# Patient Record
Sex: MALE | Race: BLACK OR AFRICAN AMERICAN | NOT HISPANIC OR LATINO | Employment: FULL TIME | URBAN - METROPOLITAN AREA
[De-identification: names, ages, dates, MRNs, and addresses within clinical notes are randomized per-mention and may not be internally consistent; named-entity substitution may affect disease eponyms.]

---

## 2018-08-03 ENCOUNTER — HOSPITAL ENCOUNTER (INPATIENT)
Facility: HOSPITAL | Age: 48
LOS: 1 days | Discharge: HOME | DRG: 493 | End: 2018-08-04
Attending: EMERGENCY MEDICINE | Admitting: ORTHOPAEDIC SURGERY
Payer: OTHER MISCELLANEOUS

## 2018-08-03 ENCOUNTER — ANESTHESIA (OUTPATIENT)
Dept: SURGERY | Facility: HOSPITAL | Age: 48
DRG: 493 | End: 2018-08-03
Payer: OTHER MISCELLANEOUS

## 2018-08-03 ENCOUNTER — ANESTHESIA EVENT (OUTPATIENT)
Dept: SURGERY | Facility: HOSPITAL | Age: 48
DRG: 493 | End: 2018-08-03
Payer: OTHER MISCELLANEOUS

## 2018-08-03 DIAGNOSIS — T14.90XA TRAUMA: ICD-10-CM

## 2018-08-03 DIAGNOSIS — S82.891A CLOSED FRACTURE OF RIGHT ANKLE, INITIAL ENCOUNTER: Primary | ICD-10-CM

## 2018-08-03 DIAGNOSIS — Z01.818 PREOPERATIVE CLEARANCE: ICD-10-CM

## 2018-08-03 LAB
ALBUMIN SERPL BCP-MCNC: 4 G/DL
ALP SERPL-CCNC: 62 U/L
ALT SERPL W/O P-5'-P-CCNC: 23 U/L
ANION GAP SERPL CALC-SCNC: 4 MMOL/L
AST SERPL-CCNC: 25 U/L
BASOPHILS # BLD AUTO: 0.01 K/UL
BASOPHILS NFR BLD: 0.1 %
BILIRUB SERPL-MCNC: 0.9 MG/DL
BUN SERPL-MCNC: 10 MG/DL
CALCIUM SERPL-MCNC: 8.6 MG/DL
CHLORIDE SERPL-SCNC: 105 MMOL/L
CO2 SERPL-SCNC: 27 MMOL/L
CREAT SERPL-MCNC: 1 MG/DL
DIFFERENTIAL METHOD: ABNORMAL
EOSINOPHIL # BLD AUTO: 0 K/UL
EOSINOPHIL NFR BLD: 0.4 %
ERYTHROCYTE [DISTWIDTH] IN BLOOD BY AUTOMATED COUNT: 11.8 %
EST. GFR  (AFRICAN AMERICAN): >60 ML/MIN/1.73 M^2
EST. GFR  (NON AFRICAN AMERICAN): >60 ML/MIN/1.73 M^2
GLUCOSE SERPL-MCNC: 103 MG/DL
HCT VFR BLD AUTO: 39.9 %
HGB BLD-MCNC: 13.5 G/DL
LYMPHOCYTES # BLD AUTO: 0.9 K/UL
LYMPHOCYTES NFR BLD: 12 %
MCH RBC QN AUTO: 30.1 PG
MCHC RBC AUTO-ENTMCNC: 33.8 G/DL
MCV RBC AUTO: 89 FL
MONOCYTES # BLD AUTO: 0.6 K/UL
MONOCYTES NFR BLD: 8.5 %
NEUTROPHILS # BLD AUTO: 5.9 K/UL
NEUTROPHILS NFR BLD: 79 %
PLATELET # BLD AUTO: 155 K/UL
PMV BLD AUTO: 9.2 FL
POTASSIUM SERPL-SCNC: 4.5 MMOL/L
PROT SERPL-MCNC: 7.5 G/DL
RBC # BLD AUTO: 4.48 M/UL
SODIUM SERPL-SCNC: 136 MMOL/L
WBC # BLD AUTO: 7.44 K/UL

## 2018-08-03 PROCEDURE — 36000708 HC OR TIME LEV III 1ST 15 MIN: Performed by: ORTHOPAEDIC SURGERY

## 2018-08-03 PROCEDURE — 80053 COMPREHEN METABOLIC PANEL: CPT

## 2018-08-03 PROCEDURE — 63600175 PHARM REV CODE 636 W HCPCS: Performed by: ANESTHESIOLOGY

## 2018-08-03 PROCEDURE — 94761 N-INVAS EAR/PLS OXIMETRY MLT: CPT

## 2018-08-03 PROCEDURE — 25000003 PHARM REV CODE 250: Performed by: ANESTHESIOLOGY

## 2018-08-03 PROCEDURE — 93005 ELECTROCARDIOGRAM TRACING: CPT

## 2018-08-03 PROCEDURE — 85025 COMPLETE CBC W/AUTO DIFF WBC: CPT

## 2018-08-03 PROCEDURE — 96375 TX/PRO/DX INJ NEW DRUG ADDON: CPT

## 2018-08-03 PROCEDURE — 0QSG3BZ REPOSITION RIGHT TIBIA WITH MONOPLANAR EXTERNAL FIXATION DEVICE, PERCUTANEOUS APPROACH: ICD-10-PCS | Performed by: ORTHOPAEDIC SURGERY

## 2018-08-03 PROCEDURE — 71000039 HC RECOVERY, EACH ADD'L HOUR: Performed by: ORTHOPAEDIC SURGERY

## 2018-08-03 PROCEDURE — 36000711: Performed by: ORTHOPAEDIC SURGERY

## 2018-08-03 PROCEDURE — 63600175 PHARM REV CODE 636 W HCPCS: Performed by: EMERGENCY MEDICINE

## 2018-08-03 PROCEDURE — 71000033 HC RECOVERY, INTIAL HOUR: Performed by: ORTHOPAEDIC SURGERY

## 2018-08-03 PROCEDURE — 63600175 PHARM REV CODE 636 W HCPCS: Performed by: STUDENT IN AN ORGANIZED HEALTH CARE EDUCATION/TRAINING PROGRAM

## 2018-08-03 PROCEDURE — C1713 ANCHOR/SCREW BN/BN,TIS/BN: HCPCS | Performed by: ORTHOPAEDIC SURGERY

## 2018-08-03 PROCEDURE — 36000710: Performed by: ORTHOPAEDIC SURGERY

## 2018-08-03 PROCEDURE — 25000003 PHARM REV CODE 250: Performed by: NURSE ANESTHETIST, CERTIFIED REGISTERED

## 2018-08-03 PROCEDURE — 37000008 HC ANESTHESIA 1ST 15 MINUTES: Performed by: ORTHOPAEDIC SURGERY

## 2018-08-03 PROCEDURE — 25000003 PHARM REV CODE 250: Performed by: STUDENT IN AN ORGANIZED HEALTH CARE EDUCATION/TRAINING PROGRAM

## 2018-08-03 PROCEDURE — 25000003 PHARM REV CODE 250: Performed by: EMERGENCY MEDICINE

## 2018-08-03 PROCEDURE — 93010 ELECTROCARDIOGRAM REPORT: CPT | Mod: ,,, | Performed by: INTERNAL MEDICINE

## 2018-08-03 PROCEDURE — 37000009 HC ANESTHESIA EA ADD 15 MINS: Performed by: ORTHOPAEDIC SURGERY

## 2018-08-03 PROCEDURE — 11000001 HC ACUTE MED/SURG PRIVATE ROOM

## 2018-08-03 PROCEDURE — 63600175 PHARM REV CODE 636 W HCPCS: Performed by: NURSE ANESTHETIST, CERTIFIED REGISTERED

## 2018-08-03 PROCEDURE — 96374 THER/PROPH/DIAG INJ IV PUSH: CPT

## 2018-08-03 PROCEDURE — 36000709 HC OR TIME LEV III EA ADD 15 MIN: Performed by: ORTHOPAEDIC SURGERY

## 2018-08-03 PROCEDURE — 99285 EMERGENCY DEPT VISIT HI MDM: CPT | Mod: 25

## 2018-08-03 DEVICE — CLAMP COMBINATION / LG EXT FIX: Type: IMPLANTABLE DEVICE | Site: LEG | Status: FUNCTIONAL

## 2018-08-03 DEVICE — ROD CARBON FIBER 11MM X 400MM: Type: IMPLANTABLE DEVICE | Site: LEG | Status: FUNCTIONAL

## 2018-08-03 DEVICE — SCREW SCHANZ 5/60/150 294.784: Type: IMPLANTABLE DEVICE | Site: LEG | Status: FUNCTIONAL

## 2018-08-03 DEVICE — CLAMP LG 4 POSITION MULTI-PIN: Type: IMPLANTABLE DEVICE | Site: LEG | Status: FUNCTIONAL

## 2018-08-03 DEVICE — POST FIXATOR EXTERAL 11MM SS: Type: IMPLANTABLE DEVICE | Site: LEG | Status: FUNCTIONAL

## 2018-08-03 DEVICE — PIN TRANFX EXFIX 6X225: Type: IMPLANTABLE DEVICE | Site: LEG | Status: FUNCTIONAL

## 2018-08-03 RX ORDER — ACETAMINOPHEN 325 MG/1
650 TABLET ORAL EVERY 6 HOURS PRN
Status: DISCONTINUED | OUTPATIENT
Start: 2018-08-03 | End: 2018-08-04 | Stop reason: HOSPADM

## 2018-08-03 RX ORDER — MORPHINE SULFATE 2 MG/ML
3 INJECTION, SOLUTION INTRAMUSCULAR; INTRAVENOUS
Status: DISCONTINUED | OUTPATIENT
Start: 2018-08-03 | End: 2018-08-04

## 2018-08-03 RX ORDER — MUPIROCIN 20 MG/G
1 OINTMENT TOPICAL 2 TIMES DAILY
Status: DISCONTINUED | OUTPATIENT
Start: 2018-08-03 | End: 2018-08-04 | Stop reason: HOSPADM

## 2018-08-03 RX ORDER — MIDAZOLAM HYDROCHLORIDE 1 MG/ML
INJECTION, SOLUTION INTRAMUSCULAR; INTRAVENOUS
Status: DISCONTINUED | OUTPATIENT
Start: 2018-08-03 | End: 2018-08-03

## 2018-08-03 RX ORDER — ROCURONIUM BROMIDE 10 MG/ML
INJECTION, SOLUTION INTRAVENOUS
Status: DISCONTINUED | OUTPATIENT
Start: 2018-08-03 | End: 2018-08-03

## 2018-08-03 RX ORDER — HYDROCODONE BITARTRATE AND ACETAMINOPHEN 5; 325 MG/1; MG/1
1 TABLET ORAL EVERY 4 HOURS PRN
Status: DISCONTINUED | OUTPATIENT
Start: 2018-08-03 | End: 2018-08-04 | Stop reason: HOSPADM

## 2018-08-03 RX ORDER — FENTANYL CITRATE 50 UG/ML
INJECTION, SOLUTION INTRAMUSCULAR; INTRAVENOUS
Status: DISCONTINUED | OUTPATIENT
Start: 2018-08-03 | End: 2018-08-03

## 2018-08-03 RX ORDER — ONDANSETRON 2 MG/ML
4 INJECTION INTRAMUSCULAR; INTRAVENOUS EVERY 12 HOURS PRN
Status: DISCONTINUED | OUTPATIENT
Start: 2018-08-03 | End: 2018-08-04 | Stop reason: HOSPADM

## 2018-08-03 RX ORDER — MORPHINE SULFATE 4 MG/ML
4 INJECTION, SOLUTION INTRAMUSCULAR; INTRAVENOUS
Status: COMPLETED | OUTPATIENT
Start: 2018-08-03 | End: 2018-08-03

## 2018-08-03 RX ORDER — ONDANSETRON 2 MG/ML
4 INJECTION INTRAMUSCULAR; INTRAVENOUS DAILY PRN
Status: DISCONTINUED | OUTPATIENT
Start: 2018-08-03 | End: 2018-08-03 | Stop reason: HOSPADM

## 2018-08-03 RX ORDER — SODIUM CHLORIDE, SODIUM LACTATE, POTASSIUM CHLORIDE, CALCIUM CHLORIDE 600; 310; 30; 20 MG/100ML; MG/100ML; MG/100ML; MG/100ML
INJECTION, SOLUTION INTRAVENOUS CONTINUOUS PRN
Status: DISCONTINUED | OUTPATIENT
Start: 2018-08-03 | End: 2018-08-03

## 2018-08-03 RX ORDER — DIPHENHYDRAMINE HCL 25 MG
25 CAPSULE ORAL EVERY 6 HOURS PRN
Status: DISCONTINUED | OUTPATIENT
Start: 2018-08-03 | End: 2018-08-04 | Stop reason: HOSPADM

## 2018-08-03 RX ORDER — ENOXAPARIN SODIUM 100 MG/ML
40 INJECTION SUBCUTANEOUS EVERY 24 HOURS
Status: DISCONTINUED | OUTPATIENT
Start: 2018-08-03 | End: 2018-08-04 | Stop reason: HOSPADM

## 2018-08-03 RX ORDER — SODIUM CHLORIDE 0.9 % (FLUSH) 0.9 %
3 SYRINGE (ML) INJECTION
Status: DISCONTINUED | OUTPATIENT
Start: 2018-08-03 | End: 2018-08-03 | Stop reason: HOSPADM

## 2018-08-03 RX ORDER — SUCCINYLCHOLINE CHLORIDE 20 MG/ML
INJECTION INTRAMUSCULAR; INTRAVENOUS
Status: DISCONTINUED | OUTPATIENT
Start: 2018-08-03 | End: 2018-08-03

## 2018-08-03 RX ORDER — SODIUM CHLORIDE 0.9 % (FLUSH) 0.9 %
5 SYRINGE (ML) INJECTION
Status: DISCONTINUED | OUTPATIENT
Start: 2018-08-03 | End: 2018-08-04 | Stop reason: HOSPADM

## 2018-08-03 RX ORDER — ACETAMINOPHEN 10 MG/ML
INJECTION, SOLUTION INTRAVENOUS
Status: DISCONTINUED | OUTPATIENT
Start: 2018-08-03 | End: 2018-08-03

## 2018-08-03 RX ORDER — DIPHENHYDRAMINE HYDROCHLORIDE 50 MG/ML
12.5 INJECTION INTRAMUSCULAR; INTRAVENOUS EVERY 6 HOURS PRN
Status: DISCONTINUED | OUTPATIENT
Start: 2018-08-03 | End: 2018-08-03 | Stop reason: HOSPADM

## 2018-08-03 RX ORDER — HYDROCODONE BITARTRATE AND ACETAMINOPHEN 5; 325 MG/1; MG/1
1 TABLET ORAL EVERY 6 HOURS PRN
Status: COMPLETED | OUTPATIENT
Start: 2018-08-03 | End: 2018-08-03

## 2018-08-03 RX ORDER — PROPOFOL 10 MG/ML
VIAL (ML) INTRAVENOUS
Status: DISCONTINUED | OUTPATIENT
Start: 2018-08-03 | End: 2018-08-03

## 2018-08-03 RX ORDER — HYDROMORPHONE HYDROCHLORIDE 2 MG/ML
0.5 INJECTION, SOLUTION INTRAMUSCULAR; INTRAVENOUS; SUBCUTANEOUS EVERY 5 MIN PRN
Status: DISCONTINUED | OUTPATIENT
Start: 2018-08-03 | End: 2018-08-03 | Stop reason: HOSPADM

## 2018-08-03 RX ORDER — ONDANSETRON 2 MG/ML
INJECTION INTRAMUSCULAR; INTRAVENOUS
Status: DISCONTINUED | OUTPATIENT
Start: 2018-08-03 | End: 2018-08-03

## 2018-08-03 RX ORDER — SODIUM CHLORIDE, SODIUM LACTATE, POTASSIUM CHLORIDE, CALCIUM CHLORIDE 600; 310; 30; 20 MG/100ML; MG/100ML; MG/100ML; MG/100ML
1000 INJECTION, SOLUTION INTRAVENOUS
Status: COMPLETED | OUTPATIENT
Start: 2018-08-03 | End: 2018-08-03

## 2018-08-03 RX ORDER — LIDOCAINE HYDROCHLORIDE 20 MG/ML
INJECTION, SOLUTION EPIDURAL; INFILTRATION; INTRACAUDAL; PERINEURAL
Status: DISCONTINUED | OUTPATIENT
Start: 2018-08-03 | End: 2018-08-03

## 2018-08-03 RX ORDER — HYDROMORPHONE HYDROCHLORIDE 2 MG/ML
INJECTION, SOLUTION INTRAMUSCULAR; INTRAVENOUS; SUBCUTANEOUS
Status: DISCONTINUED
Start: 2018-08-03 | End: 2018-08-03 | Stop reason: WASHOUT

## 2018-08-03 RX ORDER — ONDANSETRON 2 MG/ML
4 INJECTION INTRAMUSCULAR; INTRAVENOUS
Status: COMPLETED | OUTPATIENT
Start: 2018-08-03 | End: 2018-08-03

## 2018-08-03 RX ADMIN — ONDANSETRON 4 MG: 2 INJECTION INTRAMUSCULAR; INTRAVENOUS at 06:08

## 2018-08-03 RX ADMIN — MUPIROCIN 1 G: 20 OINTMENT TOPICAL at 09:08

## 2018-08-03 RX ADMIN — ROCURONIUM BROMIDE 30 MG: 10 INJECTION, SOLUTION INTRAVENOUS at 09:08

## 2018-08-03 RX ADMIN — HYDROMORPHONE HYDROCHLORIDE 0.5 MG: 2 INJECTION INTRAMUSCULAR; INTRAVENOUS; SUBCUTANEOUS at 10:08

## 2018-08-03 RX ADMIN — FENTANYL CITRATE 100 MCG: 50 INJECTION, SOLUTION INTRAMUSCULAR; INTRAVENOUS at 09:08

## 2018-08-03 RX ADMIN — MORPHINE SULFATE 3 MG: 2 INJECTION, SOLUTION INTRAMUSCULAR; INTRAVENOUS at 08:08

## 2018-08-03 RX ADMIN — HYDROCODONE BITARTRATE AND ACETAMINOPHEN 1 TABLET: 5; 325 TABLET ORAL at 05:08

## 2018-08-03 RX ADMIN — HYDROCODONE BITARTRATE AND ACETAMINOPHEN 1 TABLET: 5; 325 TABLET ORAL at 11:08

## 2018-08-03 RX ADMIN — HYDROMORPHONE HYDROCHLORIDE 0.5 MG: 2 INJECTION INTRAMUSCULAR; INTRAVENOUS; SUBCUTANEOUS at 11:08

## 2018-08-03 RX ADMIN — MORPHINE SULFATE 4 MG: 4 INJECTION INTRAVENOUS at 06:08

## 2018-08-03 RX ADMIN — PROPOFOL 200 MG: 10 INJECTION, EMULSION INTRAVENOUS at 09:08

## 2018-08-03 RX ADMIN — SUCCINYLCHOLINE CHLORIDE 120 MG: 20 INJECTION, SOLUTION INTRAMUSCULAR; INTRAVENOUS at 09:08

## 2018-08-03 RX ADMIN — SODIUM CHLORIDE, SODIUM LACTATE, POTASSIUM CHLORIDE, AND CALCIUM CHLORIDE 1000 ML: .6; .31; .03; .02 INJECTION, SOLUTION INTRAVENOUS at 07:08

## 2018-08-03 RX ADMIN — ACETAMINOPHEN 1000 MG: 10 INJECTION, SOLUTION INTRAVENOUS at 09:08

## 2018-08-03 RX ADMIN — FENTANYL CITRATE 50 MCG: 50 INJECTION, SOLUTION INTRAMUSCULAR; INTRAVENOUS at 09:08

## 2018-08-03 RX ADMIN — ONDANSETRON 4 MG: 2 INJECTION, SOLUTION INTRAMUSCULAR; INTRAVENOUS at 10:08

## 2018-08-03 RX ADMIN — LIDOCAINE HYDROCHLORIDE 100 MG: 20 INJECTION, SOLUTION EPIDURAL; INFILTRATION; INTRACAUDAL; PERINEURAL at 09:08

## 2018-08-03 RX ADMIN — ENOXAPARIN SODIUM 40 MG: 100 INJECTION SUBCUTANEOUS at 05:08

## 2018-08-03 RX ADMIN — DEXTROSE 2 G: 50 INJECTION, SOLUTION INTRAVENOUS at 09:08

## 2018-08-03 RX ADMIN — MIDAZOLAM 2 MG: 1 INJECTION INTRAMUSCULAR; INTRAVENOUS at 08:08

## 2018-08-03 RX ADMIN — SODIUM CHLORIDE, SODIUM LACTATE, POTASSIUM CHLORIDE, AND CALCIUM CHLORIDE: .6; .31; .03; .02 INJECTION, SOLUTION INTRAVENOUS at 08:08

## 2018-08-03 NOTE — PROGRESS NOTES
Pt arrived to floor from recovery, AAOx4, VSS, NAD noted, no complaints of pain, son at bedside, bed alarm active, safety has been maintained, will continue to monitor.

## 2018-08-03 NOTE — PT/OT/SLP PROGRESS
Occupational Therapy      Patient Name:  Herman Floyd   MRN:  [id number]    2:08 PM: Patient not seen today secondary to nsg stated that the pt was still asleep after surgical procedure  . Will follow-up as able.    Ursula Britton OT  8/3/2018

## 2018-08-03 NOTE — PLAN OF CARE
Pt doing well. Security at BS bringing patient Cell phone and hearing aide. Attempted to call Wife, Grace No answer. Spoke with son Herman, family aware surgery is over and pt is in PACU waiting on an admit bed. Will continue to monitor

## 2018-08-03 NOTE — ANESTHESIA POSTPROCEDURE EVALUATION
Anesthesia Post Evaluation    Patient: Herman Floyd    Procedure(s) Performed: Procedure(s) (LRB):  APPLICATION,EXTERNAL FIXATION DEVICE (Right)    Final Anesthesia Type: general  Patient location during evaluation: PACU  Patient participation: Yes- Able to Participate  Level of consciousness: awake and alert, oriented and awake  Post-procedure vital signs: reviewed and stable  Pain management: adequate  Airway patency: patent  PONV status at discharge: No PONV  Anesthetic complications: no      Cardiovascular status: blood pressure returned to baseline  Respiratory status: unassisted and room air  Hydration status: euvolemic  Follow-up not needed.        Visit Vitals  /60 (BP Location: Right arm, Patient Position: Lying)   Pulse 64   Temp 36.7 °C (98 °F) (Oral)   Resp 14   Ht 6' (1.829 m)   Wt 134.3 kg (296 lb)   SpO2 97%   BMI 40.14 kg/m²       Pain/Andria Score: Pain Assessment Performed: Yes (8/3/2018 11:20 AM)  Presence of Pain: non-verbal indicators present (8/3/2018  1:30 PM)  Pain Rating Prior to Med Admin: 3 (8/3/2018 11:24 AM)  Andria Score: 10 (8/3/2018  1:30 PM)

## 2018-08-03 NOTE — ED NOTES
Contacted [employer] drug testing and gave them Jose's contact info at patient's work   Jose's # is [phone]

## 2018-08-03 NOTE — PLAN OF CARE
Report given to Yasmin, released from PACU. Transport called to bring pt to rm [id number] pt transported to [id number] with RN to floor. VSS NAD noted, Belongings with patient.

## 2018-08-03 NOTE — PT/OT/SLP PROGRESS
Physical Therapy  Eval Attempt    Patient Name:  Herman Floyd   MRN:  [id number]    Patient not seen today secondary to pt sleeping soundly and is unable to awaken to participate in nurses assessment- unsafe and unable to participate in therapy at this time. Will follow-up as able.    Flor Shipley, PT   8/3/2018

## 2018-08-03 NOTE — H&P
[facility]  H&P    Consult:   Right ankle injury    HPI:  47yM fell stepping off semitruck and sustained a right ankle injury.  Last meal was yesterday, drank water at 330am.    PMH:   Past Medical History:   Diagnosis Date    Deafness in right ear    Obesity    PSH:    has a past surgical history that includes Finger fracture surgery.    SOCIAL:    reports that he has never smoked. He does not have any smokeless tobacco history on file. He reports that he does not drink alcohol or use drugs.    MEDS:   No current facility-administered medications on file prior to encounter.      No current outpatient prescriptions on file prior to encounter.       ALLERGY:  Allergies as of 08/03/2018    (No Known Allergies)       Vitals:  Vitals:    08/03/18 0532   BP: (!) 159/67   Pulse: 87   Resp: 18   Temp: 98 °F (36.7 °C)       Labs:    Recent Labs  Lab 08/03/18  0658   WBC 7.44   HGB 13.5*   HCT 39.9*      MCV 89   RDW 11.8      K 4.5      CO2 27   BUN 10   CREATININE 1.0      PROT 7.5   ALBUMIN 4.0   BILITOT 0.9   AST 25   ALKPHOS 62   ALT 23       Coags: No results found for: INR, PROTIME, APTT      Exam:  NAD, A+Ox3  RRR  No increased WOB    RLE:  Swelling  Skin intact anteriorly  Motor: + ehl, fhl, ta, gs  SILT t/s/s/sp/dp  2+ DP, wwp    Radiology:  R ankle:  Comminuted pilon fracture    Impression:  47yM with right comminuted pilon fracture    Plan:  - NPO, IVF  - To OR for Ex fix  - pain control  - NWB RLE    I met and examined the patient. I agree with findings outlined by the resident. I have discussed with him the severity of the pilon fracture and have recommended spanning external fixation. He has demonstrated an understanding and has given written informed consent to proceed with surgery this morning.

## 2018-08-03 NOTE — ED NOTES
47y M to ED via  EMS. Pt with c/o right ankle pain and swelling after falling while stepping out of work vehicle. Significant swelling and deformity noted to right ankle on arrival to ED. Splint to ankle intact. Pt received 50mcg Fentanyl in route to ED.

## 2018-08-03 NOTE — BRIEF OP NOTE
[facility]  Brief Op Note    [facility] Brief Operative Note    Patient information:  Herman Floyd  [id number]      Date of Surgery:  8/3/2018      Pre-op Diagnosis:   1. R comminuted pilon fracture  2. R lateral malleolus fracture       Post-op Diagnosis:   1. R comminuted pilon fracture  2. R lateral malleolus fracture    Procedure(s):   1. External fixator application to right ankle    Anesthesia: General    Staff Attending/Surgeon: Dr. Simms    Assistant Surgeon(s):   1. Joey Espino MD    Estimated Blood Loss: Minimal           Drain: None            Specimens: None    Implants: Synthes large ex fix    Complications: None    Findings: Consistent with diagnosis    Tourniquet time: none           Disposition: awakened from anesthesia, extubated and taken to the recovery room in a stable condition, having suffered no apparent untoward event.           Condition: doing well without problems      Technique: See op report    I agree with findings outlined by the resident.

## 2018-08-03 NOTE — ANESTHESIA PREPROCEDURE EVALUATION
08/03/2018  Herman Floyd is a 47 y.o., male fell today injuring his right foot.  PMH - [place].  NPO since 3 AM today H2O only.  NAAC.  NKDA.  Chest clear.    Anesthesia Evaluation    I have reviewed the Patient Summary Reports.    I have reviewed the Nursing Notes.   I have reviewed the Medications.     Review of Systems  Anesthesia Hx:  No problems with previous Anesthesia   Denies Personal Hx of Anesthesia complications.   Social:  Non-Smoker        Physical Exam  General:  Well nourished    Airway/Jaw/Neck:  Airway Findings: Tongue: Normal General Airway Assessment: Adult  Mallampati: III  Improves to II with phonation.  TM Distance: Normal, at least 6 cm  Jaw/Neck Findings:  Neck ROM: Normal ROM            Mental Status:  Mental Status Findings:  Alert and Oriented         Anesthesia Plan  Type of Anesthesia, risks & benefits discussed:  Anesthesia Type:  general  Patient's Preference:   Intra-op Monitoring Plan:   Intra-op Monitoring Plan Comments:   Post Op Pain Control Plan:   Post Op Pain Control Plan Comments:   Induction:   IV  Beta Blocker:  Patient is not currently on a Beta-Blocker (No further documentation required).       Informed Consent: Patient understands risks and agrees with Anesthesia plan.  Questions answered. Anesthesia consent signed with patient.  ASA Score: 2     Day of Surgery Review of History & Physical:  There are no significant changes.          Ready For Surgery From Anesthesia Perspective.

## 2018-08-03 NOTE — ED PROVIDER NOTES
Encounter Date: 8/3/2018    SCRIBE #1 NOTE: I, Juanita Akins, am scribing for, and in the presence of,  Dr. Vargas. I have scribed the entire note.     I, Dr. Cachorro Vargas MD, personally performed the services described in this documentation. All medical record entries made by the scribe were at my direction and in my presence.  I have reviewed the chart and agree that the record reflects my personal performance and is accurate and complete. Cachorro Vargas MD.    History     Chief Complaint   Patient presents with    Ankle Pain     To ER per EMS with c/o pain and swelling to right ankle.  pt states that he accidently stepped off the back of his 18 frankel trailer while trying to fix it.  right ankle with swelling and pain to lateral and anterior ankle.  splint in place per EMS     CHIEF COMPLAINT: Patient presents with: ankle pain       HISTORY OF PRESENT ILLNESS: Herman Floyd who is a 47 y.o. presents to the emergency department today with complaint of right ankle pain. He reports onset of symptoms was about 1 hr ago. The patient notes he slipped from the back of his truck trying to attach a trailer. He states he fell about 3 ft to the ground. He denies any LOC or head injury. The patient reports his foot twisted inward. He describes the pain as sharp. The patient has associated swelling to the ankle but denies open wounds, numbness, tingling or weakness in the right leg. He denies use of any medications for the symptoms. The patient was splinted by EMS prior to arrival. He also received 50 mg of Fentanyl by EMS. He is comfortable at this time.    ALLERGIES REVIEWED  MEDICATIONS REVIEWED  PMH/PSH/SOC/FH REVIEWED     The history is provided by the patient.    Nursing/Ancillary staff note reviewed.          The history is provided by the patient.     Review of patient's allergies indicates:  No Known Allergies  Past Medical History:   Diagnosis Date    Deafness in right ear      Past Surgical History:    Procedure Laterality Date    FINGER FRACTURE SURGERY       History reviewed. No pertinent family history.  Social History   Substance Use Topics    Smoking status: Never Smoker    Smokeless tobacco: Not on file    Alcohol use No     Review of Systems   Constitutional: Negative for activity change, chills, diaphoresis and fever.   HENT: Negative for congestion, drooling, ear pain, rhinorrhea, sneezing, sore throat and trouble swallowing.    Eyes: Negative for pain.   Respiratory: Negative for cough, chest tightness, shortness of breath, wheezing and stridor.    Cardiovascular: Negative for chest pain, palpitations and leg swelling.   Gastrointestinal: Negative for abdominal distention, abdominal pain, constipation, diarrhea, nausea and vomiting.   Genitourinary: Negative for difficulty urinating, dysuria, frequency and urgency.   Musculoskeletal: Positive for joint swelling (right ankle). Negative for arthralgias, back pain, myalgias, neck pain and neck stiffness.        Right ankle pain   Skin: Negative for pallor, rash and wound.   Neurological: Negative for dizziness, syncope, weakness, light-headedness, numbness and headaches.   All other systems reviewed and are negative.      Physical Exam     Initial Vitals [08/03/18 0532]   BP Pulse Resp Temp SpO2   (!) 159/67 87 18 98 °F (36.7 °C) 96 %      MAP       --         Physical Exam    Nursing note and vitals reviewed.  Constitutional: He appears well-developed and well-nourished. He is not diaphoretic. No distress.   HENT:   Head: Normocephalic and atraumatic.   Nose: Nose normal.   Mouth/Throat: Oropharynx is clear and moist.   Eyes: Conjunctivae and EOM are normal. Pupils are equal, round, and reactive to light. No scleral icterus.   Neck: Normal range of motion. Neck supple. No JVD present.   Cardiovascular: Normal rate, regular rhythm, normal heart sounds and intact distal pulses. Exam reveals no gallop and no friction rub.    No murmur  heard.  Pulmonary/Chest: Breath sounds normal. No stridor. No respiratory distress. He has no wheezes. He exhibits no tenderness.   Abdominal: Soft. Bowel sounds are normal. He exhibits no distension and no mass. There is no tenderness. There is no rebound and no guarding.   Musculoskeletal: Normal range of motion. He exhibits edema and tenderness.        Cervical back: Normal.        Thoracic back: Normal.        Lumbar back: Normal.   RLE: Swelling and deformity noted to the ankle. But soft, no compartment syndrome appreciated. TTP along the ankle. 2+ DP pulse. Can wiggle toes. 3 pinpoint abrasions to medial malleolus. No lacerations or open wounds   Lymphadenopathy:     He has no cervical adenopathy.   Neurological: He is alert and oriented to person, place, and time. He has normal strength. No cranial nerve deficit.   Skin: Skin is warm and dry. No rash noted. No pallor.   Psychiatric: He has a normal mood and affect. Thought content normal.         ED Course   Procedures  Labs Reviewed   COMPREHENSIVE METABOLIC PANEL - Abnormal; Notable for the following:        Result Value    Calcium 8.6 (*)     Anion Gap 4 (*)     All other components within normal limits   CBC W/ AUTO DIFFERENTIAL - Abnormal; Notable for the following:     RBC 4.48 (*)     Hemoglobin 13.5 (*)     Hematocrit 39.9 (*)     Lymph # 0.9 (*)     Gran% 79.0 (*)     Lymph% 12.0 (*)     All other components within normal limits     EKG Readings: (Independently Interpreted)   Normal sinus rhythm at 79 bpm. Normal axis. Normal intervals. No ST elevations. No STEMI       Imaging Results          X-Ray Ankle Complete Right (Final result)  Result time 08/03/18 06:38:12    Final result by Reinaldo Moreno MD (08/03/18 06:38:12)                 Impression:      1. Comminuted, displaced pilon and distal fibular fractures with associated tibiotalar subluxation/dislocation.      Electronically signed by: Reinaldo Moreno  MD  Date:    08/03/2018  Time:    06:38             Narrative:    EXAMINATION:  XR ANKLE COMPLETE 3 VIEW RIGHT    CLINICAL HISTORY:  Injury, unspecified, initial encounter    TECHNIQUE:  AP, lateral, and oblique images of the right ankle were performed.    COMPARISON:  None    FINDINGS:  Comminuted, displaced pilon and distal fibular fractures are identified with associated dislocation of the tibiotalar joint.  There is surrounding soft tissue swelling.                                 Medical Decision Making:   Initial Assessment:   This is 47 y.o male who presents with right ankle pain s/p fall from approximately 3 ft. On exam there is swelling and tenderness to the ankle. Intact DP pulses. Will obtain Xray and give pain control then reassess  Differential Diagnosis:   Strain, sprain, fracture, dislocation.   Independently Interpreted Test(s):   I have ordered and independently interpreted EKG Reading(s) - see prior notes  Clinical Tests:   Lab Tests: Ordered and Reviewed  Radiological Study: Ordered and Reviewed  Medical Tests: Ordered and Reviewed  ED Management:  6:50AM I discussed the xray findings with the pt, he understands that he needs surgical interventions.     6:52 AM Paged Orthopedics    6:57 AM Case discussed with Dr. Sotelo of Orthopedics, will review Xray and give call back.     7:05 AM Dr. Sotelo agrees pt needs surgery, will take the patient to surgery.     7:08 AM Patient's pulse is still 2+, pain is controlled                       Clinical Impression:     1. Closed fracture of right ankle, initial encounter    2. Trauma    3. Preoperative clearance                             Cachorro Vargas MD  08/03/18 0851

## 2018-08-04 VITALS
SYSTOLIC BLOOD PRESSURE: 119 MMHG | BODY MASS INDEX: 40.36 KG/M2 | RESPIRATION RATE: 20 BRPM | HEART RATE: 79 BPM | TEMPERATURE: 99 F | WEIGHT: 298 LBS | OXYGEN SATURATION: 96 % | DIASTOLIC BLOOD PRESSURE: 56 MMHG | HEIGHT: 72 IN

## 2018-08-04 PROCEDURE — G8979 MOBILITY GOAL STATUS: HCPCS | Mod: CI

## 2018-08-04 PROCEDURE — 25000003 PHARM REV CODE 250: Performed by: STUDENT IN AN ORGANIZED HEALTH CARE EDUCATION/TRAINING PROGRAM

## 2018-08-04 PROCEDURE — 63600175 PHARM REV CODE 636 W HCPCS: Performed by: STUDENT IN AN ORGANIZED HEALTH CARE EDUCATION/TRAINING PROGRAM

## 2018-08-04 PROCEDURE — G8988 SELF CARE GOAL STATUS: HCPCS | Mod: CI

## 2018-08-04 PROCEDURE — G8987 SELF CARE CURRENT STATUS: HCPCS | Mod: CK

## 2018-08-04 PROCEDURE — G8978 MOBILITY CURRENT STATUS: HCPCS | Mod: CK

## 2018-08-04 PROCEDURE — 97535 SELF CARE MNGMENT TRAINING: CPT

## 2018-08-04 PROCEDURE — 94761 N-INVAS EAR/PLS OXIMETRY MLT: CPT

## 2018-08-04 PROCEDURE — 97165 OT EVAL LOW COMPLEX 30 MIN: CPT

## 2018-08-04 PROCEDURE — 97161 PT EVAL LOW COMPLEX 20 MIN: CPT

## 2018-08-04 RX ORDER — MORPHINE SULFATE 2 MG/ML
3 INJECTION, SOLUTION INTRAMUSCULAR; INTRAVENOUS
Status: DISCONTINUED | OUTPATIENT
Start: 2018-08-04 | End: 2018-08-04

## 2018-08-04 RX ORDER — FAMOTIDINE 20 MG/1
20 TABLET, FILM COATED ORAL 2 TIMES DAILY
Qty: 56 TABLET | Refills: 0 | Status: SHIPPED | OUTPATIENT
Start: 2018-08-04 | End: 2018-09-24 | Stop reason: SDUPTHER

## 2018-08-04 RX ORDER — OXYCODONE AND ACETAMINOPHEN 10; 325 MG/1; MG/1
1 TABLET ORAL EVERY 6 HOURS PRN
Qty: 28 TABLET | Refills: 0 | Status: SHIPPED | OUTPATIENT
Start: 2018-08-04 | End: 2018-08-11

## 2018-08-04 RX ORDER — NAPROXEN SODIUM 220 MG/1
81 TABLET, FILM COATED ORAL DAILY
Qty: 56 TABLET | Refills: 0 | Status: SHIPPED | OUTPATIENT
Start: 2018-08-04 | End: 2019-09-26

## 2018-08-04 RX ORDER — MORPHINE SULFATE 2 MG/ML
3 INJECTION, SOLUTION INTRAMUSCULAR; INTRAVENOUS
Status: DISCONTINUED | OUTPATIENT
Start: 2018-08-04 | End: 2018-08-04 | Stop reason: HOSPADM

## 2018-08-04 RX ADMIN — ENOXAPARIN SODIUM 40 MG: 100 INJECTION SUBCUTANEOUS at 08:08

## 2018-08-04 RX ADMIN — HYDROCODONE BITARTRATE AND ACETAMINOPHEN 1 TABLET: 5; 325 TABLET ORAL at 05:08

## 2018-08-04 RX ADMIN — MORPHINE SULFATE 3 MG: 2 INJECTION, SOLUTION INTRAMUSCULAR; INTRAVENOUS at 02:08

## 2018-08-04 RX ADMIN — HYDROCODONE BITARTRATE AND ACETAMINOPHEN 1 TABLET: 5; 325 TABLET ORAL at 01:08

## 2018-08-04 RX ADMIN — MORPHINE SULFATE 3 MG: 2 INJECTION, SOLUTION INTRAMUSCULAR; INTRAVENOUS at 08:08

## 2018-08-04 RX ADMIN — HYDROCODONE BITARTRATE AND ACETAMINOPHEN 1 TABLET: 5; 325 TABLET ORAL at 08:08

## 2018-08-04 RX ADMIN — MUPIROCIN 1 G: 20 OINTMENT TOPICAL at 08:08

## 2018-08-04 NOTE — OP NOTE
[facility]   Operative Note    Patient Name:  Herman Floyd  [id number]    Date of Surgery:  8/3/18    Pre-op Diagnosis:   1. Closed, comminuted, right pilon fracture       Post-op Diagnosis:   1. Closed, comminuted, right pilon fracture    Procedure(s):   1. Right ankle spanning external fixator application    Attending:    Bradley Simms MD    Assistant:   Joey Espino MD    Anesthesia:   General endotracheal anesthesia    Estimated Blood Loss: Minimal     Complications:  None    Implants:   Synthes large ex fix set    Findings:  Consistent with diagnosis           Drain: None                  Specimens: None    Indications:    47yM, , slipped off his truck and fell injuring his RLE.  He had immediate swelling, deformity, and inability to bear weight on the RLE.  He was brought to [facility] ED for evaluation.  He was found to have sustained a closed, comminuted right pilon fracture.  Closed manipulation and external fixator application was recommended to the patient to temporized the fracture pending definitive fixation after his swelling improves.  The patient agreed with the plan and provided informed consent to proceed.    Procedure in Detail:  Mr. Floyd was identified in the pre-operative area.  His identity was confirmed as well as the operative site and the procedure being performed. Any last minute questions were answered to his satisfaction.  He was taken to the OR, positioned supine on the OR table, and general endotracheal anesthesia was induced.  The right lower extremity was prepped and draped in normal sterile fashion.  A timeout was performed. All were in agreement so we proceeded with surgery.    The proximal extent of the fracture site was identified with fluoroscopic imaging.  A 5mm incision was made over the anteromedial aspect of the tibia, well proximal to the proximal extent of the fracture.  A 5mm, self-drilling bing pin was placed anterior to posterior in the  tibia at this site.  A second incision was made proximal relative to the first.  A second 5mm bing pin was placed A-to-P into the tibia through this incision.  A connector was placed between the two tibial bing pins. Two outriggers were placed on either side of the connector.     A 5mm transfix pin was placed from medial to lateral through a 5mm incision over the medial aspect of the calcaneal tuberosity.  The calcaneal transfix pin was placed under fluoroscopic guidance.  Two pin-to-bar connectors were applied to the transfix pin, with one on either side of the foot.  A bar-to-bar connector was placed on each outrigger.  Bars were placed spanning between the transfix pin connectors and the outrigger pin connectors.  The fracture was reduced under fluoroscopy and the connectors were fully tightened.  Satisfactory fracture alignment was confirmed under fluoroscopy.      Sterile dressing was applied with xeroform, kerlex, and ACE.  The patient was awoken from anesthesia and taken to recovery in stable condition.           Disposition: awakened from anesthesia, extubated and taken to the recovery room in a stable condition, having suffered no apparent untoward event.           Condition: doing well without problems    Post-op Plan:   Provisional fixation with ex fix for 2-3 weeks before definitive fixation after swelling has resolved.  NWFAUSTINO RLE. CT RLE.

## 2018-08-04 NOTE — DISCHARGE INSTRUCTIONS
Elevate R ankle above level of heart.  Ice extremity R ankle.  Do not bear weight on right lower extremity.  Keep ex fix clean and dry. Leave dressing on until follow up.  Take aspirin as prescribed to prevent blood clot.

## 2018-08-04 NOTE — PLAN OF CARE
Workman's Comp claim number:           [facility]# [id number]    No HH ordered. DME- BSC, RW, WC, TTB ordered. [place] informed pt that insurance will not pay for TTB, also informed pt that insurance would only pay for WC or RW not both. Pt stated that he had his grandmother's Rollator he could use and requested the WC.    Per [facility], Porfirio Carbone, [phone], OK to pull from DME closet, signed paper work delivered to DME closet. DME delivered to pt bedside. Floor nurse informed.    Pt's nurse will go over medications/signs and symptoms prior to discharge       08/04/18 1331   Final Note   Assessment Type Final Discharge Note   Discharge Disposition Home   What phone number can be called within the next 1-3 days to see how you are doing after discharge? [phone]   Hospital Follow Up  Appt(s) scheduled? No  (Offices closed for weekend. Patient to call MD office for appointment time.)   Right Care Referral Info   Post Acute Recommendation No Care     Grace Ramos, RN Transitional Navigator  [phone]

## 2018-08-04 NOTE — PLAN OF CARE
Problem: Pain, Acute (Adult)  Goal: Acceptable Pain Control/Comfort Level  Patient will demonstrate the desired outcomes by discharge/transition of care.  Assess for pain and medicate as needed. Reevaluate for effectiveness.

## 2018-08-04 NOTE — PLAN OF CARE
Problem: Occupational Therapy Goal  Goal: Occupational Therapy Goal  Goals to be met by: 9/4/2018    Patient will increase functional independence with ADLs by performing:    LE Dressing with Minimal Assistance.  Grooming while standing with Set-up Assistance.  Toileting from toilet with Modified Turner for hygiene and clothing management.   Sitting at edge of bed x10 minutes with Supervision.  Rolling to Bilateral with Turner.   Supine to sit with Turner.  Stand pivot transfers with Modified Turner.  Toilet transfer to toilet with Modified Turner.  Upper extremity exercise program x10 reps per handout, with independence.    Outcome: Ongoing (interventions implemented as appropriate)  Pt will continue to benefit from acute OT services to address deficits in functional mobility and self care. Recommending d/c home with HHOT/PT and BSC, TTB, RW, w/c.

## 2018-08-04 NOTE — PT/OT/SLP EVAL
Physical Therapy Evaluation    Patient Name:  Herman Floyd   MRN:  [id number]    Recommendations:     Discharge Recommendations:  home health PT, home health OT   Discharge Equipment Recommendations: bedside commode, bath bench, walker, rolling, wheelchair   Barriers to discharge: Inaccessible home and Decreased caregiver support    Assessment:     Herman Floyd is a 47 y.o. male admitted with a medical diagnosis of Closed fracture of right ankle.  He presents with the following impairments/functional limitations:  weakness, impaired endurance, impaired self care skills, impaired balance, gait instability, impaired functional mobilty, decreased coordination, decreased upper extremity function, decreased lower extremity function, pain, decreased safety awareness, impaired coordination, impaired fine motor, impaired skin, edema, orthopedic precautions .    Rehab Prognosis:  Good; patient would benefit from acute skilled PT services to address these deficits and reach maximum level of function.      Recent Surgery: Procedure(s) (LRB):  APPLICATION,EXTERNAL FIXATION DEVICE (Right) 1 Day Post-Op    Plan:     During this hospitalization, patient to be seen daily to address the above listed problems via gait training, therapeutic activities, therapeutic exercises, wheelchair management/training  · Plan of Care Expires:  08/18/18   Plan of Care Reviewed with: patient    Subjective     Communicated with nsg prior to session.  Patient found HOB elevated upon PT entry to room, agreeable to evaluation.      Chief Complaint: Pain  Patient comments/goals: PLOF  Pain/Comfort:  · Pain Rating 1:  (Unable to rate, Hanna Romero 6/10 with mobility)  · Location 1:  (R leg)  · Pain Addressed 1: Pre-medicate for activity, Reposition, Distraction, Cessation of Activity, Nurse notified  · Pain Rating Post-Intervention 1: 0/10    Patients cultural, spiritual, Baptist conflicts given the current situation: none    Living  Environment:  Pt lives with his spouse in a town house with 14 steps within home split railing  Prior to admission, patients level of function was Independent.  Patient has the following equipment: none.  DME owned (not currently used): none.  Upon discharge, patient will have assistance from Spouse, but works daily.    Objective:     Patient found with: bed alarm     General Precautions: Standard, fall   Orthopedic Precautions:RLE non weight bearing   Braces: N/A     Exams:  · Cognitive Exam:  Patient is oriented to Person, Place, Time and Situation and follows 100% of 2-step commands   · Fine Motor Coordination: impaired R Le  · Gross Motor Coordination:  Mildly impaired 2/2 body habitus  · Postural Exam:  Patient presented with the following abnormalities:    · -       Rounded shoulders  · -       Forward head  · -       Pendulous abdomen  · Sensation:    · -       Intact  light/touch B LE's  · Skin Integrity/Edema:      · -       Skin integrity: R LE with external fixator in place  · RLE ROM: N/T  · RLE Strength: N/T  · LLE ROM: WFL  · LLE Strength: WFL    Functional Mobility:  · Bed Mobility:     · Scooting: contact guard assistance  · Supine to Sit: minimum assistance  · Transfers:     · Sit to Stand:  minimum assistance with RW   · Gait: Pt ambulated approx 8' with RW adn CGA  · Balance: Fair+ sit, Fair stand    AM-PAC 6 CLICK MOBILITY  Total Score:17       Therapeutic Activities and Exercises:   eval on ly    Patient left HOB elevated with all lines intact, call button in reach, bed alarm on, nsg notified and friend present.    GOALS:    Physical Therapy Goals        Problem: Physical Therapy Goal    Goal Priority Disciplines Outcome Goal Variances Interventions   Physical Therapy Goal     PT/OT, PT Ongoing (interventions implemented as appropriate)     Description:  Goals to be met by: 2018     Patient will increase functional independence with mobility by performin. Supine to sit with Stand-by  Assistance  2. Sit to stand transfer with Stand-by Assistance  3. Bed to chair transfer with Stand-by Assistance   4. Gait  x 25-5- feet with Supervision using Rolling Walker.   5. Wheelchair propulsion x150 feet with Modified [place] using bilateral uppper extremities  6. Lower extremity exercise program x10-15 reps per handout, with independence                      History:     Past Medical History:   Diagnosis Date    Deafness in right ear        Past Surgical History:   Procedure Laterality Date    FINGER FRACTURE SURGERY         Clinical Decision Making:     History  Co-morbidities and personal factors that may impact the plan of care Examination  Body Structures and Functions, activity limitations and participation restrictions that may impact the plan of care Clinical Presentation   Decision Making/ Complexity Score   Co-morbidities:   [] Time since onset of injury / illness / exacerbation  [] Status of current condition  []Patient's cognitive status and safety concerns    [] Multiple Medical Problems (see med hx)  Personal Factors:   [] Patient's age  [] Prior Level of function   [] Patient's home situation (environment and family support)  [] Patient's level of motivation  [] Expected progression of patient      HISTORY:(criteria)    [] 28903 - no personal factors/history    [] 73176 - has 1-2 personal factor/comorbidity     [] 22262 - has >3 personal factor/comorbidity     Body Regions:  [] Objective examination findings  [] Head     []  Neck  [] Trunk   [] Upper Extremity  [] Lower Extremity    Body Systems:  [] For communication ability, affect, cognition, language, and learning style: the assessment of the ability to make needs known, consciousness, orientation (person, place, and time), expected emotional /behavioral responses, and learning preferences (eg, learning barriers, education  needs)  [] For the neuromuscular system: a general assessment of gross coordinated movement (eg, balance,  gait, locomotion, transfers, and transitions) and motor function  (motor control and motor learning)  [] For the musculoskeletal system: the assessment of gross symmetry, gross range of motion, gross strength, height, and weight  [] For the integumentary system: the assessment of pliability(texture), presence of scar formation, skin color, and skin integrity  [] For cardiovascular/pulmonary system: the assessment of heart rate, respiratory rate, blood pressure, and edema     Activity limitations:    [] Patient's cognitive status and saf ety concerns          [] Status of current condition      [] Weight bearing restriction  [] Cardiopulmunary Restriction    Participation Restrictions:   [] Goals and goal agreement with the patient     [] Rehab potential (prognosis) and probable outcome      Examination of Body System: (criteria)    [] 08722 - addressing 1-2 elements    [] 71574 - addressing a total of 3 or more elements     [] 76672 -  Addressing a total of 4 or more elements         Clinical Presentation: (criteria)  Choose one     On examination of body system using standardized tests and measures patient presents with (CHOOSE ONE) elements from any of the following: body structures and functions, activity limitations, and/or participation restrictions.  Leading to a clinical presentation that is considered (CHOOSE ONE)                              Clinical Decision Making  (Eval Complexity):  Choose One     Time Tracking:     PT Received On: 08/04/18  PT Start Time: 1055     PT Stop Time: 1112  PT Total Time (min): 17 min     Billable Minutes: Evaluation 17      Deb Price, PT  08/04/2018

## 2018-08-04 NOTE — PLAN OF CARE
Problem: Patient Care Overview  Goal: Plan of Care Review  C/o pain to Rt foot during shift. Received morphine 3mg x2  With moderate relief. Rt foot warm, pedal pulse +2. External fixation in place with Ace bandage. No drainage noted. Asleep at present, resp 16/min easy and nonlabored.. Will continue to monitor.

## 2018-08-04 NOTE — PLAN OF CARE
Problem: Skin and Soft Tissue Infection (Adult)  Goal: Signs and Symptoms of Listed Potential Problems Will be Absent, Minimized or Managed (Skin and Soft Tissue Infection)  Signs and symptoms of listed potential problems will be absent, minimized or managed by discharge/transition of care (reference Skin and Soft Tissue Infection (Adult) CPG).  Monitor for adequate circulation to rt foot/ankle, any signs of infection. Will continue to monitor.

## 2018-08-04 NOTE — PLAN OF CARE
Chief Complaint   Patient presents with    Ankle Pain       To ER per EMS with c/o pain and swelling to right ankle.  pt states that he accidently stepped off the back of his 18 frankel trailer while trying to fix it.  right ankle with swelling and pain to lateral and anterior ankle.  splint in place per EMS     Pt previously independent with ADLs, no HH/HME. Injured while at work, covered by workman's comp, [place] attempting to contact pt's employer, [employer], Suraj Rutherford [phone], for case number.    Workman's Comp claim number:           [id number]       08/04/18 1223   Discharge Assessment   Assessment Type Discharge Planning Assessment   Confirmed/corrected address and phone number on facesheet? Yes   Assessment information obtained from? Patient   Expected Length of Stay (days) 2   Communicated expected length of stay with patient/caregiver yes   Prior to hospitilization cognitive status: Alert/Oriented   Prior to hospitalization functional status: Independent   Current cognitive status: Alert/Oriented   Current Functional Status: Needs Assistance;Assistive Equipment   Facility Arrived From: (home)   Lives With spouse  (wife: Grace Floyd [phone])   Able to Return to Prior Arrangements yes   Is patient able to care for self after discharge? No   Who are your caregiver(s) and their phone number(s)? wife: Grace Floyd [phone]   Patient's perception of discharge disposition home or selfcare   Readmission Within The Last 30 Days no previous admission in last 30 days   Patient currently being followed by outpatient case management? No   Patient currently receives any other outside agency services? No   Equipment Currently Used at Home none   Do you have any problems affording any of your prescribed medications? TBD  (pt only covered by workman's comp)   Is the patient taking medications as prescribed? yes   Does the patient have transportation home? Yes   Transportation Available  family or friend will provide   Dialysis Name and Scheduled days N/A   Does the patient receive services at the [facility]mad[facility] Clinic? No   Discharge Plan A Home   Discharge Plan B Home with family   Patient/Family In Agreement With Plan yes     Grace Ramos RN Transitional Navigator  [phone]

## 2018-08-04 NOTE — DISCHARGE SUMMARY
Physician Discharge Summary     Patient ID:  [id number]    Admit date: 8/3/2018    Discharge date: 8/4/2018    Admitting Physician: Bradley Simms MD    Discharge Physician: same    Admission Diagnoses:   1. Closed, comminuted right pilon fracture       Discharge Diagnoses: same    Admission Condition: poor    Discharged Condition: good    Indication for Admission: operative management of above conditions    Hospital Course:   Herman Floyd was admitted to [facility] by Dr. Simms and taken to the OR for a ex fix application to the RLE.  See op note for details.  he tolerated the procedure well and post op course was benign.  He progressed well with PT.  Patient was discharged with proper instructions.    Consults: None    Treatments: surgery    Disposition: Home or Self Care    Patient Instructions:     Discharge Medications  Refer to Discharge Medication List    Activity: NWB RLE  Diet: regular diet  Wound Care: keep wound clean and dry    Discussed plan with patient and answered questions: Yes    Signed:  Joey Espino MD

## 2018-08-04 NOTE — PT/OT/SLP EVAL
Occupational Therapy   Evaluation    Name: Herman Floyd  MRN: [id number]  Admitting Diagnosis:  Closed fracture of right ankle 1 Day Post-Op    Recommendations:     Discharge Recommendations: home health OT, home health PT  Discharge Equipment Recommendations:  walker, rolling, bath bench, bedside commode, wheelchair  Barriers to discharge:  None    History:     Occupational Profile:  Living Environment: Lives with spouse in [facility], [address], split HR, t/s combo.   Previous level of function: Independent with self care and functional mobility  Roles and Routines: Works, drives  Equipment Owned:  none  Assistance upon Discharge: Family    Past Medical History:   Diagnosis Date    Deafness in right ear        Past Surgical History:   Procedure Laterality Date    FINGER FRACTURE SURGERY         Subjective     Chief Complaint: Pt c/o 10/10 pain in RLE  Patient/Family stated goals: To return to PLOF without pain  Communicated with: yvette prior to session.  Pain/Comfort:  · Pain Rating 1: 10/10  · Location - Side 1: Right  · Location 1: ankle  · Pain Addressed 1: Reposition, Distraction, Cessation of Activity, Pre-medicate for activity, Nurse notified  · Pain Rating Post-Intervention 1:  (did not rate but c/o significant pain at end of session)    Patients cultural, spiritual, Evangelical conflicts given the current situation:      Objective:     Patient found with: bed alarm, peripheral IV    General Precautions: Standard, fall   Orthopedic Precautions:N/A   Braces: N/A     Occupational Performance:    Bed Mobility:    · Patient completed Rolling/Turning to Left with  minimum assistance and moderate assistance to manage RLE  · Patient completed Scooting/Bridging with minimum assistance to manage RLE  · Patient completed Supine to Sit with minimum assistance to manage RLE  · Patient completed Sit to Supine with moderate assistance to manage RLE    Functional Mobility/Transfers:  · Patient completed Sit <> Stand Transfer  with contact guard assistance  with  rolling walker   · Patient completed Toilet Transfer Stand Pivot technique with minimum assistance with  rolling walker  · Functional Mobility: Seated balance Fair+, dynamic standing balance fair but with inc pain and pt reports exhaustion after ambulating 8 ft to toilet.    Activities of Daily Living:  · Upper Body Dressing: supervision to don gown as robe seated EOB  · Lower Body Dressing: maximal assistance to don undergarment on RLE  · Toileting: contact guard assistance and minimum assistance for simulated toileting scenario at toilet to manage clothing and hygeine    Cognitive/Visual Perceptual:  Cognitive/Psychosocial Skills:     -       Oriented to: Person, Place, Time and Situation   -       Follows Commands/attention:Follows multistep  commands  -       Communication: clear/fluent  -       Memory: No Deficits noted  -       Safety awareness/insight to disability: intact   -       Mood/Affect/Coping skills/emotional control: Appropriate to situation    Physical Exam:  Skin integrity: Wound RLE  Edema:  Moderate RLE  Sensation:    -       Intact  Motor Planning: -       WFL  Upper Extremity Range of Motion:  WFL  Upper Extremity Strength:WFL   Strength:WFL  Fine Motor Coordination: WFL  Gross motor coordination: WFL    Patient left HOB elevated with all lines intact, call button in reach, bed alarm on, nsg notified and case management and friends present    [facility] 6 Click:  [facility] Total Score: 15    Treatment & Education:  Pt educated on role of OT and POC.   Pt performing skills as listed above.  Pt educated re: DME needs and use but would likely benefit from training in use of DME for home.   Pt educated on adaptive LE dressing techniques but still req'ing max A to don undergarments 2/2 significant RLE pain with movement. Pt required min A to doff undergarment. Pt return demo'd donning affected extremity first and doffing last with carry over.  "    Education:    Assessment:     Herman Floyd is a 47 y.o. male with a medical diagnosis of Closed fracture of right ankle.  He presents with s/p ex fix RLE.  Performance deficits affecting function are weakness, impaired endurance, impaired sensation, impaired functional mobilty, impaired self care skills, impaired balance, decreased lower extremity function, pain, decreased ROM, edema, orthopedic precautions.  Pt would benefit from continued OT services to address deficits in self care and functional mobility.     Rehab Prognosis:  Good; patient would benefit from acute skilled OT services to address these deficits and reach maximum level of function.         Clinical Decision Makin.  OT Low:  "Pt evaluation falls under low complexity for evaluation coding due to performance deficits noted in 1-3 areas as stated above and 0 co-morbities affecting current functional status. Data obtained from problem focused assessments. No modifications or assistance was required for completion of evaluation. Only brief occupational profile and history review completed."     Plan:     Patient to be seen 5 x/week to address the above listed problems via self-care/home management, therapeutic activities, therapeutic exercises  · Plan of Care Expires:    · Plan of Care Reviewed with: patient    This Plan of care has been discussed with the patient who was involved in its development and understands and is in agreement with the identified goals and treatment plan    GOALS:    Occupational Therapy Goals        Problem: Occupational Therapy Goal    Goal Priority Disciplines Outcome Interventions   Occupational Therapy Goal     OT, PT/OT Ongoing (interventions implemented as appropriate)    Description:  Goals to be met by: 2018    Patient will increase functional independence with ADLs by performing:    LE Dressing with Minimal Assistance.  Grooming while standing with Set-up Assistance.  Toileting from toilet with " Modified [place] for hygiene and clothing management.   Sitting at edge of bed x10 minutes with Supervision.  Rolling to Bilateral with [place].   Supine to sit with [place].  Stand pivot transfers with Modified [place].  Toilet transfer to toilet with Modified [place].  Upper extremity exercise program x10 reps per handout, with independence.                      Time Tracking:     OT Date of Treatment: 08/04/18  OT Start Time: 1118  OT Stop Time: 1157  OT Total Time (min): 39 min    Billable Minutes:Evaluation 10  Self Care/Home Management 29    Ursula Britton OT  8/4/2018

## 2018-08-04 NOTE — PLAN OF CARE
Problem: Physical Therapy Goal  Goal: Physical Therapy Goal  Goals to be met by: 2018     Patient will increase functional independence with mobility by performin. Supine to sit with Stand-by Assistance  2. Sit to stand transfer with Stand-by Assistance  3. Bed to chair transfer with Stand-by Assistance   4. Gait  x 25-5- feet with Supervision using Rolling Walker.   5. Wheelchair propulsion x150 feet with Modified [place] using bilateral uppper extremities  6. Lower extremity exercise program x10-15 reps per handout, with independence    Outcome: Ongoing (interventions implemented as appropriate)  Initial PT evaluation performed.  Pt could benefit from daily skilled PT services in order to maximize function prior to D/C.  HHPT/OT, BSC, TTB, RW, and W/C recommended.

## 2018-08-04 NOTE — PLAN OF CARE
Problem: Occupational Therapy Goal  Goal: Occupational Therapy Goal  Outcome: Outcome(s) achieved Date Met: 08/04/18  Pt to d/c home this date with no further acute OT needs; reconsult if necessary. Recommending HHOT/PT with BSC, TTB, RW, w/c.

## 2018-08-04 NOTE — PLAN OF CARE
Problem: Fall Risk (Adult)  Goal: Absence of Falls  Patient will demonstrate the desired outcomes by discharge/transition of care.   Bed alarm on and bed in lowest position. Call geraldo alberts.

## 2018-08-04 NOTE — PROGRESS NOTES
[facility]  Clinic Note    Some pain this morning and overnight.    Temp:  [96.1 °F (35.6 °C)-99.3 °F (37.4 °C)] 98.5 °F (36.9 °C)  Pulse:  [64-91] 81  Resp:  [8-22] 18  SpO2:  [88 %-100 %] 95 %  BP: (101-144)/(51-84) 134/69    NAD, A+Ox3  RRR  No increased WOB    RLE  Swelling of RLE  Ex-fix in place  Motor: + ehl, fhl, ta, gs  SILT t/s/s/sp/dp  2+ DP, wwp    A/P: 47yM with comminuted R pilon fracture s/p ex fix  - pain control  - NWB RLE  - DVT ppx  - PT/OT  - Obtain DME  - Follow up with Dr. Mitchell on Wendesday  - Elevate and ice extremity    I agree with findings outlined by the resident.

## 2018-08-05 NOTE — NURSING
Discharge instructions and prescriptions given to patient and wife, patient is aware of follow up on 8/8/18 with Dr. Mitchell. External Fixer to Right lower calf and foot. No bleeding or irritation noted to pin sites. Patient was able to get into wheelchair with minimal assist. Home wheelchair and bedside commode went home with patient. All questions and concerns addressed. Escorted in patient wheelchair with nursing staff and patient's wife.

## 2018-08-06 ENCOUNTER — PATIENT OUTREACH (OUTPATIENT)
Dept: ADMINISTRATIVE | Facility: CLINIC | Age: 48
End: 2018-08-06

## 2018-08-06 NOTE — PATIENT INSTRUCTIONS
Leg Fracture    You have a break (fracture) of the leg. A fracture is treated with a splint, cast, or special boot. It will usually take at about 8 to 12 weeks for the fracture to heal, but it can take several months in some cases. If you have a severe injury, you may need surgery to fix it.  Home care  Follow these guidelines when caring for yourself at home:  · You will be given a splint, cast, boot, or other device to keep the injured area from moving. Unless you were told otherwise, use crutches or a walker. Dont put weight on the injured leg until your healthcare provider says you can do so. (You can rent crutches and a walker at many pharmacies and surgical or orthopedic supply stores.)  · Keep your leg elevated to reduce pain and swelling. When sleeping, put a pillow under the injured leg. When sitting, support the injured leg so it is above your waist. This is very important during the first 2 days (48 hours).  · Put an ice pack on the injured area. Do this for 20 minutes every 1 to 2 hours the first day for pain relief. You can make an ice pack by wrapping a plastic bag of ice cubes in a thin towel. As the ice melts, be careful that the cast, splint, or boot doesnt get wet. You can put the ice pack directly over the splint or cast. Continue using the ice pack 3 to 4 times a day for the next 2 days. Then use the ice pack as needed to ease pain and swelling.  · Keep the cast, splint, or boot completely dry at all times. Bathe with your cast, splint, or boot out of the water. Protect it with a large plastic bag, rubber-banded at the top end. If a boot or fiberglass cast or splint gets wet, you can dry it with a hair dryer.  · You may use acetaminophen or ibuprofen to control pain, unless another pain medicine was prescribed. If you have chronic liver or kidney disease, talk with your healthcare provider before using these medicines. Also talk with your provider if youve had a stomach ulcer or  gastrointestinal bleeding.  · Dont put creams or objects under the cast if you have itching.  Follow-up care  Follow up with your healthcare provider, or as advised. This is to make sure the bone is healing the way it should. If a splint was put on, it may be converted to a cast at your next visit.  X-rays may be taken. You will be told of any new findings that may affect your care.  When to seek medical advice  Call your healthcare provider right away if any of these occur:  · The cast or splint cracks  · The plaster cast or splint becomes wet or soft  · The fiberglass cast or splint stays wet for more than 24 hours  · Bad odor from the cast or wound fluid stains the cast  · Tightness or pain under the cast or splint gets worse  · Toes become swollen, cold, blue, numb, or tingly  · You cant move your toes  · Skin around cast or splint becomes red  · Fever of 100.4ºF (38ºC) or higher, or as directed by your healthcare provider  Date Last Reviewed: 2/1/2017 © 2000-2017 [employer]. [address] [place] [address]. All rights reserved. This information is not intended as a substitute for professional medical care. Always follow your healthcare professional's instructions.

## 2018-08-11 ENCOUNTER — HOSPITAL ENCOUNTER (EMERGENCY)
Facility: HOSPITAL | Age: 48
Discharge: HOME | End: 2018-08-11
Attending: EMERGENCY MEDICINE
Payer: OTHER MISCELLANEOUS

## 2018-08-11 VITALS
OXYGEN SATURATION: 95 % | DIASTOLIC BLOOD PRESSURE: 80 MMHG | SYSTOLIC BLOOD PRESSURE: 146 MMHG | HEART RATE: 93 BPM | HEIGHT: 72 IN | TEMPERATURE: 100 F | WEIGHT: 296 LBS | RESPIRATION RATE: 20 BRPM | BODY MASS INDEX: 40.09 KG/M2

## 2018-08-11 DIAGNOSIS — M79.673 FOOT PAIN: ICD-10-CM

## 2018-08-11 DIAGNOSIS — M79.606 LEG PAIN: ICD-10-CM

## 2018-08-11 DIAGNOSIS — S82.891G CLOSED FRACTURE OF RIGHT ANKLE WITH DELAYED HEALING, SUBSEQUENT ENCOUNTER: Primary | ICD-10-CM

## 2018-08-11 DIAGNOSIS — M25.579 ANKLE PAIN: ICD-10-CM

## 2018-08-11 DIAGNOSIS — R52 PAIN: ICD-10-CM

## 2018-08-11 PROCEDURE — 96372 THER/PROPH/DIAG INJ SC/IM: CPT

## 2018-08-11 PROCEDURE — 99284 EMERGENCY DEPT VISIT MOD MDM: CPT | Mod: 25

## 2018-08-11 PROCEDURE — 63600175 PHARM REV CODE 636 W HCPCS: Performed by: NURSE PRACTITIONER

## 2018-08-11 RX ORDER — HYDROMORPHONE HYDROCHLORIDE 1 MG/ML
1 INJECTION, SOLUTION INTRAMUSCULAR; INTRAVENOUS; SUBCUTANEOUS
Status: COMPLETED | OUTPATIENT
Start: 2018-08-11 | End: 2018-08-11

## 2018-08-11 RX ORDER — OXYCODONE AND ACETAMINOPHEN 10; 325 MG/1; MG/1
1 TABLET ORAL EVERY 6 HOURS PRN
Qty: 20 TABLET | Refills: 0 | Status: SHIPPED | OUTPATIENT
Start: 2018-08-11 | End: 2018-09-24 | Stop reason: ALTCHOICE

## 2018-08-11 RX ADMIN — Medication 1 MG: at 06:08

## 2018-08-11 RX ADMIN — Medication 1 MG: at 08:08

## 2018-08-11 NOTE — ED PROVIDER NOTES
"Encounter Date: 8/11/2018       History     Chief Complaint   Patient presents with    Leg Pain     pt had right lower leg sx with external pins in place and complains of increased pain      Patient is a 47 year old male who with past medical history of deafness in right ear who presents to ED with right leg pain x 3 days. Patient reports he received surgery on 8/3/18 by Dr. Simms for a fractured right ankle. Patient reports that he received pins in right leg. Pt reports that he had f/u with Dr. Mitchell on 8/8/18 and has had increased pain and swelling to right leg since. Pt has another f/u appointment in 2 weeks. Pt reports that he has been taking prescribed percocet with no relief. Last dose taken this am. Patient associates chills.  Denies history of blood clots.  Pt describes the pain as it "feels like my skin is tightening around the screws."  Patient denies any alleviating factors. Patient denies fever, neck pain/stiffness, dizziness, lightheadedness, SOB, chest pain, N/V/D, weakness, abdominal pain and dysuria.  Patient denies any complaints at this time.      The history is provided by the patient and the spouse.     Review of patient's allergies indicates:  No Known Allergies  Past Medical History:   Diagnosis Date    Deafness in right ear      Past Surgical History:   Procedure Laterality Date    FINGER FRACTURE SURGERY       History reviewed. No pertinent family history.  Social History   Substance Use Topics    Smoking status: Never Smoker    Smokeless tobacco: Not on file    Alcohol use No     Review of Systems   Constitutional: Positive for chills. Negative for fever.   HENT: Negative for sore throat.    Respiratory: Negative for shortness of breath.    Cardiovascular: Negative for chest pain.   Gastrointestinal: Negative for abdominal pain, diarrhea, nausea and vomiting.   Genitourinary: Negative for dysuria.   Musculoskeletal: Positive for arthralgias. Negative for back pain, gait problem, " joint swelling, neck pain and neck stiffness.        Right leg, ankle, and foot   Skin: Negative for rash.   Neurological: Negative for dizziness, syncope, facial asymmetry, speech difficulty, weakness, light-headedness, numbness and headaches.   Hematological: Does not bruise/bleed easily.       Physical Exam     Initial Vitals [08/11/18 1800]   BP Pulse Resp Temp SpO2   (!) 160/69 (!) 113 20 98.3 °F (36.8 °C) 98 %      MAP       --         Physical Exam    Nursing note and vitals reviewed.  Constitutional: He appears well-developed and well-nourished. He is not diaphoretic.  Non-toxic appearance. He does not have a sickly appearance.   HENT:   Head: Normocephalic.   Nose: Nose normal.   Mouth/Throat: Uvula is midline, oropharynx is clear and moist and mucous membranes are normal.   Eyes: Lids are normal.   Neck: Trachea normal, normal range of motion, full passive range of motion without pain and phonation normal. Neck supple.   Cardiovascular: Regular rhythm, normal heart sounds and normal pulses. Tachycardia present.    Pulses:       Dorsalis pedis pulses are 2+ on the right side, and 2+ on the left side.   Moderate swelling with non-pitting edema noted to right foot. 2+ DP pulses bilaterally by palpation. Denies chest pain and SOB.    Pulmonary/Chest: Effort normal and breath sounds normal.   Neurological: He is alert and oriented to person, place, and time. He has normal strength. No sensory deficit. Gait normal. GCS eye subscore is 4. GCS verbal subscore is 5. GCS motor subscore is 6.   Skin: Skin is warm, dry and intact. Capillary refill takes less than 2 seconds. No rash noted.   Psychiatric: He has a normal mood and affect.         ED Course   Procedures  Labs Reviewed - No data to display       Imaging Results          US Lower Extremity Veins Right (Final result)  Result time 08/11/18 19:56:34    Final result by Aleksandr Aguila MD (08/11/18 19:56:34)                 Impression:      No evidence of deep  venous thrombosis in the right lower extremity.      Electronically signed by: Aleksandr Aguila MD  Date:    08/11/2018  Time:    19:56             Narrative:    EXAMINATION:  US LOWER EXTREMITY VEINS RIGHT    CLINICAL HISTORY:  Pain in leg, unspecified    TECHNIQUE:  Duplex and color flow Doppler evaluation and graded compression of the right lower extremity veins was performed.    COMPARISON:  None    FINDINGS:  Right thigh veins: The common femoral, femoral, popliteal, upper greater saphenous, and deep femoral veins are patent and free of thrombus. The veins are normally compressible and have normal phasic flow and augmentation response.    Right calf veins: The visualized calf veins are patent.    Contralateral CFV: The contralateral (left) common femoral vein is patent and free of thrombus.    Miscellaneous: None                               X-Ray Ankle Complete Right (Final result)  Result time 08/11/18 19:08:45    Final result by Martinez Louis MD (08/11/18 19:08:45)                 Impression:      Heavily comminuted intra-articular fractures of the distal tibia and fibula in external fixation, noting increased separation of fracture fragments and widening of the tibiotalar joint when compared with the prior exam.      Electronically signed by: Martinez Louis MD  Date:    08/11/2018  Time:    19:08             Narrative:    EXAMINATION:  XR TIBIA FIBULA 2 VIEW RIGHT; XR ANKLE COMPLETE 3 VIEW RIGHT; XR FOOT COMPLETE 3 VIEW RIGHT    CLINICAL HISTORY:  Pain, unspecified; Pain in unspecified ankle and joints of unspecified foot; Pain in unspecified foot    TECHNIQUE:  Three views right tibia and fibula, three views right ankle, and three views right foot.    COMPARISON:  Right ankle and tibia/fibula radiographs, 08/03/2018.    FINDINGS:  External fixation device is again present traversing the mid tibia and calcaneus.  There are displaced and comminuted distal tibia and fibula fractures as described  previously, noting increased separation of some fracture fragments and widening of the tibiotalar joint when compared with the prior exam.  There is marked soft tissue swelling about the ankle and lower calf.                               X-Ray Foot Complete Right (Final result)  Result time 08/11/18 19:08:45    Final result by Martinez Louis MD (08/11/18 19:08:45)                 Impression:      Heavily comminuted intra-articular fractures of the distal tibia and fibula in external fixation, noting increased separation of fracture fragments and widening of the tibiotalar joint when compared with the prior exam.      Electronically signed by: Martinez Louis MD  Date:    08/11/2018  Time:    19:08             Narrative:    EXAMINATION:  XR TIBIA FIBULA 2 VIEW RIGHT; XR ANKLE COMPLETE 3 VIEW RIGHT; XR FOOT COMPLETE 3 VIEW RIGHT    CLINICAL HISTORY:  Pain, unspecified; Pain in unspecified ankle and joints of unspecified foot; Pain in unspecified foot    TECHNIQUE:  Three views right tibia and fibula, three views right ankle, and three views right foot.    COMPARISON:  Right ankle and tibia/fibula radiographs, 08/03/2018.    FINDINGS:  External fixation device is again present traversing the mid tibia and calcaneus.  There are displaced and comminuted distal tibia and fibula fractures as described previously, noting increased separation of some fracture fragments and widening of the tibiotalar joint when compared with the prior exam.  There is marked soft tissue swelling about the ankle and lower calf.                               X-Ray Tibia Fibula 2 View Right (Final result)  Result time 08/11/18 19:08:45    Final result by Martinez Louis MD (08/11/18 19:08:45)                 Impression:      Heavily comminuted intra-articular fractures of the distal tibia and fibula in external fixation, noting increased separation of fracture fragments and widening of the tibiotalar joint when compared with the prior  exam.      Electronically signed by: Martinez Louis MD  Date:    08/11/2018  Time:    19:08             Narrative:    EXAMINATION:  XR TIBIA FIBULA 2 VIEW RIGHT; XR ANKLE COMPLETE 3 VIEW RIGHT; XR FOOT COMPLETE 3 VIEW RIGHT    CLINICAL HISTORY:  Pain, unspecified; Pain in unspecified ankle and joints of unspecified foot; Pain in unspecified foot    TECHNIQUE:  Three views right tibia and fibula, three views right ankle, and three views right foot.    COMPARISON:  Right ankle and tibia/fibula radiographs, 08/03/2018.    FINDINGS:  External fixation device is again present traversing the mid tibia and calcaneus.  There are displaced and comminuted distal tibia and fibula fractures as described previously, noting increased separation of some fracture fragments and widening of the tibiotalar joint when compared with the prior exam.  There is marked soft tissue swelling about the ankle and lower calf.                                 Medical Decision Making:   Initial Assessment:   Pt presents to ED with right leg pain x 3 days. Pt appears well, non-toxic. Pt afebrile. 2+ DP pulses bilaterally by palpation. Sensation and strength intact bilaterally lower extremities. Pt NVI.  Differential Diagnosis:   Sprain, strain, fracture, dislocation, DVT   Clinical Tests:   Radiological Study: Ordered and Reviewed  ED Management:  Xray right foot, ankle, and tib-fib, US lower extremity, 1 mg IM dilaudid (x2)  Other:   I have discussed this case with another health care provider.       <> Summary of the Discussion: 2004- [facility] consulted.   Xray reveals heavily comminuted intra-articular fractures of the distal tibia and fibula in external fixation, noting increased separation of fracture fragments and widening of the tibiotalar joint when compared with the prior exam. US negative.  Dr. Latham discussed with [facility] surgeon.  Dr. Hinkle has reviewed Xrays and states that there is nothing emergently surgical to do at this  time.  Patient has follow-up and will likely get ankle fusion per Ortho.  Patient is stable and will be DC home with prescription for Percocet.  Patient instructed to follow up with Dr. Simms within 2-3 days and to return to ED if symptoms worsen or change.    Rx: percocet               Attending Attestation:     Physician Attestation Statement for NP/PA:   I have conducted a face to face encounter with this patient in addition to the NP/PA, due to NP/PA Request    Other NP/PA Attestation Additions:      Medical Decision Making: Patient with  Increased RLE pain.  Has ankle fx and is s/p ORIF.  VSS, 2+ DP pulse, + edema to LLE.  Sensation intact LLE, capillary refill <2 secs.    Upon re-evaluation, the patient's status has improved.  After complete ED evaluation, clinical impression is most consistent with closed ankle fracture.  Patient's XRs with some changes.  This was discussed with ortho who states that patient is already scheduled for outpatient follow up and is scheduled for ankle fusion.  There is nothing emergently surgical to do at this time.   At this time, I feel there is no emergent condition requiring further evaluation or admission. I believe the patient is stable for discharge from the ED. The patient and any additional family present were updated with test results, overall clinical impression, and recommended further plan of care. All questions were answered. The patient expressed understanding and agreed with current plan for discharge with recommendations to follow up as scheduled with orthopedic surgery. Strict return precautions were provided, including redness, drainage, fever, return/worsening of current symptoms or any other concerns.                    ED Course as of Aug 11 2016   Sat Aug 11, 2018   2004 Discussed with [facility] orthopedic surgery  [LD]   2007 Dr Hinkle has reviewed XRs and states that there is nothing emergently surgical to do at this point.  Patient has follow up scheduled and  will likely get ankle fusion per ortho.  [LD]      ED Course User Index  [LD] Leidy Latham MD     Clinical Impression:   The primary encounter diagnosis was Closed fracture of right ankle with delayed healing, subsequent encounter. Diagnoses of Leg pain, Pain, Ankle pain, and Foot pain were also pertinent to this visit.      Disposition:   Disposition: Discharged  Condition: Stable                        Vikas Benites NP  08/11/18 [id number]       Leidy Latham MD  08/13/18 [id number]

## 2018-08-11 NOTE — ED TRIAGE NOTES
Patient states he had a f/u appointment with orthopedics this week and ever since the appointment he feels that his leg pain has increased and he has more swelling noted to that extremity.

## 2018-08-12 NOTE — DISCHARGE INSTRUCTIONS
Please take prescribed medication as labeled. Follow-up with Dr. Simms within 2-3 days and return to ED if symptoms worsen or change.

## 2018-08-18 ENCOUNTER — HOSPITAL ENCOUNTER (EMERGENCY)
Facility: HOSPITAL | Age: 48
Discharge: HOME | End: 2018-08-18
Attending: EMERGENCY MEDICINE
Payer: OTHER MISCELLANEOUS

## 2018-08-18 VITALS
HEIGHT: 72 IN | TEMPERATURE: 99 F | OXYGEN SATURATION: 100 % | WEIGHT: 295 LBS | BODY MASS INDEX: 39.96 KG/M2 | HEART RATE: 92 BPM | SYSTOLIC BLOOD PRESSURE: 134 MMHG | DIASTOLIC BLOOD PRESSURE: 72 MMHG | RESPIRATION RATE: 18 BRPM

## 2018-08-18 DIAGNOSIS — Z48.89 ENCOUNTER FOR POST SURGICAL WOUND CHECK: Primary | ICD-10-CM

## 2018-08-18 PROCEDURE — 99283 EMERGENCY DEPT VISIT LOW MDM: CPT

## 2018-08-19 NOTE — DISCHARGE INSTRUCTIONS
Return promptly if concerning symptoms arise, keep exposed pin sites dressed, elevate leg when recumbent

## 2018-08-19 NOTE — ED PROVIDER NOTES
Encounter Date: 8/18/2018       History     Chief Complaint   Patient presents with    Wound Check     Had surgery Aug 3rd.  External fixator on right lower leg.  Noticed drainage around screws. Right leg swollen since surgery.     Patient presents for evaluation of drainage from one the pin sites on his right lower extremity external fixator. Patient is 2 weeks s/p external fixator placement for a comminuted distal tib/fb fracture. He is scheduled to see his Orthopedist this Wednesday. No fever. No further trauma. Patient reports a soupy discharge (no pus) from the medial calcaneous pin site.           Review of patient's allergies indicates:  No Known Allergies  Past Medical History:   Diagnosis Date    Deafness in right ear      Past Surgical History:   Procedure Laterality Date    FINGER FRACTURE SURGERY       No family history on file.  Social History     Tobacco Use    Smoking status: Never Smoker   Substance Use Topics    Alcohol use: No    Drug use: No     Review of Systems   All other systems reviewed and are negative.      Physical Exam     Initial Vitals [08/18/18 1937]   BP Pulse Resp Temp SpO2   123/64 100 18 98.5 °F (36.9 °C) --      MAP       --         Physical Exam    Constitutional: He appears well-developed and well-nourished.   HENT:   Head: Normocephalic and atraumatic.   Neck: Normal range of motion. Neck supple.   Cardiovascular: Normal rate, regular rhythm, normal heart sounds and intact distal pulses.   Pulmonary/Chest: Breath sounds normal. No respiratory distress. He has no wheezes. He has no rhonchi. He has no rales.   Abdominal: Soft. There is no tenderness. There is no rebound and no guarding.   Musculoskeletal: He exhibits edema.   External fixator in place on right lower extremity at ankle aspect, positive edema to right leg, distal pulses intact, no bleeding, positive slight brownish drainage collected at exposed medial calcaneal pin site, no surrounding erythema, no purulent  drainage   Neurological: He is alert and oriented to person, place, and time.   Skin: Skin is warm and dry.   See MS'al exam, no sign of cellulitis or abscess         ED Course   Procedures  Labs Reviewed - No data to display       Imaging Results    None          Medical Decision Making:   ED Management:  Discussed with Dr. Hinkle (Ortho on-call for Dr. Mitchell) who agrees with plan. Fresh Xeroform and gauze has been placed on medial pin site (following removal of original gauze and Xeroform)                       Clinical Impression:     1. Encounter for post surgical wound check                                 Jc Lewis MD  08/18/18 2035

## 2018-08-19 NOTE — ED NOTES
Pt presents to the ED for a wound recheck. Pt reports having surgery on his right lower leg on august 3 after falling out of his truck. Pt with an external fixator in place. Slight oozing noted around screws. Right lower leg swollen and tender to touch. Pt denies fever, chills, body aches, headache, n/v, or bladder or bowel issues at this time. Pt wife at bedside.

## 2018-09-18 ENCOUNTER — HOSPITAL ENCOUNTER (INPATIENT)
Facility: HOSPITAL | Age: 48
LOS: 2 days | Discharge: HOME | DRG: 176 | End: 2018-09-20
Attending: EMERGENCY MEDICINE | Admitting: INTERNAL MEDICINE
Payer: OTHER MISCELLANEOUS

## 2018-09-18 DIAGNOSIS — R06.02 SHORTNESS OF BREATH: ICD-10-CM

## 2018-09-18 DIAGNOSIS — S82.891D CLOSED FRACTURE OF RIGHT ANKLE WITH ROUTINE HEALING, SUBSEQUENT ENCOUNTER: ICD-10-CM

## 2018-09-18 DIAGNOSIS — I26.99 OTHER PULMONARY EMBOLISM WITHOUT ACUTE COR PULMONALE, UNSPECIFIED CHRONICITY: Primary | ICD-10-CM

## 2018-09-18 DIAGNOSIS — R07.9 CHEST PAIN, UNSPECIFIED TYPE: ICD-10-CM

## 2018-09-18 DIAGNOSIS — I26.99 OTHER ACUTE PULMONARY EMBOLISM WITHOUT ACUTE COR PULMONALE: ICD-10-CM

## 2018-09-18 LAB
ALBUMIN SERPL BCP-MCNC: 4 G/DL
ALP SERPL-CCNC: 74 U/L
ALT SERPL W/O P-5'-P-CCNC: 19 U/L
ANION GAP SERPL CALC-SCNC: 12 MMOL/L
AST SERPL-CCNC: 15 U/L
BASOPHILS # BLD AUTO: 0.02 K/UL
BASOPHILS NFR BLD: 0.4 %
BILIRUB SERPL-MCNC: 1.6 MG/DL
BNP SERPL-MCNC: <10 PG/ML
BUN SERPL-MCNC: 10 MG/DL
CALCIUM SERPL-MCNC: 9.4 MG/DL
CHLORIDE SERPL-SCNC: 101 MMOL/L
CO2 SERPL-SCNC: 25 MMOL/L
CREAT SERPL-MCNC: 1.1 MG/DL
CREAT SERPL-MCNC: 1.1 MG/DL (ref 0.5–1.4)
DIFFERENTIAL METHOD: ABNORMAL
EOSINOPHIL # BLD AUTO: 0.1 K/UL
EOSINOPHIL NFR BLD: 2.6 %
ERYTHROCYTE [DISTWIDTH] IN BLOOD BY AUTOMATED COUNT: 13.4 %
EST. GFR  (AFRICAN AMERICAN): >60 ML/MIN/1.73 M^2
EST. GFR  (NON AFRICAN AMERICAN): >60 ML/MIN/1.73 M^2
GLUCOSE SERPL-MCNC: 126 MG/DL
HCT VFR BLD AUTO: 39.8 %
HGB BLD-MCNC: 13.5 G/DL
INR PPP: 1.1
LYMPHOCYTES # BLD AUTO: 1.8 K/UL
LYMPHOCYTES NFR BLD: 32.4 %
MCH RBC QN AUTO: 29.2 PG
MCHC RBC AUTO-ENTMCNC: 33.9 G/DL
MCV RBC AUTO: 86 FL
MONOCYTES # BLD AUTO: 0.4 K/UL
MONOCYTES NFR BLD: 8 %
NEUTROPHILS # BLD AUTO: 3.1 K/UL
NEUTROPHILS NFR BLD: 56.6 %
PLATELET # BLD AUTO: 151 K/UL
PMV BLD AUTO: 9.4 FL
POTASSIUM SERPL-SCNC: 3.8 MMOL/L
PROT SERPL-MCNC: 7.7 G/DL
PROTHROMBIN TIME: 11.8 SEC
RBC # BLD AUTO: 4.63 M/UL
SAMPLE: NORMAL
SODIUM SERPL-SCNC: 138 MMOL/L
TROPONIN I SERPL DL<=0.01 NG/ML-MCNC: 0.01 NG/ML
WBC # BLD AUTO: 5.4 K/UL

## 2018-09-18 PROCEDURE — 80053 COMPREHEN METABOLIC PANEL: CPT

## 2018-09-18 PROCEDURE — 85610 PROTHROMBIN TIME: CPT

## 2018-09-18 PROCEDURE — 25500020 PHARM REV CODE 255: Performed by: EMERGENCY MEDICINE

## 2018-09-18 PROCEDURE — 82565 ASSAY OF CREATININE: CPT

## 2018-09-18 PROCEDURE — 96372 THER/PROPH/DIAG INJ SC/IM: CPT | Mod: 59

## 2018-09-18 PROCEDURE — 93005 ELECTROCARDIOGRAM TRACING: CPT

## 2018-09-18 PROCEDURE — 63600175 PHARM REV CODE 636 W HCPCS: Performed by: INTERNAL MEDICINE

## 2018-09-18 PROCEDURE — 99285 EMERGENCY DEPT VISIT HI MDM: CPT | Mod: 25

## 2018-09-18 PROCEDURE — 3E033GC INTRODUCTION OF OTHER THERAPEUTIC SUBSTANCE INTO PERIPHERAL VEIN, PERCUTANEOUS APPROACH: ICD-10-PCS | Performed by: INTERNAL MEDICINE

## 2018-09-18 PROCEDURE — 12000002 HC ACUTE/MED SURGE SEMI-PRIVATE ROOM

## 2018-09-18 PROCEDURE — 63600175 PHARM REV CODE 636 W HCPCS: Performed by: EMERGENCY MEDICINE

## 2018-09-18 PROCEDURE — 85025 COMPLETE CBC W/AUTO DIFF WBC: CPT

## 2018-09-18 PROCEDURE — 83880 ASSAY OF NATRIURETIC PEPTIDE: CPT

## 2018-09-18 PROCEDURE — 99900035 HC TECH TIME PER 15 MIN (STAT)

## 2018-09-18 PROCEDURE — 84484 ASSAY OF TROPONIN QUANT: CPT

## 2018-09-18 PROCEDURE — 96374 THER/PROPH/DIAG INJ IV PUSH: CPT

## 2018-09-18 RX ORDER — GLUCAGON 1 MG
1 KIT INJECTION
Status: DISCONTINUED | OUTPATIENT
Start: 2018-09-18 | End: 2018-09-20 | Stop reason: HOSPADM

## 2018-09-18 RX ORDER — ONDANSETRON 2 MG/ML
4 INJECTION INTRAMUSCULAR; INTRAVENOUS EVERY 8 HOURS PRN
Status: DISCONTINUED | OUTPATIENT
Start: 2018-09-18 | End: 2018-09-20 | Stop reason: HOSPADM

## 2018-09-18 RX ORDER — OXYCODONE AND ACETAMINOPHEN 10; 325 MG/1; MG/1
1 TABLET ORAL EVERY 6 HOURS PRN
Status: DISCONTINUED | OUTPATIENT
Start: 2018-09-18 | End: 2018-09-20 | Stop reason: HOSPADM

## 2018-09-18 RX ORDER — ENOXAPARIN SODIUM 150 MG/ML
1 INJECTION SUBCUTANEOUS
Status: DISCONTINUED | OUTPATIENT
Start: 2018-09-18 | End: 2018-09-20

## 2018-09-18 RX ORDER — FENTANYL CITRATE 50 UG/ML
50 INJECTION, SOLUTION INTRAMUSCULAR; INTRAVENOUS
Status: COMPLETED | OUTPATIENT
Start: 2018-09-18 | End: 2018-09-18

## 2018-09-18 RX ORDER — MORPHINE SULFATE 2 MG/ML
2 INJECTION, SOLUTION INTRAMUSCULAR; INTRAVENOUS EVERY 4 HOURS PRN
Status: DISCONTINUED | OUTPATIENT
Start: 2018-09-18 | End: 2018-09-20 | Stop reason: HOSPADM

## 2018-09-18 RX ORDER — SODIUM CHLORIDE 0.9 % (FLUSH) 0.9 %
5 SYRINGE (ML) INJECTION
Status: DISCONTINUED | OUTPATIENT
Start: 2018-09-18 | End: 2018-09-20 | Stop reason: HOSPADM

## 2018-09-18 RX ORDER — IBUPROFEN 200 MG
24 TABLET ORAL
Status: DISCONTINUED | OUTPATIENT
Start: 2018-09-18 | End: 2018-09-20 | Stop reason: HOSPADM

## 2018-09-18 RX ORDER — IBUPROFEN 200 MG
16 TABLET ORAL
Status: DISCONTINUED | OUTPATIENT
Start: 2018-09-18 | End: 2018-09-20 | Stop reason: HOSPADM

## 2018-09-18 RX ORDER — ENOXAPARIN SODIUM 150 MG/ML
1 INJECTION SUBCUTANEOUS
Status: COMPLETED | OUTPATIENT
Start: 2018-09-18 | End: 2018-09-18

## 2018-09-18 RX ORDER — ACETAMINOPHEN 325 MG/1
650 TABLET ORAL EVERY 4 HOURS PRN
Status: DISCONTINUED | OUTPATIENT
Start: 2018-09-18 | End: 2018-09-20 | Stop reason: HOSPADM

## 2018-09-18 RX ADMIN — ENOXAPARIN SODIUM 140 MG: 150 INJECTION SUBCUTANEOUS at 05:09

## 2018-09-18 RX ADMIN — FENTANYL CITRATE 50 MCG: 50 INJECTION, SOLUTION INTRAMUSCULAR; INTRAVENOUS at 07:09

## 2018-09-18 RX ADMIN — IOHEXOL 100 ML: 350 INJECTION, SOLUTION INTRAVENOUS at 05:09

## 2018-09-18 RX ADMIN — ENOXAPARIN SODIUM 140 MG: 150 INJECTION SUBCUTANEOUS at 11:09

## 2018-09-18 NOTE — ED PROVIDER NOTES
Encounter Date: 9/18/2018       History     Chief Complaint   Patient presents with    Chest Pain     sudden onset of chest pain and shortness of breath after urinating, hx of injury to right lower leg 1 month ago. Low sats, tachycardia     Hermandeanne Floyd is a 47 y.o. male who  has a past medical history of Deafness in right ear.    The patient presents to the ED due to sudden chest discomfort and SOB approximately 30 minutes ago with near syncopal episode. Per wife reports the patient took 2 aspirins after eating fried chicken, then reports sweats and symptoms occurring. He has an external fixator in place to his right leg. He reports the first surgery for his closed fracture was performed on 8/3/18 at this facility and a revision done 2 weeks ago. He denies being on any other blood thinner besides aspirin.       The history is provided by the spouse.     Review of patient's allergies indicates:  No Known Allergies  Past Medical History:   Diagnosis Date    Deafness in right ear      Past Surgical History:   Procedure Laterality Date    APPLICATION,EXTERNAL FIXATION DEVICE Right 8/3/2018    Performed by Bradley Simms MD at [facility]    FINGER FRACTURE SURGERY       No family history on file.  Social History     Tobacco Use    Smoking status: Never Smoker   Substance Use Topics    Alcohol use: No    Drug use: No     Review of Systems   Constitutional: Positive for diaphoresis.   Respiratory: Positive for shortness of breath.    Cardiovascular: Positive for chest pain (discomfort).   All other systems reviewed and are negative.      Physical Exam     Initial Vitals [09/18/18 1659]   BP Pulse Resp Temp SpO2   (!) 140/81 (!) 113 (!) 22 -- 98 %      MAP       --         Physical Exam    Nursing note and vitals reviewed.  Constitutional: He appears well-developed and well-nourished.   HENT:   Head: Normocephalic and atraumatic.   Eyes: EOM are normal. Pupils are equal, round, and reactive to light.   Neck:  Normal range of motion. Neck supple.   Cardiovascular: Regular rhythm, normal heart sounds and intact distal pulses. Tachycardia present.  Exam reveals no gallop and no friction rub.    No murmur heard.  tachycardic   Pulmonary/Chest: Breath sounds normal. No respiratory distress.   Abdominal: Soft. He exhibits no distension. There is no tenderness.   Musculoskeletal: He exhibits edema.   External fixator to the RLE  2+ pitting edema   Neurological: He is alert and oriented to person, place, and time. He has normal strength.   Skin: Skin is warm and dry.         ED Course   Critical Care  Date/Time: 9/18/2018 7:43 PM  Performed by: Jc Lewis MD  Authorized by: Jc Lewis MD   Direct patient critical care time: 5 minutes  Additional history critical care time: 5 minutes  Ordering / reviewing critical care time: 10 minutes  Documentation critical care time: 5 minutes  Consulting other physicians critical care time: 5 minutes  Consult with family critical care time: 5 minutes  Other critical care time: 5 minutes  Total critical care time (exclusive of procedural time) : 40 minutes  Critical care time was exclusive of separately billable procedures and treating other patients and teaching time.  Critical care was necessary to treat or prevent imminent or life-threatening deterioration of the following conditions: cardiac failure (PE).  Critical care was time spent personally by me on the following activities: blood draw for specimens, development of treatment plan with patient or surrogate, discussions with consultants, discussions with primary provider, interpretation of cardiac output measurements, evaluation of patient's response to treatment, examination of patient, obtaining history from patient or surrogate, ordering and performing treatments and interventions, ordering and review of laboratory studies, ordering and review of radiographic studies, pulse oximetry, re-evaluation of patient's condition and  review of old charts.        Labs Reviewed   CBC W/ AUTO DIFFERENTIAL - Abnormal; Notable for the following components:       Result Value    Hemoglobin 13.5 (*)     Hematocrit 39.8 (*)     All other components within normal limits   COMPREHENSIVE METABOLIC PANEL - Abnormal; Notable for the following components:    Glucose 126 (*)     Total Bilirubin 1.6 (*)     All other components within normal limits   TROPONIN I   B-TYPE NATRIURETIC PEPTIDE   PROTIME-INR   ISTAT CREATININE     EKG Readings: (Independently Interpreted)   1655: Sinus Tachycardia at rate of 114 bpm. QTc at 471. Consider inferior and anterior T wave abnormality.       Imaging Results          CTA Chest Non-Coronary (PE Study) (Final result)  Result time 09/18/18 18:10:37    Final result by Juwan Mariee MD (09/18/18 18:10:37)                 Impression:      1. Diffuse bilateral pulmonary thromboembolism, originating in the distal aspects of the right and left main pulmonary arteries, and extending distally.  2. No large focal consolidation.  3. Possible pulmonary nodules within the left upper lobe versus atelectasis.  One year follow-up as warranted.  4. Several additional findings above.      Electronically signed by: Juwan Mariee MD  Date:    09/18/2018  Time:    18:10             Narrative:    EXAMINATION:  CTA CHEST NON CORONARY    CLINICAL HISTORY:  Chest pain, acute, PE suspected, high pretest prob;    TECHNIQUE:  Low dose axial images, sagittal and coronal reformations were obtained from the thoracic inlet to the lung bases following the IV administration of 100 mL of Omnipaque 350.  Contrast timing was optimized to evaluate the pulmonary arteries.  MIP images were performed.    COMPARISON:  None    FINDINGS:  The structures at the base of the neck are grossly unremarkable.  No significant mediastinal lymphadenopathy.  The heart is not enlarged.  No pericardial effusion.  The visualized portions of the kidneys, spleen, pancreas,  gallbladder and liver are grossly unremarkable.  There is a minimal hiatal hernia.    The airways are grossly patent.  There is bilateral basilar dependent atelectasis.  No large focal consolidation.  No pleural effusion.  No pneumothorax.  There is a 1 mm pulmonary nodule within the posterior aspect of the left upper lobe.  There is a vague focus of ground-glass versus atelectasis within the anterior aspect of the left upper lobe measuring 2-3 mm.  There is a possible calcified granuloma within the lingula versus artifact.    Bolus timing is adequate for the evaluation of pulmonary thromboembolism.  There are multifocal pulmonary arterial filling defects originating within the distal aspects of the left main and right pulmonary arteries, and extending distally to involve the majority of all branches to all lobes.    Degenerative changes are noted of the spine.  No focal osseous destructive process.                               X-Ray Chest AP Portable (Final result)  Result time 09/18/18 17:35:50    Final result by Aleksandr Aguila MD (09/18/18 17:35:50)                 Impression:      No acute process.      Electronically signed by: Aleksandr Aguila MD  Date:    09/18/2018  Time:    17:35             Narrative:    EXAMINATION:  XR CHEST AP PORTABLE    CLINICAL HISTORY:  Evaluate for possible congestive heart failure.    TECHNIQUE:  Single frontal view of the chest was performed.    COMPARISON:  None    FINDINGS:  Monitoring EKG leads are present.  The trachea is unremarkable.  The cardiomediastinal silhouette is within normal limits.  The hemidiaphragms are unremarkable.  There is no evidence of free air beneath the hemidiaphragms.  There are no pleural effusions.  There is no evidence of a pneumothorax.  There is no evidence of pneumomediastinum.  No airspace opacity is present.  There are degenerative changes in the osseous structures.  The subcutaneous tissues are unremarkable.                                 Medical  Decision Making:   Initial Assessment:   The patient presents to the ED due to sudden chest discomfort and SOB approximately 30 minutes ago with near syncopal episode.  Clinical Tests:   Lab Tests: Reviewed and Ordered  Radiological Study: Ordered and Reviewed  Medical Tests: Ordered and Reviewed  ED Management:  1612 Worriesome for PE will administer Lovenox.     1818 Dr. Coughlin of Cardiology was notified and will review patient's chart and evaluate at bedside for possible selective thrombectomy    1840 Dr. Coughlin will admit    1939: remains hemodynamically stable does not appear in extremis. Awaiting cardiology assessment for possible selective thrombectomy.                       Clinical Impression:     1. Other pulmonary embolism without acute cor pulmonale, unspecified chronicity    2. Shortness of breath    3. Chest pain, unspecified type         I, Dr. Jc Lewis, personally performed the services described in this documentation. All medical record entries made by the scribe were at my direction and in my presence.  I have reviewed the chart and agree that the record reflects my personal performance and is accurate and complete.                      Jc Lewis MD  09/19/18 3312

## 2018-09-18 NOTE — ED NOTES
APPEARANCE: Alert, oriented and having shortness of breath  CARDIAC: Tachycardic rate and regular rhythm, no murmur heard.   PERIPHERAL VASCULAR: peripheral pulses present. Normal cap refill. Edema to right lower leg due to leg injury. Warm to touch.    RESPIRATORY:Tachypneic rate and moderate effort, breath sounds diminished bilaterally throughout chest. Respirations are equal with some shortness of breath  GASTRO: soft, bowel sounds normal, no tenderness, no abdominal distention. Obese  MUSC: Limited ROM due to shortness of breath and right lower leg injury with external fixator in place.   SKIN: Skin is warm and dry, normal skin turgor, mucous membranes moist.  NEURO: 5/5 strength major flexors/extensors bilaterally. Sensory intact to light touch bilaterally. Kunia coma scale: eyes open spontaneously-4, oriented & converses-5, obeys commands-6. No neurological abnormalities.   MENTAL STATUS: awake, alert and aware of environment.  EYE: PERRL, both eyes: pupils brisk and reactive to light. Normal size.  ENT: EARS: no obvious drainage. NOSE: no active bleeding.   Pt complains of sudden onset of shortness of breath with chest pain when urinating. Pt had a right injury with surgery 2 weeks ago. Pt brought per EMS on NRB at 15L oxygen

## 2018-09-19 LAB
DIASTOLIC DYSFUNCTION: NO
ESTIMATED AVG GLUCOSE: 82 MG/DL
ESTIMATED PA SYSTOLIC PRESSURE: 31.73
HBA1C MFR BLD HPLC: 4.5 %
MITRAL VALVE MOBILITY: NORMAL
RETIRED EF AND QEF - SEE NOTES: 55 (ref 55–65)
TRICUSPID VALVE REGURGITATION: NORMAL

## 2018-09-19 PROCEDURE — 83036 HEMOGLOBIN GLYCOSYLATED A1C: CPT

## 2018-09-19 PROCEDURE — 25000003 PHARM REV CODE 250: Performed by: INTERNAL MEDICINE

## 2018-09-19 PROCEDURE — 93306 TTE W/DOPPLER COMPLETE: CPT

## 2018-09-19 PROCEDURE — 93306 TTE W/DOPPLER COMPLETE: CPT | Mod: 26,,, | Performed by: INTERNAL MEDICINE

## 2018-09-19 PROCEDURE — 27000221 HC OXYGEN, UP TO 24 HOURS

## 2018-09-19 PROCEDURE — 63600175 PHARM REV CODE 636 W HCPCS: Performed by: INTERNAL MEDICINE

## 2018-09-19 PROCEDURE — 94761 N-INVAS EAR/PLS OXIMETRY MLT: CPT

## 2018-09-19 PROCEDURE — 25000003 PHARM REV CODE 250: Performed by: NURSE PRACTITIONER

## 2018-09-19 PROCEDURE — 99223 1ST HOSP IP/OBS HIGH 75: CPT | Mod: ,,, | Performed by: INTERNAL MEDICINE

## 2018-09-19 PROCEDURE — 11000001 HC ACUTE MED/SURG PRIVATE ROOM

## 2018-09-19 RX ORDER — FAMOTIDINE 20 MG/1
20 TABLET, FILM COATED ORAL 2 TIMES DAILY
Status: DISCONTINUED | OUTPATIENT
Start: 2018-09-19 | End: 2018-09-20 | Stop reason: HOSPADM

## 2018-09-19 RX ADMIN — FAMOTIDINE 20 MG: 20 TABLET, FILM COATED ORAL at 09:09

## 2018-09-19 RX ADMIN — ENOXAPARIN SODIUM 140 MG: 150 INJECTION SUBCUTANEOUS at 10:09

## 2018-09-19 RX ADMIN — OXYCODONE HYDROCHLORIDE AND ACETAMINOPHEN 1 TABLET: 10; 325 TABLET ORAL at 03:09

## 2018-09-19 RX ADMIN — OXYCODONE HYDROCHLORIDE AND ACETAMINOPHEN 1 TABLET: 10; 325 TABLET ORAL at 07:09

## 2018-09-19 RX ADMIN — OXYCODONE HYDROCHLORIDE AND ACETAMINOPHEN 1 TABLET: 10; 325 TABLET ORAL at 09:09

## 2018-09-19 RX ADMIN — ONDANSETRON 4 MG: 2 INJECTION INTRAMUSCULAR; INTRAVENOUS at 08:09

## 2018-09-19 NOTE — ED NOTES
Patient reports is hungry thirsty and that his back hurts and is uncomfortable and that after pain med earlier the back of his head hurts

## 2018-09-19 NOTE — PLAN OF CARE
"Pt arrived to , pt sinus tach on monitor, other VSS. Pt reports dyspnea with exertion and mild pressure in middle of chest with intermittent sharp "heartburn pains." See flowsheet for further assessment.  "

## 2018-09-19 NOTE — PLAN OF CARE
Patient hard of hearing. Wife at bedside provided hx.  Patient had work-related leg injury.   Surgery on 8/3 -Dr. Mitchell  2nd Surgery recently ( revised fixator) at [facility].(doesnt remember date)  Patient current with Odilia BOSWELL ( RN and PT)  Has WC and crutches at home.      retrieved Workers Comp card from patient and faxed copy to Keeley in admitting.  [employer] [phone]- Supervisor Suraj GUTIERREZ [employer] [place] [employer] [phone]       09/19/18 1141   Discharge Assessment   Assessment Type Discharge Planning Assessment   Confirmed/corrected address and phone number on facesheet? Yes   Assessment information obtained from? Caregiver   Prior to hospitilization cognitive status: Alert/Oriented   Prior to hospitalization functional status: Assistive Equipment   Current cognitive status: Alert/Oriented   Current Functional Status: Needs Assistance   Facility Arrived From: Home   Lives With spouse   Able to Return to Prior Arrangements yes   Is patient able to care for self after discharge? Yes   Who are your caregiver(s) and their phone number(s)? Grace Floyd [phone]   Patient's perception of discharge disposition home health   Readmission Within The Last 30 Days no previous admission in last 30 days   Patient currently being followed by outpatient case management? No   Patient currently receives any other outside agency services? No   Equipment Currently Used at Home wheelchair;crutches   Do you have any problems affording any of your prescribed medications? No   Is the patient taking medications as prescribed? yes   Does the patient have transportation home? Yes   Transportation Available family or friend will provide   Does the patient receive services at the [facility]? No   Discharge Plan A Home Health  (current with [facility] )   Discharge Plan B Other   Patient/Family In Agreement With Plan yes   Does the patient have transportation to healthcare  appointments? Yes

## 2018-09-19 NOTE — ED NOTES
Patient given wet cloth for his lips; pt held npo this er visit until reassessment by admit cardiology

## 2018-09-19 NOTE — NURSING
Patient lying in bed eating supper, no signs of difficulty or distress @ present, pt in stable condition, will continue to monitor

## 2018-09-19 NOTE — HOSPITAL COURSE
09/19/2018 Per HPI  09/20/2018 No DVT per imaging. Hemodynamically stable overnight. Initiated Eliquis for PE treatment.   Evaluated by Orthopedic surgery - We will defer any other treatment for pilon at present.  Repeat PE studies will be done in about 4 weeks to determine if the PE has cleared  Patient will follow up with ortho in 2 weeks and with cardiology in 2-3 weeks.

## 2018-09-19 NOTE — H&P
[facility]  Cardiology  History and Physical     Patient Name: Herman Floyd  MRN: [id number]  Admission Date: 9/18/2018  Code Status: Full Code   Attending Provider: Marshall Savage MD   Primary Care Physician: Primary Doctor No  Principal Problem:<principal problem not specified>    Patient information was obtained from patient, past medical records and ER records.     Subjective:     Chief Complaint:  Chest Pain, SOB      HPI:  Herman Floyd is a 47 y.o. Male with right ear deafness, and ankle fracture who presented to the ED due to sudden chest discomfort and SOB approximately 30 minutes PTA with near syncopal episode. On 08/03/2018 patient note slipped from the back of his truck trying to attach a trailer. He states he fell about 3 ft to the ground. Patient sustained a right comminuted pilon fracture and underwent external fixator. He has been mostly bed bound since that time. Patient is afraid to use his crutches and reportedly gets OOB twice a day. Wife reports poor participation with prescribed PT. CTA chest in the ED noted diffuse bilateral pulmonary thromboembolism, originating in the distal aspects of the right and left main pulmonary arteries, and extending distally. He is hemodynamically stable and oxygenating well. Admitted to cardiology service for PE. Patient was transferred to ICU and evaluated by interventional cardiology. His troponin was negative and there was no evidence of right heart strain on CTA. No criteria for massive or submassive PE and no indication for systemic/catheter directed thrombolysis. Patient on therapeutic Lovenox. Echo and BLE venous US pending.               Past Medical History:   Diagnosis Date    Deafness in right ear        Past Surgical History:   Procedure Laterality Date    APPLICATION,EXTERNAL FIXATION DEVICE Right 8/3/2018    Performed by [facility]ms MD at [facility] OR    FINGER FRACTURE SURGERY         Review of patient's allergies  indicates:  No Known Allergies    No current facility-administered medications on file prior to encounter.      Current Outpatient Medications on File Prior to Encounter   Medication Sig    aspirin 81 MG Chew Take 1 tablet (81 mg total) by mouth once daily.    famotidine (PEPCID) 20 MG tablet Take 1 tablet (20 mg total) by mouth 2 (two) times daily. Take with aspirin    oxyCODONE-acetaminophen (PERCOCET)  mg per tablet Take 1 tablet by mouth every 6 (six) hours as needed for Pain.     Family History     None        Tobacco Use    Smoking status: Never Smoker   Substance and Sexual Activity    Alcohol use: No    Drug use: No    Sexual activity: Yes     Review of Systems   Constitution: Negative for diaphoresis.   HENT: Negative.    Eyes: Negative.    Cardiovascular: Positive for chest pain and near-syncope. Negative for dyspnea on exertion, irregular heartbeat, leg swelling, orthopnea, palpitations and syncope.   Respiratory: Positive for shortness of breath. Negative for hemoptysis, sputum production and wheezing.    Endocrine: Negative.    Hematologic/Lymphatic: Negative.    Skin: Negative.    Musculoskeletal: Positive for joint pain.   Gastrointestinal: Negative.    Genitourinary: Negative.    Neurological: Negative.    Psychiatric/Behavioral: Negative.    Allergic/Immunologic: Negative.      Objective:     Vital Signs (Most Recent):  Temp: 98.6 °F (37 °C) (09/19/18 0701)  Pulse: 99 (09/19/18 0800)  Resp: (!) 22 (09/19/18 0800)  BP: 114/62 (09/19/18 0800)  SpO2: 97 % (09/19/18 0800) Vital Signs (24h Range):  Temp:  [97.9 °F (36.6 °C)-98.8 °F (37.1 °C)] 98.6 °F (37 °C)  Pulse:  [] 99  Resp:  [11-63] 22  SpO2:  [92 %-100 %] 97 %  BP: (101-140)/(54-82) 114/62     Weight: 119.4 kg (263 lb 3.7 oz)  Body mass index is 35.7 kg/m².    SpO2: 97 %  O2 Device (Oxygen Therapy): nasal cannula      Intake/Output Summary (Last 24 hours) at 9/19/2018 1027  Last data filed at 9/19/2018 0200  Gross per 24 hour    Intake --   Output 700 ml   Net -700 ml       Lines/Drains/Airways     Peripheral Intravenous Line                 Peripheral IV - Single Lumen 09/18/18 1718 Right Antecubital less than 1 day         Peripheral IV - Single Lumen 09/19/18 0124 Left Forearm less than 1 day                Physical Exam   Constitutional: He is oriented to person, place, and time. He appears well-developed and well-nourished. No distress.   HENT:   Head: Normocephalic and atraumatic.   Eyes: Right eye exhibits no discharge. Left eye exhibits no discharge.   Neck: No JVD present.   Cardiovascular: Normal rate, regular rhythm and normal heart sounds. Exam reveals no gallop and no friction rub.   No murmur heard.  Pulmonary/Chest: Effort normal and breath sounds normal.   Abdominal: Soft. Bowel sounds are normal.   Musculoskeletal: He exhibits tenderness (RLE).   Neurological: He is alert and oriented to person, place, and time.   Skin: Skin is warm and dry. He is not diaphoretic.   Psychiatric: He has a normal mood and affect. His behavior is normal. Judgment and thought content normal.       Significant Labs:   BMP:   Recent Labs   Lab  09/18/18   1717   GLU  126*   NA  138   K  3.8   CL  101   CO2  25   BUN  10   CREATININE  1.1   CALCIUM  9.4   , CMP   Recent Labs   Lab  09/18/18   1717   NA  138   K  3.8   CL  101   CO2  25   GLU  126*   BUN  10   CREATININE  1.1   CALCIUM  9.4   PROT  7.7   ALBUMIN  4.0   BILITOT  1.6*   ALKPHOS  74   AST  15   ALT  19   ANIONGAP  12   ESTGFRAFRICA  >60   EGFRNONAA  >60   , CBC   Recent Labs   Lab  09/18/18   1717   WBC  5.40   HGB  13.5*   HCT  39.8*   PLT  151   , INR   Recent Labs   Lab  09/18/18   1717   INR  1.1   , Troponin   Recent Labs   Lab  09/18/18   1717   TROPONINI  0.012    and All pertinent lab results from the last 24 hours have been reviewed.    Significant Imaging: Echocardiogram:   2D echo with color flow doppler:   Results for orders placed or performed during the hospital  encounter of 09/18/18   2D echo with color flow doppler   Result Value Ref Range    EF 55 55 - 65    Diastolic Dysfunction No     Est. PA Systolic Pressure 31.73     Mitral Valve Mobility NORMAL     Tricuspid Valve Regurgitation MILD      Assessment and Plan:     Other pulmonary embolism without acute cor pulmonale    Patient immobilized since 08/2018 following fracture, afraid to use crutches and minimal PT participation.  CTA chest in the ED noted diffuse bilateral pulmonary thromboembolism, originating in the distal aspects of the right and left main pulmonary arteries, and extending distally. He is hemodynamically stable and oxygenating well.     His troponin was negative and there was no evidence of right heart strain on CTA or 2DE.     No criteria for massive or submassive PE and no indication for systemic/catheter directed thrombolysis.   Patient on therapeutic Lovenox.      BLE venous US pending.    Will monitor today and switch to OAC prior to discharge     Consult orthopedic surgery; patient had appointment scheduled today     Transfer to telemetry             VTE Risk Mitigation (From admission, onward)        Ordered     enoxaparin injection 140 mg  Every 12 hours (non-standard times)      09/18/18 2239     Place sequential compression device  Until discontinued      09/18/18 2238     IP VTE HIGH RISK PATIENT  Once      09/18/18 2238          Fernando Richey NP  Cardiology   [facility]

## 2018-09-19 NOTE — PROGRESS NOTES
Patient seen and evaluated by Interventional Cardiology  No elevation in Troponin or BNP  No mention of right heart strain or enlarged heart on CT  No criteria for massive or submassive PE  Hemodynamically stable  No current indication for systemic or catheter directed thrombolysis  2DE  Bilateral LE venous doppler  Systemic anticoagulation followed by oral DOAC

## 2018-09-19 NOTE — SUBJECTIVE & OBJECTIVE
Past Medical History:   Diagnosis Date    Deafness in right ear        Past Surgical History:   Procedure Laterality Date    APPLICATION,EXTERNAL FIXATION DEVICE Right 8/3/2018    Performed by Bradley Simms MD at [facility] OR    FINGER FRACTURE SURGERY         Review of patient's allergies indicates:  No Known Allergies    No current facility-administered medications on file prior to encounter.      Current Outpatient Medications on File Prior to Encounter   Medication Sig    aspirin 81 MG Chew Take 1 tablet (81 mg total) by mouth once daily.    famotidine (PEPCID) 20 MG tablet Take 1 tablet (20 mg total) by mouth 2 (two) times daily. Take with aspirin    oxyCODONE-acetaminophen (PERCOCET)  mg per tablet Take 1 tablet by mouth every 6 (six) hours as needed for Pain.     Family History     None        Tobacco Use    Smoking status: Never Smoker   Substance and Sexual Activity    Alcohol use: No    Drug use: No    Sexual activity: Yes     Review of Systems   Constitution: Negative for diaphoresis.   HENT: Negative.    Eyes: Negative.    Cardiovascular: Positive for chest pain and near-syncope. Negative for dyspnea on exertion, irregular heartbeat, leg swelling, orthopnea, palpitations and syncope.   Respiratory: Positive for shortness of breath. Negative for hemoptysis, sputum production and wheezing.    Endocrine: Negative.    Hematologic/Lymphatic: Negative.    Skin: Negative.    Musculoskeletal: Positive for joint pain.   Gastrointestinal: Negative.    Genitourinary: Negative.    Neurological: Negative.    Psychiatric/Behavioral: Negative.    Allergic/Immunologic: Negative.      Objective:     Vital Signs (Most Recent):  Temp: 98.6 °F (37 °C) (09/19/18 0701)  Pulse: 99 (09/19/18 0800)  Resp: (!) 22 (09/19/18 0800)  BP: 114/62 (09/19/18 0800)  SpO2: 97 % (09/19/18 0800) Vital Signs (24h Range):  Temp:  [97.9 °F (36.6 °C)-98.8 °F (37.1 °C)] 98.6 °F (37 °C)  Pulse:  [] 99  Resp:  [11-63]  22  SpO2:  [92 %-100 %] 97 %  BP: (101-140)/(54-82) 114/62     Weight: 119.4 kg (263 lb 3.7 oz)  Body mass index is 35.7 kg/m².    SpO2: 97 %  O2 Device (Oxygen Therapy): nasal cannula      Intake/Output Summary (Last 24 hours) at 9/19/2018 1027  Last data filed at 9/19/2018 0200  Gross per 24 hour   Intake --   Output 700 ml   Net -700 ml       Lines/Drains/Airways     Peripheral Intravenous Line                 Peripheral IV - Single Lumen 09/18/18 1718 Right Antecubital less than 1 day         Peripheral IV - Single Lumen 09/19/18 0124 Left Forearm less than 1 day                Physical Exam   Constitutional: He is oriented to person, place, and time. He appears well-developed and well-nourished. No distress.   HENT:   Head: Normocephalic and atraumatic.   Eyes: Right eye exhibits no discharge. Left eye exhibits no discharge.   Neck: No JVD present.   Cardiovascular: Normal rate, regular rhythm and normal heart sounds. Exam reveals no gallop and no friction rub.   No murmur heard.  Pulmonary/Chest: Effort normal and breath sounds normal.   Abdominal: Soft. Bowel sounds are normal.   Musculoskeletal: He exhibits tenderness (RLE).   Neurological: He is alert and oriented to person, place, and time.   Skin: Skin is warm and dry. He is not diaphoretic.   Psychiatric: He has a normal mood and affect. His behavior is normal. Judgment and thought content normal.       Significant Labs:   BMP:   Recent Labs   Lab  09/18/18   1717   GLU  126*   NA  138   K  3.8   CL  101   CO2  25   BUN  10   CREATININE  1.1   CALCIUM  9.4   , CMP   Recent Labs   Lab  09/18/18   1717   NA  138   K  3.8   CL  101   CO2  25   GLU  126*   BUN  10   CREATININE  1.1   CALCIUM  9.4   PROT  7.7   ALBUMIN  4.0   BILITOT  1.6*   ALKPHOS  74   AST  15   ALT  19   ANIONGAP  12   ESTGFRAFRICA  >60   EGFRNONAA  >60   , CBC   Recent Labs   Lab  09/18/18   1717   WBC  5.40   HGB  13.5*   HCT  39.8*   PLT  151   , INR   Recent Labs   Lab  09/18/18    1717   INR  1.1   , Troponin   Recent Labs   Lab  09/18/18   1717   TROPONINI  0.012    and All pertinent lab results from the last 24 hours have been reviewed.    Significant Imaging: Echocardiogram:   2D echo with color flow doppler:   Results for orders placed or performed during the hospital encounter of 09/18/18   2D echo with color flow doppler   Result Value Ref Range    EF 55 55 - 65    Diastolic Dysfunction No     Est. PA Systolic Pressure 31.73     Mitral Valve Mobility NORMAL     Tricuspid Valve Regurgitation MILD

## 2018-09-19 NOTE — NURSING
Patient received to room 533.  Vital signs obtained, bed lowered to lowest position.  Siderails up x3.  Patient is AAOx4, calm cooperative, answers questions appropriately.  Report given to Meliza MOSER.  Will continue to monitor.

## 2018-09-19 NOTE — PLAN OF CARE
Problem: Patient Care Overview  Goal: Plan of Care Review  Outcome: Ongoing (interventions implemented as appropriate)  Patient safety maintained throughout this shift, tele monitor, IV 16 gracy RA and 18 gracy to LA, 2D ECHO 55-60%, deaf to right ear, hearing aide to right ear, pt have a external fixator to the RLE with ace wrap, pt still have discomfort and pain to left side of chest 4/10 still , pt lying in bed in lowest position with wheels locked, bed alarm active, call light in reach, pt in stable condition, will continue to monitor

## 2018-09-19 NOTE — ASSESSMENT & PLAN NOTE
Patient immobilized since 08/2018 following fracture, afraid to use crutches and minimal PT participation.  CTA chest in the ED noted diffuse bilateral pulmonary thromboembolism, originating in the distal aspects of the right and left main pulmonary arteries, and extending distally. He is hemodynamically stable and oxygenating well.     His troponin was negative and there was no evidence of right heart strain on CTA or 2DE.     No criteria for massive or submassive PE and no indication for systemic/catheter directed thrombolysis.   Patient on therapeutic Lovenox.      BLE venous US pending.    Will monitor today and switch to OAC prior to discharge     Consult orthopedic surgery; patient had appointment scheduled today     Transfer to telemetry

## 2018-09-19 NOTE — PLAN OF CARE
Problem: Patient Care Overview  Goal: Plan of Care Review  Outcome: Ongoing (interventions implemented as appropriate)  Pt AAOx4, VSS, dyspneic on exertion. Lovenox therapy was started in ED. Pt reported some midsternal CP upon arrival to unit that has since moved a few inches to the left, pain still a pressure with intermittent sharpness. Pain seems to be well controlled with PRN meds. Pt resting comfortably in bed, O2 sat 96-99% on 3L NC. Awaiting cardiology to see pt this AM and decide POC.

## 2018-09-19 NOTE — NURSING
Received report from Rosendo MEJIA, resume care of patient, pt still c/o of left sided chest pain noted, pt is also [place] to the right ear and has a hearing aide to the right ear, pt also have a external fixator to the RLE noted, pt is in stable condition, will continue to monitor

## 2018-09-19 NOTE — HPI
Herman Floyd is a 47 y.o. Male with right ear deafness, and ankle fracture who presented to the ED due to sudden chest discomfort and SOB approximately 30 minutes PTA with near syncopal episode. On 08/03/2018 patient note slipped from the back of his truck trying to attach a trailer. He states he fell about 3 ft to the ground. Patient sustained a right comminuted pilon fracture and underwent external fixator. He has been mostly bed bound since that time. Patient is afraid to use his crutches and reportedly gets OOB twice a day. Wife reports poor participation with prescribed PT. CTA chest in the ED noted diffuse bilateral pulmonary thromboembolism, originating in the distal aspects of the right and left main pulmonary arteries, and extending distally. He is hemodynamically stable and oxygenating well. Admitted to cardiology service for PE. Patient was transferred to ICU and evaluated by interventional cardiology. His troponin was negative and there was no evidence of right heart strain on CTA. No criteria for massive or submassive PE and no indication for systemic/catheter directed thrombolysis. Patient on therapeutic Lovenox. Echo and BLE venous US pending.

## 2018-09-20 ENCOUNTER — TELEPHONE (OUTPATIENT)
Dept: ADMINISTRATIVE | Facility: CLINIC | Age: 48
End: 2018-09-20

## 2018-09-20 VITALS
WEIGHT: 263.25 LBS | RESPIRATION RATE: 18 BRPM | HEIGHT: 72 IN | HEART RATE: 95 BPM | SYSTOLIC BLOOD PRESSURE: 110 MMHG | DIASTOLIC BLOOD PRESSURE: 64 MMHG | OXYGEN SATURATION: 98 % | BODY MASS INDEX: 35.66 KG/M2 | TEMPERATURE: 99 F

## 2018-09-20 LAB
ANION GAP SERPL CALC-SCNC: 9 MMOL/L
BASOPHILS # BLD AUTO: 0.02 K/UL
BASOPHILS NFR BLD: 0.4 %
BUN SERPL-MCNC: 11 MG/DL
CALCIUM SERPL-MCNC: 9.7 MG/DL
CHLORIDE SERPL-SCNC: 102 MMOL/L
CO2 SERPL-SCNC: 24 MMOL/L
CREAT SERPL-MCNC: 1 MG/DL
DIFFERENTIAL METHOD: ABNORMAL
EOSINOPHIL # BLD AUTO: 0.2 K/UL
EOSINOPHIL NFR BLD: 2.9 %
ERYTHROCYTE [DISTWIDTH] IN BLOOD BY AUTOMATED COUNT: 13.1 %
EST. GFR  (AFRICAN AMERICAN): >60 ML/MIN/1.73 M^2
EST. GFR  (NON AFRICAN AMERICAN): >60 ML/MIN/1.73 M^2
GLUCOSE SERPL-MCNC: 111 MG/DL
HCT VFR BLD AUTO: 39.7 %
HGB BLD-MCNC: 13.2 G/DL
LYMPHOCYTES # BLD AUTO: 1.8 K/UL
LYMPHOCYTES NFR BLD: 32.6 %
MCH RBC QN AUTO: 28.4 PG
MCHC RBC AUTO-ENTMCNC: 33.2 G/DL
MCV RBC AUTO: 85 FL
MONOCYTES # BLD AUTO: 0.5 K/UL
MONOCYTES NFR BLD: 8.7 %
NEUTROPHILS # BLD AUTO: 3 K/UL
NEUTROPHILS NFR BLD: 55.4 %
PLATELET # BLD AUTO: 138 K/UL
PMV BLD AUTO: 9 FL
POTASSIUM SERPL-SCNC: 3.8 MMOL/L
RBC # BLD AUTO: 4.65 M/UL
SODIUM SERPL-SCNC: 135 MMOL/L
WBC # BLD AUTO: 5.43 K/UL

## 2018-09-20 PROCEDURE — 85025 COMPLETE CBC W/AUTO DIFF WBC: CPT

## 2018-09-20 PROCEDURE — 63600175 PHARM REV CODE 636 W HCPCS: Performed by: INTERNAL MEDICINE

## 2018-09-20 PROCEDURE — 25000003 PHARM REV CODE 250: Performed by: NURSE PRACTITIONER

## 2018-09-20 PROCEDURE — 94761 N-INVAS EAR/PLS OXIMETRY MLT: CPT

## 2018-09-20 PROCEDURE — 80048 BASIC METABOLIC PNL TOTAL CA: CPT

## 2018-09-20 PROCEDURE — 90471 IMMUNIZATION ADMIN: CPT | Performed by: INTERNAL MEDICINE

## 2018-09-20 PROCEDURE — 36415 COLL VENOUS BLD VENIPUNCTURE: CPT

## 2018-09-20 PROCEDURE — 63600150 PHARM REV CODE 636: Performed by: INTERNAL MEDICINE

## 2018-09-20 PROCEDURE — 90686 IIV4 VACC NO PRSV 0.5 ML IM: CPT | Performed by: INTERNAL MEDICINE

## 2018-09-20 PROCEDURE — 99239 HOSP IP/OBS DSCHRG MGMT >30: CPT | Mod: ,,, | Performed by: NURSE PRACTITIONER

## 2018-09-20 PROCEDURE — 25000003 PHARM REV CODE 250: Performed by: INTERNAL MEDICINE

## 2018-09-20 RX ADMIN — OXYCODONE HYDROCHLORIDE AND ACETAMINOPHEN 1 TABLET: 10; 325 TABLET ORAL at 09:09

## 2018-09-20 RX ADMIN — INFLUENZA A VIRUS A/MICHIGAN/45/2015 X-275 (H1N1) ANTIGEN (FORMALDEHYDE INACTIVATED), INFLUENZA A VIRUS A/SINGAPORE/INFIMH-16-0019/2016 IVR-186 (H3N2) ANTIGEN (FORMALDEHYDE INACTIVATED), INFLUENZA B VIRUS B/PHUKET/3073/2013 ANTIGEN (FORMALDEHYDE INACTIVATED), AND INFLUENZA B VIRUS B/MARYLAND/15/2016 BX-69A ANTIGEN (FORMALDEHYDE INACTIVATED) 0.5 ML: 15; 15; 15; 15 INJECTION, SUSPENSION INTRAMUSCULAR at 12:09

## 2018-09-20 RX ADMIN — ENOXAPARIN SODIUM 140 MG: 150 INJECTION SUBCUTANEOUS at 12:09

## 2018-09-20 RX ADMIN — FAMOTIDINE 20 MG: 20 TABLET, FILM COATED ORAL at 09:09

## 2018-09-20 RX ADMIN — APIXABAN 10 MG: 5 TABLET, FILM COATED ORAL at 09:09

## 2018-09-20 RX ADMIN — ACETAMINOPHEN 650 MG: 325 TABLET ORAL at 06:09

## 2018-09-20 NOTE — DISCHARGE SUMMARY
[facility]  Cardiology  Discharge Summary      Patient Name: Herman Floyd  MRN: [id number]  Admission Date: 9/18/2018  Hospital Length of Stay: 2 days  Discharge Date and Time:  09/20/2018 11:07 AM  Attending Physician: Marshall Savage MD    Discharging Provider: Fernando Richey NP  Primary Care Physician: Primary Doctor No    HPI:   Herman Floyd is a 47 y.o. Male with right ear deafness, and ankle fracture who presented to the ED due to sudden chest discomfort and SOB approximately 30 minutes PTA with near syncopal episode. On 08/03/2018 patient note slipped from the back of his truck trying to attach a trailer. He states he fell about 3 ft to the ground. Patient sustained a right comminuted pilon fracture and underwent external fixator. He has been mostly bed bound since that time. Patient is afraid to use his crutches and reportedly gets OOB twice a day. Wife reports poor participation with prescribed PT. CTA chest in the ED noted diffuse bilateral pulmonary thromboembolism, originating in the distal aspects of the right and left main pulmonary arteries, and extending distally. He is hemodynamically stable and oxygenating well. Admitted to cardiology service for PE. Patient was transferred to ICU and evaluated by interventional cardiology. His troponin was negative and there was no evidence of right heart strain on CTA. No criteria for massive or submassive PE and no indication for systemic/catheter directed thrombolysis. Patient on therapeutic Lovenox. Echo and BLE venous US pending.               * No surgery found *     Indwelling Lines/Drains at time of discharge:  Lines/Drains/Airways          None          Hospital Course:  09/19/2018 Per HPI  09/20/2018 No DVT per imaging. Hemodynamically stable overnight. Initiated Eliquis for PE treatment.   Evaluated by Orthopedic surgery - We will defer any other treatment for pilon at present.  Repeat PE studies will be done in about 4  weeks to determine if the PE has cleared  Patient will follow up with ortho in 2 weeks and with cardiology in 2-3 weeks.         Consults:   Consults (From admission, onward)        Status Ordering Provider     Inpatient consult to Orthopedic Surgery  Once     Provider:  Deejay Mitchell MD    Acknowledged DALE JACOBO          Significant Diagnostic Studies: Labs:   BMP:   Recent Labs   Lab  09/18/18   1717  09/20/18   0902   GLU  126*  111*   NA  138  135*   K  3.8  3.8   CL  101  102   CO2  25  24   BUN  10  11   CREATININE  1.1  1.0   CALCIUM  9.4  9.7   , CMP   Recent Labs   Lab  09/18/18   1717  09/20/18   0902   NA  138  135*   K  3.8  3.8   CL  101  102   CO2  25  24   GLU  126*  111*   BUN  10  11   CREATININE  1.1  1.0   CALCIUM  9.4  9.7   PROT  7.7   --    ALBUMIN  4.0   --    BILITOT  1.6*   --    ALKPHOS  74   --    AST  15   --    ALT  19   --    ANIONGAP  12  9   ESTGFRAFRICA  >60  >60   EGFRNONAA  >60  >60   , CBC   Recent Labs   Lab  09/18/18 1717  09/20/18   0902   WBC  5.40  5.43   HGB  13.5*  13.2*   HCT  39.8*  39.7*   PLT  151  138*   , INR   Lab Results   Component Value Date    INR 1.1 09/18/2018   , Lipid Panel No results found for: CHOL, HDL, LDLCALC, TRIG, CHOLHDL, Troponin   Recent Labs   Lab  09/18/18   1717   TROPONINI  0.012   , A1C:   Recent Labs   Lab  09/19/18   0027   HGBA1C  4.5    and All labs within the past 24 hours have been reviewed  Cardiac Graphics: Echocardiogram:   2D echo with color flow doppler:   Results for orders placed or performed during the hospital encounter of 09/18/18   2D echo with color flow doppler   Result Value Ref Range    EF 55 55 - 65    Diastolic Dysfunction No     Est. PA Systolic Pressure 31.73     Mitral Valve Mobility NORMAL     Tricuspid Valve Regurgitation MILD        Pending Diagnostic Studies:     None          Final Active Diagnoses:    Diagnosis Date Noted POA    PRINCIPAL PROBLEM:  Other pulmonary embolism without acute cor  pulmonale [I26.99] 09/18/2018 Yes      Problems Resolved During this Admission:     No new Assessment & Plan notes have been filed under this hospital service since the last note was generated.  Service: Cardiology      Discharged Condition: good    Disposition: Home or Self Care    Follow Up:    Patient Instructions:      Diet Adult Regular     Notify your health care provider if you experience any of the following:  severe uncontrolled pain     Notify your health care provider if you experience any of the following:  redness, tenderness, or signs of infection (pain, swelling, redness, odor or green/yellow discharge around incision site)     Notify your health care provider if you experience any of the following:  difficulty breathing or increased cough     Notify your health care provider if you experience any of the following:  persistent dizziness, light-headedness, or visual disturbances     Activity as tolerated     Medications:  Reconciled Home Medications:      Medication List      START taking these medications    * apixaban 5 mg Tab  Commonly known as:  ELIQUIS  Take 2 tablets (10 mg total) by mouth 2 (two) times daily. for 7 days     * apixaban 5 mg Tab  Commonly known as:  ELIQUIS  Take 1 tablet (5 mg total) by mouth 2 (two) times daily. Start on 09/27/2018  Start taking on:  9/27/2018         * This list has 2 medication(s) that are the same as other medications prescribed for you. Read the directions carefully, and ask your doctor or other care provider to review them with you.            CONTINUE taking these medications    aspirin 81 MG Chew  Take 1 tablet (81 mg total) by mouth once daily.     famotidine 20 MG tablet  Commonly known as:  PEPCID  Take 1 tablet (20 mg total) by mouth 2 (two) times daily. Take with aspirin     oxyCODONE-acetaminophen  mg per tablet  Commonly known as:  PERCOCET  Take 1 tablet by mouth every 6 (six) hours as needed for Pain.            Time spent on the discharge  of patient: 45 minutes    Fernando Richey NP  Cardiology  [facility]

## 2018-09-20 NOTE — PLAN OF CARE
Follow-up With  Details  Why  Contact Info   Deejay Mitchell MD  On 10/3/2018  @12:45pm  [address]  [address]  [place] [address]  [phone]   Fernando Richey NP  On 10/2/2018  @3pm for cardiology follow up  [address]  [address]  [place] [address]  [phone]

## 2018-09-20 NOTE — PLAN OF CARE
Patient to d/c home with wife. DME in place.  No HH  Needs. New scripts delivered by [facility] delivery.      Discharge rounds on patient. Discussed followup appointments, blue discharge folder, discharge nurse will go over home medications and reasons for medications and final discharge instructions. All patient/caregiver questions answered. Patient verbalized understanding.    Follow up with Dr Mitchell on Oct 3 @ 12:45pm   discussed with PCC, patient no eligible. Patient will need cards follow up. [place] to schedule and update patient.     Update:     Follow-up With  Details  Why  Contact Info   Deejay Mitchell MD  On 10/3/2018  @12:45pm  [address]  [address]  [place] [address]  [phone]   Fernando Richey NP  On 10/2/2018  @3pm for cardiology follow up  [address]  [address]  [place] [address]  [phone]             09/20/18 1330   Final Note   Assessment Type Final Discharge Note   Discharge Disposition Home   What phone number can be called within the next 1-3 days to see how you are doing after discharge? [phone]   Hospital Follow Up  Appt(s) scheduled? Yes   Discharge plans and expectations educations in teach back method with documentation complete? Yes   Right Care Referral Info   Post Acute Recommendation No Care

## 2018-09-20 NOTE — H&P
[facility]  Orthopedics  Progress Note    Patient Name: Herman Floyd  MRN: [id number]  Admission Date: 9/18/2018  Hospital Length of Stay: 1 days  Attending Provider: Marshall Savage MD  Primary Care Provider: Primary Doctor No    Subjective:     Principal Problem:Other pulmonary embolism without acute cor pulmonale    Principal Orthopedic Problem: pilon fracture    Interval History: admitted with PE  Patient was comfortable this morning with his wife at bedside when I rounded this AM    Review of patient's allergies indicates:  No Known Allergies    Current Facility-Administered Medications   Medication    acetaminophen tablet 650 mg    dextrose 50% injection 12.5 g    dextrose 50% injection 25 g    enoxaparin injection 140 mg    famotidine tablet 20 mg    glucagon (human recombinant) injection 1 mg    glucose chewable tablet 16 g    glucose chewable tablet 24 g    influenza (FLUZONE QUADRIVALENT) vaccine 0.5 mL    morphine injection 2 mg    ondansetron injection 4 mg    oxyCODONE-acetaminophen  mg per tablet 1 tablet    sodium chloride 0.9% flush 5 mL     Objective:     Vital Signs (Most Recent):  Temp: 99.7 °F (37.6 °C) (09/19/18 1940)  Pulse: 89 (09/19/18 2031)  Resp: 18 (09/19/18 1940)  BP: 113/72 (09/19/18 1940)  SpO2: 95 % (09/19/18 1954) Vital Signs (24h Range):  Temp:  [96 °F (35.6 °C)-99.7 °F (37.6 °C)] 99.7 °F (37.6 °C)  Pulse:  [] 89  Resp:  [11-63] 18  SpO2:  [92 %-100 %] 95 %  BP: (101-141)/(54-82) 113/72     Weight: 119.4 kg (263 lb 3.7 oz)  Height: 6' (182.9 cm)  Body mass index is 35.7 kg/m².      Intake/Output Summary (Last 24 hours) at 9/19/2018 2128  Last data filed at 9/19/2018 1700  Gross per 24 hour   Intake 240 ml   Output 1325 ml   Net -1085 ml       Ortho/SPM Exam    General: well developed, well nourished; A&O x 3  Head: normocephalic, atraumatic   Eyes: conjunctivae/corneas clear. PERRL.   Neck: supple, symmetrical, trachea midline  Lungs:  normal respiratory effort   Heart: regular rate and rhythm   Abdomen: non-distended  Pulses: 2+ and symmetric   Neurologic: Normal strength and tone. No focal numbness or weakness  Extremities: no cyanosis or edema, or clubbing   External fixator intact  Pin clean  Swelling decreased and wrinkling now        Assessment/Plan:     Active Diagnoses:    Diagnosis Date Noted POA    PRINCIPAL PROBLEM:  Other pulmonary embolism without acute cor pulmonale [I26.99] 09/18/2018 Yes      Problems Resolved During this Admission:     Discussed with Dr. Savage  Patient will receive treatment for PE  We will defer any other treatment for pilon at present  Repeat PE studies will be done in about 4 weeks to determine if the PE has cleared  Patient will f/u with me in 2 weeks in clinic    Deejay Mitchell MD  Orthopedics  [facility]

## 2018-09-20 NOTE — TELEPHONE ENCOUNTER
Home Health SOC 09/04/2018 - 11/02/2018 with [facility] - Dr. Deejay Mitchell. Patient received  services.

## 2018-09-20 NOTE — PROGRESS NOTES
Pt had prescriptions delivered to bedside. Pt given discharge instructions and indicated understanding verbally. Pt awaiting transport home.

## 2018-09-20 NOTE — PLAN OF CARE
Problem: Patient Care Overview  Goal: Plan of Care Review  Outcome: Ongoing (interventions implemented as appropriate)   09/20/18 5172   Coping/Psychosocial   Plan Of Care Reviewed With patient   Patients vital signs have been stable over night. Denies any pain at present. He feels his condition is improving and the pain is easing. Slept well during night. Safety measures maintained. On telemetry NSR. Will continue to monitor patient.

## 2018-09-20 NOTE — PLAN OF CARE
Problem: Patient Care Overview  Goal: Plan of Care Review  Outcome: Ongoing (interventions implemented as appropriate)  Pt on RA with documented sats.95. Will continue to monitor.

## 2018-09-20 NOTE — NURSING TRANSFER
Nursing Transfer Note      9/19/2018     Transfer To: 533    Transfer via bed    Transfer with cardiac monitoring    Transported by ICU Nurse and Patient Transport     Medicines sent: No medications to send     Chart send with patient: Yes    Notified: spouse    Patient reassessed at: 9/19/18 by 5th floor nurse     Upon arrival to floor: cardiac monitor applied, patient oriented to room, call bell in reach and bed in lowest position

## 2018-09-21 ENCOUNTER — PATIENT OUTREACH (OUTPATIENT)
Dept: ADMINISTRATIVE | Facility: CLINIC | Age: 48
End: 2018-09-21

## 2018-09-21 ENCOUNTER — NURSE TRIAGE (OUTPATIENT)
Dept: ADMINISTRATIVE | Facility: CLINIC | Age: 48
End: 2018-09-21

## 2018-09-21 NOTE — PATIENT INSTRUCTIONS
Discharge Instructions for Pulmonary Embolism  A deep vein thrombosis (DVT) is a blood clot in a large vein deep in a leg, arm, or elsewhere in the body. The clot can separate from the vein, travel to the lungs and cut off blood flow. This is a pulmonary embolism (PE). Pulmonary embolism is very serious and may cause death.   Healthcare providers use the term venous thromboembolism (VTE) to describe both DVT and PE. They use the term VTE because the two conditions are very closely related. And, because their prevention and treatment are closely related.   Home care  Taking care of yourself is very important. To help prevent more blood clots from forming, follow your healthcare provider's instructions. Do the following:  · Take your medicines exactly as instructed. Dont skip doses. If you miss a dose, call your healthcare provider and ask what you should do.    · Have all lab tests as recommended. This is very important when you take medicines to prevent blood clots.   · If your healthcare provider has instructed you to do so, wear elastic (compression stockings).  · Get up and get moving.  · While sitting for long periods of time, move your knees, ankles, feet, and toes.  Lifestyle changes  To help prevent problems with your heart and blood vessels, do the following:   · If you smoke, get help to quit. Talk with your healthcare provider about medicines and programs that can help.  · Stay at a healthy weight. Talk to your healthcare provider about losing weight, if you are overweight  · Try to exercise at least 30 minutes on most days. Before starting an exercise program, talk with your healthcare provider.   · When traveling by car, make frequent stops to get up and move around.  · On long airplane rides, get up and move around when possible. If you cant get up, wiggle your toes, move your ankles and tighten your calves to keep your blood moving.  Follow-up care  Make a follow-up appointment as directed  Have  your lab work done as directed.     When to seek medical advice  Call your healthcare provider if you have pain, swelling, and redness in your leg, arm, or other body area. These symptoms may mean another blood clot.  And, call your healthcare provider if you have signs and symptoms of bleeding, like blood in your urine, bleeding with bowel movements, or bleeding from the nose, gums, a cut, or vagina.   Call 911  Call 911 or get emergency help if you have symptoms of a blood clot in the lungs including:   · Chest pain  · Trouble breathing  · Coughing (may cough up blood)  · Fast heartbeat  · Sweating  · Fainting  Also call 911 if you have:  · Heavy or uncontrolled bleeding. If you are taking a blood thinner, you have an increased chance of bleeding.   Date Last Reviewed: 2/1/2017  © 8305-5634 [employer]. [address] [place] [address]. All rights reserved. This information is not intended as a substitute for professional medical care. Always follow your healthcare professional's instructions.

## 2018-09-21 NOTE — TELEPHONE ENCOUNTER
RN received IM from Shelly CARPENTER LPN    [9/21/2018 11:36 AM] Shelly Connelly:   Herman Floyd [id number] dx pulmonary embolism. wife states that he is having some c/p . Had it in the hospital and pain is the same. wife insists on talking to a triage nurse  she wants to be called on her number, Her name is Grace  [9/21/2018 11:39 AM] Vani Valentine:   Ok  No answer- Mail box Full unable to leave a message

## 2018-09-24 ENCOUNTER — OFFICE VISIT (OUTPATIENT)
Dept: CARDIOLOGY | Facility: CLINIC | Age: 48
End: 2018-09-24
Payer: OTHER MISCELLANEOUS

## 2018-09-24 ENCOUNTER — TELEPHONE (OUTPATIENT)
Dept: CARDIOLOGY | Facility: CLINIC | Age: 48
End: 2018-09-24

## 2018-09-24 VITALS
DIASTOLIC BLOOD PRESSURE: 73 MMHG | SYSTOLIC BLOOD PRESSURE: 113 MMHG | HEIGHT: 72 IN | HEART RATE: 86 BPM | BODY MASS INDEX: 35.7 KG/M2 | OXYGEN SATURATION: 96 %

## 2018-09-24 DIAGNOSIS — R07.89 COSTOCHONDRAL CHEST PAIN: ICD-10-CM

## 2018-09-24 DIAGNOSIS — R07.1 CHEST PAIN ON BREATHING: ICD-10-CM

## 2018-09-24 DIAGNOSIS — I26.99 OTHER PULMONARY EMBOLISM WITHOUT ACUTE COR PULMONALE, UNSPECIFIED CHRONICITY: Primary | ICD-10-CM

## 2018-09-24 PROCEDURE — 93010 ELECTROCARDIOGRAM REPORT: CPT | Mod: S$PBB,,, | Performed by: INTERNAL MEDICINE

## 2018-09-24 PROCEDURE — 99999 PR PBB SHADOW E&M-EST. PATIENT-LVL III: CPT | Mod: PBBFAC,,, | Performed by: STUDENT IN AN ORGANIZED HEALTH CARE EDUCATION/TRAINING PROGRAM

## 2018-09-24 PROCEDURE — 99213 OFFICE O/P EST LOW 20 MIN: CPT | Mod: PBBFAC,PO,25 | Performed by: STUDENT IN AN ORGANIZED HEALTH CARE EDUCATION/TRAINING PROGRAM

## 2018-09-24 PROCEDURE — 99214 OFFICE O/P EST MOD 30 MIN: CPT | Mod: S$PBB,,, | Performed by: STUDENT IN AN ORGANIZED HEALTH CARE EDUCATION/TRAINING PROGRAM

## 2018-09-24 PROCEDURE — 93005 ELECTROCARDIOGRAM TRACING: CPT | Mod: PBBFAC,PO | Performed by: INTERNAL MEDICINE

## 2018-09-24 RX ORDER — ACETAMINOPHEN 500 MG
500 TABLET ORAL EVERY 8 HOURS PRN
Qty: 60 TABLET | Refills: 2 | Status: SHIPPED | OUTPATIENT
Start: 2018-09-24 | End: 2018-10-08 | Stop reason: SDUPTHER

## 2018-09-24 RX ORDER — FAMOTIDINE 20 MG/1
40 TABLET, FILM COATED ORAL DAILY
Qty: 60 TABLET | Refills: 1 | Status: SHIPPED | OUTPATIENT
Start: 2018-09-27 | End: 2018-10-08 | Stop reason: SDUPTHER

## 2018-09-24 RX ORDER — NAPROXEN 500 MG/1
500 TABLET ORAL 2 TIMES DAILY
Qty: 60 TABLET | Refills: 0 | Status: SHIPPED | OUTPATIENT
Start: 2018-09-24 | End: 2018-10-08 | Stop reason: SDUPTHER

## 2018-09-24 NOTE — ASSESSMENT & PLAN NOTE
Possible inflammation from PE  - will give naproxen and tylenol PRN for costochondral chest pain.  RTC in 2-3 weeks

## 2018-09-24 NOTE — TELEPHONE ENCOUNTER
----- Message from Esthela Hester sent at 9/24/2018  8:07 AM CDT -----  Contact: [phone]/Grace pt's wife   Pt its requesting an appointment for today , states he is still having chest pain . Please advise

## 2018-09-24 NOTE — PROGRESS NOTES
Cardiology Clinic note    Subjective:   Patient ID:  Herman Floyd is a 48 y.o. male who presents for follow-up of PE and chest pain    HPI:   Herman Floyd  has a past medical history of Deafness in right ear.  Herman Floyd is a 47 y.o. Male with right ear deafness, and ankle fracture who presented to the ED due to sudden chest discomfort and SOB approximately 30 minutes PTA with near syncopal episode. On 08/03/2018 patient note slipped from the back of his truck trying to attach a trailer. He states he fell about 3 ft to the ground. Patient sustained a right comminuted pilon fracture and underwent external fixator. He has been mostly bed bound since that time. Patient is afraid to use his crutches and reportedly gets OOB twice a day. Wife reports poor participation with prescribed PT. CTA chest in the ED noted diffuse bilateral pulmonary thromboembolism, originating in the distal aspects of the right and left main pulmonary arteries, and extending distally. He is hemodynamically stable and oxygenating well. Admitted to cardiology service for PE. Patient was transferred to ICU and evaluated by interventional cardiology. His troponin was negative and there was no evidence of right heart strain on CTA. No criteria for massive or submassive PE and no indication for systemic/catheter directed thrombolysis. Patient on therapeutic Lovenox. Echo and BLE venous US pending.   Patient was then discharged home on eliquis with 4 day 10mg load. Pt is tolerating the medication well.  Today he comes for s/s of increase chest wall discomfort and substernal chest pain. He notes he only had the s/s after his PE diagnosis. He was had stutter chest wall discomfort on the day of his discharge as well. He comes in for options on relief of his symptoms.  Describes his pain as achy, worse with coughing, worse with palpation of the chest wall. Rate the pain from a 5-7/10.  Denies any hemoptysis. No subjective shortness of  Pt without complaints breath.    Vitals  Vitals:    09/24/18 1055   BP: 113/73   Pulse: 86   SpO2: 96%   Height: 6' (1.829 m)       Patient Active Problem List    Diagnosis Date Noted    Costochondral chest pain 09/24/2018    Chest pain     Shortness of breath     Other pulmonary embolism without acute cor pulmonale 09/18/2018    Closed fracture of right ankle 08/03/2018          Medication List           Accurate as of 9/24/18 12:38 PM. If you have any questions, ask your nurse or doctor.               START taking these medications    acetaminophen 500 MG tablet  Commonly known as:  TYLENOL  Take 1 tablet (500 mg total) by mouth every 8 (eight) hours as needed for Pain.  Started by:  Mario Leon MD     naproxen 500 MG tablet  Commonly known as:  NAPROSYN  Take 1 tablet (500 mg total) by mouth 2 (two) times daily.  Started by:  Mario Leon MD        CHANGE how you take these medications    famotidine 40 MG tablet  Commonly known as:  PEPCID  Take 1 tablet (40 mg total) by mouth once daily. Take with aspirin  What changed:    · medication strength  · how much to take  · when to take this  Changed by:  Mario Leon MD        CONTINUE taking these medications    aspirin 81 MG Chew  Take 1 tablet (81 mg total) by mouth once daily.     ELIQUIS 5 mg Tab  Generic drug:  apixaban  Take 1 tablet (5 mg total) by mouth 2 (two) times daily. Start on 09/27/2018  Start taking on:  9/27/2018        STOP taking these medications    oxyCODONE-acetaminophen  mg per tablet  Commonly known as:  PERCOCET  Stopped by:  Mario Leon MD           Where to Get Your Medications      These medications were sent to [facility] [address] [place] [id number]    Hours:  Mon-Fri, 8a-5:30p Phone:  [phone]   · acetaminophen 500 MG tablet  · naproxen 500 MG tablet     You can get these medications from any pharmacy    Bring a paper prescription for each of these medications  · famotidine 40 MG tablet            Review of Systems   Constitution: Positive for malaise/fatigue. Negative for fever, weakness and night sweats.   HENT: Positive for hearing loss. Negative for congestion, hoarse voice and sore throat.    Cardiovascular: Negative for chest pain, irregular heartbeat, palpitations and syncope.   Respiratory: Positive for cough and sleep disturbances due to breathing. Negative for hemoptysis, shortness of breath, sputum production and wheezing.    Musculoskeletal: Positive for muscle weakness.         Objective:   Physical Exam   Constitutional: He is oriented to person, place, and time. He appears well-developed and well-nourished.   HENT:   Head: Normocephalic.   Eyes: EOM are normal. Left eye exhibits no discharge.   Neck: Neck supple. No JVD present.   Cardiovascular: Normal rate, regular rhythm and normal heart sounds.   No murmur heard.  Pulmonary/Chest: Effort normal and breath sounds normal. No respiratory distress. He has no wheezes. He has no rales. He exhibits tenderness.   Abdominal: Soft. He exhibits no distension.   Musculoskeletal: Normal range of motion. He exhibits no edema.   Neurological: He is alert and oriented to person, place, and time.   Skin: Skin is dry. He is not diaphoretic. No erythema.   Psychiatric: He has a normal mood and affect. His behavior is normal. Judgment and thought content normal.     Gen: Patient awake and alert, in NAD  Eyes: Pupils equal and round.  Sclerae anicteric, noninjected conjunctivae.  HENT: NC/AT, nasal septum midline, MMM, OP clear and without exudates.  CV: Regular rhythm.  Normal S1, S2.  No murmurs, rub or gallop.  JVP normal.  Chest:  Symmetric chest wall expansion.  Good air movement.  No wheezes or crackles.  Abd:  Soft, Non-tender.  Non distended.  Normoactive bowel sounds, no rebound  Ext: +2 radial pulses, no C/C/E, warm to touch  Skin: intact, no lesions or rashes noted.  No decubitus ulcer.  Neuro:  Moves all extremities grossly.  Tongue  midline.  Psych: Appropriate affect    Lab Results    Lab Results   Component Value Date     (L) 09/20/2018    K 3.8 09/20/2018     09/20/2018    CO2 24 09/20/2018    BUN 11 09/20/2018    CREATININE 1.0 09/20/2018     (H) 09/20/2018    HGBA1C 4.5 09/19/2018    AST 15 09/18/2018    ALT 19 09/18/2018    ALBUMIN 4.0 09/18/2018    PROT 7.7 09/18/2018    BILITOT 1.6 (H) 09/18/2018    WBC 5.43 09/20/2018    HGB 13.2 (L) 09/20/2018    HCT 39.7 (L) 09/20/2018    MCV 85 09/20/2018     (L) 09/20/2018    INR 1.1 09/18/2018    BNP <10 09/18/2018       Lipid panel  No results found for: CHOL  No results found for: HDL  No results found for: LDLCALC  No results found for: TRIG    Cardiac Studies  ECG:  normal EKG, normal sinus rhythm, unchanged from previous tracings.    Echo: 9/19/18  CONCLUSIONS     1 - Normal left ventricular systolic function (EF 55-60%).     2 - Normal left ventricular diastolic function.     3 - Normal right ventricular systolic function .     4 - The estimated PA systolic pressure is 32 mmHg.     Cath study    Assessment:     1. Other pulmonary embolism without acute cor pulmonale, unspecified chronicity    2. Costochondral chest pain        Plan:     Other pulmonary embolism without acute cor pulmonale  Compliant with eliquis - transition to 5mg BID  - normal o2 sat  Breathing comfortably    Costochondral chest pain  Possible inflammation from PE  - will give naproxen and tylenol PRN for costochondral chest pain.  RTC in 2-3 weeks       Continue with current medical plan and lifestyle changes.  Return sooner for concerns or questions. If symptoms persist go to the ED    No orders of the defined types were placed in this encounter.      Follow up as scheduled. Return to clinic in 2 weeks   He expressed verbal understanding and agreed with the plan    Thank you for the opportunity to care for this patient. Will be available for questions if needed.     Mario Leon MD  [facility]  Interventional Cardiology  [facility]  Pager: [phone]

## 2018-09-26 DIAGNOSIS — R07.1 CHEST PAIN ON BREATHING: Primary | ICD-10-CM

## 2018-09-27 ENCOUNTER — TELEPHONE (OUTPATIENT)
Dept: CARDIOLOGY | Facility: CLINIC | Age: 48
End: 2018-09-27

## 2018-09-27 NOTE — TELEPHONE ENCOUNTER
----- Message from Laura Figueroa sent at 9/27/2018 10:02 AM CDT -----  Contact: wife [phone]  Patient is stating the pharmacy does not have the prescription for famotidine (PEPCID) 40 MG tablet that was sent over a few days ago. Pharmacy informed them to call and have doctor resend script. Please call and advise.

## 2018-09-27 NOTE — TELEPHONE ENCOUNTER
Wife notified that Rx has been called in to pharmacy and she should check with them to see when it will be ready for .

## 2018-10-08 ENCOUNTER — OFFICE VISIT (OUTPATIENT)
Dept: CARDIOLOGY | Facility: CLINIC | Age: 48
End: 2018-10-08
Payer: OTHER MISCELLANEOUS

## 2018-10-08 VITALS
SYSTOLIC BLOOD PRESSURE: 112 MMHG | HEART RATE: 84 BPM | DIASTOLIC BLOOD PRESSURE: 76 MMHG | HEIGHT: 72 IN | BODY MASS INDEX: 38.6 KG/M2 | WEIGHT: 285 LBS

## 2018-10-08 DIAGNOSIS — I27.82 OTHER CHRONIC PULMONARY EMBOLISM WITHOUT ACUTE COR PULMONALE: ICD-10-CM

## 2018-10-08 DIAGNOSIS — R07.89 COSTOCHONDRAL CHEST PAIN: ICD-10-CM

## 2018-10-08 PROCEDURE — 99213 OFFICE O/P EST LOW 20 MIN: CPT | Mod: PBBFAC,PO | Performed by: STUDENT IN AN ORGANIZED HEALTH CARE EDUCATION/TRAINING PROGRAM

## 2018-10-08 PROCEDURE — 99214 OFFICE O/P EST MOD 30 MIN: CPT | Mod: S$PBB,,, | Performed by: STUDENT IN AN ORGANIZED HEALTH CARE EDUCATION/TRAINING PROGRAM

## 2018-10-08 PROCEDURE — 99999 PR PBB SHADOW E&M-EST. PATIENT-LVL III: CPT | Mod: PBBFAC,,, | Performed by: STUDENT IN AN ORGANIZED HEALTH CARE EDUCATION/TRAINING PROGRAM

## 2018-10-08 RX ORDER — NAPROXEN 500 MG/1
500 TABLET ORAL 2 TIMES DAILY
Qty: 60 TABLET | Refills: 0 | Status: SHIPPED | OUTPATIENT
Start: 2018-10-08 | End: 2018-10-25 | Stop reason: SDUPTHER

## 2018-10-08 RX ORDER — ACETAMINOPHEN 500 MG
500 TABLET ORAL EVERY 8 HOURS PRN
Qty: 60 TABLET | Refills: 2 | Status: SHIPPED | OUTPATIENT
Start: 2018-10-08 | End: 2019-09-26

## 2018-10-08 RX ORDER — FAMOTIDINE 20 MG/1
40 TABLET, FILM COATED ORAL DAILY
Qty: 60 TABLET | Refills: 2 | Status: SHIPPED | OUTPATIENT
Start: 2018-10-08 | End: 2019-09-26

## 2018-10-08 NOTE — ASSESSMENT & PLAN NOTE
Provoked PE secondary to immobility  - hemodynamically stable. Not requiring O2. Asymptomatic.  Compliant with eliquis  - will continue eliquis at least 3 months after the patient is able to ambulate with healed leg fracture.   - likely duration of eliquis will be ~6months.

## 2018-10-08 NOTE — PROGRESS NOTES
Cardiology Clinic note    Subjective:   Patient ID:  Herman Floyd is a 48 y.o. male who presents for follow-up of PE and chest pain    HPI:   Herman Floyd  has a past medical history of Deafness in right ear.  Herman Floyd is a 47 y.o. Male with right ear deafness, and ankle fracture who presented to the ED due to sudden chest discomfort and SOB approximately 30 minutes PTA with near syncopal episode. On 08/03/2018 patient note slipped from the back of his truck trying to attach a trailer. He states he fell about 3 ft to the ground. Patient sustained a right comminuted pilon fracture and underwent external fixator. He has been mostly bed bound since that time. Patient is afraid to use his crutches and reportedly gets OOB twice a day. Wife reports poor participation with prescribed PT. CTA chest in the ED noted diffuse bilateral pulmonary thromboembolism, originating in the distal aspects of the right and left main pulmonary arteries, and extending distally. He is hemodynamically stable and oxygenating well. Admitted to cardiology service for PE. Patient was transferred to ICU and evaluated by interventional cardiology. His troponin was negative and there was no evidence of right heart strain on CTA. No criteria for massive or submassive PE and no indication for systemic/catheter directed thrombolysis. Patient on therapeutic Lovenox. Echo and BLE venous US pending.   Patient was then discharged home on eliquis with 4 day 10mg load. Pt is tolerating the medication well.  9/27:  he comes for s/s of increase chest wall discomfort and substernal chest pain. He notes he only had the s/s after his PE diagnosis. He was had stutter chest wall discomfort on the day of his discharge as well. He comes in for options on relief of his symptoms.  Describes his pain as achy, worse with coughing, worse with palpation of the chest wall. Rate the pain from a 5-7/10.  Denies any hemoptysis. No subjective shortness of  breath.   We diagnosis the patient with intercostal pain 2nd to PE vs costochondritis. Given tylenol and naproxen for pain control    10/8: follow up. Pt states he is doing much better. The prior chest wall pain has greatly improved with the medication regiment. Compliant with eliquis. No ADR. He is still taking the naproxen BID with pepcid. He still is not in a case for his right leg fx.    Vitals  Vitals:    10/08/18 0908   BP: 112/76   Pulse: 84   Weight: 129.3 kg (285 lb)   Height: 6' (1.829 m)       Patient Active Problem List    Diagnosis Date Noted    Costochondral chest pain 09/24/2018    Chest pain     Shortness of breath     Other pulmonary embolism without acute cor pulmonale 09/18/2018    Closed fracture of right ankle 08/03/2018          Medication List           Accurate as of 10/8/18  9:54 AM. If you have any questions, ask your nurse or doctor.               CONTINUE taking these medications    acetaminophen 500 MG tablet  Commonly known as:  TYLENOL  Take 1 tablet (500 mg total) by mouth every 8 (eight) hours as needed for Pain.     apixaban 5 mg Tab  Commonly known as:  ELIQUIS  Take 1 tablet (5 mg total) by mouth 2 (two) times daily. Start on 09/27/2018     aspirin 81 MG Chew  Take 1 tablet (81 mg total) by mouth once daily.     famotidine 20 MG tablet  Commonly known as:  PEPCID  Take 2 tablets (40 mg total) by mouth once daily. take with aspirin     naproxen 500 MG tablet  Commonly known as:  NAPROSYN  Take 1 tablet (500 mg total) by mouth 2 (two) times daily.           Where to Get Your Medications      These medications were sent to [id number] [address] [facility] [address] AT [facility] [address]  [address] [place] [address]    Phone:  [phone]   · acetaminophen 500 MG tablet  · apixaban 5 mg Tab  · naproxen 500 MG tablet     You can get these medications from any pharmacy    Bring a paper prescription for each of these  medications  · famotidine 20 MG tablet           Review of Systems   Constitution: Negative for fever, weakness, malaise/fatigue and night sweats.   HENT: Positive for hearing loss. Negative for congestion, hoarse voice and sore throat.    Cardiovascular: Negative for chest pain, irregular heartbeat, palpitations and syncope.   Respiratory: Positive for sleep disturbances due to breathing. Negative for cough, hemoptysis, shortness of breath, sputum production and wheezing.    Musculoskeletal: Positive for muscle weakness.   Gastrointestinal: Negative for abdominal pain, hematemesis and hematochezia.   Neurological: Negative.    Psychiatric/Behavioral: Negative.          Objective:   Physical Exam   Constitutional: He is oriented to person, place, and time. He appears well-developed and well-nourished.   HENT:   Head: Normocephalic.   Eyes: EOM are normal. Left eye exhibits no discharge.   Neck: Neck supple. No JVD present.   Cardiovascular: Normal rate, regular rhythm and normal heart sounds.   No murmur heard.  Pulmonary/Chest: Effort normal and breath sounds normal. No respiratory distress. He has no wheezes. He has no rales. He exhibits no tenderness.   Abdominal: Soft. He exhibits no distension.   Musculoskeletal: Normal range of motion. He exhibits no edema.   Neurological: He is alert and oriented to person, place, and time.   Skin: Skin is dry. He is not diaphoretic. No erythema.   Psychiatric: He has a normal mood and affect. His behavior is normal. Judgment and thought content normal.     Gen: Patient awake and alert, in NAD  Eyes: Pupils equal and round.  Sclerae anicteric, noninjected conjunctivae.  HENT: NC/AT, nasal septum midline, MMM, OP clear and without exudates.  CV: Regular rhythm.  Normal S1, S2.  No murmurs, rub or gallop.  JVP normal.  Chest:  Symmetric chest wall expansion.  Good air movement.  No wheezes or crackles.  Abd:  Soft, Non-tender.  Non distended.  Normoactive bowel sounds, no  rebound  Ext: +2 radial pulses, no C/C/E, warm to touch  Skin: intact, no lesions or rashes noted.  No decubitus ulcer.  Neuro:  Moves all extremities grossly.  Tongue midline.  Psych: Appropriate affect    Lab Results    Lab Results   Component Value Date     (L) 09/20/2018    K 3.8 09/20/2018     09/20/2018    CO2 24 09/20/2018    BUN 11 09/20/2018    CREATININE 1.0 09/20/2018     (H) 09/20/2018    HGBA1C 4.5 09/19/2018    AST 15 09/18/2018    ALT 19 09/18/2018    ALBUMIN 4.0 09/18/2018    PROT 7.7 09/18/2018    BILITOT 1.6 (H) 09/18/2018    WBC 5.43 09/20/2018    HGB 13.2 (L) 09/20/2018    HCT 39.7 (L) 09/20/2018    MCV 85 09/20/2018     (L) 09/20/2018    INR 1.1 09/18/2018    BNP <10 09/18/2018       Lipid panel  No results found for: CHOL  No results found for: HDL  No results found for: LDLCALC  No results found for: TRIG    Cardiac Studies  ECG:  normal EKG, normal sinus rhythm, unchanged from previous tracings.    Echo: 9/19/18  CONCLUSIONS     1 - Normal left ventricular systolic function (EF 55-60%).     2 - Normal left ventricular diastolic function.     3 - Normal right ventricular systolic function .     4 - The estimated PA systolic pressure is 32 mmHg.     Cath study    Assessment:     1. Costochondral chest pain    2. Other chronic pulmonary embolism without acute cor pulmonale        Plan:     Costochondral chest pain  Improved symptomatically   - will continue naproxen & tylenol PRN for costochondral chest pain.  Advised to try to taper off medications by the end of the month  - c/w pepcid for GI px    Other pulmonary embolism without acute cor pulmonale  Provoked PE secondary to immobility  - hemodynamically stable. Not requiring O2. Asymptomatic.  Compliant with eliquis  - will continue eliquis at least 3 months after the patient is able to ambulate with healed leg fracture.   - likely duration of eliquis will be ~6months.      Continue with current medical plan and  lifestyle changes.  Return sooner for concerns or questions. If symptoms persist go to the ED    No orders of the defined types were placed in this encounter.      Follow up as scheduled. Return to clinic in 3 months   He expressed verbal understanding and agreed with the plan    Thank you for the opportunity to care for this patient. Will be available for questions if needed.     Mario Leon MD [facility]  Interventional Cardiology  [facility]  Pager: [phone]

## 2018-10-08 NOTE — ASSESSMENT & PLAN NOTE
Improved symptomatically   - will continue naproxen & tylenol PRN for costochondral chest pain.  Advised to try to taper off medications by the end of the month  - c/w pepcid for GI px

## 2018-10-25 RX ORDER — NAPROXEN 500 MG/1
500 TABLET ORAL 2 TIMES DAILY
Qty: 60 TABLET | Refills: 0 | Status: SHIPPED | OUTPATIENT
Start: 2018-10-25 | End: 2019-09-26

## 2018-10-25 NOTE — TELEPHONE ENCOUNTER
Spoke with pt wife she stated that [facility] needs authorization on pt medications. Pt  Wife stated that she would like the prescriptions sent [facility] to [facility] where she has filled them before and they don't need authorization. I explained to her that Dr. Leon is not in clinic today and I will call her and let her know when she can  the prescriptions. She stated that she understood.

## 2018-10-25 NOTE — TELEPHONE ENCOUNTER
----- Message from Ariella Swift sent at 10/25/2018  9:41 AM CDT -----  Patient's wife, Grace, called.   No. [phone]    [facility] needs prior authorization.    [facility]  This will be a new script for [facility].

## 2018-10-26 ENCOUNTER — HOSPITAL ENCOUNTER (OUTPATIENT)
Dept: RADIOLOGY | Facility: HOSPITAL | Age: 48
Discharge: HOME | End: 2018-10-26
Attending: PHYSICIAN ASSISTANT
Payer: OTHER MISCELLANEOUS

## 2018-10-26 DIAGNOSIS — M79.661 BILATERAL CALF PAIN: ICD-10-CM

## 2018-10-26 DIAGNOSIS — S82.871A: ICD-10-CM

## 2018-10-26 DIAGNOSIS — I26.99 IATROGENIC PULMONARY EMBOLISM AND INFARCTION: Primary | ICD-10-CM

## 2018-10-26 DIAGNOSIS — M79.662 BILATERAL CALF PAIN: ICD-10-CM

## 2018-10-26 DIAGNOSIS — T81.718A IATROGENIC PULMONARY EMBOLISM AND INFARCTION: Primary | ICD-10-CM

## 2018-10-26 DIAGNOSIS — T81.718A IATROGENIC PULMONARY EMBOLISM AND INFARCTION: ICD-10-CM

## 2018-10-26 DIAGNOSIS — I26.99 IATROGENIC PULMONARY EMBOLISM AND INFARCTION: ICD-10-CM

## 2018-10-26 PROCEDURE — 93971 EXTREMITY STUDY: CPT | Mod: TC

## 2018-10-26 PROCEDURE — 93971 EXTREMITY STUDY: CPT | Mod: 26,RT,, | Performed by: RADIOLOGY

## 2018-10-31 ENCOUNTER — TELEPHONE (OUTPATIENT)
Dept: CARDIOLOGY | Facility: CLINIC | Age: 48
End: 2018-10-31

## 2018-10-31 DIAGNOSIS — I27.82 OTHER CHRONIC PULMONARY EMBOLISM WITHOUT ACUTE COR PULMONALE: Primary | ICD-10-CM

## 2018-10-31 NOTE — TELEPHONE ENCOUNTER
Please order a CTA for PE follow up      He is a patient Dr. Leon but I have also seen him in the past         Thanks        ZN

## 2018-11-01 NOTE — TELEPHONE ENCOUNTER
Pt is scheduled on 11/09/18 @ 7:30am for a CTA at [facility]. Called and spoke to pt wife and advised her of the appt.

## 2018-11-04 NOTE — H&P
Orthopedic H&P (based on clinic visit from 10/31/18)    Reason For Visit    s/p R LE ex fix adjustment 8/30/18 [facility]  doi: 8/3/18 R layne fx     For Ex fix removal on Tuesday    History of Present Illness    Placed in an ex fix about 3 months ago for severe pilon fracture  Fixator adjusted 8/30/18  Developed a PE and now on anticoagulation  Doing okay today  Wants fixator off     Past Medical History:   Diagnosis Date    Deafness in right ear     -   Pulmonary Embolism about one month ago treated with anticoagulation      Past Surgical History:   Procedure Laterality Date    APPLICATION,EXTERNAL FIXATION DEVICE Right 8/3/2018    Performed by [facility] at [facility]    FINGER FRACTURE SURGERY       Allergies   · No Known Drug Allergies    Current Meds    Medication Name Instruction   Acetaminophen-Codeine #3 TABS TAKE TABLET  PRN   Aspir-81 TBEC TAKE 1 TABLET DAILY.   Cephalexin 500 MG Oral Capsule TAKE 1 CAPSULE 4 TIMES DAILY UNTIL GONE.   Eliquis 5 MG Oral Tablet TAKE 1 TABLET Twice daily for blood clot   Famotidine 20 MG Oral Tablet TAKE 1 TABLET TWICE DAILY   Gabapentin 300 MG Oral Capsule TAKE 1 CAPSULE 2-3 TIMES DAILY   Naproxen Sodium 550 MG Oral Tablet TAKE TABLET Twice daily     Surgical History   · History of External fixation removal   · History of Halo placement   · History of Hand surgery    Results/Data    U/s last week was negative for blood clot     Vitals   Recorded: 31Oct2018 11:08AM   Height 6 ft    Systolic 137   Diastolic 84   Heart Rate 71   Pain Scale 4   Location: right pilon     Physical Exam    General: well developed, well nourished   Head: normocephalic, atraumatic   Eyes: conjunctivae/corneas clear. PERRL.   Neck: supple, symmetrical, trachea midline  Lungs: normal respiratory effort   Heart: regular rate and rhythm   Abdomen: non-distended  Pulses: 2+ and symmetric   Neurologic: Normal strength and tone. No focal numbness or weakness  Extremities: no cyanosis or edema, or  clubbing     Fixator intact  Pins clean  Moving toes  Sensation same  Swelling moderate    Results  X-RAY REPORT  STUDY: Three views of the right ankle .  FINDINGS: Severe bone loss with comminution, possible early healing     Assessment   1. Pilon fracture of right tibia (S82.001A)   2. Pulmonary embolus (I26.99)    Plan    Will plan to remove fixator now since it has been three months.  Although I do not think the fracture is fully healed, I do not think we have other good options a present.  I do not think ORIF is a reasonable option now given the fracture severity, length of time elapsed, and current swelling and I do not think leaving the fixator on longer will help.    We will do this Tuesday    We will place a cam boot afterwards. Further reconstruction may be needed later     I discussed this plan with Dr. Stephenson who is treating the PE and he agrees.

## 2018-11-05 ENCOUNTER — HOSPITAL ENCOUNTER (OUTPATIENT)
Dept: PREADMISSION TESTING | Facility: HOSPITAL | Age: 48
Discharge: HOME | End: 2018-11-05
Attending: ORTHOPAEDIC SURGERY
Payer: OTHER MISCELLANEOUS

## 2018-11-05 ENCOUNTER — ANESTHESIA EVENT (OUTPATIENT)
Dept: SURGERY | Facility: HOSPITAL | Age: 48
End: 2018-11-05
Payer: OTHER MISCELLANEOUS

## 2018-11-05 ENCOUNTER — LAB VISIT (OUTPATIENT)
Dept: LAB | Facility: HOSPITAL | Age: 48
End: 2018-11-05
Attending: ORTHOPAEDIC SURGERY

## 2018-11-05 DIAGNOSIS — Z01.818 PRE-OP TESTING: Primary | ICD-10-CM

## 2018-11-05 LAB
ABO + RH BLD: NORMAL
ANION GAP SERPL CALC-SCNC: 10 MMOL/L
APTT BLDCRRT: 27.1 SEC
BASOPHILS # BLD AUTO: 0.03 K/UL
BASOPHILS NFR BLD: 0.7 %
BLD GP AB SCN CELLS X3 SERPL QL: NORMAL
BUN SERPL-MCNC: 11 MG/DL
CALCIUM SERPL-MCNC: 9.4 MG/DL
CHLORIDE SERPL-SCNC: 101 MMOL/L
CO2 SERPL-SCNC: 26 MMOL/L
CREAT SERPL-MCNC: 0.9 MG/DL
CRP SERPL-MCNC: 2.8 MG/L
DIFFERENTIAL METHOD: ABNORMAL
EOSINOPHIL # BLD AUTO: 0.2 K/UL
EOSINOPHIL NFR BLD: 3.6 %
ERYTHROCYTE [DISTWIDTH] IN BLOOD BY AUTOMATED COUNT: 13.5 %
ERYTHROCYTE [SEDIMENTATION RATE] IN BLOOD BY WESTERGREN METHOD: 19 MM/HR
EST. GFR  (AFRICAN AMERICAN): >60 ML/MIN/1.73 M^2
EST. GFR  (NON AFRICAN AMERICAN): >60 ML/MIN/1.73 M^2
ESTIMATED AVG GLUCOSE: 85 MG/DL
GLUCOSE SERPL-MCNC: 112 MG/DL
HBA1C MFR BLD HPLC: 4.6 %
HCT VFR BLD AUTO: 42.2 %
HGB BLD-MCNC: 14.2 G/DL
INR PPP: 1
LYMPHOCYTES # BLD AUTO: 1.5 K/UL
LYMPHOCYTES NFR BLD: 35.6 %
MCH RBC QN AUTO: 29.2 PG
MCHC RBC AUTO-ENTMCNC: 33.6 G/DL
MCV RBC AUTO: 87 FL
MONOCYTES # BLD AUTO: 0.5 K/UL
MONOCYTES NFR BLD: 12 %
NEUTROPHILS # BLD AUTO: 2 K/UL
NEUTROPHILS NFR BLD: 47.9 %
PLATELET # BLD AUTO: 176 K/UL
PMV BLD AUTO: 8.8 FL
POTASSIUM SERPL-SCNC: 3.8 MMOL/L
PROTHROMBIN TIME: 10.8 SEC
RBC # BLD AUTO: 4.86 M/UL
SODIUM SERPL-SCNC: 137 MMOL/L
WBC # BLD AUTO: 4.18 K/UL

## 2018-11-05 PROCEDURE — 83036 HEMOGLOBIN GLYCOSYLATED A1C: CPT

## 2018-11-05 PROCEDURE — 86920 COMPATIBILITY TEST SPIN: CPT

## 2018-11-05 PROCEDURE — 36415 COLL VENOUS BLD VENIPUNCTURE: CPT

## 2018-11-05 PROCEDURE — 85730 THROMBOPLASTIN TIME PARTIAL: CPT

## 2018-11-05 PROCEDURE — 86140 C-REACTIVE PROTEIN: CPT

## 2018-11-05 PROCEDURE — 85025 COMPLETE CBC W/AUTO DIFF WBC: CPT

## 2018-11-05 PROCEDURE — 85652 RBC SED RATE AUTOMATED: CPT

## 2018-11-05 PROCEDURE — 80048 BASIC METABOLIC PNL TOTAL CA: CPT

## 2018-11-05 PROCEDURE — 85610 PROTHROMBIN TIME: CPT

## 2018-11-05 PROCEDURE — 86901 BLOOD TYPING SEROLOGIC RH(D): CPT

## 2018-11-05 RX ORDER — SODIUM CHLORIDE, SODIUM LACTATE, POTASSIUM CHLORIDE, CALCIUM CHLORIDE 600; 310; 30; 20 MG/100ML; MG/100ML; MG/100ML; MG/100ML
INJECTION, SOLUTION INTRAVENOUS CONTINUOUS
Status: CANCELLED | OUTPATIENT
Start: 2018-11-05

## 2018-11-05 RX ORDER — LIDOCAINE HYDROCHLORIDE 10 MG/ML
1 INJECTION, SOLUTION EPIDURAL; INFILTRATION; INTRACAUDAL; PERINEURAL ONCE
Status: CANCELLED | OUTPATIENT
Start: 2018-11-05 | End: 2018-11-05

## 2018-11-05 RX ORDER — CEPHALEXIN 250 MG/1
250 CAPSULE ORAL EVERY 6 HOURS
COMMUNITY
End: 2019-09-26

## 2018-11-05 RX ORDER — CEFACLOR 250 MG
250 CAPSULE ORAL
COMMUNITY
End: 2018-11-05

## 2018-11-05 NOTE — PRE-PROCEDURE INSTRUCTIONS
Grace, [phone]    Allergies, medical, surgical, family and psychosocial histories reviewed with patient. Periop plan of care reviewed. Preop instructions given, including medications to take and to hold. Hibiclens soap and instructions on use given. Time allotted for questions to be addressed.  Patient verbalized understanding.

## 2018-11-05 NOTE — DISCHARGE INSTRUCTIONS
Your surgery is scheduled for 11/6.    Please report to Outpatient Surgery Intake Office on the 2nd FLOOR at 0945 a.m.          INSTRUCTIONS IMPORTANT!!!  ¨ Do not eat or drink after 12 midnight-including water. OK to brush teeth, no   gum, candy or mints!    ¨ Take only these medicines with a small swallow of water-morning of surgery.            ____  Do not wear makeup, including mascara.  ____  No powder, lotions or creams to surgical area.  ____  Please remove all jewelry, including piercings and leave at home.  ____  No money or valuables needed. Please leave at home.  ____  Please bring any documents given by your doctor.  ____  If going home the same day, arrange for a ride home. You will not be able to             drive if Anesthesia was used.  ____  Children under 18 years require a parent / guardian present the entire time             they are in surgery / recovery.  ____  Wear loose fitting clothing. Allow for dressings, bandages.  ____  Stop Aspirin, Ibuprofen, Motrin and Aleve at least 3-5 days before surgery, unless otherwise instructed by your doctor, or the nurse.   You MAY use Tylenol/acetaminophen until day of surgery.  ____  Wash the surgical area with Hibiclens the night before surgery, and again the             morning of surgery.  Be sure to rinse hibiclens off completely (if instructed by   nurse).  ____  If you take diabetic medication, do not take am of surgery unless instructed by Doctor.  ____  Call MD for temperature above 101 degrees or any other signs of infection such as Urinary (bladder) infection, Upper respiratory infection, skin boils, etc.  ____ Stop taking any Fish Oil supplement or any Vitamins that contain Vitamin E at least 5 days prior to surgery.  ____ Do Not wear your contact lenses the day of your procedure.  You may wear your glasses.      ____Do not shave for 3 days prior to surgery.  ____ Practice Good hand washing before, during, and after procedure.      I have read  or had read and explained to me, and understand the above information.  Additional comments or instructions:  For additional questions call [phone]      Pre-Op Bathing Instructions    Before surgery, you can play an important role in your own health.    Because skin is not sterile, we need to be sure that your skin is as free of germs as possible. By following the instructions below, you can reduce the number of germs on your skin before surgery.    IMPORTANT: You will need to shower with a special soap called Hibiclens*, available at any pharmacy.  If you are allergic to Chlorhexidine (the antiseptic in Hibiclens), use an antibacterial soap such as Dial Soap for your preoperative shower.  You will shower with Hibiclens both the night before your surgery and the morning of your surgery.  Do not use Hibiclens on the head, face or genitals to avoid injury to those areas.    STEP #1: THE NIGHT BEFORE YOUR SURGERY     1. Do not shave the area of your body where your surgery will be performed.  2. Shower and wash your hair and body as usual with your normal soap and shampoo.  3. Rinse your hair and body thoroughly after you shower to remove all soap residue.  4. With your hand, apply one packet of Hibiclens soap to the surgical site.   5. Wash the site gently for five (5) minutes. Do not scrub your skin too hard.   6. Do not wash with your regular soap after Hibiclens is used.  7. Rinse your body thoroughly.  8. Pat yourself dry with a clean, soft towel.  9. Do not use lotion, cream, or powder.  10. Wear clean clothes.    STEP #2: THE MORNING OF YOUR SURGERY     1. Repeat Step #1.    * Not to be used by people allergic to Chlorhexidine.      Types of Anesthesia  Your anesthesiologist is a key member of your surgical team. He or she gives you anesthetics (medications to keep you comfortable and decrease your awareness of surgery) and monitors your condition to keep you safe during surgery. You will have 1 of 3 kinds of  anesthesia during your surgery.  Monitored anesthesia care (MAC)  · Often used for surgery that is short or not too invasive.  · Sedatives (medicines to relax you) are given through an IV (intravenous) line.  · The area around the surgical site is usually numbed with a local anesthetic.  · You may choose to remain awake or sleep lightly.  Regional anesthesia (sometimes called spinal epidural or winston block)  · Often used for surgery on the arms, legs, and abdomen. It is also used during childbirth.  · A specific region of your body is numbed by injecting anesthetic near nerves, near your spine, or near the operative site.  · You may also be given sedatives through an IV line to relax you.  · With regional anesthesia, you may choose to remain awake or sleep lightly.  General anesthesia  · Often used for extensive surgery, such as on the heart, brain, or abdominal operation.  · Also used when the patient wants to be totally asleep.  · May be given as a gas that you breathe and as medicines that are injected through an IV line.  · Because you are asleep, you feel no pain and remember nothing of the surgery.  The risks and complications of anesthesia depend on your overall health. If you are healthy, the risks are low. The risks are higher for patients with heart or lung problems. Your anesthesiologist or nurse anesthetist will discuss the risks with you.   Date Last Reviewed: 12/1/2016 © 2000-2017 [employer]. [address] [place] [address]. All rights reserved. This information is not intended as a substitute for professional medical care. Always follow your healthcare professional's instructions.

## 2018-11-05 NOTE — ANESTHESIA PREPROCEDURE EVALUATION
11/05/2018  Herman Floyd is a 48 y.o., male scheduled for removal of RLL external hardware 11/6/18.    ED visit 9/18/18 found bilateral PE, started on Eliquis.      Anesthesia Evaluation    I have reviewed the Patient Summary Reports.    I have reviewed the Nursing Notes.   I have reviewed the Medications.     Review of Systems  Anesthesia Hx:  No problems with previous Anesthesia  History of prior surgery of interest to airway management or planning: Previous anesthesia: General  Denies Personal Hx of Anesthesia complications.   Social:  Non-Smoker, Social Alcohol Use    Hematology/Oncology:        Hematology Comments: Bilateral PE 9/18/18   Cardiovascular:   Denies Dysrhythmias.   Denies Angina.    Pulmonary:   Denies Shortness of breath.  Denies Sleep Apnea.    Renal/:  Renal/ Normal     Hepatic/GI:   GERD, well controlled Denies Liver Disease.    Neurological:   Denies TIA. Denies CVA. Denies Seizures.        Physical Exam  General:  Well nourished    Airway/Jaw/Neck:  Airway Findings: Mouth Opening: Normal Tongue: Normal  General Airway Assessment: Adult  Mallampati: II  Jaw/Neck Findings:  Neck ROM: Normal ROM      Dental:  Dental Findings:    Chest/Lungs:  Chest/Lungs Findings: Clear to auscultation, Normal Respiratory Rate     Heart/Vascular:  Heart Findings: Rate: Normal  Rhythm: Regular Rhythm  Heart murmur: negative       Mental Status:  Mental Status Findings:  Cooperative, Alert and Oriented       EKG 9/27/18:  Normal sinus rhythm  Moderate voltage criteria for LVH, may be normal variant  T wave abnormality, consider inferior ischemia  Abnormal ECG  When compared with ECG of 18-SEP-2018 16:55,  ST no longer depressed in Lateral leads  T wave inversion no longer evident in Lateral leads  Confirmed by RADHA DICKEY, CAMRON    Anesthesia Plan  Type of Anesthesia, risks & benefits  discussed:  Anesthesia Type:  general, MAC  Patient's Preference:   Intra-op Monitoring Plan:   Intra-op Monitoring Plan Comments:   Post Op Pain Control Plan:   Post Op Pain Control Plan Comments:   Induction:   IV  Beta Blocker:  Patient is not currently on a Beta-Blocker (No further documentation required).       Informed Consent: Patient understands risks and agrees with Anesthesia plan.  Questions answered.   ASA Score: 3     Day of Surgery Review of History & Physical:        Anesthesia Plan Notes: Anesthesia consent to be signed prior to surgery 11/6/18.  Awaiting cardiology clearance due to bilateral PE on Eliquis.           Ready For Surgery From Anesthesia Perspective.

## 2018-11-06 ENCOUNTER — HOSPITAL ENCOUNTER (OUTPATIENT)
Facility: HOSPITAL | Age: 48
Discharge: HOME | End: 2018-11-06
Attending: ORTHOPAEDIC SURGERY | Admitting: ORTHOPAEDIC SURGERY
Payer: OTHER MISCELLANEOUS

## 2018-11-06 ENCOUNTER — ANESTHESIA (OUTPATIENT)
Dept: SURGERY | Facility: HOSPITAL | Age: 48
End: 2018-11-06
Payer: OTHER MISCELLANEOUS

## 2018-11-06 VITALS
WEIGHT: 290 LBS | TEMPERATURE: 98 F | SYSTOLIC BLOOD PRESSURE: 137 MMHG | OXYGEN SATURATION: 98 % | HEIGHT: 72 IN | BODY MASS INDEX: 39.28 KG/M2 | DIASTOLIC BLOOD PRESSURE: 72 MMHG | RESPIRATION RATE: 18 BRPM | HEART RATE: 86 BPM

## 2018-11-06 DIAGNOSIS — M79.606 LEG PAIN: ICD-10-CM

## 2018-11-06 DIAGNOSIS — S82.891D CLOSED FRACTURE OF RIGHT ANKLE WITH ROUTINE HEALING, SUBSEQUENT ENCOUNTER: Primary | ICD-10-CM

## 2018-11-06 PROCEDURE — 25000003 PHARM REV CODE 250: Performed by: NURSE PRACTITIONER

## 2018-11-06 PROCEDURE — 71000016 HC POSTOP RECOV ADDL HR: Performed by: ORTHOPAEDIC SURGERY

## 2018-11-06 PROCEDURE — 37000009 HC ANESTHESIA EA ADD 15 MINS: Performed by: ORTHOPAEDIC SURGERY

## 2018-11-06 PROCEDURE — 25000003 PHARM REV CODE 250: Performed by: ORTHOPAEDIC SURGERY

## 2018-11-06 PROCEDURE — 36000710: Performed by: ORTHOPAEDIC SURGERY

## 2018-11-06 PROCEDURE — 36000711: Performed by: ORTHOPAEDIC SURGERY

## 2018-11-06 PROCEDURE — 37000008 HC ANESTHESIA 1ST 15 MINUTES: Performed by: ORTHOPAEDIC SURGERY

## 2018-11-06 PROCEDURE — 63600175 PHARM REV CODE 636 W HCPCS: Performed by: NURSE ANESTHETIST, CERTIFIED REGISTERED

## 2018-11-06 PROCEDURE — 71000015 HC POSTOP RECOV 1ST HR: Performed by: ORTHOPAEDIC SURGERY

## 2018-11-06 RX ORDER — LIDOCAINE HYDROCHLORIDE 10 MG/ML
1 INJECTION, SOLUTION EPIDURAL; INFILTRATION; INTRACAUDAL; PERINEURAL ONCE
Status: ACTIVE | OUTPATIENT
Start: 2018-11-06

## 2018-11-06 RX ORDER — SODIUM CHLORIDE 9 MG/ML
INJECTION, SOLUTION INTRAVENOUS CONTINUOUS
Status: ACTIVE | OUTPATIENT
Start: 2018-11-06

## 2018-11-06 RX ORDER — LIDOCAINE HCL/PF 100 MG/5ML
SYRINGE (ML) INTRAVENOUS
Status: DISCONTINUED | OUTPATIENT
Start: 2018-11-06 | End: 2018-11-06

## 2018-11-06 RX ORDER — LIDOCAINE HYDROCHLORIDE 10 MG/ML
1 INJECTION, SOLUTION EPIDURAL; INFILTRATION; INTRACAUDAL; PERINEURAL ONCE
Status: DISCONTINUED | OUTPATIENT
Start: 2018-11-06 | End: 2018-11-06 | Stop reason: HOSPADM

## 2018-11-06 RX ORDER — SODIUM CHLORIDE, SODIUM LACTATE, POTASSIUM CHLORIDE, CALCIUM CHLORIDE 600; 310; 30; 20 MG/100ML; MG/100ML; MG/100ML; MG/100ML
INJECTION, SOLUTION INTRAVENOUS CONTINUOUS
Status: DISCONTINUED | OUTPATIENT
Start: 2018-11-06 | End: 2018-11-06 | Stop reason: HOSPADM

## 2018-11-06 RX ORDER — OXYCODONE AND ACETAMINOPHEN 5; 325 MG/1; MG/1
1 TABLET ORAL ONCE
Status: COMPLETED | OUTPATIENT
Start: 2018-11-06 | End: 2018-11-06

## 2018-11-06 RX ORDER — PROPOFOL 10 MG/ML
VIAL (ML) INTRAVENOUS CONTINUOUS PRN
Status: DISCONTINUED | OUTPATIENT
Start: 2018-11-06 | End: 2018-11-06

## 2018-11-06 RX ORDER — MIDAZOLAM HYDROCHLORIDE 1 MG/ML
INJECTION INTRAMUSCULAR; INTRAVENOUS
Status: DISCONTINUED | OUTPATIENT
Start: 2018-11-06 | End: 2018-11-06

## 2018-11-06 RX ORDER — FENTANYL CITRATE 50 UG/ML
INJECTION, SOLUTION INTRAMUSCULAR; INTRAVENOUS
Status: DISCONTINUED | OUTPATIENT
Start: 2018-11-06 | End: 2018-11-06

## 2018-11-06 RX ADMIN — PROPOFOL 150 MCG/KG/MIN: 10 INJECTION, EMULSION INTRAVENOUS at 09:11

## 2018-11-06 RX ADMIN — FENTANYL CITRATE 50 MCG: 50 INJECTION, SOLUTION INTRAMUSCULAR; INTRAVENOUS at 09:11

## 2018-11-06 RX ADMIN — OXYCODONE HYDROCHLORIDE AND ACETAMINOPHEN 1 TABLET: 5; 325 TABLET ORAL at 11:11

## 2018-11-06 RX ADMIN — SODIUM CHLORIDE, SODIUM LACTATE, POTASSIUM CHLORIDE, AND CALCIUM CHLORIDE: .6; .31; .03; .02 INJECTION, SOLUTION INTRAVENOUS at 09:11

## 2018-11-06 RX ADMIN — LIDOCAINE HYDROCHLORIDE 75 MG: 20 INJECTION, SOLUTION INTRAVENOUS at 09:11

## 2018-11-06 RX ADMIN — MIDAZOLAM HYDROCHLORIDE 2 MG: 1 INJECTION, SOLUTION INTRAMUSCULAR; INTRAVENOUS at 09:11

## 2018-11-06 NOTE — OP NOTE
Orthopaedic Surgery Service     Attending Physician: Deejay Mitchell MD     First Assistant: Jere Campos MD    Pre-Op Diagnosis: Right Tibia Fracture    Post-Op Diagnosis: same     Procedure: Removal External Fixator Right Leg     Anesthesia: MAC    Estimated Blood Loss: min    Complications: none     Specimens: none    Drains: none     Findings: healed    Indications: See H&P    Procedure: The patient was taken to the OR and given MAC. The pin sites were prepped and fixator was easily removed.  The leg was examined and there was relative stability at the fracture site. Dressings and a boot were placed.    The patient was awoken and taken from the OR  in stable condition.     Jere Campos MD

## 2018-11-06 NOTE — DISCHARGE INSTRUCTIONS
Discharge Instructions for Foot Surgery  Arrange to have an adult drive you home after surgery. If you had general anesthesia, it may take a day or more to fully recover. So, for at least the next 24 hours: Do not drive or use machinery or power tools; do not drink alcohol; and do not make any major decisions.    Diet  Here are some dietary suggestions following surgery:   · Start with liquids and light foods (like dry toast, bananas, and applesauce). As you feel up to it, slowly return to your normal diet.  · Drink at least 6 to 8 glasses of water or other nonalcoholic fluids a day.  · To avoid nausea, eat before taking narcotic pain medicines.  ·   Medicines  It is important to follow these directions:   · Take all medicines as instructed.  · Take pain medicines on time. Do not wait until the pain is bad before taking your medicines.  · Avoid alcohol while on pain medicines.  Activity  These instructions are to help with your recovery:   · Sit or lie down when possible. Put a pillow under your heel to raise your foot above the level of your heart.  · Wrap an ice pack . Place it over your bandaged foot for no longer than 30 minutes at time for the first 24 hours.  · You can drive again in seven days or as instructed by your healthcare provider.  · Wear your surgical shoe at all times unless told otherwise by your healthcare provider. Ok to remove at night if needed.  · Use crutches or a cane as directed.  · Follow your healthcare providers instructions about putting weight on your foot. Ok to apply weight with crutches and boot inplace.  ·   Bandage and cast care    Here are tips to follow:   · Do not shower for 48 hours.       Ok to remove the dressing on Thursday. May shower at that time. Reapply band aid to sites as needed.    What to expect  It is normal to have the following:  · Bruising and slight swelling of the foot and toes  · A small amount of blood on the dressing  ·   Call your healthcare  provider   Contact your healthcare provider right away if you have any of the following:   · Continuous bleeding through the bandage  · Excessive swelling, increased bleeding, or redness  · Fever over 100.4°F (38°C) or chills  · Pain unrelieved by pain medicines  · Foot feels cold to the touch or numb  · Increased ache in your leg or foot  · Chest pain or shortness of breath  · Anything unusual that concerns you       ANESTHESIA  -For the first 24 hours after surgery:  Do not drive, use heavy equipment, make important decisions, or drink alcohol  -It is normal to feel sleepy for several hours.  Rest until you are more awake.  -Have someone stay with you, if needed.  They can watch for problems and help keep you safe.  -Some possible post anesthesia side effects include: nausea and vomiting, sore throat and hoarseness, sleepiness, and dizziness.    PAIN  -If you have pain after surgery, pain medicine will help you feel better.  Take it as directed, before pain becomes severe.  Most pain relievers taken by mouth need at least 20-30 minutes to start working.  -Do not drive or drink alcohol while taking pain medicine.  -Pain medication can upset your stomach.  Taking them with a little food may help.  -Other ways to help control pain: elevation, ice, and relaxation  -Call your surgeon if still having unmanageable pain an hour after taking pain medicine.  -Pain medicine can cause constipation.  Taking an over-the counter stool softener while on prescription pain medicine and drinking plenty of fluids can prevent this side effect.  -Call your surgeon if you have severe side effects like: breathing problems, trouble waking up, dizziness, confusion, or severe constipation.    NAUSEA  -Some people have nausea after surgery.  This is often because of anesthesia, pain, pain medicine, or the stress of surgery.  -Do not push yourself to eat.  Start off with clear liquids and soup.  Slowly move to solid foods.  Don't eat fatty,  rich, spicy foods at first.  Eat smaller amounts.  -If you develop persistent nausea and vomiting please notify your surgeon immediately.    BLEEDING  -Different types of surgery require different types of care and dressing changes.  It is important to follow all instructions and advice from your surgeon.  Change dressing as directed.  Call your surgeon for any concerns regarding postop bleeding.    SIGNS OF INFECTION  -Signs of infection include: fever, swelling, drainage, and redness  -Notify your surgeon if you have a fever of 100.4 F (38.0 C) or higher.  -Notify your surgeon if you notice redness, swelling, increased pain, pus, or a foul smell at the incision site.    Notify Dr. Mitchell for any problems or concerns.

## 2018-11-06 NOTE — ANESTHESIA POSTPROCEDURE EVALUATION
Anesthesia Post Evaluation    Patient: Herman Floyd    Procedure(s) Performed: Procedure(s) (LRB):  APPLICATION, EXTERNAL FIXATION DEVICE, REMOVAL, LOWER EXTREMITY (Right)    Final Anesthesia Type: MAC  Patient location during evaluation: OPS  Patient participation: Yes- Able to Participate  Level of consciousness: awake and alert  Post-procedure vital signs: reviewed and stable  Pain management: adequate  Airway patency: patent  PONV status at discharge: No PONV  Anesthetic complications: no      Cardiovascular status: blood pressure returned to baseline and hemodynamically stable  Respiratory status: unassisted, spontaneous ventilation and room air  Hydration status: euvolemic  Follow-up not needed.        Visit Vitals  /74 (BP Location: Left arm, Patient Position: Lying)   Pulse 80   Temp 36.8 °C (98.2 °F) (Skin)   Resp 18   Ht 6' (1.829 m)   Wt 131.5 kg (290 lb)   SpO2 98%   BMI 39.33 kg/m²       Pain/Andria Score: Pain Assessment Performed: Yes (11/6/2018  8:42 AM)  Presence of Pain: denies (11/6/2018  8:42 AM)

## 2018-11-06 NOTE — INTERVAL H&P NOTE
The patient has been examined and the H&P has been reviewed:    I concur with the findings and no changes have occurred since H&P was written.    Anesthesia/Surgery risks, benefits and alternative options discussed and understood by patient/family.          Active Hospital Problems    Diagnosis  POA    Leg pain [M79.606]  Yes      Resolved Hospital Problems   No resolved problems to display.

## 2018-11-06 NOTE — PROGRESS NOTES
Air cast walker boot delivered by [facility] company to patient at bedside.  No other needs or concerns identified.

## 2018-11-06 NOTE — PLAN OF CARE
11/06/18 1022   Post-Acute Status   Post-Acute Authorization HME   HME Status Referrals Sent      consulted to assist with discharge planning, need air cast walker boot for home use.  Order sent to [facility] for delivery to room 228.     attempted to make phone contact with [place] [employer] claims department, Meka Hayes [phone].  Ms. Hayes was not available left voice message.

## 2018-11-06 NOTE — BRIEF OP NOTE
[facility]  Brief Operative Note     SUMMARY     Surgery Date: 11/6/2018     Surgeon(s) and Role:     * Deejay Mitchell MD - Primary    Assisting Surgeon: None    Pre-op Diagnosis:  Closed displaced pilon fracture of right tibia, initial encounter [S82.871A]    Post-op Diagnosis:  Post-Op Diagnosis Codes:     * Closed displaced pilon fracture of right tibia, initial encounter [S82.871A]    Procedure(s) (LRB):  APPLICATION, EXTERNAL FIXATION DEVICE, REMOVAL, LOWER EXTREMITY (Right)    Anesthesia: General/MAC    Description of the findings of the procedure: Ex fix removed, fracture site relatively stable    Findings/Key Components: As expected    Estimated Blood Loss: None         Specimens:   Specimen (12h ago, onward)    None          Discharge Note    SUMMARY     Admit Date: 11/6/2018    Discharge Date and Time:  11/06/2018 9:45 AM    Hospital Course (synopsis of major diagnoses, care, treatment, and services provided during the course of the hospital stay): Patient presented to same day for his ex fix removal. He underwent his procedure uneventfully and was discharged home     Final Diagnosis: Post-Op Diagnosis Codes:     * Closed displaced pilon fracture of right tibia, initial encounter [S82.871A]    Disposition: Discharged home    Follow Up/Patient Instructions:     Medications:  Reconciled Home Medications:      Medication List      ASK your doctor about these medications    acetaminophen 500 MG tablet  Commonly known as:  TYLENOL  Take 1 tablet (500 mg total) by mouth every 8 (eight) hours as needed for Pain.     * ELIQUIS 5 mg Tab  Generic drug:  apixaban  take one tablet by mouth two times a day     * apixaban 5 mg Tab  Commonly known as:  ELIQUIS  Take 1 tablet (5 mg total) by mouth 2 (two) times daily. Start on 09/27/2018     aspirin 81 MG Chew  Take 1 tablet (81 mg total) by mouth once daily.     cephALEXin 250 MG capsule  Commonly known as:  KEFLEX  Take 250 mg by mouth every 6 (six)  hours.     famotidine 20 MG tablet  Commonly known as:  PEPCID  Take 2 tablets (40 mg total) by mouth once daily. take with aspirin     naproxen 500 MG tablet  Commonly known as:  NAPROSYN  Take 1 tablet (500 mg total) by mouth 2 (two) times daily.         * This list has 2 medication(s) that are the same as other medications prescribed for you. Read the directions carefully, and ask your doctor or other care provider to review them with you.              No discharge procedures on file.

## 2018-11-06 NOTE — TRANSFER OF CARE
Anesthesia Transfer of Care Note    Patient: Herman Floyd    Procedure(s) Performed: Procedure(s) (LRB):  APPLICATION, EXTERNAL FIXATION DEVICE, REMOVAL, LOWER EXTREMITY (Right)    Patient location: OPS    Anesthesia Type: MAC    Transport from OR: Transported from OR on room air with adequate spontaneous ventilation    Post pain: adequate analgesia    Post assessment: no apparent anesthetic complications    Post vital signs: stable    Level of consciousness: awake    Nausea/Vomiting: no nausea/vomiting    Complications: none    Transfer of care protocol was followed      Last vitals:   Visit Vitals  /74 (BP Location: Left arm, Patient Position: Lying)   Pulse 80   Temp 36.8 °C (98.2 °F) (Skin)   Resp 18   Ht 6' (1.829 m)   Wt 131.5 kg (290 lb)   SpO2 98%   BMI 39.33 kg/m²

## 2018-11-07 LAB
BLD PROD TYP BPU: NORMAL
BLD PROD TYP BPU: NORMAL
BLOOD UNIT EXPIRATION DATE: NORMAL
BLOOD UNIT EXPIRATION DATE: NORMAL
BLOOD UNIT TYPE CODE: 6200
BLOOD UNIT TYPE CODE: 6200
BLOOD UNIT TYPE: NORMAL
BLOOD UNIT TYPE: NORMAL
CODING SYSTEM: NORMAL
CODING SYSTEM: NORMAL
DISPENSE STATUS: NORMAL
DISPENSE STATUS: NORMAL
TRANS ERYTHROCYTES VOL PATIENT: NORMAL ML
TRANS ERYTHROCYTES VOL PATIENT: NORMAL ML

## 2018-11-09 ENCOUNTER — HOSPITAL ENCOUNTER (OUTPATIENT)
Dept: RADIOLOGY | Facility: HOSPITAL | Age: 48
Discharge: HOME | End: 2018-11-09
Attending: INTERNAL MEDICINE
Payer: OTHER MISCELLANEOUS

## 2018-11-09 DIAGNOSIS — I27.82 OTHER CHRONIC PULMONARY EMBOLISM WITHOUT ACUTE COR PULMONALE: ICD-10-CM

## 2018-11-09 PROCEDURE — 71275 CT ANGIOGRAPHY CHEST: CPT | Mod: 26,,, | Performed by: RADIOLOGY

## 2018-11-09 PROCEDURE — 25500020 PHARM REV CODE 255: Performed by: INTERNAL MEDICINE

## 2018-11-09 PROCEDURE — 71275 CT ANGIOGRAPHY CHEST: CPT | Mod: TC

## 2018-11-09 RX ADMIN — IOHEXOL 100 ML: 350 INJECTION, SOLUTION INTRAVENOUS at 07:11

## 2018-12-03 ENCOUNTER — OFFICE VISIT (OUTPATIENT)
Dept: CARDIOLOGY | Facility: CLINIC | Age: 48
End: 2018-12-03
Payer: OTHER MISCELLANEOUS

## 2018-12-03 VITALS
HEART RATE: 87 BPM | BODY MASS INDEX: 40.69 KG/M2 | DIASTOLIC BLOOD PRESSURE: 83 MMHG | SYSTOLIC BLOOD PRESSURE: 127 MMHG | WEIGHT: 300 LBS

## 2018-12-03 DIAGNOSIS — E66.01 CLASS 3 SEVERE OBESITY DUE TO EXCESS CALORIES WITHOUT SERIOUS COMORBIDITY WITH BODY MASS INDEX (BMI) OF 40.0 TO 44.9 IN ADULT: ICD-10-CM

## 2018-12-03 DIAGNOSIS — I10 ESSENTIAL HYPERTENSION: Primary | ICD-10-CM

## 2018-12-03 DIAGNOSIS — I27.82 OTHER CHRONIC PULMONARY EMBOLISM WITHOUT ACUTE COR PULMONALE: ICD-10-CM

## 2018-12-03 PROBLEM — E66.813 CLASS 3 SEVERE OBESITY DUE TO EXCESS CALORIES WITH BODY MASS INDEX (BMI) OF 40.0 TO 44.9 IN ADULT: Status: ACTIVE | Noted: 2018-12-03

## 2018-12-03 PROCEDURE — 99215 OFFICE O/P EST HI 40 MIN: CPT | Mod: S$PBB,,, | Performed by: INTERNAL MEDICINE

## 2018-12-03 PROCEDURE — 99213 OFFICE O/P EST LOW 20 MIN: CPT | Mod: PBBFAC,PO | Performed by: INTERNAL MEDICINE

## 2018-12-03 PROCEDURE — 99999 PR PBB SHADOW E&M-EST. PATIENT-LVL III: CPT | Mod: PBBFAC,,, | Performed by: INTERNAL MEDICINE

## 2018-12-03 PROCEDURE — 93005 ELECTROCARDIOGRAM TRACING: CPT | Mod: PBBFAC,PO | Performed by: INTERNAL MEDICINE

## 2018-12-03 PROCEDURE — 93010 ELECTROCARDIOGRAM REPORT: CPT | Mod: S$PBB,,, | Performed by: INTERNAL MEDICINE

## 2018-12-03 NOTE — PATIENT INSTRUCTIONS
Heart Disease Education    The heart beats 60 to 100 times per minute, 24 hours a day. This equals almost 1000,000 times a day. It pumps blood with oxygen and nutrients to the tissues and organs of the body. But the heart is a muscle and needs its own supply of blood. Blood flow to the heart is supplied by the coronary arteries. Coronary artery disease (atherosclerosis) is a result of cholesterol, saturated fat, and calcium deposits (plaques) that build up inside the walls. This causes inflammation within the coronary arteries. These plaques narrow the artery and reduce blood flow to the heart muscle. The reduction in blood flow to the heart muscle decreases oxygen supply to the heart. If the narrowing is significant enough, the oxygen supply to one or more regions of the heart can be temporarily or permanently shut down. This can cause chest pain, and possibly death of heart tissue (heart attack).  Types of chest pain  Angina is the name for pain in the heart muscle. Angina is a warning sign of serious heart disease. When untreated it can lead to a heart attack, also known as acute myocardial infarction, or AMI. Angina occurs when there is not enough blood and oxygen flowing to the heart for the amount of work it is doing. This most often happens during physical exertion, when the heart is working hardest. It is usually relieved by rest or nitroglycerin. Angina may also occur after a large meal when extra blood is sent to the digestive organs and less goes to the heart. In the case of advanced or unstable heart disease, angina can occur at rest or awaken you from sleep. Angina usually lasts from a few minutes up to 20 minutes or more. When treated early, the effects of angina can be reversed without permanent damage to the heart. Angina is a serious condition and needs to be evaluated by a medical professional immediately.  There are two types of angina -- stable and unstable:  · Stable angina usually occurs  with a predictable level of activity. Being stable, its character, severity, and occurrence do not change much over time. It usually starts with activity, and resolves with rest or taking your medicine as instructed by your doctor. The symptoms usually do not last long.  · Unstable angina changes or gets worse over time. It is different from whatever you are used to. It may feel different or worse, begin without cause, occur with exercise or exertion, wake you up from sleep, and last longer. It may not respond in the same way as it does when you take your usual medicines for an attack. This type of angina can be a warning sign of an impending heart attack.     A heart attack is usually the result of a blood clot that suddenly forms in a coronary artery that has been narrowed with plaque. When this occurs, blood flow may be cut off to a part of the heart muscle, causing the cells to die. This weakens the pumping action of the heart, which affects the delivery of blood to all the other organs in the body including the brain. This damage is not reversible. However, early treatment can limit the amount of damage.  The pain you feel with angina and a heart attack may have a similar quality. However, it is usually different in intensity and duration. Here are some typical descriptions of a heart attack:  · It is most often experienced as a squeezing, crushing, pressure-like sensation in the center of the chest.  · It is sometimes described as something heavy sitting on my chest.  · It may feel more like a bad case of indigestion.  · The pain may spread from the chest to the arm, shoulder, throat or jaw.  · Sometimes the pain is not felt in the chest at all, but only in the arm, shoulder, throat or jaw.  · There may also be nausea, vomiting, dizziness or light-headedness, sweating and trouble breathing.  · Palpitations, or your heart beating rapidly  · A new, irregular heart beat  · Unexplained weakness  You may not be  "able to tell the difference between "bad" angina and a heart attack at home. Seek help if your symptoms are different than usual. Do not be in denial or just try to "tough it out."  Call 911  This is the fastest and safest way to get to the emergency department. The paramedics can also start treatment on the way to the hospital, saving valuable time for your heart.  · If the angina gets worse, if it continues, or if it stops and returns, call 911 immediately. Do not delay. You may be having a heart attack.  · After you call 911, take a second tablet or spray unless instructed otherwise. When repeating doses, sit down if possible, because it can make you feel lightheaded or dizzy. Wait another 5 minutes. If the angina still does not go away, take a third tablet or spray. Do not take more than 3 tablets or sprays within 15 minutes. Stay on the phone with 911 for further instruction.  · Your healthcare provider may give you slightly different instructions than those above. If so, follow them carefully.  Do not wait until symptoms become severe to call 911.  Other reasons to call 911 include:  · Trouble breathing  · Feeling lightheaded, faint, or dizzy  · Rapid heart beat  · Slower than usual heart rate compared to your normal  · Angina with weakness, dizziness, fainting, heavy sweating, nausea, or vomiting  · Extreme drowsiness, confusion  · Weakness of an arm or leg or one side of the face  · Difficulty with speech or vision  When to seek medical care  Remember, the signs and symptoms of a heart attack are not always like they are on TV. Sometimes they are not so obvious. You may only feel weak, or just not right. If it is not clear or if you have any doubt, call for advice.  · Seek help if there is a change in the type of pain, if it feels different, or if your symptoms are mild.  · Do not drive yourself. Have someone else drive you. If no one can drive, call 911.  · Do not delay. Fast diagnosis and treatment can " "prevent or limit the amount of heart damage during a heart attack.  · Do not go to your doctor's office or a clinic as they may not be able to provide all the testing and treatment required for this condition.  · If your doctor has given you medicine to take when symptoms occur, take them but don't delay getting help trying to locate medicines.  What happens in the emergency department  The emergency department is connected to your local emergency medical system (EMS) through 911. That's why during a cardiac emergency, calling 911 is the fastest way to get help. The goal of the emergency department is to rapidly screen, evaluate, and treat people.  Once you are there, an electrocardiogram (ECG or heart tracing) will be done. Blood samples may be taken to look for the presence of heart enzymes that leak from damaged heart cells and show if a heart attack is occurring. You will often be evaluated by a heart specialist (cardiologist) who decides the best course of action. In the case of severe angina or early heart attack, and depending on the circumstances, powerful "clot busting" medicines can be used to dissolve blood clots in the coronary artery. In other cases, you may be taken to a cardiac catheterization lab. Here, a tiny balloon-tipped catheter is advanced through blood vessels to the heart. There the balloon is inflated pushing open the blood vessel restoring blood flow.  Risk factors for heart disease  Risk factors for heart disease are a combination of genetic and lifestyle. Many risk factors work by either directly or indirectly damaging the blood vessels of the heart, or by increasing the risk of forming blood or cholesterol clots, which then clog up and block the arteries.     Examples of physical lifestyle risk factors:  · Cigarette smoking  · High blood pressure  · High blood cholesterol  · Use of stimulant drugs such as cocaine, crack, and amphetamines  · Eating a high-fat, high-cholesterol " meal  · Diabetes   · Obesity which increases risk for diabetes and high blood pressure  · Lack of regular physical activity     Examples of emotional lifestyle factors:  · Chronic high stress levels release stress hormones. These raise blood pressure and cholesterol level and makes blood clot more easily.  · Held-in anger, hostile or cynical attitude  · Social and emotional isolation, lack of intimacy  · Loss of relationship  · Depression  Other factors that increase the risk of heart attack that you cannot control :  · Age. The older you get beyond 40, the greater is your risk of significant coronary artery disease.  · Gender. More men than women get heart disease; but once past menopause, women who are not taking estrogen replacement have the same risk as men for a heart attack.  · Family history. If your mother, father, brother or sister has coronary artery disease, your risk of having it is higher than a person your age without this family history.  What can you do to decrease your risk  To reduce your risk of heart disease:  · Get regular checkups with your doctor.  · Take your medicines for blood pressure, cholesterol or diabetes as directed.  · Watch your diet. Eat a heart healthy diet choosing fresh foods, less salt, cholesterol, and fat  · Stop smoking. Get help if needed.  · Get regular exercise.  · Manage stress.  · Carry a list of medicines and doses in your wallet.  Date Last Reviewed: 12/30/2015  © 8215-2738 [employer]. [address] [place] [address], [place] [address]. All rights reserved. This information is not intended as a substitute for professional medical care. Always follow your healthcare professional's instructions.

## 2018-12-03 NOTE — PROGRESS NOTES
Subjective:   Patient ID:  Herman Floyd is a 48 y.o. male who presents for follow up of pulmonary emboli; surgical clearance; and Obesity      HPI:       Herman Floyd 48 y.o. male is here follow up and feeling well without any new complaints. He has a recent of bilateral PE due to immobility after an ankle fracture.  He presented a month later in 2018 to the ED with suddden chest discomfort and SOB approximately 30 minutes PTA with near syncopal episode. He had been mostly bed bound prior to the PE.  CTA chest in the ED noted diffuse bilateral pulmonary thromboembolism, originating in the distal aspects of the right and left main pulmonary arteries, and extending distally. He was hemodynamically stable and oxygenating well. No DVT. Admitted to cardiology service for PE. Patient was transferred to ICU and evaluated by interventional cardiology. His troponin was negative and there was no evidence of right heart strain on CTA. No criteria for massive or submassive PE and no indication for systemic/catheter directed thrombolysis. He tolerated Eliquis well. Repeat CTA 2018 revealed complete resolution of PE. He is here for clearance for final surgical intervention by orthopedic.     ECG today: NSR             Patient Active Problem List    Diagnosis Date Noted    Class 3 severe obesity due to excess calories with body mass index (BMI) of 40.0 to 44.9 in adult 2018    Leg pain 2018    Costochondral chest pain 2018    Chest pain     Shortness of breath     Other pulmonary embolism without acute cor pulmonale 2018           CTA 2018   Bilateral PE   No cor pulmonale   No DVT   Treated with Eliquis           CTA 2018   Resolution of PE      Closed fracture of right ankle 2018           Right Arm BP - Sittin/83  Left Arm BP - Sittin/84        LABS  LAST HbA1c  Lab Results   Component Value Date    HGBA1C 4.6 2018         Review of Systems    Constitution: Negative for diaphoresis, weakness, night sweats, weight gain and weight loss.   HENT: Positive for hearing loss. Negative for congestion.    Eyes: Negative for blurred vision, discharge and double vision.   Cardiovascular: Negative for chest pain, claudication, cyanosis, dyspnea on exertion, irregular heartbeat, leg swelling, near-syncope, orthopnea, palpitations, paroxysmal nocturnal dyspnea and syncope.   Respiratory: Negative for cough, shortness of breath and wheezing.    Endocrine: Negative for cold intolerance, heat intolerance and polyphagia.   Hematologic/Lymphatic: Negative for adenopathy and bleeding problem. Does not bruise/bleed easily.   Skin: Negative for dry skin and nail changes.   Musculoskeletal: Negative for arthritis, back pain, falls, joint pain, myalgias and neck pain.          R ankle and foot in a cast      Gastrointestinal: Negative for bloating, abdominal pain, change in bowel habit and constipation.   Genitourinary: Negative for bladder incontinence, dysuria, flank pain, genital sores and missed menses.   Neurological: Negative for aphonia, brief paralysis, difficulty with concentration and dizziness.   Psychiatric/Behavioral: Negative for altered mental status and memory loss. The patient does not have insomnia.    Allergic/Immunologic: Negative for environmental allergies.       Objective:   Physical Exam   Constitutional: He is oriented to person, place, and time. He appears well-developed and well-nourished. He is not intubated.   HENT:   Head: Normocephalic and atraumatic.   Right Ear: External ear normal.   Left Ear: External ear normal.   Mouth/Throat: Oropharynx is clear and moist.   Eyes: Conjunctivae and EOM are normal. Pupils are equal, round, and reactive to light. Right eye exhibits no discharge. Left eye exhibits no discharge. No scleral icterus.   Neck: Normal range of motion. Neck supple. Normal carotid pulses, no hepatojugular reflux and no JVD present.  Carotid bruit is not present. No tracheal deviation present. No thyromegaly present.   Cardiovascular: Normal rate, regular rhythm, S1 normal and S2 normal.  No extrasystoles are present. PMI is not displaced. Exam reveals no gallop, no S3, no distant heart sounds, no friction rub and no midsystolic click.   No murmur heard.  Pulses:       Carotid pulses are 2+ on the right side, and 2+ on the left side.       Radial pulses are 2+ on the right side, and 2+ on the left side.        Femoral pulses are 2+ on the right side, and 2+ on the left side.       Popliteal pulses are 2+ on the right side, and 2+ on the left side.        Dorsalis pedis pulses are 2+ on the right side, and 2+ on the left side.        Posterior tibial pulses are 2+ on the right side, and 2+ on the left side.   Pulmonary/Chest: Effort normal and breath sounds normal. No accessory muscle usage or stridor. No apnea, no tachypnea and no bradypnea. He is not intubated. No respiratory distress. He has no decreased breath sounds. He has no wheezes. He has no rales. He exhibits no tenderness and no bony tenderness.   Abdominal: He exhibits no distension, no pulsatile liver, no abdominal bruit, no ascites, no pulsatile midline mass and no mass. There is no tenderness. There is no rebound and no guarding.   Musculoskeletal: Normal range of motion. He exhibits no edema or tenderness.       R foot + ankle in a cast      Lymphadenopathy:     He has no cervical adenopathy.   Neurological: He is alert and oriented to person, place, and time. He has normal reflexes. No cranial nerve deficit. Coordination normal.   Skin: Skin is warm. No rash noted. No erythema. No pallor.   Psychiatric: He has a normal mood and affect. His behavior is normal. Judgment and thought content normal.       Assessment:     1. Essential hypertension    2. Other chronic pulmonary embolism without acute cor pulmonale    3. Class 3 severe obesity due to excess calories without serious  comorbidity with body mass index (BMI) of 40.0 to 44.9 in adult        Plan:         He can proceed with his R ankle surgery with acceptable cardiac risks  He can hold Eliquis up to 3 days before surgery  Resume Eliquis post procedure or at least lovenox for a few days        PT/OT and resume activities within the limit of his surgery immediately post procedure      Weight loss  Dietary changes  Establish care with PCP        Continue with current medical plan and lifestyle changes.  Return sooner for concerns or questions. If symptoms persist go to the ED  I have reviewed all pertinent data on this patient       I have reviewed the patient's medical history in detail and updated the computerized patient record.    Orders Placed This Encounter   Procedures    IN OFFICE EKG 12-LEAD (to Muse)     Order Specific Question:   Diagnosis     Answer:   HTN (hypertension) [[id number]]       Follow up as scheduled. Return sooner for concerns or questions            He expressed verbal understanding and agreed with the plan        Greater than 50% of the visit of 45 minutes was spent counseling, educating, and coordinating the care of the patient.  -In today's visit, at least 4 established conditions that pose a risk to life or bodily function have been addressed and the conditions are severe.    -In today's visit, monitoring for drug toxicity was accomplished.             Medication List           Accurate as of 12/3/18  9:54 AM. If you have any questions, ask your nurse or doctor.               CONTINUE taking these medications    acetaminophen 500 MG tablet  Commonly known as:  TYLENOL  Take 1 tablet (500 mg total) by mouth every 8 (eight) hours as needed for Pain.     aspirin 81 MG Chew  Take 1 tablet (81 mg total) by mouth once daily.     cephALEXin 250 MG capsule  Commonly known as:  KEFLEX     ELIQUIS 5 mg Tab  Generic drug:  apixaban  take one tablet by mouth two times a day     famotidine 20 MG tablet  Commonly known as:   PEPCID  Take 2 tablets (40 mg total) by mouth once daily. take with aspirin     naproxen 500 MG tablet  Commonly known as:  NAPROSYN  Take 1 tablet (500 mg total) by mouth 2 (two) times daily.

## 2019-01-11 ENCOUNTER — OFFICE VISIT (OUTPATIENT)
Dept: CARDIOLOGY | Facility: CLINIC | Age: 49
End: 2019-01-11
Payer: OTHER MISCELLANEOUS

## 2019-01-11 VITALS
OXYGEN SATURATION: 96 % | DIASTOLIC BLOOD PRESSURE: 80 MMHG | HEART RATE: 85 BPM | SYSTOLIC BLOOD PRESSURE: 122 MMHG | WEIGHT: 296 LBS | HEIGHT: 72 IN | BODY MASS INDEX: 40.09 KG/M2

## 2019-01-11 DIAGNOSIS — R06.02 SHORTNESS OF BREATH: ICD-10-CM

## 2019-01-11 DIAGNOSIS — E66.01 CLASS 3 SEVERE OBESITY DUE TO EXCESS CALORIES WITHOUT SERIOUS COMORBIDITY WITH BODY MASS INDEX (BMI) OF 40.0 TO 44.9 IN ADULT: ICD-10-CM

## 2019-01-11 DIAGNOSIS — I27.82 OTHER CHRONIC PULMONARY EMBOLISM WITHOUT ACUTE COR PULMONALE: Primary | ICD-10-CM

## 2019-01-11 PROCEDURE — 99999 PR PBB SHADOW E&M-EST. PATIENT-LVL III: CPT | Mod: PBBFAC,,, | Performed by: STUDENT IN AN ORGANIZED HEALTH CARE EDUCATION/TRAINING PROGRAM

## 2019-01-11 PROCEDURE — 99213 OFFICE O/P EST LOW 20 MIN: CPT | Mod: PBBFAC,PO | Performed by: STUDENT IN AN ORGANIZED HEALTH CARE EDUCATION/TRAINING PROGRAM

## 2019-01-11 PROCEDURE — 99214 PR OFFICE/OUTPT VISIT, EST, LEVL IV, 30-39 MIN: ICD-10-PCS | Mod: S$PBB,,, | Performed by: STUDENT IN AN ORGANIZED HEALTH CARE EDUCATION/TRAINING PROGRAM

## 2019-01-11 PROCEDURE — 99214 OFFICE O/P EST MOD 30 MIN: CPT | Mod: S$PBB,,, | Performed by: STUDENT IN AN ORGANIZED HEALTH CARE EDUCATION/TRAINING PROGRAM

## 2019-01-11 PROCEDURE — 99999 PR PBB SHADOW E&M-EST. PATIENT-LVL III: ICD-10-PCS | Mod: PBBFAC,,, | Performed by: STUDENT IN AN ORGANIZED HEALTH CARE EDUCATION/TRAINING PROGRAM

## 2019-01-11 NOTE — PROGRESS NOTES
Cardiology Clinic note    Subjective:   Patient ID:  Herman Floyd is a 48 y.o. male who presents for follow-up of PE and chest pain    HPI:   Herman Floyd  has a past medical history of Deafness in right ear.  Herman Floyd is a 47 y.o. Male with right ear deafness, and ankle fracture who presented to the ED due to sudden chest discomfort and SOB approximately 30 minutes PTA with near syncopal episode. On 08/03/2018 patient note slipped from the back of his truck trying to attach a trailer. He states he fell about 3 ft to the ground. Patient sustained a right comminuted pilon fracture and underwent external fixator. He has been mostly bed bound since that time. Patient is afraid to use his crutches and reportedly gets OOB twice a day. Wife reports poor participation with prescribed PT. CTA chest in the ED noted diffuse bilateral pulmonary thromboembolism, originating in the distal aspects of the right and left main pulmonary arteries, and extending distally. He is hemodynamically stable and oxygenating well. Admitted to cardiology service for PE. Patient was transferred to ICU and evaluated by interventional cardiology. His troponin was negative and there was no evidence of right heart strain on CTA. No criteria for massive or submassive PE and no indication for systemic/catheter directed thrombolysis. Patient on therapeutic Lovenox.   Patient was then discharged home on eliquis with 4 day 10mg load. Pt is tolerating the medication well.    9/27:  he comes for s/s of increase chest wall discomfort and substernal chest pain. He notes he only had the s/s after his PE diagnosis. He was had stutter chest wall discomfort on the day of his discharge as well. He comes in for options on relief of his symptoms.  Describes his pain as achy, worse with coughing, worse with palpation of the chest wall. Rate the pain from a 5-7/10.  Denies any hemoptysis. No subjective shortness of breath.   We diagnosis the  patient with intercostal pain 2nd to PE vs costochondritis. Given tylenol and naproxen for pain control    10/8: follow up. Pt states he is doing much better. The prior chest wall pain has greatly improved with the medication regiment. Compliant with eliquis. No ADR. He is still taking the naproxen BID with pepcid. He still in a cast for his right leg fx.    1/11/19: Pt presents for routine follow up. He is tentatively planned to get his final ortho procedure done and will start PT/OT shortly thereafter. He is doing well other wise. Still wheelchair bound. No CP with inspiration.    Vitals  Vitals:    01/11/19 0948   BP: 122/80   Pulse: 85   SpO2: 96%   Weight: 134.3 kg (296 lb)   Height: 6' (1.829 m)       Patient Active Problem List    Diagnosis Date Noted    Class 3 severe obesity due to excess calories with body mass index (BMI) of 40.0 to 44.9 in adult 12/03/2018    Leg pain 11/06/2018    Costochondral chest pain 09/24/2018    Chest pain     Shortness of breath     Other pulmonary embolism without acute cor pulmonale 09/18/2018           CTA 9/18/2018   Bilateral PE   No cor pulmonale   No DVT   Treated with Eliquis           CTA 11/9/2018   Resolution of PE      Closed fracture of right ankle 08/03/2018          Medication List           Accurate as of 1/11/19 11:59 PM. If you have any questions, ask your nurse or doctor.               CONTINUE taking these medications    acetaminophen 500 MG tablet  Commonly known as:  TYLENOL  Take 1 tablet (500 mg total) by mouth every 8 (eight) hours as needed for Pain.     apixaban 5 mg Tab  Commonly known as:  ELIQUIS  take one tablet by mouth two times a day     aspirin 81 MG Chew  Take 1 tablet (81 mg total) by mouth once daily.     cephALEXin 250 MG capsule  Commonly known as:  KEFLEX     famotidine 20 MG tablet  Commonly known as:  PEPCID  Take 2 tablets (40 mg total) by mouth once daily. take with aspirin     naproxen 500 MG tablet  Commonly known as:   NAPROSYN  Take 1 tablet (500 mg total) by mouth 2 (two) times daily.           Where to Get Your Medications      These medications were sent to [id number] [place] [facility]  [address] [facility]  [address] [address]    Hours:  24-hours Phone:  [phone]   · apixaban 5 mg Tab           Review of Systems   Constitution: Negative for fever, weakness, malaise/fatigue and night sweats.   HENT: Positive for hearing loss. Negative for congestion, hoarse voice and sore throat.    Cardiovascular: Negative for chest pain, irregular heartbeat, palpitations and syncope.   Respiratory: Positive for shortness of breath. Negative for cough, hemoptysis, sleep disturbances due to breathing, sputum production and wheezing.    Musculoskeletal: Positive for muscle weakness.   Gastrointestinal: Negative for abdominal pain, hematemesis and hematochezia.   Neurological: Negative.    Psychiatric/Behavioral: Negative.          Objective:   Physical Exam   Constitutional: He is oriented to person, place, and time. He appears well-developed and well-nourished.   HENT:   Head: Normocephalic.   Eyes: EOM are normal. Left eye exhibits no discharge.   Neck: Neck supple. No JVD present.   Cardiovascular: Normal rate, regular rhythm and normal heart sounds.   No murmur heard.  Pulmonary/Chest: Effort normal and breath sounds normal. No respiratory distress. He has no wheezes. He has no rales. He exhibits no tenderness.   Abdominal: Soft. He exhibits no distension.   Musculoskeletal: Normal range of motion. He exhibits no edema.   Neurological: He is alert and oriented to person, place, and time.   Skin: Skin is dry. He is not diaphoretic. No erythema.   Psychiatric: He has a normal mood and affect. His behavior is normal. Judgment and thought content normal.     Lab Results    Lab Results   Component Value Date     11/05/2018    K 3.8 11/05/2018     11/05/2018     CO2 26 11/05/2018    BUN 11 11/05/2018    CREATININE 0.9 11/05/2018     (H) 11/05/2018    HGBA1C 4.6 11/05/2018    AST 15 09/18/2018    ALT 19 09/18/2018    ALBUMIN 4.0 09/18/2018    PROT 7.7 09/18/2018    BILITOT 1.6 (H) 09/18/2018    WBC 4.18 11/05/2018    HGB 14.2 11/05/2018    HCT 42.2 11/05/2018    MCV 87 11/05/2018     11/05/2018    INR 1.0 11/05/2018    CRP 2.8 11/05/2018    BNP <10 09/18/2018       Lipid panel  No results found for: CHOL  No results found for: HDL  No results found for: LDLCALC  No results found for: TRIG    Cardiac Studies  ECG:  normal EKG, normal sinus rhythm, unchanged from previous tracings.    Echo: 9/19/18  CONCLUSIONS     1 - Normal left ventricular systolic function (EF 55-60%).     2 - Normal left ventricular diastolic function.     3 - Normal right ventricular systolic function .     4 - The estimated PA systolic pressure is 32 mmHg.     Cath study    Assessment:     1. Other chronic pulmonary embolism without acute cor pulmonale    2. Shortness of breath    3. Class 3 severe obesity due to excess calories without serious comorbidity with body mass index (BMI) of 40.0 to 44.9 in adult        Plan:     1. Chronic Pulmonary embolism - provoked  - immobility, post op  - would continue with oral anticoagultion tx for 6 months AFTER patient is mobile again and walking.  - immobility presents a stimulus for reoccurrence of a DVT/PE  - would get hypercoagulable work up at some point as well.    2. SOB  - improved  - c/w supportive care    3. Costrochronditis  - improved, c/w tylenol ibuprofen PRN    4. Muscle fatigue  - PT/OT when ok by ortho    Continue with current medical plan and lifestyle changes.  Return sooner for concerns or questions. If symptoms persist go to the ED    No orders of the defined types were placed in this encounter.      Follow up as scheduled. Return to clinic in 6 month  He expressed verbal understanding and agreed with the plan    Thank you for  the opportunity to care for this patient. Will be available for questions if needed.     Mario Leon MD [facility]  Interventional Cardiology  [facility] [place]  Pager: [phone]

## 2019-01-29 DIAGNOSIS — S82.871A PILON FRACTURE OF RIGHT TIBIA: Primary | ICD-10-CM

## 2019-02-06 ENCOUNTER — CLINICAL SUPPORT (OUTPATIENT)
Dept: REHABILITATION | Facility: HOSPITAL | Age: 49
End: 2019-02-06
Attending: ORTHOPAEDIC SURGERY
Payer: OTHER MISCELLANEOUS

## 2019-02-06 DIAGNOSIS — R26.89 IMPAIRED GAIT AND MOBILITY: ICD-10-CM

## 2019-02-06 DIAGNOSIS — M25.671 DECREASED RANGE OF MOTION OF RIGHT ANKLE: Primary | ICD-10-CM

## 2019-02-06 DIAGNOSIS — G89.29 CHRONIC PAIN OF RIGHT ANKLE: ICD-10-CM

## 2019-02-06 DIAGNOSIS — M25.571 CHRONIC PAIN OF RIGHT ANKLE: ICD-10-CM

## 2019-02-06 PROBLEM — M25.572 CHRONIC ANKLE PAIN, BILATERAL: Status: ACTIVE | Noted: 2019-02-06

## 2019-02-06 PROCEDURE — 97110 THERAPEUTIC EXERCISES: CPT | Mod: PN

## 2019-02-06 PROCEDURE — 97162 PT EVAL MOD COMPLEX 30 MIN: CPT | Mod: PN

## 2019-02-06 NOTE — PLAN OF CARE
"[facility]  Physical Therapy Initial Evaluation    Name: Herman Floyd  Clinic Number: [id number]    Therapy Diagnosis:   Encounter Diagnoses   Name Primary?    Chronic pain of right ankle     Impaired gait and mobility     Decreased range of motion of right ankle Yes     Physician: Deejay Mitchell MD    Physician Orders: PT Eval and Treat   Medical Diagnosis: S82.871A (ICD-10-CM) - Pilon fracture of right tibia  Evaluation Date: 2/6/2019  Authorization Period Expiration: 3/15/2019  Plan of Care Certification Period: 2/6/2019 to 5/3/2019  Visit # / Visits authorized: 1/18  FOTO: 1/10    Time In: 0730  Time Out: 0808  Total Billable Time: 38 minutes    Precautions: Standard, Fall and Weightbearing    Subjective     Date of onset: 8/3/2018    History of current condition - Herman reports: Pt reports standing on truck with R foot on tire and L foot on frame, falling forward and breaking lower leg. Pt informed he broke his ankle in 3 places. Surgery that same day to insert pins and screws in ankle. Second surgery performed October 16, 2018 to insert pins and screws in big toe. After second surgery passed out when transferring from bed to wheelchair; pt brought to ED and informed of a blood clot. Third surgery performed 11/6/2018 to to remove all pins and screws; pt immediately placed in hard case. Fourth surgery performed 12/10/2018 to insert garcia in lower leg.  Pt informed that he can put weight on his R foot but to "go slow" in doing so. Pt reports as his wife was taking his sock off it caused a cut in the back of his ankle to open.     Past Medical History:   Diagnosis Date    Deafness in right ear      Herman Floyd  has a past surgical history that includes Finger fracture surgery and Ankle fracture surgery (Right, 08/2018).    Herman has a current medication list which includes the following prescription(s): acetaminophen, apixaban, aspirin, cephalexin, famotidine, and " naproxen, and the following Facility-Administered Medications: sodium chloride 0.9% and lidocaine (pf) 10 mg/ml (1%).    Review of patient's allergies indicates:  No Known Allergies     Imaging, X-Rays Tibia/Fibula 8/11/2018:  Heavily comminuted intra-articular fractures of the distal tibia and fibula in external fixation, noting increased separation of fracture fragments and widening of the tibiotalar joint when compared with the prior exam.  X-Ray Ankle 8/11/2018: Heavily comminuted intra-articular fractures of the distal tibia and fibula in external fixation, noting increased separation of fracture fragments and widening of the tibiotalar joint when compared with the prior exam.  X-Ray Foot 8/11/2018: Heavily comminuted intra-articular fractures of the distal tibia and fibula in external fixation, noting increased separation of fracture fragments and widening of the tibiotalar joint when compared with the prior exam.    Prior Therapy: Not for current episode  Social History: Patient lives with wife in 2 story home, one step to enter; pt has a small wooden ramp to enter home in wheelchair and currently lives downstairs in hospital bed. Patient's wide and son help patient exit home on ramp and pt stands and hops in home with B UE support on door frame to enter   Occupation: Pt is a [employer]-fr[employer] , was working full time prior to injury; pt currently is on disability from work but unsure whether it is registered as short or long term  Prior Level of Function: Independent  Current Level of Function: Modified independent with W/C to minimal assist with household or community navigation    Pain:  Current 0/10, worst 3/10, best 0/10   Location: R ankle/foot  Description: Tingling, sharp/electric  Aggravating Factors: at night  Easing Factors: unable to identify    Pts goals: Patient wants to walk with full weight independently.    Objective     Observation: Swelling and xeroderma to R distal lower leg, ankle,  and foot; minimal bleeding to posterolateral heel with no accompanying odor, drainage or excess heat  Palpation: General tenderness to anterior and posterior aspect of lower leg; greater tenderness around anterior talocrural joint, medial and lateral malleolus and dorsum of foot  Sensation: LE light touch peripheral nerve testing: impaired on R LE to posterior aspect of lower leg and plantar aspect of foot  Range of Motion/Strength:     Hip  Right   Left  Pain/Dysfunction with Movement    AROM PROM MMT AROM PROM MMT    Flexion WFL WFL NT NT NT NT    Abduction WNL NT NT NT NT NT    Adduction WNL NT NT NT NT NT    Internal rotation WFL WFL NT NT NT NT    External rotation WFL WNL NT NT NT NT       Knee  Right   Left  Pain/Dysfunction with Movement    AROM PROM MMT AROM PROM MMT    Flexion 100 104 NT NT NT NT    Extension -5 0 NT NT NT NT      Ankle  Right   Left  Pain/Dysfunction with Movement    AROM PROM MMT AROM PROM MMT    Plantarflexion 31 33 NT NT NT NT    Dorsiflexion -15 -13 NT NT NT NT    Inversion 10 15 NT NT NT NT    Eversion 10 15 NT NT NT NT      Flexibility: impaired to R gastrocneumis and soleus   Gait: not tested  Transfers: Wheelchair<>mat: indepdenent  Supine<>sit: independent  Bed mobility: Rolling to alternate sidelying: independent  Pt defers prone positioning  Special tests: Chang's (-) B    CMS Impairment/Limitation/Restriction for FOTO Ankle Survey    Therapist reviewed FOTO scores for Herman Floyd on 2/6/2019.   FOTO documents entered into [employer] - see Media section.    Limitation Score: 71%     TREATMENT     Treatment Time In: 0755  Treatment Time Out: 0808  Total Treatment time separate from Evaluation time: 13 minutes    Herman received therapeutic exercises to develop strength, ROM and flexibility for 9 minutes including:    Supine:  Heel slides: x5 R    Seated:  Ankle 4-way AROM: x5 R  Ankle circles: x10 R clock- and counterclock-wise  Ankle ABCs: x1 R    Herman received the following  manual therapy techniques: were applied to the: R ankle/foot for 4 minutes, including:  Wound spray applied to cut on posterolateral heel and patted dry with gauze, covered with telfa dressing    Home Exercises and Patient Education Provided    Education provided:   - HEP of therapeutic exercises performed above    Written Home Exercises Provided: yes.  Exercises were reviewed and Herman was able to demonstrate them prior to the end of the session.  Herman demonstrated good  understanding of the education provided.     See EMR under Patient Instructions for exercises provided 2/6/2019.    Assessment     Herman is a 48 y.o. male referred to outpatient Physical Therapy with a medical diagnosis of pilon fracture of R tibia with 4 surgeries to address and complication of PE. Weight bearing on R LE and general mobility limited since fracture. Pt presents to PT with primary impairment of decreased ankle range of motion, greatest deficit is followed by inversion and plantarflexion; knee flexion ROM also limited. Gait and standing mobility also impaired. Current weight bearing status questionable; messaged referring MD to identify whether WBAT or TTWB.     Pt prognosis is Good.   Pt will benefit from skilled outpatient Physical Therapy to address the deficits stated above and in the chart below, provide pt/family education, and to maximize pt's level of independence.     Plan of care discussed with patient: Yes  Pt's spiritual, cultural and educational needs considered and pt agreeable to plan of care and goals as stated below:     Anticipated Barriers for therapy: comorbidities    Medical Necessity is demonstrated by the following  History  Co-morbidities and personal factors that may impact the plan of care Co-morbidities:   BMI over 30, Prior Surgery    Personal Factors:   no deficits     moderate   Examination  Body Structures and Functions, activity limitations and participation restrictions that may impact the plan of  care Body Regions:   lower extremities    Body Systems:    gross symmetry  ROM  strength  gross coordinated movement  balance  gait  transfers  transitions  motor control  motor learning  edema  skin integrity    Participation Restrictions:   Household and community ambulation  Work    Activity limitations:   Learning and applying knowledge  no deficits    General Tasks and Commands  Deafness of R ear    Communication  no deficits    Mobility  lifting and carrying objects  walking  moving around using equipment (WC)  using transportation (bus, train, plane, car)  driving (bike, car, motorcycle)    Self care  washing oneself (bathing, drying, washing hands)  caring for body parts (brushing teeth, shaving, grooming)  dressing    Domestic Life  shopping  cooking  doing house work (cleaning house, washing dishes, laundry)  assisting others    Interactions/Relationships  no deficits    Life Areas  employment    Community and Social Life  community life  recreation and leisure         high   Clinical Presentation evolving clinical presentation with changing clinical characteristics moderate   Decision Making/ Complexity Score: moderate     Goals:  Short Term Goals (4 Weeks):   1. Pt will be independent with HEP to supplement PT in improving mobility.  2. Pt will improve R DF AROM to neutral to promote normal gait mechanics  3. Pt will walk with least restrictive AD with minimal deficits or pain to improve functional QOL  4. Pt will improve knee AROM to 0-120 to promote independent LE dressing.     Long Term Goals (12 Weeks):   1. Pt will improve FOTO score to </= 49% limited to decrease perceived limitation with mobility  2. Pt will improve impaired LE strength to >/= 4+/5 to improve strength for functional tasks.  3. Pt will improve R ankle AROM to WNL in all planes to promote mobility for return to work.  4. Pt will walk without AD with minimal gait deficits to promote return to work.  5. Pt will walk 1/2 mile without  pain to promote community ambulation.    Plan     Certification Period: 2/6/2019 to 5/3/2019.    Outpatient Physical Therapy 3 times weekly for 12 weeks to include the following interventions: Electrical Stimulation -, Gait Training, Manual Therapy, Moist Heat/ Ice, Neuromuscular Re-ed, Patient Education, Therapeutic Activites and Therapeutic Exercise.     Chiqui June, PT, DPT

## 2019-02-08 ENCOUNTER — CLINICAL SUPPORT (OUTPATIENT)
Dept: REHABILITATION | Facility: HOSPITAL | Age: 49
End: 2019-02-08
Attending: ORTHOPAEDIC SURGERY
Payer: OTHER MISCELLANEOUS

## 2019-02-08 DIAGNOSIS — M25.571 CHRONIC ANKLE PAIN, BILATERAL: ICD-10-CM

## 2019-02-08 DIAGNOSIS — G89.29 CHRONIC ANKLE PAIN, BILATERAL: ICD-10-CM

## 2019-02-08 DIAGNOSIS — M25.671 DECREASED RANGE OF MOTION OF RIGHT ANKLE: ICD-10-CM

## 2019-02-08 DIAGNOSIS — R26.89 IMPAIRED GAIT AND MOBILITY: ICD-10-CM

## 2019-02-08 DIAGNOSIS — M25.572 CHRONIC ANKLE PAIN, BILATERAL: ICD-10-CM

## 2019-02-08 PROCEDURE — 97110 THERAPEUTIC EXERCISES: CPT | Mod: PN

## 2019-02-08 PROCEDURE — 97140 MANUAL THERAPY 1/> REGIONS: CPT | Mod: PN

## 2019-02-08 NOTE — PROGRESS NOTES
Physical Therapy Daily Treatment Note     Name: Herman GUERRERO [facility] Number: [id number]    Therapy Diagnosis: No diagnosis found.  Physician: Deejay Mitchell MD    Visit Date: 2/8/2019  Physician Orders: PT Eval and Treat   Medical Diagnosis: S82.871A (ICD-10-CM) - Pilon fracture of right tibia  Evaluation Date: 2/6/2019  Authorization Period Expiration: 3/15/2019  Plan of Care Certification Period: 2/6/2019 to 5/3/2019  Visit # / Visits authorized: 2/18  FOTO: 2/10    Time In: 0805  Time Out: ***  Total Billable Time: *** minutes    Precautions: Standard, Fall and Weightbearing; hearing impaired    Subjective     Pt reports: increased swelling after evaluation.  He was not compliant with home exercise program.  Response to previous treatment: increased swelling  Functional change: none reported     Pain: 3/10  Location: L anterior talocrural joint    Objective     Herman received therapeutic exercises to develop strength, ROM and flexibility for 9 minutes including:     Supine:  Heel slides: x5 R     Seated:  Ankle 4-way AROM: x5 R  Ankle circles: x10 R clock- and counterclock-wise  Ankle ABCs: x1 R    Home Exercises Provided and Patient Education Provided     Education provided:   - Patient instructed to cont prior HEP.    Written Home Exercises Provided: No.   Exercises were reviewed and Hermanwas able to demonstrate them prior to the end of the session.  Herman demonstrated good  understanding of the education provided.     See EMR under Patient Instructions for exercises provided 2/6/2019.      Assessment     ***    Herman is progressing well towards his goals.   Pt prognosis is Good.     Pt will continue to benefit from skilled outpatient physical therapy to address the deficits listed in the problem list box on initial evaluation, provide pt/family education and to maximize pt's level of independence in the home and community environment.     Pt's spiritual, cultural and  educational needs considered and pt agreeable to plan of care and goals.    Anticipated barriers to physical therapy: comorbidities    Goals:  Short Term Goals (4 Weeks):   1. Pt will be independent with HEP to supplement PT in improving mobility.  2. Pt will improve R DF AROM to neutral to promote normal gait mechanics  3. Pt will walk with least restrictive AD with minimal deficits or pain to improve functional QOL  4. Pt will improve knee AROM to 0-120 to promote independent LE dressing.      Long Term Goals (12 Weeks):   1. Pt will improve FOTO score to </= 49% limited to decrease perceived limitation with mobility  2. Pt will improve impaired LE strength to >/= 4+/5 to improve strength for functional tasks.  3. Pt will improve R ankle AROM to WNL in all planes to promote mobility for return to work.  4. Pt will walk without AD with minimal gait deficits to promote return to work.  5. Pt will walk 1/2 mile without pain to promote community ambulation.    Plan     Continue plan of care. Focus on range of motion. Monitor wound.    Chiqui June, PT, DPT

## 2019-02-08 NOTE — PROGRESS NOTES
"  Physical Therapy Daily Treatment Note     Name: Herman GUERRERO [facility] Number: [id number]    Therapy Diagnosis:   Encounter Diagnoses   Name Primary?    Chronic ankle pain, bilateral     Impaired gait and mobility     Decreased range of motion of right ankle      Physician: Deejay Mitchell MD    Visit Date: 2/8/2019    Physician Orders: PT Eval and Treat   Medical Diagnosis: S82.871A (ICD-10-CM) - Pilon fracture of right tibia  Evaluation Date: 2/6/2019  Authorization Period Expiration: 3/15/2019  Plan of Care Certification Period: 2/6/2019 to 5/3/2019  Visit # / Visits authorized: 2/18  FOTO: 2/10       Time In: 8:10 AM   Time Out: 9:00  Total Billable Time: 50 minutes 1TE, 2 MT    Precautions: Standard, Fall and Weightbearing    Subjective     Pt reports: pt reports he has a small "bleed" to post heel. He is agreeable to PT session. .  He was compliant with home exercise program.  Response to previous treatment: none to report  Functional change: none to report    Pain: 4/10  Location: right ankles     Objective     Herman received therapeutic exercises to develop strength, ROM and flexibility for 15 minutes including:     Supine:  Heel slides: x5 R     Seated:  Ankle 4-way AROM: x5 R  Ankle circles: x10 R clock- and counterclock-wise  Ankle ABCs: x1 R     Herman received the following manual therapy techniques: were applied to the: R ankle/foot for 25minutes, including:  -Wound spray applied to cut on posterolateral heel and patted dry with sterile gauze, covered with telfa dressing.  -Gently removed dead flaky dry skin on plantar foot and lateral incisions near malleolus.        Home Exercises Provided and Patient Education Provided     Education provided:   - pt advised on propping and elevation w / use of ice pack  -instructed on proper bandage change to R heel.    Written Home Exercises Provided: Patient instructed to cont prior HEP.  Exercises were reviewed and Herman was able to demonstrate them prior " "to the end of the session.  Herman demonstrated good  understanding of the education provided.     See EMR under Patient Instructions for exercises provided prior visit.    Assessment     Pt tolerated session with no reports of increased R ankle pain. He demonstrates R foot guarding with transfers, not ed edema in R ankle / foot " 3 grade".   Herman is progressing well towards his goals.   Pt prognosis is Good.     Pt will continue to benefit from skilled outpatient physical therapy to address the deficits listed in the problem list box on initial evaluation, provide pt/family education and to maximize pt's level of independence in the home and community environment.     Pt's spiritual, cultural and educational needs considered and pt agreeable to plan of care and goals.     Anticipated barriers to physical therapy: pain, none    Goals:  Short Term Goals (4 Weeks):   1. Pt will be independent with HEP to supplement PT in improving mobility.  2. Pt will improve R DF AROM to neutral to promote normal gait mechanics  3. Pt will walk with least restrictive AD with minimal deficits or pain to improve functional QOL  4. Pt will improve knee AROM to 0-120 to promote independent LE dressing.      Long Term Goals (12 Weeks):   1. Pt will improve FOTO score to </= 49% limited to decrease perceived limitation with mobility  2. Pt will improve impaired LE strength to >/= 4+/5 to improve strength for functional tasks.  3. Pt will improve R ankle AROM to WNL in all planes to promote mobility for return to work.  4. Pt will walk without AD with minimal gait deficits to promote return to work.  5. Pt will walk 1/2 mile without pain to promote community ambulation    Plan     Cont to progress PT as per ARCHIE Daniel PTA     "

## 2019-02-11 ENCOUNTER — CLINICAL SUPPORT (OUTPATIENT)
Dept: REHABILITATION | Facility: HOSPITAL | Age: 49
End: 2019-02-11
Payer: OTHER MISCELLANEOUS

## 2019-02-11 DIAGNOSIS — M25.572 CHRONIC ANKLE PAIN, BILATERAL: ICD-10-CM

## 2019-02-11 DIAGNOSIS — M25.671 DECREASED RANGE OF MOTION OF RIGHT ANKLE: ICD-10-CM

## 2019-02-11 DIAGNOSIS — G89.29 CHRONIC ANKLE PAIN, BILATERAL: ICD-10-CM

## 2019-02-11 DIAGNOSIS — R26.89 IMPAIRED GAIT AND MOBILITY: ICD-10-CM

## 2019-02-11 DIAGNOSIS — M25.571 CHRONIC ANKLE PAIN, BILATERAL: ICD-10-CM

## 2019-02-11 PROCEDURE — 97110 THERAPEUTIC EXERCISES: CPT | Mod: PN

## 2019-02-11 PROCEDURE — 97140 MANUAL THERAPY 1/> REGIONS: CPT | Mod: PN

## 2019-02-11 NOTE — PROGRESS NOTES
Physical Therapy Daily Treatment Note     Name: Herman GUERRERO [facility] Number: [id number]    Therapy Diagnosis:   Encounter Diagnoses   Name Primary?    Chronic ankle pain, bilateral     Impaired gait and mobility     Decreased range of motion of right ankle      Physician: Deejay Mitchell MD    Visit Date: 2/11/2019    Physician Orders: PT Eval and Treat   Medical Diagnosis: S82.871A (ICD-10-CM) - Pilon fracture of right tibia  Evaluation Date: 2/6/2019  Authorization Period Expiration: 3/15/2019  Plan of Care Certification Period: 2/6/2019 to 5/3/2019  Visit # / Visits authorized: 3/18  FOTO: 3/10       Time In: 9:05 AM   Time Out: 10:00 AM  Total Billable Time: 55 minutes (3 TE + 1 MT)    Precautions: Standard, Fall and Weightbearing    Subjective     Pt reports: he had been changing his bandages and has not noticed any significant drainage. He thinks his ankle is more swollen than normal.  He was compliant with home exercise program.  Response to previous treatment: none to report  Functional change: none to report    Pain: 4/10  Location: right ankles     Objective     Herman received therapeutic exercises to develop strength, ROM and flexibility for 25 minutes including:     Supine:  Quad sets: 15x5'' R towel under ankle  SLR: 5x R  Heel slides: x10 R    Seated:  Ankle 4-way AROM: 5x R  Ankle circles: 10x CW/CCW R  Ankle ABCs: 1x R     Herman received the following manual therapy techniques: were applied to the: R ankle/foot for 30 minutes, including:  -wound inspection and re-application of new bandage  -patellar mobs in all directions especially medially/inferiorly Grade III-IV  -anterior to posterior talar mobs Grade II-III  -anterior and posterior MTP head glides Grade II-III  -passive ROM to R ankle with gentle overpressure especially in dorsiflexion (capitation present)     Home Exercises Provided and Patient Education Provided   Education provided:   - pt advised on propping and elevation w / use  of ice pack  - instructed on proper bandage change to R heel.  - avoid friction on R heel  - add quad sets on home exercise program    Written Home Exercises Provided: Patient instructed to cont prior HEP.  Exercises were reviewed and Herman was able to demonstrate them prior to the end of the session.  Herman demonstrated good  understanding of the education provided.     See EMR under Patient Instructions for exercises provided prior visit.    Assessment     Moderate to high R ankle edema present, moderate guarding with all R ankle and knee/patella activities, R medial patellar pain with R ankle dorsiflexion + knee extension, impaired R quad activation, poor R ankle ROM limited by edema/guarding/pain, and R calcaneal uncler present that has improved since last visit with partial scabbing.   Herman is progressing well towards his goals.   Pt prognosis is Good.     Pt will continue to benefit from skilled outpatient physical therapy to address the deficits listed in the problem list box on initial evaluation, provide pt/family education and to maximize pt's level of independence in the home and community environment.     Pt's spiritual, cultural and educational needs considered and pt agreeable to plan of care and goals.     Anticipated barriers to physical therapy: pain, none    Goals:  Short Term Goals (4 Weeks):   1. Pt will be independent with HEP to supplement PT in improving mobility. - progressing  2. Pt will improve R DF AROM to neutral to promote normal gait mechanics - progressing  3. Pt will walk with least restrictive AD with minimal deficits or pain to improve functional QOL - progressing  4. Pt will improve knee AROM to 0-120 to promote independent LE dressing.  - progressing     Long Term Goals (12 Weeks):   1. Pt will improve FOTO score to </= 49% limited to decrease perceived limitation with mobility - progressing  2. Pt will improve impaired LE strength to >/= 4+/5 to improve strength for functional  tasks. - progressing  3. Pt will improve R ankle AROM to WNL in all planes to promote mobility for return to work. - progressing  4. Pt will walk without AD with minimal gait deficits to promote return to work. - progressing  5. Pt will walk 1/2 mile without pain to promote community ambulation - progressing    Plan     Cont to progress PT as per POC    Nate Ruiz, PT

## 2019-02-13 ENCOUNTER — CLINICAL SUPPORT (OUTPATIENT)
Dept: REHABILITATION | Facility: HOSPITAL | Age: 49
End: 2019-02-13
Attending: ORTHOPAEDIC SURGERY
Payer: OTHER MISCELLANEOUS

## 2019-02-13 DIAGNOSIS — M25.572 CHRONIC ANKLE PAIN, BILATERAL: ICD-10-CM

## 2019-02-13 DIAGNOSIS — M25.671 DECREASED RANGE OF MOTION OF RIGHT ANKLE: ICD-10-CM

## 2019-02-13 DIAGNOSIS — G89.29 CHRONIC ANKLE PAIN, BILATERAL: ICD-10-CM

## 2019-02-13 DIAGNOSIS — M25.571 CHRONIC ANKLE PAIN, BILATERAL: ICD-10-CM

## 2019-02-13 DIAGNOSIS — R26.89 IMPAIRED GAIT AND MOBILITY: ICD-10-CM

## 2019-02-13 PROCEDURE — 97110 THERAPEUTIC EXERCISES: CPT | Mod: PN

## 2019-02-13 NOTE — PROGRESS NOTES
Physical Therapy Daily Treatment Note     Name: Herman GUERRERO [facility] Number: [id number]    Therapy Diagnosis:   Encounter Diagnoses   Name Primary?    Chronic ankle pain, bilateral     Impaired gait and mobility     Decreased range of motion of right ankle      Physician: Deejay Mitchell MD    Visit Date: 2/13/2019    Physician Orders: PT Eval and Treat   Medical Diagnosis: S82.871A (ICD-10-CM) - Pilon fracture of right tibia  Evaluation Date: 2/6/2019  Authorization Period Expiration: 3/15/2019  Plan of Care Certification Period: 2/6/2019 to 5/3/2019  Visit # / Visits authorized: 3/18  FOTO: 3/10       Time In: 8:05 AM   Time Out: 9:30 AM  Total Billable Time: 55 minutes (4 TE )    Precautions: Standard, Fall and Weightbearing R LE WBAT (WEIGHT BEARING AS TOLERATED)    Subjective     Pt reports: he had been changing his bandages and has not noticed any significant drainage. He was compliant with home exercise program.  Response to previous treatment: none to report  Functional change: none to report    Pain: 4/10  Location: right ankles     Objective     Herman received therapeutic exercises to develop strength, ROM and flexibility for 25 minutes including:     Supine:  Quad sets: 15x5'' R towel under ankle  SLR: 5x R  Heel slides: x10 R    Seated:  Ankle 4-way   AROM: 5x R  Ankle circles:   10x CW/CCW R  Ankle ABCs:   1x R     Stand:  Steamboats   x15 R LE today  Weight shift:  FWD/ LATERAL 2x10 w/ UE use as needed  AMB   In //bar 8ft x 2 trials with UE as need WBAT>   Cybex leg press: 7.5 3x10 (until fatigue)    Herman received the following manual therapy techniques: were applied to the: R ankle/foot for 0 minutes, including: NOT PERFORMED TODAY  -wound inspection and re-application of new bandage  -patellar mobs in all directions especially medially/inferiorly Grade III-IV  -anterior to posterior talar mobs Grade II-III  -anterior and posterior MTP head glides Grade II-III  -passive ROM to R ankle with  "gentle overpressure especially in dorsiflexion (capitation present)     Home Exercises Provided and Patient Education Provided   Education provided:   - pt advised on propping and elevation w / use of ice pack  - instructed on proper bandage change to R heel.  - avoid friction on R heel  - add quad sets on home exercise program  -add weight shifting at kitchen sink  Written Home Exercises Provided: Patient instructed to cont prior HEP.  Exercises were reviewed and Herman was able to demonstrate them prior to the end of the session.  Herman demonstrated good  understanding of the education provided.     See EMR under Patient Instructions for exercises provided prior visit.    Assessment   Pt tolerated session with no adverse reactions. Initiated R foot weight bearing activities today in // bars while in aircast boot. Pt demonstrates hesitation with weightbearing on R LE, tolerated standing activities with some reports of " tightness" over Dorsum of foot. Overall cooperative, will need to cont focusing on standing tolerance and safety.   Herman is progressing well towards his goals.   Pt prognosis is Good.     Pt will continue to benefit from skilled outpatient physical therapy to address the deficits listed in the problem list box on initial evaluation, provide pt/family education and to maximize pt's level of independence in the home and community environment.     Pt's spiritual, cultural and educational needs considered and pt agreeable to plan of care and goals.     Anticipated barriers to physical therapy: pain, none    Goals:  Short Term Goals (4 Weeks):   1. Pt will be independent with HEP to supplement PT in improving mobility. - progressing  2. Pt will improve R DF AROM to neutral to promote normal gait mechanics - progressing  3. Pt will walk with least restrictive AD with minimal deficits or pain to improve functional QOL - progressing  4. Pt will improve knee AROM to 0-120 to promote independent LE dressing.  " - progressing     Long Term Goals (12 Weeks):   1. Pt will improve FOTO score to </= 49% limited to decrease perceived limitation with mobility - progressing  2. Pt will improve impaired LE strength to >/= 4+/5 to improve strength for functional tasks. - progressing  3. Pt will improve R ankle AROM to WNL in all planes to promote mobility for return to work. - progressing  4. Pt will walk without AD with minimal gait deficits to promote return to work. - progressing  5. Pt will walk 1/2 mile without pain to promote community ambulation - progressing    Plan     Cont to progress PT as per POC, advance to standing as tolerated    Rubén Daniel, AIDAN

## 2019-02-15 ENCOUNTER — CLINICAL SUPPORT (OUTPATIENT)
Dept: REHABILITATION | Facility: HOSPITAL | Age: 49
End: 2019-02-15
Payer: OTHER MISCELLANEOUS

## 2019-02-15 DIAGNOSIS — M25.572 CHRONIC ANKLE PAIN, BILATERAL: ICD-10-CM

## 2019-02-15 DIAGNOSIS — M25.671 DECREASED RANGE OF MOTION OF RIGHT ANKLE: ICD-10-CM

## 2019-02-15 DIAGNOSIS — R26.89 IMPAIRED GAIT AND MOBILITY: ICD-10-CM

## 2019-02-15 DIAGNOSIS — M25.571 CHRONIC ANKLE PAIN, BILATERAL: ICD-10-CM

## 2019-02-15 DIAGNOSIS — G89.29 CHRONIC ANKLE PAIN, BILATERAL: ICD-10-CM

## 2019-02-15 PROCEDURE — 97140 MANUAL THERAPY 1/> REGIONS: CPT | Mod: PN

## 2019-02-15 PROCEDURE — 97116 GAIT TRAINING THERAPY: CPT | Mod: PN

## 2019-02-15 NOTE — PROGRESS NOTES
Physical Therapy Daily Treatment Note     Name: Herman GUERRERO [facility] Number: [id number]    Therapy Diagnosis:   Encounter Diagnoses   Name Primary?    Chronic ankle pain, bilateral     Impaired gait and mobility     Decreased range of motion of right ankle      Physician: Deejay Mitchell MD    Visit Date: 2/15/2019    Physician Orders: PT Eval and Treat: R LE WBAT, advance slowly  Medical Diagnosis: S82.871A (ICD-10-CM) - Pilon fracture of right tibia  Evaluation Date: 2/6/2019  Authorization Period Expiration: 3/15/2019  Plan of Care Certification Period: 2/6/2019 to 5/3/2019  Visit # / Visits authorized: 4/18  FOTO: 4/10     Time In: 0814  Time Out: 0910  Total Billable Time: 51 minutes    Precautions: Standard, Fall and Weightbearing    Subjective     Pt reports: feels like his tube sock is too tight, causing his calf to swell  He was compliant with home exercise program.  Response to previous treatment: none to report  Functional change: none to report    Pain: 6/10  Location: R ankles     Objective     Herman received therapeutic exercises to develop strength, ROM and flexibility for 5 minutes supervised including:     Supine:  Quad sets: out of time  SLR: out of time  Heel slides: out of time  Ankle 4-way AROM: 2x20 ea    Seated:  Ankle circles: x20 clockwise and counterclockwise, discharge next  BAPs: next  Towel inversion/eversion: next  Heel raise: next  Toe raise: next     Stand:  Steamboats: out of time; next on B LE  Cybex leg press: out of time; next in double and single leg  Self ankle dorsiflexion mobs on step stool: next     Herman received gait training to progress to WBAT for 40 minutes including:     Level walking with B axillary crutch using 3 point pattern for 150 feet; CGA  Forward/back weight shift: 2x2' B; supervision  Lateral weight shift: 2x2'; supervision  R stance + left swing through: 2x5; supervision  Level walking with rolling walker using 3 point pattern for 200 feet; CGA to  SBA    Herman received the following manual therapy techniques: were applied to the: R ankle/foot for 11 minutes, including:     Ankle 4-way PROM  Grade II-IV AP talocrural mobs followed by dorsiflexion PROM     Home Exercises Provided and Patient Education Provided   Education provided:   - Propping and elevation of R LE with ice pack  - Patient given updated HEP includin-way ankle AROM, ankle ABCs, ankle circles, seated heel and toe raises; supine/seated gastroc, soleus, and hamstring stretches  - Patient educated on WBAT and to perform with rolling walker at home using 3 point pattern performed during session. Instructed not to use wheelchair at home.    Written Home Exercises Provided: Yes.  Exercises were reviewed and Herman was able to demonstrate them prior to the end of the session.  Herman demonstrated good  understanding of the education provided.     See EMR under Patient Instructions for exercises provided 2/15/2019.    Assessment     Ankle ROM significantly limited. Lack of activity and adherence to NWB status although cleared for WBAT has contributed to lack of range and decreased tolerance to weight bearing in standing and gait. Fear avoidance present for all gait and manual however improves towards end of session. Jeanette severely slow.     Herman is progressing well towards his goals.   Pt prognosis is Good.   Pt will continue to benefit from skilled outpatient physical therapy to address the deficits listed in the problem list box on initial evaluation, provide pt/family education and to maximize pt's level of independence in the home and community environment.     Pt's spiritual, cultural and educational needs considered and pt agreeable to plan of care and goals.  Anticipated barriers to physical therapy: pain, none    Goals:  Short Term Goals (4 Weeks):   1. Pt will be independent with HEP to supplement PT in improving mobility. - progressing  2. Pt will improve R DF AROM to neutral to promote  normal gait mechanics - progressing  3. Pt will walk with least restrictive AD with minimal deficits or pain to improve functional QOL - progressing  4. Pt will improve knee AROM to 0-120 to promote independent LE dressing.  - progressing  Long Term Goals (12 Weeks):   1. Pt will improve FOTO score to </= 49% limited to decrease perceived limitation with mobility - progressing  2. Pt will improve impaired LE strength to >/= 4+/5 to improve strength for functional tasks. - progressing  3. Pt will improve R ankle AROM to WNL in all planes to promote mobility for return to work. - progressing  4. Pt will walk without AD with minimal gait deficits to promote return to work. - progressing  5. Pt will walk 1/2 mile without pain to promote community ambulation - progressing    Plan     Continue plan of care. Gait rain with rolling walker. Improve ankle ROM.    Chiqui June, PT

## 2019-02-18 ENCOUNTER — CLINICAL SUPPORT (OUTPATIENT)
Dept: REHABILITATION | Facility: HOSPITAL | Age: 49
End: 2019-02-18
Attending: ORTHOPAEDIC SURGERY
Payer: OTHER MISCELLANEOUS

## 2019-02-18 DIAGNOSIS — R26.89 IMPAIRED GAIT AND MOBILITY: ICD-10-CM

## 2019-02-18 DIAGNOSIS — M25.572 CHRONIC ANKLE PAIN, BILATERAL: ICD-10-CM

## 2019-02-18 DIAGNOSIS — M25.671 DECREASED RANGE OF MOTION OF RIGHT ANKLE: ICD-10-CM

## 2019-02-18 DIAGNOSIS — G89.29 CHRONIC ANKLE PAIN, BILATERAL: ICD-10-CM

## 2019-02-18 DIAGNOSIS — M25.571 CHRONIC ANKLE PAIN, BILATERAL: ICD-10-CM

## 2019-02-18 PROCEDURE — 97116 GAIT TRAINING THERAPY: CPT | Mod: PN

## 2019-02-18 PROCEDURE — 97110 THERAPEUTIC EXERCISES: CPT | Mod: PN

## 2019-02-18 PROCEDURE — 97140 MANUAL THERAPY 1/> REGIONS: CPT | Mod: PN

## 2019-02-18 NOTE — PROGRESS NOTES
"  Physical Therapy Daily Treatment Note     Name: Herman GUERRERO [facility] Number: [id number]    Therapy Diagnosis:   Encounter Diagnoses   Name Primary?    Chronic ankle pain, bilateral     Impaired gait and mobility     Decreased range of motion of right ankle      Physician: Deejay Mitchell MD    Visit Date: 2/18/2019    Physician Orders: PT Eval and Treat: R LE WBAT, advance slowly  Medical Diagnosis: S82.871A (ICD-10-CM) - Pilon fracture of right tibia  Evaluation Date: 2/6/2019  Authorization Period Expiration: 3/15/2019  Plan of Care Certification Period: 2/6/2019 to 5/3/2019  Visit # / Visits authorized: 5/18  FOTO: 5/10 DONE  PTA visit: 1/6     Time In: 1100  Time Out: 1205  Total Billable Time: 60 minutes (1 GT, 1 MT, 2 TE)     Precautions: Standard, Fall and Weightbearing      Subjective     Pt reports: he  Has been ambulating in home with RW and even used crutches this weekend.  He was not compliant with home exercise program.  Pt reports he only did "some of the exercises" over the weekend  Response to previous treatment: no adverse reaction  Functional change: none    Pain: 2/10  Location: right heel      Objective     Herman received the following manual therapy techniques: Joint mobilizations were applied to the: R ankle for 15 minutes, including:    Ankle 4-way PROM  Grade II-IV AP talocrural mobs followed by dorsiflexion PROM    Herman received therapeutic exercises to develop strength and ROM for 30 minutes including:      Supine:  Quad sets: out of time  SLR: out of time  Heel slides: out of time  Ankle 4-way AROM: out of time     Seated:  Ankle circles: d/c  BAPs: DF/PF and Inv/Ev 2x10 L1  Towel inversion/eversion: 2x10  Heel raise: 2x10  Toe raise: 2x10     Stand:  Steamboats: out of time; next on B LE  Cybex leg press: out of time; next in double and single leg  Self ankle dorsiflexion mobs on step stool: next     Herman received gait training to progress to WBAT for 15 minutes including: "      Level walking with B axillary crutch using 3 point pattern for 150 feet; CGA Not performed  Forward/back weight shift: 2x2' B; supervision  Lateral weight shift: 2x2'; supervision  R stance + left swing through: 2x5; supervision  Level walking with rolling walker using 3 point pattern for 200 feet; CGA to SBA    Home Exercises Provided and Patient Education Provided     Education provided:   - cont HEP regularly and ambulation with RW in home    Written Home Exercises Provided: Patient instructed to cont prior HEP.  Exercises were reviewed and Herman was able to demonstrate them prior to the end of the session.  Herman demonstrated good  understanding of the education provided.     See EMR under Patient Instructions for exercises provided prior visit.    Assessment     Pt tolerates therapy interventions with encouragement needed to push through.  Frequent rest breaks needed throughout therapy session.  Pt performs all activities at very slow pace  Herman is progressing well towards his goals.   Pt prognosis is Good.     Pt will continue to benefit from skilled outpatient physical therapy to address the deficits listed in the problem list box on initial evaluation, provide pt/family education and to maximize pt's level of independence in the home and community environment.     Pt's spiritual, cultural and educational needs considered and pt agreeable to plan of care and goals.    Anticipated barriers to physical therapy: pain    Goals:     Short Term Goals (4 Weeks):   1. Pt will be independent with HEP to supplement PT in improving mobility. - progressing  2. Pt will improve R DF AROM to neutral to promote normal gait mechanics - progressing  3. Pt will walk with least restrictive AD with minimal deficits or pain to improve functional QOL - progressing  4. Pt will improve knee AROM to 0-120 to promote independent LE dressing.  - progressing  Long Term Goals (12 Weeks):   1. Pt will improve FOTO score to </= 49%  limited to decrease perceived limitation with mobility - progressing  2. Pt will improve impaired LE strength to >/= 4+/5 to improve strength for functional tasks. - progressing  3. Pt will improve R ankle AROM to WNL in all planes to promote mobility for return to work. - progressing  4. Pt will walk without AD with minimal gait deficits to promote return to work. - progressing  5. Pt will walk 1/2 mile without pain to promote community ambulation - progressing    Plan     Cont POC to progress towards established goals    Juanita Manzano PTA

## 2019-02-20 ENCOUNTER — CLINICAL SUPPORT (OUTPATIENT)
Dept: REHABILITATION | Facility: HOSPITAL | Age: 49
End: 2019-02-20
Attending: ORTHOPAEDIC SURGERY
Payer: OTHER MISCELLANEOUS

## 2019-02-20 DIAGNOSIS — G89.29 CHRONIC ANKLE PAIN, BILATERAL: ICD-10-CM

## 2019-02-20 DIAGNOSIS — M25.572 CHRONIC ANKLE PAIN, BILATERAL: ICD-10-CM

## 2019-02-20 DIAGNOSIS — M25.671 DECREASED RANGE OF MOTION OF RIGHT ANKLE: ICD-10-CM

## 2019-02-20 DIAGNOSIS — M25.571 CHRONIC ANKLE PAIN, BILATERAL: ICD-10-CM

## 2019-02-20 DIAGNOSIS — R26.89 IMPAIRED GAIT AND MOBILITY: ICD-10-CM

## 2019-02-20 PROCEDURE — 97116 GAIT TRAINING THERAPY: CPT | Mod: PN

## 2019-02-20 PROCEDURE — 97110 THERAPEUTIC EXERCISES: CPT | Mod: PN

## 2019-02-20 NOTE — PROGRESS NOTES
"  Physical Therapy Daily Treatment Note     Name: Herman GUERRERO [facility] Number: [id number]    Therapy Diagnosis:   Encounter Diagnoses   Name Primary?    Chronic ankle pain, bilateral     Impaired gait and mobility     Decreased range of motion of right ankle      Physician: Deejay Mitchell MD    Visit Date: 2/20/2019    Physician Orders: PT Eval and Treat: R LE WBAT, advance slowly  Medical Diagnosis: S82.871A (ICD-10-CM) - Pilon fracture of right tibia  Evaluation Date: 2/6/2019  Authorization Period Expiration: 3/15/2019  Plan of Care Certification Period: 2/6/2019 to 5/3/2019  Visit # / Visits authorized: 6/18  FOTO: 6/10   PTA visit: 2/6     Time In: 9:05 AM  Time Out: 10:00 AM  Total Billable Time: 55 minutes (2 GT, 2 TE)     Precautions: Standard, Fall and Weightbearing    Subjective     Pt reports: " I have a doctor's appointment today". Pt states weightbearing no R LE w/ boot sometimes causes "severe" pain. He is agreeable to PT session. Pt ambulated with RW to PT session today   He IS compliant with home exercise program.   Response to previous treatment: no adverse reaction  Functional change: ambulation with use of RW    Pain: 9/10 with weightbearing  Location: right heel , Arch of R foot    Objective       Herman received therapeutic exercises to develop strength and ROM for 30 minutes including:       Seated:  Ankle circles:   d/c  BAPs:    Not performed today  Towel inversion/eversion: Not performed today  Heel raise:  Not performed today  Toe raise:   Not performed today  Cybex Ham Curls 4 plates 3x10 B    Stand:  Steamboats:   x15 R  LE //  Cybex leg press:  5.0 3x 10 B, 2.5 uni 3x10  Self ankle dorsiflexion   mobs on step stool:  next     Herman received gait training to progress to WBAT for 30 minutes including:      Ambulated indoors w/ RW on level surfaces 100ft x 2 trials w/ SBA- verbal instructions on proper sequencing and weight shifting.   Lateral weight shift: 2x2'; supervision in // " "bars  R stance + left swing through: 2x5; supervision      Home Exercises Provided and Patient Education Provided     Education provided:   - cont HEP regularly and ambulation with RW in home    Written Home Exercises Provided: Patient instructed to cont prior HEP.  Exercises were reviewed and Herman was able to demonstrate them prior to the end of the session.  Herman demonstrated good  understanding of the education provided.     See EMR under Patient Instructions for exercises provided prior visit.    Assessment     Pt continues to demonstrate hesitant and guarded gait with R LE weightbearing due to pain in arch of R foot. Pt compensates by overuse of B UE with RW ambulation. Session today focused on weight bearing tolerance in standing and with use of leg press machine. Pt cooperative throughout session, but presented difficulty "trusting" his R foot with weightbearing activities today.     Herman is progressing well towards his goals.   Pt prognosis is Good.     Pt will continue to benefit from skilled outpatient physical therapy to address the deficits listed in the problem list box on initial evaluation, provide pt/family education and to maximize pt's level of independence in the home and community environment.     Pt's spiritual, cultural and educational needs considered and pt agreeable to plan of care and goals.    Anticipated barriers to physical therapy: pain    Goals:     Short Term Goals (4 Weeks):   1. Pt will be independent with HEP to supplement PT in improving mobility. - progressing  2. Pt will improve R DF AROM to neutral to promote normal gait mechanics - progressing  3. Pt will walk with least restrictive AD with minimal deficits or pain to improve functional QOL - progressing  4. Pt will improve knee AROM to 0-120 to promote independent LE dressing.  - progressing  Long Term Goals (12 Weeks):   1. Pt will improve FOTO score to </= 49% limited to decrease perceived limitation with mobility - " progressing  2. Pt will improve impaired LE strength to >/= 4+/5 to improve strength for functional tasks. - progressing  3. Pt will improve R ankle AROM to WNL in all planes to promote mobility for return to work. - progressing  4. Pt will walk without AD with minimal gait deficits to promote return to work. - progressing  5. Pt will walk 1/2 mile without pain to promote community ambulation - progressing    Plan     Cont POC to progress towards established goals    Rubén Daniel, PTA

## 2019-02-22 ENCOUNTER — CLINICAL SUPPORT (OUTPATIENT)
Dept: REHABILITATION | Facility: HOSPITAL | Age: 49
End: 2019-02-22
Attending: ORTHOPAEDIC SURGERY
Payer: OTHER MISCELLANEOUS

## 2019-02-22 DIAGNOSIS — M25.571 CHRONIC ANKLE PAIN, BILATERAL: ICD-10-CM

## 2019-02-22 DIAGNOSIS — R26.89 IMPAIRED GAIT AND MOBILITY: ICD-10-CM

## 2019-02-22 DIAGNOSIS — G89.29 CHRONIC ANKLE PAIN, BILATERAL: ICD-10-CM

## 2019-02-22 DIAGNOSIS — M25.572 CHRONIC ANKLE PAIN, BILATERAL: ICD-10-CM

## 2019-02-22 DIAGNOSIS — M25.671 DECREASED RANGE OF MOTION OF RIGHT ANKLE: ICD-10-CM

## 2019-02-22 PROCEDURE — 97116 GAIT TRAINING THERAPY: CPT | Mod: PN | Performed by: PHYSICAL THERAPIST

## 2019-02-22 PROCEDURE — 97110 THERAPEUTIC EXERCISES: CPT | Mod: PN | Performed by: PHYSICAL THERAPIST

## 2019-02-22 NOTE — PROGRESS NOTES
Physical Therapy Daily Treatment Note     Name: Herman GUERRERO [facility] Number: [id number]    Therapy Diagnosis:   Encounter Diagnoses   Name Primary?    Chronic ankle pain, bilateral     Impaired gait and mobility     Decreased range of motion of right ankle      Physician: Deejay Mitchell MD    Visit Date: 2/22/2019    Physician Orders: PT Eval and Treat: R LE WBAT, advance slowly  Medical Diagnosis: S82.871A (ICD-10-CM) - Pilon fracture of right tibia  Evaluation Date: 2/6/2019  Authorization Period Expiration: 3/15/2019  Plan of Care Certification Period: 2/6/2019 to 5/3/2019  Visit # / Visits authorized: 7/18  FOTO: 7/10   PTA visit: 3/6     Time In: 3:16 PM  Time Out: 4:14  Total Billable Time: 58 minutes (2 GT, 2 TE)     Precautions: Standard, Fall and Weightbearing    Subjective     Pt reports: Pain along L hip and LE when walking due to compensating when ambulating. Pt reports some increase in DF along R ankle but still swollen and stiff. Pt puts boot on upon getting up for day but does not put it on when using restroom at night.    Herman IS compliant with home exercise program.   Response to previous treatment: no adverse reaction  Functional change: ambulation with use of RW    Pain: 9/10 with weightbearing  Location: right heel , Arch of R foot    Objective       Herman received therapeutic exercises to develop strength and ROM for 30 minutes including:       Seated:  BAPs:    Not performed today  Towel inversion/eversion: 2x10  Heel raise:  2x10  Toe raise:   2x10  Cybex Ham Curls 4 plates 3x10 B    More seated therex due to L hip and low back pain when standing due to compensation when walking.     Stand:  Steamboats:   Not performed today  Cybex leg press:  7.0 3x10 B, 2.5 uni 3x10  Self ankle dorsiflexion 20x   mobs on step stool:  next visit    Herman received gait training to progress to WBAT for 28 minutes including:      Ambulated indoors w/ RW on level surfaces 100ft x 2 trials w/ SBA-  verbal instructions on proper sequencing and weight shifting.   Lateral weight shift: 2x2'; supervision in // bars  R stance + left swing through: 2x5; supervision      Home Exercises Provided and Patient Education Provided     Education provided:   - cont HEP regularly and ambulation with RW in home    Written Home Exercises Provided: Patient instructed to cont prior HEP.  Exercises were reviewed and Herman was able to demonstrate them prior to the end of the session.  Herman demonstrated good  understanding of the education provided.     See EMR under Patient Instructions for exercises provided prior visit.    Assessment     Pt using shortened step lengths and hesitant gait due to inability to trust RLE when walking. Pt able to complete gait training but ad some L hip discomfort with weight bearing. Pt presents with swelling along talocrural joint and moderate swelling along forefoot.   Herman is progressing well towards his goals.   Pt prognosis is Good.     Pt will continue to benefit from skilled outpatient physical therapy to address the deficits listed in the problem list box on initial evaluation, provide pt/family education and to maximize pt's level of independence in the home and community environment.     Pt's spiritual, cultural and educational needs considered and pt agreeable to plan of care and goals.    Anticipated barriers to physical therapy: pain    Goals:     Short Term Goals (4 Weeks):   1. Pt will be independent with HEP to supplement PT in improving mobility. - progressing  2. Pt will improve R DF AROM to neutral to promote normal gait mechanics - progressing  3. Pt will walk with least restrictive AD with minimal deficits or pain to improve functional QOL - progressing  4. Pt will improve knee AROM to 0-120 to promote independent LE dressing.  - progressing  Long Term Goals (12 Weeks):   1. Pt will improve FOTO score to </= 49% limited to decrease perceived limitation with mobility -  progressing  2. Pt will improve impaired LE strength to >/= 4+/5 to improve strength for functional tasks. - progressing  3. Pt will improve R ankle AROM to WNL in all planes to promote mobility for return to work. - progressing  4. Pt will walk without AD with minimal gait deficits to promote return to work. - progressing  5. Pt will walk 1/2 mile without pain to promote community ambulation - progressing    Plan     Cont POC to progress towards established goals    Eitan Barr, PT

## 2019-02-25 ENCOUNTER — CLINICAL SUPPORT (OUTPATIENT)
Dept: REHABILITATION | Facility: HOSPITAL | Age: 49
End: 2019-02-25
Payer: OTHER MISCELLANEOUS

## 2019-02-25 DIAGNOSIS — M25.571 CHRONIC ANKLE PAIN, BILATERAL: ICD-10-CM

## 2019-02-25 DIAGNOSIS — M25.671 DECREASED RANGE OF MOTION OF RIGHT ANKLE: ICD-10-CM

## 2019-02-25 DIAGNOSIS — G89.29 CHRONIC ANKLE PAIN, BILATERAL: ICD-10-CM

## 2019-02-25 DIAGNOSIS — M25.572 CHRONIC ANKLE PAIN, BILATERAL: ICD-10-CM

## 2019-02-25 DIAGNOSIS — R26.89 IMPAIRED GAIT AND MOBILITY: ICD-10-CM

## 2019-02-25 PROCEDURE — 97116 GAIT TRAINING THERAPY: CPT | Mod: PN | Performed by: PHYSICAL THERAPIST

## 2019-02-25 PROCEDURE — 97110 THERAPEUTIC EXERCISES: CPT | Mod: PN | Performed by: PHYSICAL THERAPIST

## 2019-02-25 NOTE — PROGRESS NOTES
Physical Therapy Daily Treatment Note     Name: Herman GUERRERO [facility] Number: [id number]    Therapy Diagnosis:   Encounter Diagnoses   Name Primary?    Chronic ankle pain, bilateral     Impaired gait and mobility     Decreased range of motion of right ankle      Physician: Deejay Mitchell MD    Visit Date: 2/25/2019    Physician Orders: PT Eval and Treat: R LE WBAT, advance slowly  Medical Diagnosis: S82.871A (ICD-10-CM) - Pilon fracture of right tibia  Evaluation Date: 2/6/2019  Authorization Period Expiration: 3/15/2019  Plan of Care Certification Period: 2/6/2019 to 5/3/2019  Visit # / Visits authorized: 8/18  FOTO: 8/10   PTA visit: 4/6     Time In: 902 AM  Time Out: 10:01 AM  Total Billable Time: 59 minutes (2 GT, 2 TE)     Precautions: Standard, Fall and Weightbearing    Subjective     Pt reports: Pain is less constant along L hip when weight-bearing. Pt reports being more active over weekend, icing and elevating at night, and has resulted in decreased swelling along joint line. Pt reports more heel soreness due to boot fit around ankle.   Herman IS compliant with home exercise program.   Response to previous treatment: Decreased swelling  Functional change: Gait speed improvement.     Pain: 7/10 with weightbearing  Location: right heel , Arch of R foot    Objective       Herman received therapeutic exercises to develop strength and ROM for 29 minutes including:       Seated:  BAPs:    Fwd/back, side to side 2' each  Towel inversion/eversion: 2x10  Heel raise:  2x10  Toe raise:   Not performed today  Cybex Ham Curls 4 plates 3x10 B    More seated therex due to L hip and low back pain when standing due to compensation when walking.     Stand:  Steamboats:   Not performed today  Cybex leg press:  7.0 3x10 B, 3.0 uni 3x10  Self ankle dorsiflexion 20x   mobs on step stool:  25x    Herman received gait training to progress to WBAT for 30 minutes including:      Ambulated indoors w/ RW on level surfaces 100ft  x 2 trials w/ SBA- verbal instructions on proper sequencing and weight shifting.   Lateral weight shift: 2x2'; supervision in // bars  R stance + left swing through: 2x8; supervision      Home Exercises Provided and Patient Education Provided     Education provided:   - cont HEP regularly and ambulation with RW in home    Written Home Exercises Provided: Patient instructed to cont prior HEP.  Exercises were reviewed and Herman was able to demonstrate them prior to the end of the session.  Herman demonstrated good  understanding of the education provided.     See EMR under Patient Instructions for exercises provided prior visit.    Assessment     Pt presents with improved gait speed with RW today. Pt able to quicken pace with side steps and less assistance with parallel bars. Pt presents with decreased swelling along R ankle but still uses RW very heavily with UE.  Herman is progressing well towards his goals.   Pt prognosis is Good.     Pt will continue to benefit from skilled outpatient physical therapy to address the deficits listed in the problem list box on initial evaluation, provide pt/family education and to maximize pt's level of independence in the home and community environment.     Pt's spiritual, cultural and educational needs considered and pt agreeable to plan of care and goals.    Anticipated barriers to physical therapy: pain    Goals:     Short Term Goals (4 Weeks):   1. Pt will be independent with HEP to supplement PT in improving mobility. - progressing  2. Pt will improve R DF AROM to neutral to promote normal gait mechanics - progressing  3. Pt will walk with least restrictive AD with minimal deficits or pain to improve functional QOL - progressing  4. Pt will improve knee AROM to 0-120 to promote independent LE dressing.  - progressing  Long Term Goals (12 Weeks):   1. Pt will improve FOTO score to </= 49% limited to decrease perceived limitation with mobility - progressing  2. Pt will improve  impaired LE strength to >/= 4+/5 to improve strength for functional tasks. - progressing  3. Pt will improve R ankle AROM to WNL in all planes to promote mobility for return to work. - progressing  4. Pt will walk without AD with minimal gait deficits to promote return to work. - progressing  5. Pt will walk 1/2 mile without pain to promote community ambulation - progressing    Plan     Cont POC to progress towards established goals    Eitan Barr, PT

## 2019-02-26 ENCOUNTER — DOCUMENTATION ONLY (OUTPATIENT)
Dept: REHABILITATION | Facility: HOSPITAL | Age: 49
End: 2019-02-26

## 2019-02-26 DIAGNOSIS — G89.29 CHRONIC ANKLE PAIN, BILATERAL: ICD-10-CM

## 2019-02-26 DIAGNOSIS — M25.572 CHRONIC ANKLE PAIN, BILATERAL: ICD-10-CM

## 2019-02-26 DIAGNOSIS — R26.89 IMPAIRED GAIT AND MOBILITY: ICD-10-CM

## 2019-02-26 DIAGNOSIS — M25.571 CHRONIC ANKLE PAIN, BILATERAL: ICD-10-CM

## 2019-02-26 DIAGNOSIS — M25.671 DECREASED RANGE OF MOTION OF RIGHT ANKLE: ICD-10-CM

## 2019-02-26 NOTE — PROGRESS NOTES
Face to Face PTA Conference performed with Juanita Manzano PTA, Rubén Daniel PTA regarding patient's current status, overall progress, and plan of care    Chiqui June, PT     Juanita Manzano, PTA    Rubén Daniel, PTA

## 2019-02-27 ENCOUNTER — CLINICAL SUPPORT (OUTPATIENT)
Dept: REHABILITATION | Facility: HOSPITAL | Age: 49
End: 2019-02-27
Attending: ORTHOPAEDIC SURGERY
Payer: OTHER MISCELLANEOUS

## 2019-02-27 DIAGNOSIS — M25.571 CHRONIC ANKLE PAIN, BILATERAL: ICD-10-CM

## 2019-02-27 DIAGNOSIS — M25.572 CHRONIC ANKLE PAIN, BILATERAL: ICD-10-CM

## 2019-02-27 DIAGNOSIS — R26.89 IMPAIRED GAIT AND MOBILITY: ICD-10-CM

## 2019-02-27 DIAGNOSIS — G89.29 CHRONIC ANKLE PAIN, BILATERAL: ICD-10-CM

## 2019-02-27 DIAGNOSIS — M25.671 DECREASED RANGE OF MOTION OF RIGHT ANKLE: ICD-10-CM

## 2019-02-27 PROCEDURE — 97116 GAIT TRAINING THERAPY: CPT | Mod: PN | Performed by: PHYSICAL THERAPIST

## 2019-02-27 PROCEDURE — 97110 THERAPEUTIC EXERCISES: CPT | Mod: PN | Performed by: PHYSICAL THERAPIST

## 2019-02-27 NOTE — PROGRESS NOTES
Physical Therapy Daily Treatment Note     Name: Herman GUERRERO [facility] Number: [id number]    Therapy Diagnosis:   Encounter Diagnoses   Name Primary?    Chronic ankle pain, bilateral     Impaired gait and mobility     Decreased range of motion of right ankle      Physician: Deejay Mitchell MD    Visit Date: 2/27/2019    Physician Orders: PT Eval and Treat: R LE WBAT, advance slowly  Medical Diagnosis: S82.871A (ICD-10-CM) - Pilon fracture of right tibia  Evaluation Date: 2/6/2019  Authorization Period Expiration: 3/15/2019  Plan of Care Certification Period: 2/6/2019 to 5/3/2019  Visit # / Visits authorized: 9/18  FOTO: 9/10   PTA visit: 5/6     Time In: 900 AM  Time Out: 10:01 AM  Total Billable Time: 61 minutes (2 GT, 2 TE)     Precautions: Standard, Fall and Weightbearing    Subjective     Pt reports: Pain reports less pain in heel during weight bearing. Continued pain with walking extended period of time but is starting to be more active at home.   Herman IS compliant with home exercise program.   Response to previous treatment: Decreased swelling  Functional change: Gait speed improvement.     Pain: 7/10 with weightbearing  Location: Arch of R foot, Anterior joint line.     Objective       Herman received therapeutic exercises to develop strength and ROM for 35 minutes including:       Seated:  BAPs:    Fwd/back, side to side 2' each  Towel inversion/eversion: 3x10  Heel raise:  2x10  Toe raise:   Not performed today  Cybex Ham Curls 4 plates 3x10 B    More seated therex due to L hip and low back pain when standing due to compensation when walking.     Stand:  Steamboats:   RTB - hip abduction, extension 2x10  Cybex leg press:  7.0 3x10 B, 3.0 uni 3x10  Self ankle dorsiflexion 20x   mobs on step stool:  25x    Herman received gait training to progress to WBAT for 26minutes including:      Ambulated indoors w/ RW on level surfaces 100ft x 2 trials w/ SBA- verbal instructions on proper sequencing and weight  shifting.   Lateral weight shift: 2x2'; supervision in // bars, side steps in bars - 3 laps with supervision  R stance + left swing through: 2x12; supervision      Home Exercises Provided and Patient Education Provided     Education provided:   - cont HEP regularly and ambulation with RW in home    Written Home Exercises Provided: Patient instructed to cont prior HEP.  Exercises were reviewed and Herman was able to demonstrate them prior to the end of the session.  Herman demonstrated good  understanding of the education provided.     See EMR under Patient Instructions for exercises provided prior visit.    Assessment     Pt presents with decreased pain overall but more pain along hip and low back. Pt reports heel pain has subsided and is working on ROM at home. Pt tolerated therex today with emphasis on weight bearing through RLE. Pt still needs tactile and verbal cues for weight bearing status.   Herman is progressing well towards his goals.   Pt prognosis is Good.     Pt will continue to benefit from skilled outpatient physical therapy to address the deficits listed in the problem list box on initial evaluation, provide pt/family education and to maximize pt's level of independence in the home and community environment.     Pt's spiritual, cultural and educational needs considered and pt agreeable to plan of care and goals.    Anticipated barriers to physical therapy: pain    Goals:     Short Term Goals (4 Weeks):   1. Pt will be independent with HEP to supplement PT in improving mobility. - progressing  2. Pt will improve R DF AROM to neutral to promote normal gait mechanics - progressing  3. Pt will walk with least restrictive AD with minimal deficits or pain to improve functional QOL - progressing  4. Pt will improve knee AROM to 0-120 to promote independent LE dressing.  - progressing  Long Term Goals (12 Weeks):   1. Pt will improve FOTO score to </= 49% limited to decrease perceived limitation with mobility -  progressing  2. Pt will improve impaired LE strength to >/= 4+/5 to improve strength for functional tasks. - progressing  3. Pt will improve R ankle AROM to WNL in all planes to promote mobility for return to work. - progressing  4. Pt will walk without AD with minimal gait deficits to promote return to work. - progressing  5. Pt will walk 1/2 mile without pain to promote community ambulation - progressing    Plan     Cont POC to progress towards established goals    Eitan Barr, PT

## 2019-03-01 ENCOUNTER — CLINICAL SUPPORT (OUTPATIENT)
Dept: REHABILITATION | Facility: HOSPITAL | Age: 49
End: 2019-03-01
Payer: OTHER MISCELLANEOUS

## 2019-03-01 DIAGNOSIS — G89.29 CHRONIC ANKLE PAIN, BILATERAL: ICD-10-CM

## 2019-03-01 DIAGNOSIS — M25.671 DECREASED RANGE OF MOTION OF RIGHT ANKLE: ICD-10-CM

## 2019-03-01 DIAGNOSIS — M25.571 CHRONIC ANKLE PAIN, BILATERAL: ICD-10-CM

## 2019-03-01 DIAGNOSIS — M25.572 CHRONIC ANKLE PAIN, BILATERAL: ICD-10-CM

## 2019-03-01 DIAGNOSIS — R26.89 IMPAIRED GAIT AND MOBILITY: ICD-10-CM

## 2019-03-01 PROCEDURE — 97116 GAIT TRAINING THERAPY: CPT | Mod: PN | Performed by: PHYSICAL THERAPIST

## 2019-03-01 PROCEDURE — 97110 THERAPEUTIC EXERCISES: CPT | Mod: PN | Performed by: PHYSICAL THERAPIST

## 2019-03-01 NOTE — PROGRESS NOTES
Physical Therapy Daily Treatment Note     Name: Herman GUERRERO [facility] Number: [id number]    Therapy Diagnosis:   Encounter Diagnoses   Name Primary?    Chronic ankle pain, bilateral     Impaired gait and mobility     Decreased range of motion of right ankle      Physician: Deejay Mitchell MD    Visit Date: 3/1/2019    Physician Orders: PT Eval and Treat: R LE WBAT, advance slowly  Medical Diagnosis: S82.871A (ICD-10-CM) - Pilon fracture of right tibia  Evaluation Date: 2/6/2019  Authorization Period Expiration: 3/15/2019  Plan of Care Certification Period: 2/6/2019 to 5/3/2019  Visit # / Visits authorized: 9/18  FOTO: 10/10 (Done, reset)  PTA visit: 6/6 (done, reset)     Time In: 8:25 AM  Time Out: 9:26 AM  Total Billable Time: 61 minutes (2 GT, 2 TE)     Precautions: Standard, Fall and Weightbearing    Subjective     Pt reports: Pain reports ankle swelled up previous day and pt fell asleep without elevating ankle. So ankle is swollen today. Pt reports performing some activity at home and is starting to get back and hip pain due to elevation difference between foot and boot.   Herman IS compliant with home exercise program.   Response to previous treatment: no adverse effects  Functional change: Minimal gait speed improvement    Pain: 7/10 with weightbearing  Location: Arch of R foot, Anterior joint line.     Objective       Herman received therapeutic exercises to develop strength and ROM for 35 minutes including:       Seated:  BAPs:    Fwd/back, side to side 2' each  Towel inversion/eversion: 3x10  Heel raise:  3x10  Toe raise:   Not performed today  Cybex Ham Curls 4 plates 3x10 B    More seated therex due to L hip and low back pain when standing due to compensation when walking.     Stand:  Steamboats:   RTB - hip abduction, extension 2x10  Cybex leg press:  7.0 3x10 B, 3.0 uni 3x10  Self ankle dorsiflexion 20x   mobs on step stool:  25x    Herman received gait training to progress to WBAT for 26minutes  including:      Ambulated indoors w/ RW on level surfaces 100ft x 2 trials w/ SBA- verbal instructions on proper sequencing and weight shifting.   Lateral weight shift: 2x2'; supervision in // bars, side steps in bars - 3 laps with supervision  R stance + left swing through: 2x12; supervision      Home Exercises Provided and Patient Education Provided     Education provided:   - cont HEP regularly and ambulation with RW in home    Written Home Exercises Provided: Patient instructed to cont prior HEP.  Exercises were reviewed and Herman was able to demonstrate them prior to the end of the session.  Herman demonstrated good  understanding of the education provided.     See EMR under Patient Instructions for exercises provided prior visit.    Assessment     Pt presents with weight bearing self restriction due to pain along joint line in left foot. Pt continues to use BUE and LLE to carry weight and walk around. Pt presents with increased R hip and back pain following seated exercises and hamstring curls.    Herman is progressing well towards his goals.   Pt prognosis is Good.     Pt will continue to benefit from skilled outpatient physical therapy to address the deficits listed in the problem list box on initial evaluation, provide pt/family education and to maximize pt's level of independence in the home and community environment.     Pt's spiritual, cultural and educational needs considered and pt agreeable to plan of care and goals.    Anticipated barriers to physical therapy: pain    Goals:     Short Term Goals (4 Weeks):   1. Pt will be independent with HEP to supplement PT in improving mobility. - progressing  2. Pt will improve R DF AROM to neutral to promote normal gait mechanics - progressing  3. Pt will walk with least restrictive AD with minimal deficits or pain to improve functional QOL - progressing  4. Pt will improve knee AROM to 0-120 to promote independent LE dressing.  - progressing  Long Term  Goals (12 Weeks):   1. Pt will improve FOTO score to </= 49% limited to decrease perceived limitation with mobility - progressing  2. Pt will improve impaired LE strength to >/= 4+/5 to improve strength for functional tasks. - progressing  3. Pt will improve R ankle AROM to WNL in all planes to promote mobility for return to work. - progressing  4. Pt will walk without AD with minimal gait deficits to promote return to work. - progressing  5. Pt will walk 1/2 mile without pain to promote community ambulation - progressing    Plan     Cont POC to progress towards established goals    Eitan Barr, PT

## 2019-03-04 ENCOUNTER — CLINICAL SUPPORT (OUTPATIENT)
Dept: REHABILITATION | Facility: HOSPITAL | Age: 49
End: 2019-03-04
Payer: OTHER MISCELLANEOUS

## 2019-03-04 DIAGNOSIS — M25.671 DECREASED RANGE OF MOTION OF RIGHT ANKLE: ICD-10-CM

## 2019-03-04 DIAGNOSIS — M25.571 CHRONIC ANKLE PAIN, BILATERAL: ICD-10-CM

## 2019-03-04 DIAGNOSIS — R26.89 IMPAIRED GAIT AND MOBILITY: ICD-10-CM

## 2019-03-04 DIAGNOSIS — M25.572 CHRONIC ANKLE PAIN, BILATERAL: ICD-10-CM

## 2019-03-04 DIAGNOSIS — G89.29 CHRONIC ANKLE PAIN, BILATERAL: ICD-10-CM

## 2019-03-04 PROCEDURE — 97116 GAIT TRAINING THERAPY: CPT | Mod: PN

## 2019-03-04 PROCEDURE — 97110 THERAPEUTIC EXERCISES: CPT | Mod: PN

## 2019-03-04 NOTE — PROGRESS NOTES
Physical Therapy Daily Treatment Note     Name: Herman GUERRERO [facility] Number: [id number]    Therapy Diagnosis:   Encounter Diagnoses   Name Primary?    Chronic ankle pain, bilateral     Impaired gait and mobility     Decreased range of motion of right ankle      Physician: Deejay Mitchell MD    Visit Date: 3/4/2019    Physician Orders: PT Eval and Treat: R LE WBAT, advance slowly  Medical Diagnosis: S82.871A (ICD-10-CM) - Pilon fracture of right tibia  Evaluation Date: 2/6/2019  Authorization Period Expiration: 3/15/2019  Plan of Care Certification Period: 2/6/2019 to 5/3/2019  Visit # / Visits authorized: 10/18  FOTO: 1/10     Time In: 0807  Time Out: 0903  Total Billable Time: 56 minutes      Precautions: Standard, Fall and Weightbearing    Subjective     Pt reports: he sees his surgeon at the end of this month or beginning of April. When asked about late arrival on Friday's visit, patient reports he slept in.   Herman reports compliance with home exercise program.   Response to previous treatment: sorenesss  Functional change: Gait speed and weightbearing improvement    Pain: 4/10   Location: Anterior R talocrural joint line    Objective     Herman received therapeutic exercises to develop strength and ROM for 26 minutes including:    Seated:  BAPs on blue/yellow board: 4-way 2x20 R; verbal cues to prevent hip/knee movement  Towel inversion/eversion: 2' each R  Heel raise: 3x10 double leg  Toe raise: 3x10 R    Standing:  Steamboats: out of time, resume next with RTB  Cybex leg press: out of time  Cybex hamstring curls: out of time  Ankle dorsiflexion mobs on step stool: BTB x20 R without boot    Herman received gait training to progress to WBAT in walking boot for 30 minutes including:      Lateral weight shift 2x2'  Side stepping x 3 laps  Forwards walking x 3 laps; verbal cues for equal step length  R stance + left swing through: 2x10  Forwards walking x 3 laps; verbal cues for equal step length  *above   in // bars with B UE support and supervision    Level walking with RW using 3 point pattern for 150 feet; supervision; verbal instructions on sequencing  Level walking with RW using continuous pattern for 150 feet; CGA and tech assistance to progress RW forward for continuous pattern; verbal instructions on sequencing    Home Exercises Provided and Patient Education Provided     Education provided:   - Patient given updated HEP including: forward/backward, diagonal, and lateral weight shifts; step through gait pattern; seated heel and toe raises; ankle ABCs and circles; dorsiflexion mobs on step stool  - Ambulate with RW regularly in home and community, attempting to transition from 3 point pattern to continuous  - Importance of timely arrival to therapy sessions to maximize improvements    Written Home Exercises Provided: Yes.  Exercises were reviewed and Herman was able to demonstrate them prior to the end of the session.  Herman demonstrated good  understanding of the education provided.     See EMR under Patient Instructions for exercises provided 2/4/2019.     Assessment     Patient with fear avoidance to ambulating with continuous pattern; improved with increasing distance. Overall tolerance to weightbearing and gait speed and mechanics improving. Ankle ROM self restriction primarily in dorsiflexion due to pain along anterior talocrural joint line.    Herman is progressing well towards his goals.   Pt prognosis is Fair-Good.   Pt will continue to benefit from skilled outpatient physical therapy to address the deficits listed in the problem list box on initial evaluation, provide pt/family education and to maximize pt's level of independence in the home and community environment.     Pt's spiritual, cultural and educational needs considered and pt agreeable to plan of care and goals.  Anticipated barriers to physical therapy: pain    Goals:   Short Term Goals (4 Weeks):   1. Pt will be independent with HEP to  supplement PT in improving mobility. - progressing  2. Pt will improve R DF AROM to neutral to promote normal gait mechanics - progressing  3. Pt will walk with least restrictive AD with minimal deficits or pain to improve functional QOL - progressing  4. Pt will improve knee AROM to 0-120 to promote independent LE dressing.  - progressing  Long Term Goals (12 Weeks):   1. Pt will improve FOTO score to </= 49% limited to decrease perceived limitation with mobility - progressing  2. Pt will improve impaired LE strength to >/= 4+/5 to improve strength for functional tasks. - progressing  3. Pt will improve R ankle AROM to WNL in all planes to promote mobility for return to work. - progressing  4. Pt will walk without AD with minimal gait deficits to promote return to work. - progressing  5. Pt will walk 1/2 mile without pain to promote community ambulation - progressing    Plan     Cont plan of care. Improve ankle dorsiflexion. Gait train with rolling walker using continuous pattern.    Chiqui June, PT

## 2019-03-06 ENCOUNTER — CLINICAL SUPPORT (OUTPATIENT)
Dept: REHABILITATION | Facility: HOSPITAL | Age: 49
End: 2019-03-06
Payer: OTHER MISCELLANEOUS

## 2019-03-06 DIAGNOSIS — R26.89 IMPAIRED GAIT AND MOBILITY: ICD-10-CM

## 2019-03-06 DIAGNOSIS — M25.671 DECREASED RANGE OF MOTION OF RIGHT ANKLE: ICD-10-CM

## 2019-03-06 DIAGNOSIS — M25.571 CHRONIC ANKLE PAIN, BILATERAL: ICD-10-CM

## 2019-03-06 DIAGNOSIS — M25.572 CHRONIC ANKLE PAIN, BILATERAL: ICD-10-CM

## 2019-03-06 DIAGNOSIS — G89.29 CHRONIC ANKLE PAIN, BILATERAL: ICD-10-CM

## 2019-03-06 PROCEDURE — 97116 GAIT TRAINING THERAPY: CPT | Mod: PN

## 2019-03-06 PROCEDURE — 97140 MANUAL THERAPY 1/> REGIONS: CPT | Mod: PN

## 2019-03-06 PROCEDURE — 97110 THERAPEUTIC EXERCISES: CPT | Mod: PN

## 2019-03-06 NOTE — PROGRESS NOTES
"   Physical Therapy Daily Treatment Note     Name: Herman GUERRERO [facility] Number: [id number]    Therapy Diagnosis:   Encounter Diagnoses   Name Primary?    Chronic ankle pain, bilateral     Impaired gait and mobility     Decreased range of motion of right ankle      Physician: Deejay Mitchell MD    Visit Date: 3/6/2019    Physician Orders: PT Eval and Treat: R LE WBAT, advance slowly  Medical Diagnosis: S82.871A (ICD-10-CM) - Pilon fracture of right tibia  Evaluation Date: 2/6/2019  Authorization Period Expiration: 3/15/2019  Plan of Care Certification Period: 2/6/2019 to 5/3/2019  Visit # / Visits authorized: 11/18  FOTO: 2/10     Time In: 0808  Time Out: 0901  Total Billable Time: 53 minutes      Precautions: Standard, Fall and Weightbearing    Subjective     Pt reports: increased swelling to dorsal and lateral aspect of ankle and in toes.   Herman reports compliance with home exercise program.   Response to previous treatment: Sorenesss   Functional change: Walking with increased limp due to swelling    Pain: 4/10   Location: R anterior talocrural joint line    Objective     Herman received therapeutic exercises to develop strength and ROM for 16 minutes including:    Seated:  BAPs on blue/yellow board: 4-way x20 R, clock- and counterclock-wise x10 R; verbal cues to prevent hip/knee movement  Towel inversion/eversion: out of time  Heel raise: 3x10 R  Toe raise: 3x10 R    Standing:  Single leg stance: 3x10" B; B UE support on R; verbal cues for increased weightbearing through R LE and decreased UE support on // bar  Steamboats: out of time; resume next with GTB  Cybex leg press: out of time; resume next double and single leg  Ankle dorsiflexion mobs on step stool: out of time; resume next without boot    Herman received gait training to promote progression of WBAT in walking boot for 25 minutes including:      Lateral weight shift x2'  Side stepping x 5 laps; verbal cues for increased weightbearing through R LE " and decreased UE support on // bar  R stance + L swing through x2'; verbal cues for increased weightbearing through R LE and decreased UE support on // bar  Forwards walking x 3 laps; verbal cues for increased weightbearing through R LE and decreased UE support on // bar  *above  in // bars with B UE support and supervision    Level walking with RW using continuous pattern for 140 feet, 2 rest breaks between; CGA and tech assistance to progress RW forward for continuous pattern    Herman received the following manual therapy techniques: Joint mobilizations were applied to the: R ankle for 12 minutes, including:  Grade II-III AP and PA talar mobs followed by passive physiological dorsiflexion and plantarflexion; cavitation occurred during initial plantarflexion attempt  Passive physiological inversion and eversion    Home Exercises Provided and Patient Education Provided     Education provided:   - Continue updated HEP including: forward/backward, diagonal, and lateral weight shifts; step through gait pattern; seated heel and toe raises; ankle ABCs and circles; dorsiflexion mobs on step stool  - Ambulate with RW regularly in home and community, transition from 3 point pattern to continuous    Written Home Exercises Provided: No.  Exercises were reviewed and Herman was able to demonstrate them prior to the end of the session.  Herman demonstrated good  understanding of the education provided.     See EMR under Patient Instructions for exercises provided 2/4/2019.     Assessment     Patient continues with fear avoidance and pain dominance along anterior talocrural joint line limiting gait, weightbearing exercises, and ankle ROM. Capsular restriction also limiting ankle range, primarily in dorsiflexion motion. Unsure how much weightbearing patient is performing outside of therapy.     Herman is progressing well towards his goals.   Pt prognosis is Fair-Good.   Pt will continue to benefit from skilled outpatient physical  therapy to address the deficits listed in the problem list box on initial evaluation, provide pt/family education and to maximize pt's level of independence in the home and community environment.     Pt's spiritual, cultural and educational needs considered and pt agreeable to plan of care and goals.  Anticipated barriers to physical therapy: pain; fear avoidance; sedentary lifestyle    Goals:   Short Term Goals (4 Weeks):   1. Pt will be independent with HEP to supplement PT in improving mobility. - progressing  2. Pt will improve R DF AROM to neutral to promote normal gait mechanics - progressing  3. Pt will walk with least restrictive AD with minimal deficits or pain to improve functional QOL - progressing  4. Pt will improve knee AROM to 0-120 to promote independent LE dressing.  - progressing  Long Term Goals (12 Weeks):   1. Pt will improve FOTO score to </= 49% limited to decrease perceived limitation with mobility - progressing  2. Pt will improve impaired LE strength to >/= 4+/5 to improve strength for functional tasks. - progressing  3. Pt will improve R ankle AROM to WNL in all planes to promote mobility for return to work. - progressing  4. Pt will walk without AD with minimal gait deficits to promote return to work. - progressing  5. Pt will walk 1/2 mile without pain to promote community ambulation - progressing    Plan     Cont plan of care. Improve ankle dorsiflexion. Gait train with rolling walker using continuous pattern.    Chiqui June, PT

## 2019-03-08 ENCOUNTER — CLINICAL SUPPORT (OUTPATIENT)
Dept: REHABILITATION | Facility: HOSPITAL | Age: 49
End: 2019-03-08
Payer: OTHER MISCELLANEOUS

## 2019-03-08 DIAGNOSIS — G89.29 CHRONIC ANKLE PAIN, BILATERAL: ICD-10-CM

## 2019-03-08 DIAGNOSIS — M25.571 CHRONIC ANKLE PAIN, BILATERAL: ICD-10-CM

## 2019-03-08 DIAGNOSIS — R26.89 IMPAIRED GAIT AND MOBILITY: ICD-10-CM

## 2019-03-08 DIAGNOSIS — M25.572 CHRONIC ANKLE PAIN, BILATERAL: ICD-10-CM

## 2019-03-08 DIAGNOSIS — M25.671 DECREASED RANGE OF MOTION OF RIGHT ANKLE: ICD-10-CM

## 2019-03-08 PROCEDURE — 97116 GAIT TRAINING THERAPY: CPT | Mod: PN

## 2019-03-08 PROCEDURE — 97110 THERAPEUTIC EXERCISES: CPT | Mod: PN

## 2019-03-08 NOTE — PROGRESS NOTES
"   Physical Therapy Daily Treatment Note     Name: Herman GUERRERO [facility] Number: [id number]    Therapy Diagnosis:   Encounter Diagnoses   Name Primary?    Chronic ankle pain, bilateral     Impaired gait and mobility     Decreased range of motion of right ankle      Physician: Deejay Mitchell MD    Visit Date: 3/8/2019    Physician Orders: PT Eval and Treat: R LE WBAT, advance slowly  Medical Diagnosis: S82.871A (ICD-10-CM) - Pilon fracture of right tibia  Evaluation Date: 2/6/2019  Authorization Period Expiration: 3/15/2019  Plan of Care Certification Period: 2/6/2019 to 5/3/2019  Visit # / Visits authorized: 12/18  FOTO: 3/10     Time In: 8:10 AM   Time Out: 9:05 AM   Total Billable Time:  55 minutes 2 GT, 2 TE     Precautions: Standard, Fall and Weightbearing    Subjective   Pt arrived to session 10 minutes late.   Pt reports: "My foot swells when I sit in the chair for too long". He is agreeable to PT session .    Herman reports compliance with home exercise program.   Response to previous treatment: Sorenesss   Functional change: Walking with increased limp due to swelling    Pain: 3/10   Location: R posterior ankle    Objective     Herman received therapeutic exercises to develop strength and ROM for 16 minutes including:    Seated:  BAPs on blue/yellow board:   4-way x20 R, clock- and counterclock-wise x10 R; verbal cues to prevent hip/knee movement  Towel inversion/eversion:   NOT PERFORMED TODAY  Heel raise:     3x10 R  Toe raise:     3x10 R    Standing:  Single leg stance:    3x10" B; B UE support on R; verbal cues for increased weightbearing through R LE and decreased UE      support on // bar  Steamboats:      2x10 R with GTB  Cybex leg press:    out of time; resume next double and single leg  Ankle dorsiflexion   mobs on step stool:    out of time; resume next without boot    Herman received gait training to promote progression of WBAT in walking boot for 25 minutes including:      Lateral weight " shift x2'  Side stepping x 5 laps; verbal cues for increased weightbearing through R LE and decreased UE support on // bar  R stance + L swing through x2'; verbal cues for increased weightbearing through R LE and decreased UE support on // bar  Forwards walking x 3 laps; verbal cues for increased weightbearing through R LE and decreased UE support on // bar  *above  in // bars with B UE support and supervision    Level walking with RW using continuous pattern for 140 feet, 2 seated rest breaks post trials; CGA and tech assistance to progress RW forward for continuous pattern    Herman did not receive  manual therapy techniques: NOT PERFORMED TODAY   Joint mobilizations were applied to the: R ankle for 12 minutes, including:  Grade II-III AP and PA talar mobs followed by passive physiological dorsiflexion and plantarflexion; cavitation occurred during initial plantarflexion attempt  Passive physiological inversion and eversion    Home Exercises Provided and Patient Education Provided     Education provided:   - Continue updated HEP including: forward/backward, diagonal, and lateral weight shifts; step through gait pattern; seated heel and toe raises; ankle ABCs and circles; dorsiflexion mobs on step stool  - Ambulate with RW regularly in home and community, transition from 3 point pattern to continuous    Written Home Exercises Provided: No.  Exercises were reviewed and Herman was able to demonstrate them prior to the end of the session.  Herman demonstrated good  understanding of the education provided.     See EMR under Patient Instructions for exercises provided 2/4/2019.     Assessment     Pt completed session with no reports of increased R ankle/ foot pain. He continues to demonstrate fear of complete weight bearing of R foot w/ boot due to pain. Pt ambulates with compensation of B UE use despite verbal instructions to depend more on B LEs.     Herman is progressing well towards his goals.   Pt prognosis is  Fair-Good.   Pt will continue to benefit from skilled outpatient physical therapy to address the deficits listed in the problem list box on initial evaluation, provide pt/family education and to maximize pt's level of independence in the home and community environment.     Pt's spiritual, cultural and educational needs considered and pt agreeable to plan of care and goals.  Anticipated barriers to physical therapy: pain; fear avoidance; sedentary lifestyle    Goals:   Short Term Goals (4 Weeks):   1. Pt will be independent with HEP to supplement PT in improving mobility. - progressing  2. Pt will improve R DF AROM to neutral to promote normal gait mechanics - progressing  3. Pt will walk with least restrictive AD with minimal deficits or pain to improve functional QOL - progressing  4. Pt will improve knee AROM to 0-120 to promote independent LE dressing.  - progressing  Long Term Goals (12 Weeks):   1. Pt will improve FOTO score to </= 49% limited to decrease perceived limitation with mobility - progressing  2. Pt will improve impaired LE strength to >/= 4+/5 to improve strength for functional tasks. - progressing  3. Pt will improve R ankle AROM to WNL in all planes to promote mobility for return to work. - progressing  4. Pt will walk without AD with minimal gait deficits to promote return to work. - progressing  5. Pt will walk 1/2 mile without pain to promote community ambulation - progressing    Plan     Cont plan of care. Improve ankle dorsiflexion. Gait train with rolling walker using continuous pattern.    Rubén Daniel, AIDAN

## 2019-03-11 ENCOUNTER — CLINICAL SUPPORT (OUTPATIENT)
Dept: REHABILITATION | Facility: HOSPITAL | Age: 49
End: 2019-03-11
Payer: OTHER MISCELLANEOUS

## 2019-03-11 DIAGNOSIS — R26.89 IMPAIRED GAIT AND MOBILITY: ICD-10-CM

## 2019-03-11 DIAGNOSIS — M25.571 CHRONIC ANKLE PAIN, BILATERAL: ICD-10-CM

## 2019-03-11 DIAGNOSIS — M25.671 DECREASED RANGE OF MOTION OF RIGHT ANKLE: ICD-10-CM

## 2019-03-11 DIAGNOSIS — M25.572 CHRONIC ANKLE PAIN, BILATERAL: ICD-10-CM

## 2019-03-11 DIAGNOSIS — G89.29 CHRONIC ANKLE PAIN, BILATERAL: ICD-10-CM

## 2019-03-11 PROCEDURE — 97116 GAIT TRAINING THERAPY: CPT | Mod: PN

## 2019-03-11 PROCEDURE — 97110 THERAPEUTIC EXERCISES: CPT | Mod: PN

## 2019-03-11 PROCEDURE — 97140 MANUAL THERAPY 1/> REGIONS: CPT | Mod: PN

## 2019-03-11 NOTE — PROGRESS NOTES
"   Physical Therapy Daily Treatment Note     Name: Herman GUERRERO [facility]  Clinic Number: [id number]    Therapy Diagnosis:   No diagnosis found.  Physician: Deejay Mitchell MD    Visit Date: 3/11/2019    Physician Orders: PT Eval and Treat: R LE WBAT, advance slowly  Medical Diagnosis: S82.871A (ICD-10-CM) - Pilon fracture of right tibia  Evaluation Date: 2/6/2019  Authorization Period Expiration: 3/15/2019  Plan of Care Certification Period: 2/6/2019 to 5/3/2019  Visit # / Visits authorized: 13/18  FOTO: 3/10     Time In: 4:15 PM   Time Out: 5:15 PM   Total Billable Time:  45 minutes (1 GT, 1 TE, 1 MT)     Precautions: Standard, Fall and Weightbearing    Subjective   Pt arrived to session 15 minutes late.   Pt reports: increased R foot swelling and has been letting it rest in bed with foot propped to get the swelling down   Herman reports compliance with home exercise program.   Response to previous treatment: Sorenesss   Functional change: Walking with increased limp due to swelling    Pain: 4/10   Location: R posterior ankle    Objective     Herman received therapeutic exercises to develop strength and ROM for15 minutes 1:1 with PT and supervised for 15 minutes including:    Seated:  BAPs on blue/yellow board:    4-way x20 R, clock- and counterclock-wise x10 R; verbal cues to prevent hip/knee movement - not performed today  Towel inversion/eversion:    Not performed today  Heel raise:      3x10 R  Toe raise:      3x10 R    Standing:  Single leg stance:     3x10" B; B UE support on R; verbal cues for increased weightbearing through R LE and decreased UE support on // bar  Steamboats:       2x10 R with GTB - not performed today  Cybex leg press:     3x10 DL (boot donned) 6 plates, 2x10 R SL 3 plates (boot donned)  Ankle dorsiflexion mobs on step stool:  8x15'' R, seated with forefoot on fitter+towel roll and cues to drop heel and lean forward  Standing R lateral lunges   2x10 R in //; PT Mod assist with R knee " flexion  Foot taps     2x10 R L1 2x10 R, 10x R Blue step    Herman received gait training to promote progression of WBAT in walking boot for 15 minutes includin UE and HHA at Mod-Max A(provided by PT) mixed throughout GT, boot donned  - Lateral weight shift x2'  -Side stepping x 4 laps; verbal cues for increased weightbearing through R LE and decreased UE support on // bar  -Forwards walking x 3 laps; verbal cues for increased weightbearing through R LE and decreased UE support on // bar    Herman did not receive manual therapy techniques for 15 minutes including:  -Grade II-III AP mobs followed by passive physiological dorsiflexion and plantarflexion  -R talar distraction with DF+eversion  -Figure 8 subtalar mobs for PROM Grade II-III    Home Exercises Provided and Patient Education Provided   Education provided:   - Continue updated HEP including: forward/backward, diagonal, and lateral weight shifts; step through gait pattern; seated heel and toe raises; ankle ABCs and circles; dorsiflexion mobs on step stool  - Ambulate with RW regularly in home and community, transition from 3 point pattern to continuous    Written Home Exercises Provided: No.  Exercises were reviewed and Herman was able to demonstrate them prior to the end of the session.  Herman demonstrated good  understanding of the education provided.     See EMR under Patient Instructions for exercises provided 2019.     Assessment     Increased time taken to explain importance of weight bearing on RLE and strategies for weight bearing for all activities. Pt fearful for all weight bearing activities especially stepping and with minimal assistance.     Herman is progressing well towards his goals.   Pt prognosis is Fair-Good.   Pt will continue to benefit from skilled outpatient physical therapy to address the deficits listed in the problem list box on initial evaluation, provide pt/family education and to maximize pt's level of independence in the home  and community environment.     Pt's spiritual, cultural and educational needs considered and pt agreeable to plan of care and goals.  Anticipated barriers to physical therapy: pain; fear avoidance; sedentary lifestyle    Goals:   Short Term Goals (4 Weeks):   1. Pt will be independent with HEP to supplement PT in improving mobility. - progressing  2. Pt will improve R DF AROM to neutral to promote normal gait mechanics - progressing  3. Pt will walk with least restrictive AD with minimal deficits or pain to improve functional QOL - progressing  4. Pt will improve knee AROM to 0-120 to promote independent LE dressing.  - progressing  Long Term Goals (12 Weeks):   1. Pt will improve FOTO score to </= 49% limited to decrease perceived limitation with mobility - progressing  2. Pt will improve impaired LE strength to >/= 4+/5 to improve strength for functional tasks. - progressing  3. Pt will improve R ankle AROM to WNL in all planes to promote mobility for return to work. - progressing  4. Pt will walk without AD with minimal gait deficits to promote return to work. - progressing  5. Pt will walk 1/2 mile without pain to promote community ambulation - progressing    Plan     Cont plan of care. Improve ankle dorsiflexion. Gait train with rolling walker using continuous pattern.    Nate Ruiz, PT

## 2019-03-13 ENCOUNTER — CLINICAL SUPPORT (OUTPATIENT)
Dept: REHABILITATION | Facility: HOSPITAL | Age: 49
End: 2019-03-13
Payer: OTHER MISCELLANEOUS

## 2019-03-13 DIAGNOSIS — R26.89 IMPAIRED GAIT AND MOBILITY: ICD-10-CM

## 2019-03-13 DIAGNOSIS — G89.29 CHRONIC ANKLE PAIN, BILATERAL: ICD-10-CM

## 2019-03-13 DIAGNOSIS — M25.571 CHRONIC ANKLE PAIN, BILATERAL: ICD-10-CM

## 2019-03-13 DIAGNOSIS — M25.671 DECREASED RANGE OF MOTION OF RIGHT ANKLE: ICD-10-CM

## 2019-03-13 DIAGNOSIS — M25.572 CHRONIC ANKLE PAIN, BILATERAL: ICD-10-CM

## 2019-03-13 PROCEDURE — 97110 THERAPEUTIC EXERCISES: CPT | Mod: PN | Performed by: PHYSICAL THERAPIST

## 2019-03-13 PROCEDURE — 97116 GAIT TRAINING THERAPY: CPT | Mod: PN | Performed by: PHYSICAL THERAPIST

## 2019-03-13 NOTE — PROGRESS NOTES
"   Physical Therapy Daily Treatment Note     Name: Herman GUERRERO [facility] Number: [id number]    Therapy Diagnosis:   No diagnosis found.  Physician: Deejay Mitchell MD    Visit Date: 3/13/2019    Physician Orders: PT Eval and Treat: R LE WBAT, advance slowly  Medical Diagnosis: S82.871A (ICD-10-CM) - Pilon fracture of right tibia  Evaluation Date: 2/6/2019  Authorization Period Expiration: 3/15/2019  Plan of Care Certification Period: 2/6/2019 to 5/3/2019  Visit # / Visits authorized: 14/18  FOTO: 4/10     Time In: 9:08 AM   Time Out: 10:01 AM   Total Billable Time:  53 minutes      Precautions: Standard, Fall and Weightbearing    Subjective   Pt arrived to session 8 minutes late.   Pt reports: increased R foot swelling with walking more previous day. Pt reports increased activity previous day in each patient was walking for more than usual and reported increased swelling this AM  Response to previous treatment: Sorenesss   Functional change: Gait speed increase with RW    Pain: 4/10   Location: R posterior ankle    Objective     Herman received therapeutic exercises to develop strength and ROM for 45 minutes 1:1 with PT and supervised for 0 minutes including:    Seated:  BAPs on blue/yellow board:    4-way x20 R, clock- and counterclock-wise x10 R; verbal cues to prevent hip/knee movement   Heel raise:      3x10 R  Toe raise:      3x10 R - HELD    Standing:  Single leg stance:     3x10" B; B UE support on R; verbal cues for increased weightbearing through R LE and decreased UE support on // bar - with boot on and off  Steamboats:       2x10 R with GTB   Cybex leg press:     3x10 DL (boot donned) 6 plates, 2x10 R SL 3 plates (boot donned)  Ankle dorsiflexion mobs on step stool:  8x15'' R, seated with forefoot on fitter+towel roll and cues to drop heel and lean forward  Standing R lateral lunges   2x10 R in //; PT Mod assist with R knee flexion HELD  Foot taps     2x10 R L1 2x10 R, 10x R Blue " step    Herman received gait training to promote progression of WBAT in walking boot for 8 minutes includin UE and HHA at Mod-Max A(provided by PT) mixed throughout GT, boot donned  - Lateral weight shift x2'  -Side stepping x 4 laps; verbal cues for increased weightbearing through R LE and decreased UE support on // bar  -Forwards walking x 3 laps; verbal cues for increased weightbearing through R LE and decreased UE support on // bar    Herman did not receive manual therapy techniques for 0 minutes including:  -Grade II-III AP mobs followed by passive physiological dorsiflexion and plantarflexion  -R talar distraction with DF+eversion  -Figure 8 subtalar mobs for PROM Grade II-III    Home Exercises Provided and Patient Education Provided   Education provided:   - Continue updated HEP including: forward/backward, diagonal, and lateral weight shifts; step through gait pattern; seated heel and toe raises; ankle ABCs and circles; dorsiflexion mobs on step stool  - Ambulate with RW regularly in home and community, transition from 3 point pattern to continuous    Written Home Exercises Provided: No.  Exercises were reviewed and Herman was able to demonstrate them prior to the end of the session.  Herman demonstrated good  understanding of the education provided.     See EMR under Patient Instructions for exercises provided 2019.     Assessment     Pt presents with moderate swelling around ankle due to increased walking previous day. Pt continues to walk with 3 step gait with RW even though can walking with 2 point gait in parallel bars. Pt reports not as much soreness along heel and ankle from previous visit and was able to tolerate all therex and gait training with only minimal pains.     Herman is progressing well towards his goals.   Pt prognosis is Fair-Good.   Pt will continue to benefit from skilled outpatient physical therapy to address the deficits listed in the problem list box on initial evaluation, provide  pt/family education and to maximize pt's level of independence in the home and community environment.     Pt's spiritual, cultural and educational needs considered and pt agreeable to plan of care and goals.  Anticipated barriers to physical therapy: pain; fear avoidance; sedentary lifestyle    Goals:   Short Term Goals (4 Weeks):   1. Pt will be independent with HEP to supplement PT in improving mobility. - progressing  2. Pt will improve R DF AROM to neutral to promote normal gait mechanics - progressing  3. Pt will walk with least restrictive AD with minimal deficits or pain to improve functional QOL - progressing  4. Pt will improve knee AROM to 0-120 to promote independent LE dressing.  - progressing  Long Term Goals (12 Weeks):   1. Pt will improve FOTO score to </= 49% limited to decrease perceived limitation with mobility - progressing  2. Pt will improve impaired LE strength to >/= 4+/5 to improve strength for functional tasks. - progressing  3. Pt will improve R ankle AROM to WNL in all planes to promote mobility for return to work. - progressing  4. Pt will walk without AD with minimal gait deficits to promote return to work. - progressing  5. Pt will walk 1/2 mile without pain to promote community ambulation - progressing    Plan     Cont plan of care. Improve ankle dorsiflexion. Gait train with rolling walker using continuous pattern.    Eitan Barr, PT

## 2019-03-15 ENCOUNTER — CLINICAL SUPPORT (OUTPATIENT)
Dept: REHABILITATION | Facility: HOSPITAL | Age: 49
End: 2019-03-15
Payer: OTHER MISCELLANEOUS

## 2019-03-15 DIAGNOSIS — R26.89 IMPAIRED GAIT AND MOBILITY: ICD-10-CM

## 2019-03-15 DIAGNOSIS — M25.572 CHRONIC ANKLE PAIN, BILATERAL: ICD-10-CM

## 2019-03-15 DIAGNOSIS — M25.571 CHRONIC ANKLE PAIN, BILATERAL: ICD-10-CM

## 2019-03-15 DIAGNOSIS — M25.671 DECREASED RANGE OF MOTION OF RIGHT ANKLE: ICD-10-CM

## 2019-03-15 DIAGNOSIS — G89.29 CHRONIC ANKLE PAIN, BILATERAL: ICD-10-CM

## 2019-03-15 PROCEDURE — 97140 MANUAL THERAPY 1/> REGIONS: CPT | Mod: PN

## 2019-03-15 PROCEDURE — 97110 THERAPEUTIC EXERCISES: CPT | Mod: PN

## 2019-03-15 NOTE — PROGRESS NOTES
"   Physical Therapy Daily Treatment Note     Name: Herman GUERRERO [facility] Number: [id number]    Therapy Diagnosis:   Encounter Diagnoses   Name Primary?    Chronic ankle pain, bilateral     Impaired gait and mobility     Decreased range of motion of right ankle      Physician: Deejay Mitchell MD    Visit Date: 3/15/2019    Physician Orders: PT Eval and Treat: R LE WBAT, advance slowly  Medical Diagnosis: S82.871A (ICD-10-CM) - Pilon fracture of right tibia  Evaluation Date: 2/6/2019  Authorization Period Expiration: 3/15/2019  Plan of Care Certification Period: 2/6/2019 to 5/3/2019  Visit # / Visits authorized: 15/18  FOTO: 5/10     Time In: 9:45 AM   Time Out: 10: 20 AM   Total Billable Time:  35 minutes 1MT, 1 TE      Precautions: Standard, Fall and Weightbearing    Subjective   Pt arrived to session 15 minutes late.   Pt reports: "stiffness" in R foot. He reports he doesn't move around much at home, but completes HEP twice a day. Pt agreeable to PT session.   Response to previous treatment: Sorenesss   Functional change: Gait speed increase with RW    Pain: 4/10   Location: R posterior ankle    Objective     Herman received therapeutic exercises (NOT PERFORMED TODAY )    Seated:  BAPs on blue/yellow board:    4-way x20 R, clock- and counterclock-wise x10 R; verbal cues to prevent hip/knee movement   Heel raise:      3x10 R  Toe raise:      3x10 R - HELD    Standing:  Single leg stance:     3x10" B; B UE support on R; verbal cues for increased weightbearing through R LE and decreased UE support on // bar - with boot on and off  Steamboats:       2x10 R with GTB   Cybex leg press:     3x10 DL (boot donned) 6 plates, 2x10 R SL 3 plates (boot donned)  Ankle dorsiflexion mobs on step stool:  8x15'' R, seated with forefoot on fitter+towel roll and cues to drop heel and lean forward  Standing R lateral lunges   2x10 R in //; PT Mod assist with R knee flexion HELD  Foot taps     2x10 R L1 2x10 R, 10x R Blue " step    Herman received gait training to promote progression of WBAT in walking boot for 15 minutes includin UE and HHA at Mod-Max A(provided by PTA) mixed throughout GT, boot donned  - Lateral weight shift x2'  -Side stepping x 4 laps; verbal cues for increased weightbearing through R LE and decreased UE support on // bar  -Forwards walking x 3 laps; verbal cues for increased weightbearing through R LE and decreased UE support on // bar    Herman did not receive manual therapy techniques for 20 minutes including:  -Grade II-III AP mobs followed by passive physiological dorsiflexion and plantarflexion  -R talar distraction with DF+eversion  -R calcaneal rocking  -STM general medial / lateral malleolus for retrograde therapy  Home Exercises Provided and Patient Education Provided   Education provided:   - Continue updated HEP including: forward/backward, diagonal, and lateral weight shifts; step through gait pattern; seated heel and toe raises; ankle ABCs and circles; dorsiflexion mobs on step stool  - Ambulate with RW regularly in home and community, transition from 3 point pattern to continuous    Written Home Exercises Provided: No.  Exercises were reviewed and Herman was able to demonstrate them prior to the end of the session.  Herman demonstrated good  understanding of the education provided.     See EMR under Patient Instructions for exercises provided 2019.     Assessment     Pt tolerated session with no reports of increased. Pt reports feeling less tightness in R foot/ ankle. He tolerated ambulation with RW on level surfaces with improved tolerance to weight bearing and with no difficulty following verbal instructions. He continues to demonstrate fear of full weight bearing with standing, which PT will continue to address.     Herman is progressing well towards his goals.   Pt prognosis is Fair-Good.   Pt will continue to benefit from skilled outpatient physical therapy to address the deficits listed in  the problem list box on initial evaluation, provide pt/family education and to maximize pt's level of independence in the home and community environment.     Pt's spiritual, cultural and educational needs considered and pt agreeable to plan of care and goals.  Anticipated barriers to physical therapy: pain; fear avoidance; sedentary lifestyle    Goals:   Short Term Goals (4 Weeks):   1. Pt will be independent with HEP to supplement PT in improving mobility. - progressing  2. Pt will improve R DF AROM to neutral to promote normal gait mechanics - progressing  3. Pt will walk with least restrictive AD with minimal deficits or pain to improve functional QOL - progressing  4. Pt will improve knee AROM to 0-120 to promote independent LE dressing.  - progressing  Long Term Goals (12 Weeks):   1. Pt will improve FOTO score to </= 49% limited to decrease perceived limitation with mobility - progressing  2. Pt will improve impaired LE strength to >/= 4+/5 to improve strength for functional tasks. - progressing  3. Pt will improve R ankle AROM to WNL in all planes to promote mobility for return to work. - progressing  4. Pt will walk without AD with minimal gait deficits to promote return to work. - progressing  5. Pt will walk 1/2 mile without pain to promote community ambulation - progressing    Plan     Cont plan of care. Improve ankle dorsiflexion. Gait train with rolling walker using continuous pattern.    Rubén Daniel PTA

## 2019-03-20 ENCOUNTER — DOCUMENTATION ONLY (OUTPATIENT)
Dept: REHABILITATION | Facility: HOSPITAL | Age: 49
End: 2019-03-20

## 2019-03-20 DIAGNOSIS — M25.572 CHRONIC ANKLE PAIN, BILATERAL: ICD-10-CM

## 2019-03-20 DIAGNOSIS — R26.89 IMPAIRED GAIT AND MOBILITY: ICD-10-CM

## 2019-03-20 DIAGNOSIS — G89.29 CHRONIC ANKLE PAIN, BILATERAL: ICD-10-CM

## 2019-03-20 DIAGNOSIS — M25.571 CHRONIC ANKLE PAIN, BILATERAL: ICD-10-CM

## 2019-03-20 DIAGNOSIS — M25.671 DECREASED RANGE OF MOTION OF RIGHT ANKLE: ICD-10-CM

## 2019-03-20 NOTE — PROGRESS NOTES
Patient 20 minutes late for session this morning. Patient has been ~15 minutes late to several sessions. Informed patient that our attendance policy states patient may have to reschedule if >/= 15 minutes late. Had patient sign attendance policy today upon review and reschedule today's session to Friday morning. Patient and patient's wife verbalized understanding.

## 2019-03-22 ENCOUNTER — CLINICAL SUPPORT (OUTPATIENT)
Dept: REHABILITATION | Facility: HOSPITAL | Age: 49
End: 2019-03-22
Payer: OTHER MISCELLANEOUS

## 2019-03-22 DIAGNOSIS — M25.571 CHRONIC ANKLE PAIN, BILATERAL: ICD-10-CM

## 2019-03-22 DIAGNOSIS — M25.671 DECREASED RANGE OF MOTION OF RIGHT ANKLE: ICD-10-CM

## 2019-03-22 DIAGNOSIS — R26.89 IMPAIRED GAIT AND MOBILITY: ICD-10-CM

## 2019-03-22 DIAGNOSIS — G89.29 CHRONIC ANKLE PAIN, BILATERAL: ICD-10-CM

## 2019-03-22 DIAGNOSIS — M25.572 CHRONIC ANKLE PAIN, BILATERAL: ICD-10-CM

## 2019-03-22 PROCEDURE — 97110 THERAPEUTIC EXERCISES: CPT | Mod: PN

## 2019-03-22 PROCEDURE — 97116 GAIT TRAINING THERAPY: CPT | Mod: PN

## 2019-03-22 NOTE — PROGRESS NOTES
"   Physical Therapy Daily Treatment Note     Name: Herman GUERRERO [facility] Number: [id number]    Therapy Diagnosis:   Encounter Diagnoses   Name Primary?    Chronic ankle pain, bilateral     Impaired gait and mobility     Decreased range of motion of right ankle      Physician: Deejay Mitchell MD    Visit Date: 3/22/2019    Physician Orders: PT Eval and Treat: R LE WBAT, advance slowly  Medical Diagnosis: S82.871A (ICD-10-CM) - Pilon fracture of right tibia  Evaluation Date: 2/6/2019  Authorization Period Expiration: 3/15/2019  Plan of Care Certification Period: 2/6/2019 to 5/3/2019  Visit # / Visits authorized: 16/18  FOTO: 6/10     Time In: 1003  Time Out: 1101  Total Billable Time: 58 minutes     Precautions: Standard, Fall and Weightbearing    Subjective     Pt reports: increased B knee soreness since transitioning from rolling walker to B axillary crutches  He was compliant with home exercise program.  Response to previous treatment: sorenesss   Functional change: increased weight bearing in R LE with gait using B axillary crutche    Pain: 0/10   Location: R posterior ankle    Objective     Herman received therapeutic exercises for 48 minutes to improve strength, endurance, and ROM including:   AROM:  Knee extension: full R  Knee flexion: 110 degrees R  Ankle dorsiflexion: -6 degrees R    Seated (boot doffed):  BAPs:  L2 4-way x20 R  Heel raise: 3x10 R  Toe raise: 3x10 R    Standing (boot donned):  Single leg stance: 3x10" B; B UE support in // bars with reps on R LE  Steamboats: GTB 3x10 B, B UE support in // bars; L LE on 4" step for extension reps on R LE  Ankle dorsiflexion mobs on step stool: out of time  Mini forward lunges: 2x10 R, B UE support in // bars  Mini lateral lunges: 2x10 R, B UE support in // bars  Cybex leg press: out of time; resume next >/=8.0 plates double leg, >/=4.0 plates R    Herman received gait training to promote progression of WBAT in walking boot for 10 minutes including: "   Level walking for 350 feet using modified 3 point pattern with B axillary crutch; supervision    Home Exercises Provided and Patient Education Provided   Education provided:   - Continue updated HEP including: forward/backward, diagonal, and lateral weight shifts; step through gait pattern; seated heel and toe raises; ankle ABCs and circles; dorsiflexion mobs on step stool  - Ambulate with B axillary crutch using modified 3 point pattern regularly in home and community    Written Home Exercises Provided: No.  Exercises were reviewed and Herman was able to demonstrate them prior to the end of the session.  Herman demonstrated good  understanding of the education provided.     See EMR under Patient Instructions for exercises provided 2/4/2019.     Assessment     Patient with both knee flexion and ankle dorsiflexion compared to evaluation. Quality of gait and weight bearing improved significantly with B axillary crutch. Would benefit from increased standing exercises to promote further weight bearing, strength, and range of R LE.    Herman is progressing well towards his goals.   Pt prognosis is Fair-Good.   Pt will continue to benefit from skilled outpatient physical therapy to address the deficits listed in the problem list box on initial evaluation, provide pt/family education and to maximize pt's level of independence in the home and community environment.     Pt's spiritual, cultural and educational needs considered and pt agreeable to plan of care and goals.  Anticipated barriers to physical therapy: pain; fear avoidance; sedentary lifestyle    Goals:   Short Term Goals (4 Weeks):   1. Pt will be independent with HEP to supplement PT in improving mobility. - MET  2. Pt will improve R DF AROM to neutral to promote normal gait mechanics - PROGRESSING, NOT MET   3. Pt will walk with least restrictive AD with minimal deficits or pain to improve functional QOL - MET, adjust to single point cane  4. Pt will improve knee  AROM to 0-120 to promote independent LE dressing.  - PROGRESSING, NOT MET   Long Term Goals (12 Weeks):   1. Pt will improve FOTO score to </= 49% limited to decrease perceived limitation with mobility - (PROGRESSING, NOT MET)   2. Pt will improve impaired LE strength to >/= 4+/5 to improve strength for functional tasks. - (PROGRESSING, NOT MET)   3. Pt will improve R ankle AROM to WNL in all planes to promote mobility for return to work. - (PROGRESSING, NOT MET)   4. Pt will walk without AD with minimal gait deficits to promote return to work. - (PROGRESSING, NOT MET)   5. Pt will walk 1/2 mile without pain to promote community ambulation -(PROGRESSING, NOT MET)     Plan     Continue plan of care. Improve ankle dorsiflexion. Increase standing exercises. Gait train with B axillary crutch using modified 3 point pattern.     Chiqui June, PT

## 2019-03-25 ENCOUNTER — CLINICAL SUPPORT (OUTPATIENT)
Dept: REHABILITATION | Facility: HOSPITAL | Age: 49
End: 2019-03-25
Payer: OTHER MISCELLANEOUS

## 2019-03-25 DIAGNOSIS — R26.89 IMPAIRED GAIT AND MOBILITY: ICD-10-CM

## 2019-03-25 DIAGNOSIS — M25.572 CHRONIC ANKLE PAIN, BILATERAL: ICD-10-CM

## 2019-03-25 DIAGNOSIS — M25.571 CHRONIC ANKLE PAIN, BILATERAL: ICD-10-CM

## 2019-03-25 DIAGNOSIS — M25.671 DECREASED RANGE OF MOTION OF RIGHT ANKLE: ICD-10-CM

## 2019-03-25 DIAGNOSIS — G89.29 CHRONIC ANKLE PAIN, BILATERAL: ICD-10-CM

## 2019-03-25 PROCEDURE — 97116 GAIT TRAINING THERAPY: CPT | Mod: PN

## 2019-03-25 PROCEDURE — 97110 THERAPEUTIC EXERCISES: CPT | Mod: PN

## 2019-03-25 NOTE — PROGRESS NOTES
"   Physical Therapy Daily Treatment Note     Name: Herman GUERRERO [facility] Number: [id number]    Therapy Diagnosis:   Encounter Diagnoses   Name Primary?    Chronic ankle pain, bilateral     Impaired gait and mobility     Decreased range of motion of right ankle      Physician: Deejay Mitchell MD    Visit Date: 3/25/2019    Physician Orders: PT Eval and Treat: R LE WBAT, advance slowly  Medical Diagnosis: S82.871A (ICD-10-CM) - Pilon fracture of right tibia  Evaluation Date: 2/6/2019  Authorization Period Expiration: 3/15/2019  Plan of Care Certification Period: 2/6/2019 to 5/3/2019  Visit # / Visits authorized: 17/18  FOTO: 7/10     Time In: 9:30 AM   Time Out: 10:15 AM   Total Billable Time: 45 minutes 2TE 1 GT     Precautions: Standard, Fall and Weightbearing    Subjective     Pt reportsl Pt agreeable to PT session. States she has some Left knee (non surgical) soreness with use of axillary crutches but not " bad".  He was compliant with home exercise program.  Response to previous treatment: sorenesss   Functional change: increased weight bearing in R LE with gait using B axillary crutche    Pain: 3/10   Location: R posterior ankle    Objective     Herman received therapeutic exercises for 48 minutes to improve strength, endurance, and ROM including:       Seated (boot doffed): NOT PERFORMED TODAY  BAPs:     L2 4-way x20 R  Heel raise:    3x10 R  Toe raise:    3x10 R    Standing (boot donned):  Single leg stance:   3x10" B; B UE support in // bars with reps on R LE  Steamboats:    GTB 3x10 B, B UE support in // bars; L LE on 4" step for extension reps on R LE  Ankle dorsiflexion mobs on step stool: out of time  Mini forward lunges:   2x10 R, B UE support in // bars  Mini lateral lunges:   2x10 R, B UE support in // bars  Cybex leg press:   (next)>/=8.0 plates double leg, >/=4.0 plates R    Herman received gait training to promote progression of WBAT in walking boot for 10 minutes including:   Level walking for " 100 feet, 50ft using modified 3 point pattern with B axillary crutch; supervision    Home Exercises Provided and Patient Education Provided   Education provided:   - Continue updated HEP including: forward/backward, diagonal, and lateral weight shifts; step through gait pattern; seated heel and toe raises; ankle ABCs and circles; dorsiflexion mobs on step stool  - Ambulate with B axillary crutch using modified 3 point pattern regularly in home and community    Written Home Exercises Provided: No.  Exercises were reviewed and Herman was able to demonstrate them prior to the end of the session.  Herman demonstrated good  understanding of the education provided.     See EMR under Patient Instructions for exercises provided 2/4/2019.     Assessment     Pt completed session with reports of Left Knee soreness, possibly due to overuse/ dependency. Pt performed standing therex with seated rest breaks as needed to recover. No adverse reactions noted post session today. Session focused on standing and weightbearing activities today      Herman is progressing well towards his goals.   Pt prognosis is Fair-Good.   Pt will continue to benefit from skilled outpatient physical therapy to address the deficits listed in the problem list box on initial evaluation, provide pt/family education and to maximize pt's level of independence in the home and community environment.     Pt's spiritual, cultural and educational needs considered and pt agreeable to plan of care and goals.  Anticipated barriers to physical therapy: pain; fear avoidance; sedentary lifestyle    Goals:   Short Term Goals (4 Weeks):   1. Pt will be independent with HEP to supplement PT in improving mobility. - MET  2. Pt will improve R DF AROM to neutral to promote normal gait mechanics - PROGRESSING, NOT MET   3. Pt will walk with least restrictive AD with minimal deficits or pain to improve functional QOL - MET, adjust to single point cane  4. Pt will improve knee AROM  to 0-120 to promote independent LE dressing.  - PROGRESSING, NOT MET   Long Term Goals (12 Weeks):   1. Pt will improve FOTO score to </= 49% limited to decrease perceived limitation with mobility - (PROGRESSING, NOT MET)   2. Pt will improve impaired LE strength to >/= 4+/5 to improve strength for functional tasks. - (PROGRESSING, NOT MET)   3. Pt will improve R ankle AROM to WNL in all planes to promote mobility for return to work. - (PROGRESSING, NOT MET)   4. Pt will walk without AD with minimal gait deficits to promote return to work. - (PROGRESSING, NOT MET)   5. Pt will walk 1/2 mile without pain to promote community ambulation -(PROGRESSING, NOT MET)     Plan     Continue plan of care. Improve ankle dorsiflexion. Increase standing exercises. Gait train with B axillary crutch using modified 3 point pattern.     Rubén Daniel, PTA

## 2019-03-26 ENCOUNTER — DOCUMENTATION ONLY (OUTPATIENT)
Dept: REHABILITATION | Facility: HOSPITAL | Age: 49
End: 2019-03-26

## 2019-03-26 DIAGNOSIS — M25.571 CHRONIC ANKLE PAIN, BILATERAL: ICD-10-CM

## 2019-03-26 DIAGNOSIS — M25.572 CHRONIC ANKLE PAIN, BILATERAL: ICD-10-CM

## 2019-03-26 DIAGNOSIS — R26.89 IMPAIRED GAIT AND MOBILITY: ICD-10-CM

## 2019-03-26 DIAGNOSIS — G89.29 CHRONIC ANKLE PAIN, BILATERAL: ICD-10-CM

## 2019-03-26 DIAGNOSIS — M25.671 DECREASED RANGE OF MOTION OF RIGHT ANKLE: ICD-10-CM

## 2019-03-26 NOTE — PROGRESS NOTES
Face to Face PTA Conference performed with Juanita Mathew PTA, Rubén Daniel PTA regarding patient's current status, overall progress, and plan of care    Chiqui June, PT     Juanita Manzano, PTA    Rubén Daniel, PTA

## 2019-03-29 ENCOUNTER — CLINICAL SUPPORT (OUTPATIENT)
Dept: REHABILITATION | Facility: HOSPITAL | Age: 49
End: 2019-03-29
Payer: OTHER MISCELLANEOUS

## 2019-03-29 DIAGNOSIS — M25.671 DECREASED RANGE OF MOTION OF RIGHT ANKLE: ICD-10-CM

## 2019-03-29 DIAGNOSIS — R26.89 IMPAIRED GAIT AND MOBILITY: ICD-10-CM

## 2019-03-29 DIAGNOSIS — M25.572 CHRONIC ANKLE PAIN, BILATERAL: ICD-10-CM

## 2019-03-29 DIAGNOSIS — M25.571 CHRONIC ANKLE PAIN, BILATERAL: ICD-10-CM

## 2019-03-29 DIAGNOSIS — G89.29 CHRONIC ANKLE PAIN, BILATERAL: ICD-10-CM

## 2019-03-29 PROCEDURE — 97116 GAIT TRAINING THERAPY: CPT | Mod: PN

## 2019-03-29 PROCEDURE — 97110 THERAPEUTIC EXERCISES: CPT | Mod: PN

## 2019-03-29 NOTE — PROGRESS NOTES
Physical Therapy Daily Treatment Note     Name: Herman GUERRERO [facility] Number: [id number]    Therapy Diagnosis:   Encounter Diagnoses   Name Primary?    Chronic ankle pain, bilateral     Impaired gait and mobility     Decreased range of motion of right ankle      Physician: Deejay Mitchell MD    Visit Date: 3/29/2019    Physician Orders: PT Eval and Treat: R LE WBAT, advance slowly  Medical Diagnosis: S82.871A (ICD-10-CM) - Pilon fracture of right tibia  Evaluation Date: 2/6/2019  Authorization Period Expiration: 3/15/2019  Plan of Care Certification Period: 2/6/2019 to 5/3/2019  Visit # / Visits authorized: 18/18  FOTO: 8/10     Time In: 1105  Time Out: 1209  Total Billable Time: 64 minutes      Precautions: Standard, Fall and Weightbearing    Subjective     Pt reports: presents to therapy session with alexandria-walker; reports he walks better with this  He was compliant with home exercise program.  Response to previous treatment: L LE fatigue  Functional change: increased weight bearing in R LE with hemiwalker    Pain: 0/10   Location: R posterior ankle    Objective     Herman received therapeutic exercises for 25 minutes to improve strength, endurance, and ROM including:     Single leg stance: out of time, next  Steamboats: BTB 3x10 B, B UE support in // bars  Forward step ups: next  Lateral step ups: next  Shuttle for increased dorsiflexion ROM: 2.5 cables, 5' double leg; boot off  Nu-Step for increased dorsiflexion ROM: level 2 resistance, 5' c/ B UE and LE use; boot off    Herman received gait training to promote progression of WBAT in walking boot for 34 minutes including:   Level walking in gym for 280 feet with alexandria-walker using 2 point pattern; supervision  Level walking in // bars  laps x 8 feet with quad cane + R UE support (80 feet total); supervision  Level walking in // bars 5 laps x 8 feet with quad cane using 2 point pattern (80 feet total); supervision  Level walking in gym 140 feet with quad cane  using 2 point pattern; contact guard progressing to stand by assistance  **Rest breaks between alexandria-walker use in gym, quad cane use in //, and quad cane use in gym    Home Exercises Provided and Patient Education Provided   Education provided:   - Continue updated HEP including: forward/backward, diagonal, and lateral weight shifts; step through gait pattern; seated heel and toe raises; ankle ABCs and circles; dorsiflexion mobs on step stool  - Ambulate with hem-walker using 2 point pattern regularly in home and community    Written Home Exercises Provided: No.  Exercises were reviewed and Herman was able to demonstrate them prior to the end of the session.  Herman demonstrated good  understanding of the education provided.     See EMR under Patient Instructions for exercises provided 2/4/2019.     Assessment     Significant progression in gait today. Dorsiflexion still impaired however should improve as weightbearing increases and with anticipation of less time devoted to gait and declining fall risk status.     Herman is progressing well towards his goals.   Pt prognosis is Fair-Good.   Pt will continue to benefit from skilled outpatient physical therapy to address the deficits listed in the problem list box on initial evaluation, provide pt/family education and to maximize pt's level of independence in the home and community environment.     Pt's spiritual, cultural and educational needs considered and pt agreeable to plan of care and goals.  Anticipated barriers to physical therapy: pain; fear avoidance; sedentary lifestyle    Goals:   Short Term Goals (4 Weeks):   1. Pt will be independent with HEP to supplement PT in improving mobility. MET  2. Pt will improve R DF AROM to neutral to promote normal gait mechanics PROGRESSING, NOT MET 3/29/2019    3. Pt will walk with single point cane with minimal deficits or pain to improve functional QOL PROGRESSING, NOT MET 3/29/2019   4. Pt will improve knee AROM to 0-120 to  promote independent LE dressing. PROGRESSING, NOT MET 3/29/2019   Long Term Goals (12 Weeks):   1. Pt will improve FOTO score to </= 49% limited to decrease perceived limitation with mobility PROGRESSING, NOT MET 3/29/2019   2. Pt will improve impaired LE strength to >/= 4+/5 to improve strength for functional tasks. PROGRESSING, NOT MET 3/29/2019   3. Pt will improve R ankle AROM to WNL in all planes to promote mobility for return to work. PROGRESSING, NOT MET 3/29/2019   4. Pt will walk without AD with minimal gait deficits to promote return to work. PROGRESSING, NOT MET 3/29/2019   5. Pt will walk 1/2 mile without pain to promote community ambulation. PROGRESSING, NOT MET 3/29/2019     Plan     Continue plan of care. Improve ankle dorsiflexion. Increase standing exercises. Gait train with quad cane using 2 point pattern.     Chiqui June, PT

## 2019-04-02 ENCOUNTER — CLINICAL SUPPORT (OUTPATIENT)
Dept: REHABILITATION | Facility: HOSPITAL | Age: 49
End: 2019-04-02
Payer: OTHER MISCELLANEOUS

## 2019-04-02 DIAGNOSIS — G89.29 CHRONIC ANKLE PAIN, BILATERAL: ICD-10-CM

## 2019-04-02 DIAGNOSIS — M25.671 DECREASED RANGE OF MOTION OF RIGHT ANKLE: ICD-10-CM

## 2019-04-02 DIAGNOSIS — M25.571 CHRONIC ANKLE PAIN, BILATERAL: ICD-10-CM

## 2019-04-02 DIAGNOSIS — R26.89 IMPAIRED GAIT AND MOBILITY: ICD-10-CM

## 2019-04-02 DIAGNOSIS — M25.572 CHRONIC ANKLE PAIN, BILATERAL: ICD-10-CM

## 2019-04-02 PROCEDURE — 97110 THERAPEUTIC EXERCISES: CPT | Mod: PN

## 2019-04-02 NOTE — PROGRESS NOTES
Physical Therapy Daily Treatment Note     Name: Herman GUERRERO [facility] Number: [id number]    Therapy Diagnosis:   Encounter Diagnoses   Name Primary?    Chronic ankle pain, bilateral     Impaired gait and mobility     Decreased range of motion of right ankle      Physician: Deejay Mitchell MD    Visit Date: 4/2/2019    Physician Orders: PT Eval and Treat: R LE WBAT, advance slowly  Medical Diagnosis: S82.871A (ICD-10-CM) - Pilon fracture of right tibia  Evaluation Date: 2/6/2019  Authorization Period Expiration: 3/15/2019  Plan of Care Certification Period: 2/6/2019 to 5/3/2019  Visit # / Visits authorized: 19/18  FOTO: 9/10     Time In:8:50 AM  Time Out:9:30 AM   Total Billable Time: 45 minutes 3TE     Precautions: Standard, Fall and Weightbearing    Subjective     Pt reports: Arrived to session with no reports of pain. Pt agreeable to PT session.   He was compliant with home exercise program.  Response to previous treatment: L LE fatigue  Functional change: increased weight bearing in R LE with hemiwalker    Pain: 0/10   Location: R posterior ankle    Objective     Herman received therapeutic exercises for 25 minutes to improve strength, endurance, and ROM including:     Single leg stance:   out of time, next  Steamboats:   BTB 3x10 B, B UE support in // bars  Forward step ups:   Blue 2x10 B in //  Lateral step ups:   Blue 2x10 B in //  Shuttle for increased dorsiflexion ROM: 2.5 cables, 5' double leg; boot off  Nu-Step for increased dorsiflexion ROM: level 2 resistance, 5' c/ B UE and LE use; boot off    Gait not performed today.     Home Exercises Provided and Patient Education Provided   Education provided:   - Continue updated HEP including: forward/backward, diagonal, and lateral weight shifts; step through gait pattern; seated heel and toe raises; ankle ABCs and circles; dorsiflexion mobs on step stool  - Ambulate with hem-walker using 2 point pattern regularly in home and community    Written Home  "Exercises Provided: No.  Exercises were reviewed and Herman was able to demonstrate them prior to the end of the session.  Herman demonstrated good  understanding of the education provided.     See EMR under Patient Instructions for exercises provided 2/4/2019.     Assessment     Pt tolerated session with no reports of increased pain. He did express feeling "more loose" in R ankle than previous sessions.     Herman is progressing well towards his goals.   Pt prognosis is Fair-Good.   Pt will continue to benefit from skilled outpatient physical therapy to address the deficits listed in the problem list box on initial evaluation, provide pt/family education and to maximize pt's level of independence in the home and community environment.     Pt's spiritual, cultural and educational needs considered and pt agreeable to plan of care and goals.  Anticipated barriers to physical therapy: pain; fear avoidance; sedentary lifestyle    Goals:   Short Term Goals (4 Weeks):   1. Pt will be independent with HEP to supplement PT in improving mobility. MET  2. Pt will improve R DF AROM to neutral to promote normal gait mechanics PROGRESSING, NOT MET 4/2/2019    3. Pt will walk with single point cane with minimal deficits or pain to improve functional QOL PROGRESSING, NOT MET 4/2/2019   4. Pt will improve knee AROM to 0-120 to promote independent LE dressing. PROGRESSING, NOT MET 4/2/2019   Long Term Goals (12 Weeks):   1. Pt will improve FOTO score to </= 49% limited to decrease perceived limitation with mobility PROGRESSING, NOT MET 4/2/2019   2. Pt will improve impaired LE strength to >/= 4+/5 to improve strength for functional tasks. PROGRESSING, NOT MET 4/2/2019   3. Pt will improve R ankle AROM to WNL in all planes to promote mobility for return to work. PROGRESSING, NOT MET 4/2/2019   4. Pt will walk without AD with minimal gait deficits to promote return to work. PROGRESSING, NOT MET 4/2/2019   5. Pt will walk 1/2 mile without " pain to promote community ambulation. PROGRESSING, NOT MET 4/2/2019     Plan     Cont PT as per POC. Advance standing and weight bearing as appropriate    Rubén Daniel, AIDAN

## 2019-04-05 ENCOUNTER — CLINICAL SUPPORT (OUTPATIENT)
Dept: REHABILITATION | Facility: HOSPITAL | Age: 49
End: 2019-04-05
Attending: ORTHOPAEDIC SURGERY
Payer: OTHER MISCELLANEOUS

## 2019-04-05 DIAGNOSIS — R26.89 IMPAIRED GAIT AND MOBILITY: ICD-10-CM

## 2019-04-05 DIAGNOSIS — M25.572 CHRONIC ANKLE PAIN, BILATERAL: ICD-10-CM

## 2019-04-05 DIAGNOSIS — M25.671 DECREASED RANGE OF MOTION OF RIGHT ANKLE: ICD-10-CM

## 2019-04-05 DIAGNOSIS — G89.29 CHRONIC ANKLE PAIN, BILATERAL: ICD-10-CM

## 2019-04-05 DIAGNOSIS — M25.571 CHRONIC ANKLE PAIN, BILATERAL: ICD-10-CM

## 2019-04-05 PROCEDURE — 97110 THERAPEUTIC EXERCISES: CPT | Mod: PN

## 2019-04-05 NOTE — PROGRESS NOTES
"  Physical Therapy Daily Treatment Note     Name: Herman GUERRERO [facility] Number: [id number]    Therapy Diagnosis:   Encounter Diagnoses   Name Primary?    Chronic ankle pain, bilateral     Impaired gait and mobility     Decreased range of motion of right ankle      Physician: Deejay Mitchell MD    Visit Date: 4/5/2019    Physician Orders: PT Eval and Treat: R LE WBAT, advance slowly  Medical Diagnosis: S82.871A (ICD-10-CM) - Pilon fracture of right tibia  Evaluation Date: 2/6/2019  Authorization Period Expiration: 3/15/2019  Plan of Care Certification Period: 2/6/2019 to 5/3/2019  Visit # / Visits authorized: 20/30  FOTO: at DC  PTA visit: 2/6     Time In:0905  Time Out:1000  Total Billable Time: 30 minutes (2TE)     Precautions: Standard, Fall and Weightbearing      Subjective     Pt reports: no new c/o.  He was compliant with home exercise program.  Response to previous treatment: no adverse reaction  Functional change: none    Pain: 0/10  Location: right ankles     Objective       Herman received therapeutic exercises to develop strength, endurance and ROM for 55 minutes including:      Single leg stance:                   5x15" B w/1 UE support  Steamboats:                          BTB 3x10 B, B UE support in // bars  Forward step ups:                 Blue 2x10 B in //  Lateral step ups:                   Blue 2x10 B in //  Shuttle for increased dorsiflexion ROM: 2.5 cables, 5' double leg; boot off  Nu-Step for increased dorsiflexion ROM: level 2 resistance, 5' c/ B UE and LE use; boot off    Home Exercises Provided and Patient Education Provided     Education provided:   - cont HEP regularly and increase activity level at home    Written Home Exercises Provided: Patient instructed to cont prior HEP.  Exercises were reviewed and Herman was able to demonstrate them prior to the end of the session.  Herman demonstrated good  understanding of the education provided.     See EMR under Patient Instructions for " exercises provided 2/15/19 and 3/4/19.      Assessment     Pt performed therapy interventions without difficulties or c/o increased pain.  Some fatigue upon completion  Hreman is progressing well towards his goals.   Pt prognosis is Good.     Pt will continue to benefit from skilled outpatient physical therapy to address the deficits listed in the problem list box on initial evaluation, provide pt/family education and to maximize pt's level of independence in the home and community environment.     Pt's spiritual, cultural and educational needs considered and pt agreeable to plan of care and goals.    Anticipated barriers to physical therapy: pain; fear avoidance; sedentary lifestyle    Goals:   Short Term Goals (4 Weeks):   1. Pt will be independent with HEP to supplement PT in improving mobility. MET  2. Pt will improve R DF AROM to neutral to promote normal gait mechanics PROGRESSING, NOT MET 4/2/2019    3. Pt will walk with single point cane with minimal deficits or pain to improve functional QOL PROGRESSING, NOT MET 4/2/2019   4. Pt will improve knee AROM to 0-120 to promote independent LE dressing. PROGRESSING, NOT MET 4/2/2019   Long Term Goals (12 Weeks):   1. Pt will improve FOTO score to </= 49% limited to decrease perceived limitation with mobility PROGRESSING, NOT MET 4/2/2019   2. Pt will improve impaired LE strength to >/= 4+/5 to improve strength for functional tasks. PROGRESSING, NOT MET 4/2/2019   3. Pt will improve R ankle AROM to WNL in all planes to promote mobility for return to work. PROGRESSING, NOT MET 4/2/2019   4. Pt will walk without AD with minimal gait deficits to promote return to work. PROGRESSING, NOT MET 4/2/2019   5. Pt will walk 1/2 mile without pain to promote community ambulation. PROGRESSING, NOT MET 4/2/2019       Plan     Cont POC to progress towards established goals    Juanita Manzano, AIDAN

## 2019-04-08 ENCOUNTER — CLINICAL SUPPORT (OUTPATIENT)
Dept: REHABILITATION | Facility: HOSPITAL | Age: 49
End: 2019-04-08
Attending: ORTHOPAEDIC SURGERY
Payer: OTHER MISCELLANEOUS

## 2019-04-08 DIAGNOSIS — R26.89 IMPAIRED GAIT AND MOBILITY: ICD-10-CM

## 2019-04-08 DIAGNOSIS — G89.29 CHRONIC ANKLE PAIN, BILATERAL: ICD-10-CM

## 2019-04-08 DIAGNOSIS — M25.571 CHRONIC ANKLE PAIN, BILATERAL: ICD-10-CM

## 2019-04-08 DIAGNOSIS — M25.572 CHRONIC ANKLE PAIN, BILATERAL: ICD-10-CM

## 2019-04-08 DIAGNOSIS — M25.671 DECREASED RANGE OF MOTION OF RIGHT ANKLE: ICD-10-CM

## 2019-04-08 PROCEDURE — 97110 THERAPEUTIC EXERCISES: CPT | Mod: PN

## 2019-04-08 PROCEDURE — 97116 GAIT TRAINING THERAPY: CPT | Mod: PN

## 2019-04-08 NOTE — PROGRESS NOTES
"  Physical Therapy Daily Treatment Note     Name: Herman GUERRERO [facility] Number: [id number]    Therapy Diagnosis:   Encounter Diagnoses   Name Primary?    Chronic ankle pain, bilateral     Impaired gait and mobility     Decreased range of motion of right ankle      Physician: Deejay Mitchell MD    Visit Date: 4/8/2019    Physician Orders: PT Eval and Treat: R LE WBAT, advance slowly  Medical Diagnosis: S82.871A (ICD-10-CM) - Pilon fracture of right tibia  Evaluation Date: 2/6/2019  Authorization Period Expiration: 3/15/2019  Plan of Care Certification Period: 2/6/2019 to 5/3/2019  Visit # / Visits authorized: 20/30  FOTO: at discharge     Time In: 1006  Time Out: 1105  Total Billable Time: 33 minutes      Precautions: Standard, Fall and Weightbearing    Subjective     Pt reports: sees surgeon 4/17/2019. Descends stairs sideways.  He was compliant with home exercise program.  Response to previous treatment: decreased pain;  Functional change: improved walking with alexandria-walker; walks household distances with walker suspended. Improved quality and tolerance to stair ascent    Pain: 0/10  Location: R ankle    Objective     Herman received therapeutic exercises to develop strength, endurance and ROM for 14 minutes 1:1 and 25 minutes supervised including:    Boot on  Single leg stance: 2x30" B c/ bilateral to unilateral UE support  Forward step ups: 6" step x30 R; next without boot  Lateral step ups: 6" step x 30 R; next without boot  Lateral step downs: 4" step 2x10 R  Boot off  Shuttle for increased dorsiflexion mobility and ROM: 2.0 cables, 10' double leg  Nu-Step for increased dorsiflexion mobility and ROM: level 2 resistance, 5' c/ B UE and LE use    Herman received gait training to promote progression of WBAT in walking boot for 19 minutes including:   Level walking in gym 140 feet with quad cane using 2 point pattern; supervision  Level walking in gym for 140 feet without AD using continuous pattern; stand by " "assistance and verbal cues for increased weight bearing and stance time on R LE  Unlevel walking for 300 feet with quad cane using 2 point patten including: walking on slanted concrete and brick, ascending 6" curb x 3 trials and descending 6" curb x 2 trials; stand by assistance to supervision.    Home Exercises Provided and Patient Education Provided     Education provided:   - Continue HEP regularly  - Continue wearing boot in weight bearing; wear boot and bring tennis shoe to session Friday  - Consider purchasing quad cane for gait at home and community    Written Home Exercises Provided: No.  Exercises were reviewed and Herman was able to demonstrate them prior to the end of the session.  Herman demonstrated good  understanding of the education provided.     See EMR under Patient Instructions for exercises provided 2/15/19 and 3/4/19.    Assessment     Gait quality improving with quad cane; decreased stance time and weight bearing on R LE when performing gait without AD. Would benefit from full transition to quad cane before discharge of AD during gait. Lack of dorsiflexion range impairs performance of forward step downs, resulting in adjustment to lateral; mobility improves over time during shuttle.    Herman is progressing well towards his goals.   Pt prognosis is Good.   Pt will continue to benefit from skilled outpatient physical therapy to address the deficits listed in the problem list box on initial evaluation, provide pt/family education and to maximize pt's level of independence in the home and community environment.     Pt's spiritual, cultural and educational needs considered and pt agreeable to plan of care and goals.  Anticipated barriers to physical therapy: pain; fear avoidance; sedentary lifestyle    Goals:   Short Term Goals (4 Weeks):   1. Pt will be independent with HEP to supplement PT in improving mobility. MET  2. Pt will improve R DF AROM to neutral to promote normal gait mechanics " PROGRESSING, NOT MET 4/8/2019   3. Pt will walk with single point cane with minimal deficits or pain to improve functional QOL PROGRESSING, NOT MET 4/8/2019   4. Pt will improve knee AROM to 0-120 to promote independent LE dressing. PROGRESSING, NOT MET 4/8/2019   Long Term Goals (12 Weeks):   1. Pt will improve FOTO score to </= 49% limited to decrease perceived limitation with mobility PROGRESSING, NOT MET 4/8/2019   2. Pt will improve impaired LE strength to >/= 4+/5 to improve strength for functional tasks. PROGRESSING, NOT MET 4/8/2019   3. Pt will improve R ankle AROM to WNL in all planes to promote mobility for return to work. PROGRESSING, NOT MET 4/8/2019   4. Pt will walk without AD with minimal gait deficits to promote return to work. PROGRESSING, NOT MET 4/8/2019   5. Pt will walk 1/2 mile without pain to promote community ambulation. PROGRESSING, NOT MET 4/8/2019     Plan     Continue plan of care. Continue gait training. Progress standing strengthening.    Chiqui June, PT

## 2019-04-11 NOTE — PROGRESS NOTES
"  Physical Therapy Daily Treatment Note     Name: Herman GUERRERO [facility] Number: [id number]    Therapy Diagnosis:   No diagnosis found.  Physician: Deejay Mitchell MD    Visit Date: 4/12/2019    Physician Orders: PT Eval and Treat: R LE WBAT, advance slowly  Medical Diagnosis: S82.871A (ICD-10-CM) - Pilon fracture of right tibia  Evaluation Date: 2/6/2019  Authorization Period Expiration: 3/15/2019  Plan of Care Certification Period: 2/6/2019 to 5/3/2019  Visit # / Visits authorized: 20/30  FOTO: at discharge     Time In: 1006  Time Out: 1105  Total Billable Time: 33 minutes      Precautions: Standard, Fall and Weightbearing    Subjective     Pt reports: sees surgeon 4/17/2019. Descends stairs sideways.  He was compliant with home exercise program.  Response to previous treatment: decreased pain;  Functional change: improved walking with alexandria-walker; walks household distances with walker suspended. Improved quality and tolerance to stair ascent    Pain: 0/10  Location: R ankle    Objective     Herman received therapeutic exercises to develop strength, endurance and ROM for 14 minutes 1:1 and 25 minutes supervised including:    Boot on  Single leg stance: 2x30" B c/ bilateral to unilateral UE support  Forward step ups: 6" step x30 R; next without boot  Lateral step ups: 6" step x 30 R; next without boot  Lateral step downs: 4" step 2x10 R  Boot off  Shuttle for increased dorsiflexion mobility and ROM: 2.0 cables, 10' double leg  Nu-Step for increased dorsiflexion mobility and ROM: level 2 resistance, 5' c/ B UE and LE use    Herman received gait training to promote progression of WBAT in walking boot for 19 minutes including:   Level walking in gym 140 feet with quad cane using 2 point pattern; supervision  Level walking in gym for 140 feet without AD using continuous pattern; stand by assistance and verbal cues for increased weight bearing and stance time on R LE  Unlevel walking for 300 feet with quad cane using 2 " "point patten including: walking on slanted concrete and brick, ascending 6" curb x 3 trials and descending 6" curb x 2 trials; stand by assistance to supervision.    Home Exercises Provided and Patient Education Provided     Education provided:   - Continue HEP regularly  - Continue wearing boot in weight bearing; wear boot and bring tennis shoe to session Friday  - Consider purchasing quad cane for gait at home and community    Written Home Exercises Provided: No.  Exercises were reviewed and Herman was able to demonstrate them prior to the end of the session.  Herman demonstrated good  understanding of the education provided.     See EMR under Patient Instructions for exercises provided 2/15/19 and 3/4/19.    Assessment     Gait quality improving with quad cane; decreased stance time and weight bearing on R LE when performing gait without AD. Would benefit from full transition to quad cane before discharge of AD during gait. Lack of dorsiflexion range impairs performance of forward step downs, resulting in adjustment to lateral; mobility improves over time during shuttle.    Herman is progressing well towards his goals.   Pt prognosis is Good.   Pt will continue to benefit from skilled outpatient physical therapy to address the deficits listed in the problem list box on initial evaluation, provide pt/family education and to maximize pt's level of independence in the home and community environment.     Pt's spiritual, cultural and educational needs considered and pt agreeable to plan of care and goals.  Anticipated barriers to physical therapy: pain; fear avoidance; sedentary lifestyle    Goals:   Short Term Goals (4 Weeks):   1. Pt will be independent with HEP to supplement PT in improving mobility. MET  2. Pt will improve R DF AROM to neutral to promote normal gait mechanics PROGRESSING, NOT MET 4/12/2019   3. Pt will walk with single point cane with minimal deficits or pain to improve functional QOL PROGRESSING, " NOT MET 4/12/2019   4. Pt will improve knee AROM to 0-120 to promote independent LE dressing. PROGRESSING, NOT MET 4/12/2019   Long Term Goals (12 Weeks):   1. Pt will improve FOTO score to </= 49% limited to decrease perceived limitation with mobility PROGRESSING, NOT MET 4/12/2019   2. Pt will improve impaired LE strength to >/= 4+/5 to improve strength for functional tasks. PROGRESSING, NOT MET 4/12/2019   3. Pt will improve R ankle AROM to WNL in all planes to promote mobility for return to work. PROGRESSING, NOT MET 4/12/2019   4. Pt will walk without AD with minimal gait deficits to promote return to work. PROGRESSING, NOT MET 4/12/2019   5. Pt will walk 1/2 mile without pain to promote community ambulation. PROGRESSING, NOT MET 4/12/2019     Plan     Continue plan of care. Continue gait training. Progress standing strengthening.    Stephanie Orosco, PTA

## 2019-04-12 ENCOUNTER — CLINICAL SUPPORT (OUTPATIENT)
Dept: REHABILITATION | Facility: HOSPITAL | Age: 49
End: 2019-04-12
Payer: OTHER MISCELLANEOUS

## 2019-04-12 DIAGNOSIS — M25.571 CHRONIC ANKLE PAIN, BILATERAL: ICD-10-CM

## 2019-04-12 DIAGNOSIS — G89.29 CHRONIC ANKLE PAIN, BILATERAL: ICD-10-CM

## 2019-04-12 DIAGNOSIS — M25.572 CHRONIC ANKLE PAIN, BILATERAL: ICD-10-CM

## 2019-04-12 DIAGNOSIS — R26.89 IMPAIRED GAIT AND MOBILITY: ICD-10-CM

## 2019-04-12 DIAGNOSIS — M25.671 DECREASED RANGE OF MOTION OF RIGHT ANKLE: ICD-10-CM

## 2019-04-12 PROCEDURE — 97110 THERAPEUTIC EXERCISES: CPT | Mod: PN

## 2019-04-12 PROCEDURE — 97140 MANUAL THERAPY 1/> REGIONS: CPT | Mod: PN

## 2019-04-12 NOTE — PROGRESS NOTES
Physical Therapy Daily Treatment Note     Name: Herman GUERRERO [facility] Number: [id number]    Therapy Diagnosis:   No diagnosis found.  Physician: Deejay Mitchell MD    Visit Date: 4/12/2019    Physician Orders: PT Eval and Treat: R LUMA WBAT, advance slowly  Medical Diagnosis: S82.871A (ICD-10-CM) - Pilon fracture of right tibia  Evaluation Date: 2/6/2019  Authorization Period Expiration: 3/15/2019  Plan of Care Certification Period: 2/6/2019 to 5/3/2019  Visit # / Visits authorized: 21/30  FOTO: at discharge     Time In: 0920  Time Out: 1000  Total Billable Time: 40 minutes      Precautions: Standard, Fall and Weightbearing    Subjective     Pt reports: late to session due to having to drive himself. Patient forgot to bring shoe for exercise today. Follow up visit with surgeon next Wednesday.  He was compliant with home exercise program.  Response to previous treatment: decreased pain  Functional change: consistent with last visit.     Pain: 0/10  Location: R ankle    Objective     Herman received therapeutic exercises to develop strength, endurance and ROM for 15 minutes including:    Boot on  Single leg stance: out of time, resume next  Forward step ups: out of time, resume next  Lateral step ups: out of time, resume next  Lateral step downs: out of time, resume next  Boot off  Shuttle for increased dorsiflexion mobility and ROM: 2.5 cables, 10' double leg   Nu-Step for increased dorsiflexion mobility and ROM: level 4 resistance, 5' c/ B UE and LE use    Herman received the following manual therapy techniques: were applied to the: R ankle for 25 minutes, including:  Grade III anterior to posterior and posterior to anterior talar mobs with tibia and fibula stabilized  Grade III passive physiological dorsiflexion and plantar flexion  Grade III-IV medial and lateral calcaneal mobilizations with talus stabilized  Grade III-IV passive physiological inversion and eversion     Home Exercises Provided and Patient  Education Provided     Education provided:   - Continue HEP regularly  - Continue wearing boot in weight bearing; wear boot and bring tennis shoe to session Tuesday  - Consider purchasing quad cane for gait at home and community    Written Home Exercises Provided: No.  Exercises were reviewed and Herman was able to demonstrate them prior to the end of the session.  Herman demonstrated good  understanding of the education provided.     See EMR under Patient Instructions for exercises provided 2/15/19 and 3/4/19.    Assessment     Session limited due late arrival. Capsular mobility of talocrural joint limited; unsure if hardware is a factor, duration of boot wear and sedentary lifestyle both factors. As a result plantar and dorsiflexion range limited. Continuation of therapy depends on follow-up visit with surgeon next week, especially progression of weightbearing status out of boot.    Herman is progressing well towards his goals.   Pt prognosis is Good.   Pt will continue to benefit from skilled outpatient physical therapy to address the deficits listed in the problem list box on initial evaluation, provide pt/family education and to maximize pt's level of independence in the home and community environment.     Pt's spiritual, cultural and educational needs considered and pt agreeable to plan of care and goals.  Anticipated barriers to physical therapy: pain; fear avoidance; sedentary lifestyle    Goals:   Short Term Goals (4 Weeks):   1. Pt will be independent with HEP to supplement PT in improving mobility. MET  2. Pt will improve R DF AROM to neutral to promote normal gait mechanics PROGRESSING, NOT MET 4/12/2019   3. Pt will walk with single point cane with minimal deficits or pain to improve functional QOL PROGRESSING, NOT MET 4/12/2019   4. Pt will improve knee AROM to 0-120 to promote independent LE dressing. PROGRESSING, NOT MET 4/12/2019   Long Term Goals (12 Weeks):   1. Pt will improve FOTO score to </= 49%  limited to decrease perceived limitation with mobility PROGRESSING, NOT MET 4/12/2019   2. Pt will improve impaired LE strength to >/= 4+/5 to improve strength for functional tasks. PROGRESSING, NOT MET 4/12/2019   3. Pt will improve R ankle AROM to WNL in all planes to promote mobility for return to work. PROGRESSING, NOT MET 4/12/2019   4. Pt will walk without AD with minimal gait deficits to promote return to work. PROGRESSING, NOT MET 4/12/2019   5. Pt will walk 1/2 mile without pain to promote community ambulation. PROGRESSING, NOT MET 4/12/2019     Plan     Continue plan of care. Continue mobs and ankle range.    Chiqui June, PT

## 2019-04-16 ENCOUNTER — CLINICAL SUPPORT (OUTPATIENT)
Dept: REHABILITATION | Facility: HOSPITAL | Age: 49
End: 2019-04-16
Attending: ORTHOPAEDIC SURGERY
Payer: OTHER MISCELLANEOUS

## 2019-04-16 DIAGNOSIS — M25.572 CHRONIC ANKLE PAIN, BILATERAL: ICD-10-CM

## 2019-04-16 DIAGNOSIS — M25.671 DECREASED RANGE OF MOTION OF RIGHT ANKLE: ICD-10-CM

## 2019-04-16 DIAGNOSIS — R26.89 IMPAIRED GAIT AND MOBILITY: ICD-10-CM

## 2019-04-16 DIAGNOSIS — G89.29 CHRONIC ANKLE PAIN, BILATERAL: ICD-10-CM

## 2019-04-16 DIAGNOSIS — M25.571 CHRONIC ANKLE PAIN, BILATERAL: ICD-10-CM

## 2019-04-16 PROCEDURE — 97110 THERAPEUTIC EXERCISES: CPT | Mod: PN

## 2019-04-16 PROCEDURE — 97140 MANUAL THERAPY 1/> REGIONS: CPT | Mod: PN

## 2019-04-16 NOTE — PROGRESS NOTES
"  Physical Therapy Daily Treatment Note     Name: Herman GUERRERO [facility] Number: [id number]    Therapy Diagnosis:   Encounter Diagnoses   Name Primary?    Chronic ankle pain, bilateral     Impaired gait and mobility     Decreased range of motion of right ankle      Physician: Deejay Mitchell MD    Visit Date: 4/16/2019    Physician Orders: PT Eval and Treat: R LE WBAT, advance slowly  Medical Diagnosis: S82.871A (ICD-10-CM) - Pilon fracture of right tibia  Evaluation Date: 2/6/2019  Authorization Period Expiration: 3/15/2019  Plan of Care Certification Period: 2/6/2019 to 5/3/2019  Visit # / Visits authorized: 22/30  FOTO: at discharge  PTA visit: 1/6     Time In: 1000  Time Out: 1100  Total Billable Time: 30 minutes  (1 TE, 1 MT)     Precautions: Standard, Fall and Weightbearing      Subjective     Pt reports: he has follow up appt with surgeon tomorrow.  Did not bring shoe today as he relays that swelling prevents him from being able to put it on.  Relays had increased swelling yesterday.  He was compliant with home exercise program.  Response to previous treatment: no adverse reaction  Functional change: none    Pain: 0/10  Location: right ankles     Objective     Herman received the following manual therapy techniques: Joint mobilizations and PROM were applied to the: R ankle for 15 minutes, including:    Grade III anterior to posterior and posterior to anterior talar mobs with tibia and fibula stabilized  Grade III passive physiological dorsiflexion and plantar flexion  Grade III medial and lateral calcaneal mobilizations with talus stabilized  Grade III passive physiological inversion and eversion     Herman received therapeutic exercises to develop strength, ROM and flexibility for 45 minutes including:    Boot on  Single leg stance: 2x30" B c/ bilateral to unilateral UE support  Forward step ups: 6" step x30 R; next without boot  Lateral step ups: 6" step x 30 R; next without boot  Lateral step downs: 4" " step 2x10 R  Boot off  Shuttle for increased dorsiflexion mobility and ROM: 2.5 cables, 10' double leg   Nu-Step for increased dorsiflexion mobility and ROM: level 4 resistance, 5' c/ B UE and LE use    Home Exercises Provided and Patient Education Provided     Education provided:   - cont HEP regualrly, call PT Chiqui after follow up appt tomorrow    Written Home Exercises Provided: Patient instructed to cont prior HEP.  Exercises were reviewed and Herman was able to demonstrate them prior to the end of the session.  Herman demonstrated good  understanding of the education provided.     See EMR under Patient Instructions for exercises provided 2/15/19.      Assessment     Pt tolerates therapy activities without difficulties or c/o increased pain.  Pt continues to have limited ROM in L ankle  Herman is progressing well towards his goals.   Pt prognosis is Good.     Pt will continue to benefit from skilled outpatient physical therapy to address the deficits listed in the problem list box on initial evaluation, provide pt/family education and to maximize pt's level of independence in the home and community environment.     Pt's spiritual, cultural and educational needs considered and pt agreeable to plan of care and goals.    Anticipated barriers to physical therapy: pain; fear avoidance; sedentary lifestyle    Goals:   Short Term Goals (4 Weeks):   1. Pt will be independent with HEP to supplement PT in improving mobility. MET  2. Pt will improve R DF AROM to neutral to promote normal gait mechanics PROGRESSING, NOT MET 4/12/2019   3. Pt will walk with single point cane with minimal deficits or pain to improve functional QOL PROGRESSING, NOT MET 4/12/2019   4. Pt will improve knee AROM to 0-120 to promote independent LE dressing. PROGRESSING, NOT MET 4/12/2019   Long Term Goals (12 Weeks):   1. Pt will improve FOTO score to </= 49% limited to decrease perceived limitation with mobility PROGRESSING, NOT MET 4/12/2019    2. Pt will improve impaired LE strength to >/= 4+/5 to improve strength for functional tasks. PROGRESSING, NOT MET 4/12/2019   3. Pt will improve R ankle AROM to WNL in all planes to promote mobility for return to work. PROGRESSING, NOT MET 4/12/2019   4. Pt will walk without AD with minimal gait deficits to promote return to work. PROGRESSING, NOT MET 4/12/2019   5. Pt will walk 1/2 mile without pain to promote community ambulation. PROGRESSING, NOT MET 4/12/2019       Plan     Cont POC to progress towards established goals    Juanita Manzano, PTA

## 2019-04-18 ENCOUNTER — CLINICAL SUPPORT (OUTPATIENT)
Dept: REHABILITATION | Facility: HOSPITAL | Age: 49
End: 2019-04-18
Attending: ORTHOPAEDIC SURGERY
Payer: OTHER MISCELLANEOUS

## 2019-04-18 DIAGNOSIS — G89.29 CHRONIC ANKLE PAIN, BILATERAL: ICD-10-CM

## 2019-04-18 DIAGNOSIS — M25.571 CHRONIC ANKLE PAIN, BILATERAL: ICD-10-CM

## 2019-04-18 DIAGNOSIS — M25.671 DECREASED RANGE OF MOTION OF RIGHT ANKLE: ICD-10-CM

## 2019-04-18 DIAGNOSIS — R26.89 IMPAIRED GAIT AND MOBILITY: ICD-10-CM

## 2019-04-18 DIAGNOSIS — M25.572 CHRONIC ANKLE PAIN, BILATERAL: ICD-10-CM

## 2019-04-18 PROCEDURE — 97110 THERAPEUTIC EXERCISES: CPT | Mod: PN

## 2019-04-18 PROCEDURE — 97140 MANUAL THERAPY 1/> REGIONS: CPT | Mod: PN

## 2019-04-18 NOTE — PROGRESS NOTES
"  Physical Therapy Daily Treatment Note     Name: Herman GUERRERO [facility] Number: [id number]    Therapy Diagnosis:   No diagnosis found.  Physician: Deejay Mitchell MD    Visit Date: 4/18/2019    Physician Orders: PT Eval and Treat: R LE WBAT, advance slowly  Medical Diagnosis: S82.871A (ICD-10-CM) - Pilon fracture of right tibia  Evaluation Date: 2/6/2019  Authorization Period Expiration: 3/15/2019  Plan of Care Certification Period: 2/6/2019 to 5/3/2019  Visit # / Visits authorized: 23/30  FOTO: at discharge  PTA visit: 2/6     Time In: 11:00  Time Out: 11:55  Total Billable Time: 55 minutes  (3 TE, 1 MT)     Precautions: Standard, Fall and Weightbearing      Subjective     Pt reports: he had follow up appt with surgeon who stated he may need another surgery to gain more ROM. He has another follow up appointment in 3 months.   He was compliant with home exercise program.  Response to previous treatment: no adverse reaction  Functional change: none    Pain: 0/10  Location: right ankles     Objective     Herman received the following manual therapy techniques: Joint mobilizations and PROM were applied to the: R ankle for 15 minutes, including:    Grade III anterior to posterior and posterior to anterior talar mobs with tibia and fibula stabilized  Grade III passive physiological dorsiflexion and plantar flexion  Grade III medial and lateral calcaneal mobilizations with talus stabilized  Grade III passive physiological inversion and eversion     Herman received therapeutic exercises to develop strength, ROM and flexibility for 45 minutes including:    Boot on  Single leg stance: 2x30" B c/  unilateral UE support  Forward step ups: 6" step x30 R; next without boot  Lateral step ups: 6" step x 30 R; next without boot  Lateral step downs: 4" step 2x10 R  Boot off  Shuttle for increased dorsiflexion mobility and ROM: 2.5 cables, 10' double leg   Nu-Step for increased dorsiflexion mobility and ROM: level 4 resistance, 5' " c/ B UE and LE use    Home Exercises Provided and Patient Education Provided     Education provided:   - cont HEP regualrly, call PT Chiqui after follow up appt tomorrow    Written Home Exercises Provided: Patient instructed to cont prior HEP.  Exercises were reviewed and Herman was able to demonstrate them prior to the end of the session.  Herman demonstrated good  understanding of the education provided.     See EMR under Patient Instructions for exercises provided 2/15/19.      Assessment     Pt tolerates therapy activities without difficulties or c/o increased pain.  Pt continues to have limited ROM in L ankle  Herman is progressing well towards his goals.   Pt prognosis is Good.     Pt will continue to benefit from skilled outpatient physical therapy to address the deficits listed in the problem list box on initial evaluation, provide pt/family education and to maximize pt's level of independence in the home and community environment.     Pt's spiritual, cultural and educational needs considered and pt agreeable to plan of care and goals.    Anticipated barriers to physical therapy: pain; fear avoidance; sedentary lifestyle    Goals:   Short Term Goals (4 Weeks):   1. Pt will be independent with HEP to supplement PT in improving mobility. MET  2. Pt will improve R DF AROM to neutral to promote normal gait mechanics PROGRESSING, NOT MET 4/12/2019   3. Pt will walk with single point cane with minimal deficits or pain to improve functional QOL PROGRESSING, NOT MET 4/12/2019   4. Pt will improve knee AROM to 0-120 to promote independent LE dressing. PROGRESSING, NOT MET 4/12/2019   Long Term Goals (12 Weeks):   1. Pt will improve FOTO score to </= 49% limited to decrease perceived limitation with mobility PROGRESSING, NOT MET 4/12/2019   2. Pt will improve impaired LE strength to >/= 4+/5 to improve strength for functional tasks. PROGRESSING, NOT MET 4/12/2019   3. Pt will improve R ankle AROM to WNL in all planes  to promote mobility for return to work. PROGRESSING, NOT MET 4/12/2019   4. Pt will walk without AD with minimal gait deficits to promote return to work. PROGRESSING, NOT MET 4/12/2019   5. Pt will walk 1/2 mile without pain to promote community ambulation. PROGRESSING, NOT MET 4/12/2019       Plan     Cont POC to progress towards established goals    Jc Sol, PTA

## 2019-04-23 ENCOUNTER — CLINICAL SUPPORT (OUTPATIENT)
Dept: REHABILITATION | Facility: HOSPITAL | Age: 49
End: 2019-04-23
Payer: OTHER MISCELLANEOUS

## 2019-04-23 DIAGNOSIS — R26.89 IMPAIRED GAIT AND MOBILITY: ICD-10-CM

## 2019-04-23 DIAGNOSIS — M25.572 CHRONIC ANKLE PAIN, BILATERAL: ICD-10-CM

## 2019-04-23 DIAGNOSIS — G89.29 CHRONIC ANKLE PAIN, BILATERAL: ICD-10-CM

## 2019-04-23 DIAGNOSIS — M25.671 DECREASED RANGE OF MOTION OF RIGHT ANKLE: ICD-10-CM

## 2019-04-23 DIAGNOSIS — M25.571 CHRONIC ANKLE PAIN, BILATERAL: ICD-10-CM

## 2019-04-23 PROCEDURE — 97110 THERAPEUTIC EXERCISES: CPT | Mod: PN

## 2019-04-23 PROCEDURE — 97140 MANUAL THERAPY 1/> REGIONS: CPT | Mod: PN

## 2019-04-23 NOTE — PROGRESS NOTES
"  Physical Therapy Daily Treatment Note     Name: Herman GUERRERO [facility] Number: [id number]    Therapy Diagnosis:   Encounter Diagnoses   Name Primary?    Chronic ankle pain, bilateral     Impaired gait and mobility     Decreased range of motion of right ankle      Physician: Deejya Mitchell MD    Visit Date: 4/23/2019    Physician Orders: PT Eval and Treat: R LE WBAT, advance slowly  Medical Diagnosis: S82.871A (ICD-10-CM) - Pilon fracture of right tibia  Evaluation Date: 2/6/2019  Authorization Period Expiration: 3/15/2019  Plan of Care Certification Period: 2/6/2019 to 5/3/2019  Visit # / Visits authorized: 24/30  FOTO: at discharge  PTA visit: 3/6     Time In: 11:00  Time Out: 12:00  Total Billable Time: 30 minutes  (1TE, 1 MT)     Precautions: Standard, Fall and Weightbearing      Subjective     Pt reports: he  decided to have the qureshi ware removed and get the ankle replacement. He is scheduled for a follow up appointment with orthopedic surgeon May 15th  Response to previous treatment: no adverse reaction  Functional change: none    Pain: 0/10  Location: right ankles     Objective     Herman received the following manual therapy techniques: Joint mobilizations and PROM were applied to the: R ankle for 15 minutes, including:    Grade III anterior to posterior and posterior to anterior talar mobs with tibia and fibula stabilized  Grade III passive physiological dorsiflexion and plantar flexion  Grade III medial and lateral calcaneal mobilizations with talus stabilized  Grade III passive physiological inversion and eversion     Herman received therapeutic exercises to develop strength, ROM and flexibility for 15 minutes including: additional 30 minutes supervised     Boot on  Single leg stance: 2x30" B c/  unilateral UE support  Forward step ups: 6" step x30 R; next without boot  Lateral step ups: 6" step x 30 R; next without boot  Lateral step downs: 4" step 2x10 R Not performed today    Boot off  Shuttle for " increased dorsiflexion mobility and ROM: 2.5 cables, 3x10' double leg   Nu-Step for increased dorsiflexion mobility and ROM: level 4 resistance, 5' c/ B UE and LE use    Home Exercises Provided and Patient Education Provided     Education provided:   - cont HEP regualrly, call PT Chiqui after follow up appt tomorrow    Written Home Exercises Provided: Patient instructed to cont prior HEP.  Exercises were reviewed and Herman was able to demonstrate them prior to the end of the session.  Herman demonstrated good  understanding of the education provided.     See EMR under Patient Instructions for exercises provided 2/15/19.      Assessment     Pt tolerates therapy activities without difficulties or c/o increased pain.  Pt continues to have limited ROM in L ankle. Patient to follow up with surgeon regarding ankle replacement. Has not yet received transfer tub bench, nor rocker boot.  Herman is progressing well towards his goals.   Pt prognosis is Good.     Pt will continue to benefit from skilled outpatient physical therapy to address the deficits listed in the problem list box on initial evaluation, provide pt/family education and to maximize pt's level of independence in the home and community environment.     Pt's spiritual, cultural and educational needs considered and pt agreeable to plan of care and goals.    Anticipated barriers to physical therapy: pain; fear avoidance; sedentary lifestyle    Goals:   Short Term Goals (4 Weeks):   1. Pt will be independent with HEP to supplement PT in improving mobility. MET  2. Pt will improve R DF AROM to neutral to promote normal gait mechanics PROGRESSING, NOT MET 4/12/2019   3. Pt will walk with single point cane with minimal deficits or pain to improve functional QOL PROGRESSING, NOT MET 4/12/2019   4. Pt will improve knee AROM to 0-120 to promote independent LE dressing. PROGRESSING, NOT MET 4/12/2019   Long Term Goals (12 Weeks):   1. Pt will improve FOTO score to </= 49%  limited to decrease perceived limitation with mobility PROGRESSING, NOT MET 4/12/2019   2. Pt will improve impaired LE strength to >/= 4+/5 to improve strength for functional tasks. PROGRESSING, NOT MET 4/12/2019   3. Pt will improve R ankle AROM to WNL in all planes to promote mobility for return to work. PROGRESSING, NOT MET 4/12/2019   4. Pt will walk without AD with minimal gait deficits to promote return to work. PROGRESSING, NOT MET 4/12/2019   5. Pt will walk 1/2 mile without pain to promote community ambulation. PROGRESSING, NOT MET 4/12/2019       Plan     Cont POC to progress towards established goals    Jc Sol, PTA

## 2019-04-24 DIAGNOSIS — S99.911A RIGHT ANKLE INJURY: Primary | ICD-10-CM

## 2019-04-25 ENCOUNTER — CLINICAL SUPPORT (OUTPATIENT)
Dept: REHABILITATION | Facility: HOSPITAL | Age: 49
End: 2019-04-25
Payer: OTHER MISCELLANEOUS

## 2019-04-25 DIAGNOSIS — M25.671 DECREASED RANGE OF MOTION OF RIGHT ANKLE: ICD-10-CM

## 2019-04-25 DIAGNOSIS — M25.572 CHRONIC ANKLE PAIN, BILATERAL: ICD-10-CM

## 2019-04-25 DIAGNOSIS — M25.571 CHRONIC ANKLE PAIN, BILATERAL: ICD-10-CM

## 2019-04-25 DIAGNOSIS — G89.29 CHRONIC ANKLE PAIN, BILATERAL: ICD-10-CM

## 2019-04-25 DIAGNOSIS — R26.89 IMPAIRED GAIT AND MOBILITY: ICD-10-CM

## 2019-04-25 PROCEDURE — 97116 GAIT TRAINING THERAPY: CPT | Mod: PN

## 2019-04-25 NOTE — PROGRESS NOTES
Physical Therapy Daily Treatment Note     Name: Herman GUERRERO [facility] Number: [id number]    Therapy Diagnosis:   Encounter Diagnoses   Name Primary?    Chronic ankle pain, bilateral     Impaired gait and mobility     Decreased range of motion of right ankle      Physician: Deejay Mitchell MD    Visit Date: 4/25/2019    Physician Orders: PT Eval and Treat: R LE WBAT, advance slowly  Medical Diagnosis: S82.871A (ICD-10-CM) - Pilon fracture of right tibia  Evaluation Date: 2/6/2019  Authorization Period Expiration: 3/15/2019  Plan of Care Certification Period: 2/6/2019 to 5/3/2019  Visit # / Visits authorized: 25/30  FOTO: at discharge     Time In: 1115  Time Out: 1135  Total Billable Time: 10 minutes       Precautions: Standard, Fall and Weightbearing    Subjective     Pt reports: Follow up appointment with orthopedic surgeon in mid-May regarding decision for ankle joint replacement surgery. Patient shows therapist X-Rays of ankle on his phone; hardware extending from fibula to calcaneus. Patient reports his surgeon informed him his motion is limited due to this. Surgeon ordered rocker boot and quad cane for patent however it has not come in yet. Patient reports he has to meet with [employer] and  before making decision on surgery. Patient afraid that surgery will cause setback in overall function.   He was compliant with home exercise program.  Response to previous treatment: no pain  Functional change: improved walking quality    Pain: 0/10  Location: R ankles     Objective     First 10 minutes of session allotted to subjective and education.    Herman received gait training to promote progression of WBAT in walking boot for 10 minutes including:   Level walking in gym 280 feet with quad cane using 2 point pattern; modified independent  Level walking in gym for 140 feet with single point cane using 2 point pattern; modified independent    Home Exercises Provided and Patient Education Provided      Education provided:   - Current hardware limiting ankle motion in all directions. Until decision made regarding surgery or rocker boot and quad cane obtained, physical therapy progression is limited and reaching plateau.  - Call therapist at clinic once patient obtains rocker boot and single point cane for gait training.     Written Home Exercises Provided: No.  Exercises were reviewed and Herman was able to demonstrate them prior to the end of the session.  Herman demonstrated good  understanding of the education provided.     Assessment     Hardware placement from tibia to calcaneus limiting talocrural and subtalar motion. Because of this and continued need for boot wear, therapy progression limited. All long term goals not met due to this. Patient to be placed on hold from therapy at this time until rocker boot and quad cane obtained for gait training.    Herman is progressing well towards his goals.   Pt prognosis is Fair.  Pt will continue to benefit from skilled outpatient physical therapy to address the deficits listed in the problem list box on initial evaluation, provide pt/family education and to maximize pt's level of independence in the home and community environment.     Pt's spiritual, cultural and educational needs considered and pt agreeable to plan of care and goals.  Anticipated barriers to physical therapy: hardware limiting ankle ROM    Goals:   Short Term Goals (4 Weeks):   1. Pt will be independent with HEP to supplement PT in improving mobility. MET  2. Pt will improve R DF AROM to neutral to promote normal gait mechanics PROGRESSING, NOT MET 4/25/2019   3. Pt will walk with single point cane with minimal deficits or pain to improve functional QOLMET 4/25/2019   4. Pt will improve knee AROM to 0-120 to promote independent LE dressing. PROGRESSING, NOT MET 4/25/2019   Long Term Goals (12 Weeks):   1. Pt will improve FOTO score to </= 49% limited to decrease perceived limitation with mobility  PROGRESSING, NOT MET 4/25/2019   2. Pt will improve impaired LE strength to >/= 4+/5 to improve strength for functional tasks. PROGRESSING, NOT MET 4/25/2019   3. Pt will improve R ankle AROM to WNL in all planes to promote mobility for return to work. PROGRESSING, NOT MET 4/25/2019   4. Pt will walk without AD with minimal gait deficits to promote return to work. PROGRESSING, NOT MET 4/25/2019   5. Pt will walk 1/2 mile without pain to promote community ambulation. PROGRESSING, NOT MET 4/25/2019     Plan     Hold from therapy. Resume when patient receives quad cane and rocker boot for gait training.    Chiqui June, PT

## 2019-04-30 ENCOUNTER — HOSPITAL ENCOUNTER (OUTPATIENT)
Dept: RADIOLOGY | Facility: HOSPITAL | Age: 49
Discharge: HOME | End: 2019-04-30
Attending: ORTHOPAEDIC SURGERY

## 2019-04-30 DIAGNOSIS — S99.911A RIGHT ANKLE INJURY: ICD-10-CM

## 2019-04-30 PROCEDURE — 73700 CT LOWER EXTREMITY W/O DYE: CPT | Mod: TC,RT

## 2019-04-30 PROCEDURE — 73700 CT ANKLE (INCLUDING HINDFOOT) WITHOUT CONTRAST RIGHT: ICD-10-PCS | Mod: 26,RT,, | Performed by: RADIOLOGY

## 2019-04-30 PROCEDURE — 73700 CT LOWER EXTREMITY W/O DYE: CPT | Mod: 26,RT,, | Performed by: RADIOLOGY

## 2019-07-09 ENCOUNTER — DOCUMENTATION ONLY (OUTPATIENT)
Dept: REHABILITATION | Facility: HOSPITAL | Age: 49
End: 2019-07-09

## 2019-07-09 DIAGNOSIS — M25.571 CHRONIC ANKLE PAIN, BILATERAL: ICD-10-CM

## 2019-07-09 DIAGNOSIS — M25.671 DECREASED RANGE OF MOTION OF RIGHT ANKLE: ICD-10-CM

## 2019-07-09 DIAGNOSIS — R26.89 IMPAIRED GAIT AND MOBILITY: ICD-10-CM

## 2019-07-09 DIAGNOSIS — G89.29 CHRONIC ANKLE PAIN, BILATERAL: ICD-10-CM

## 2019-07-09 DIAGNOSIS — M25.572 CHRONIC ANKLE PAIN, BILATERAL: ICD-10-CM

## 2019-07-09 NOTE — PROGRESS NOTES
Face to Face PTA Conference performed with Juanita Mathew PTA, Jc Sol PTA regarding patient's current status, overall progress, and plan of care    Chiqui June, PT     Jc Sol,PTA    Juanita Manzano, PTA

## 2019-07-10 ENCOUNTER — DOCUMENTATION ONLY (OUTPATIENT)
Dept: REHABILITATION | Facility: HOSPITAL | Age: 49
End: 2019-07-10

## 2019-07-10 DIAGNOSIS — R26.89 IMPAIRED GAIT AND MOBILITY: ICD-10-CM

## 2019-07-10 DIAGNOSIS — M25.671 DECREASED RANGE OF MOTION OF RIGHT ANKLE: ICD-10-CM

## 2019-07-10 DIAGNOSIS — G89.29 CHRONIC ANKLE PAIN, BILATERAL: ICD-10-CM

## 2019-07-10 DIAGNOSIS — M25.572 CHRONIC ANKLE PAIN, BILATERAL: ICD-10-CM

## 2019-07-10 DIAGNOSIS — M25.571 CHRONIC ANKLE PAIN, BILATERAL: ICD-10-CM

## 2019-07-10 NOTE — PROGRESS NOTES
Face to Face PTA Conference performed with the following regarding patient's current status, overall progress, and plan of care:  Rubén Daniel PTA    Chiqui June, PT    Rubén Daniel, PTA

## 2019-07-26 DIAGNOSIS — S99.911A UNSPECIFIED INJURY OF RIGHT ANKLE, INITIAL ENCOUNTER: Primary | ICD-10-CM

## 2019-07-30 ENCOUNTER — CLINICAL SUPPORT (OUTPATIENT)
Dept: REHABILITATION | Facility: HOSPITAL | Age: 49
End: 2019-07-30
Attending: ORTHOPAEDIC SURGERY
Payer: OTHER MISCELLANEOUS

## 2019-07-30 DIAGNOSIS — R26.89 IMPAIRED GAIT AND MOBILITY: ICD-10-CM

## 2019-07-30 DIAGNOSIS — M25.571 CHRONIC ANKLE PAIN, BILATERAL: ICD-10-CM

## 2019-07-30 DIAGNOSIS — G89.29 CHRONIC ANKLE PAIN, BILATERAL: ICD-10-CM

## 2019-07-30 DIAGNOSIS — M25.671 DECREASED RANGE OF MOTION OF RIGHT ANKLE: ICD-10-CM

## 2019-07-30 DIAGNOSIS — M25.572 CHRONIC ANKLE PAIN, BILATERAL: ICD-10-CM

## 2019-07-30 PROCEDURE — 97110 THERAPEUTIC EXERCISES: CPT | Mod: PN

## 2019-07-30 PROCEDURE — 97164 PT RE-EVAL EST PLAN CARE: CPT | Mod: PN

## 2019-07-30 PROCEDURE — 97140 MANUAL THERAPY 1/> REGIONS: CPT | Mod: PN

## 2019-07-30 PROCEDURE — 97530 THERAPEUTIC ACTIVITIES: CPT | Mod: PN

## 2019-07-30 NOTE — PROGRESS NOTES
Physical Therapy Daily Treatment Note     Name: Herman GUERRERO [facility] Number: [id number]    Therapy Diagnosis:   Encounter Diagnoses   Name Primary?    Chronic ankle pain, bilateral     Impaired gait and mobility     Decreased range of motion of right ankle      Physician: Deejay Mitchell MD    Visit Date: 7/30/2019    Physician Orders: PT Eval and Treat: R LUMA WBAT, advance slowly  Medical Diagnosis: S82.871A (ICD-10-CM) - Pilon fracture of right tibia  Evaluation Date: 2/6/2019  Authorization Period Expiration: 9/30/2019  Plan of Care Certification Period: 2/6/2019 to 5/3/2019 update date next  Visit #: 26  Visits authorized: 1/12  FOTO: done     Time In: 1809  Time Out: 1710  Total Billable Time: 61 minutes       Precautions: Standard and Weightbearing    Subjective     Pt reports: brought rocker shoes to session however has not worn them since receiving last week due to swelling. Patient denies pain; swelling persists at ankle joint after long periods of walking. Appointment with another orthopedic surgeon in September to get second opinion on ankle. Patient reports his original surgeon thinks there is ability for fibular fracture to heal given more time to do so; no longer thinks patient needs ankle joint replacement. Patient reports he has felt depressed due to inability to work and limited ability navigate in the community.  He was compliant with home exercise program.  Response to previous treatment: not applicable due to hold in therapy.  Functional change: not applicable due to hold in therapy.    Pain: 0/10  Location: R ankle    Objective     Patient presents to therapy with tall CAM boot and quad cane using 2 point pattern. B rocker boot worn for treatment.     Herman received reassessment for 21 minutes including: Objective measurements (See Updated POC in Treatment section)    Herman received therapeutic exercises to develop strength, endurance, ROM and flexibility for 10 minutes  "including:  Alternating ankle dorsiflexion + toe extension and ankle plantar flexion + toe flexion: x20  Heel slide AAROM: 10"x10 R     Herman received the following manual therapy techniques: were applied to the: L LE for 10 minutes, including:  Retrograde edema reduction to lower leg    Herman participated in dynamic functional therapeutic activities to improve functional performance of standing and ambulating in rocker boot for 20  minutes, including:  Anternating heel and toe raises: 2x5 double leg  Lateral weight shifting:     Home Exercises Provided and Patient Education Provided     Education provided:   - Current hardware limiting ankle motion in all directions.     Written Home Exercises Provided: No. Will give next visit.    Assessment     See Updated POC in Treatment section     Plan     See Updated POC in Treatment section     Chiqui June, PT   "

## 2019-07-30 NOTE — PLAN OF CARE
Outpatient Therapy Updated Plan of Care     Visit Date: 7/30/2019  Name: Herman Floyd  Clinic Number: [id number]    Therapy Diagnosis:   Encounter Diagnoses   Name Primary?    Chronic ankle pain, bilateral     Impaired gait and mobility     Decreased range of motion of right ankle      Physician: Deejay Mitchell MD    Physician Orders: PT Eval and Treat: R LE WBAT, advance slowly  Medical Diagnosis: S82.871A (ICD-10-CM) - Pilon fracture of right tibia  Evaluation Date: 2/6/2019    Total Visits Received: 26    Current Certification Period:  2/6/2019 to 5/3/2019  Precautions: Standard and Weightbearing  Visits from Evaluation Date:  26  Functional Level Prior to Evaluation:  Independent    Subjective     Update: Patient reports he has been working on household and neighborhood ambulation with CAM boot and quad cane since last visit. Received quad cane one week after patient placed on hold from therapy; received rocker shoes last week. Patient reports continued limitations due to lack of ankle mobility, including: return to work, community ambulation, performance of household tasks and LE dressing.    Objective     Update:     Knee AROM: 0-120 degrees R  Ankle AROM: not tested secondary to hardware through R talocrural and subtalar joint    LE MMT: grossly 4+/5    Ankle girth (after manual therapy): 36 cm R malleolar  69 cm R figure 8    Gait: with SPC   Analysis: 2 point pattern; L lateral trunk lean, L hip adduction and R hip abduction    Transfers: independent    Other: modified independent performance of LE dressing-donning of R sock secondary to increased time to perform    CMS Impairment/Limitation/Restriction for FOTO Lower Leg without Knee Survey    Therapist reviewed FOTO scores for Herman Floyd on 7/30/2019.   FOTO documents entered into [employer] - see Media section.    Limitation Score: 43%  Category: Mobility     Assessment     Update:     Hardware placement from tibia to calcaneus continues to  limit talocrural and subtalar motion and thus gait mechanics. Able to progress gait training now that patient has obtained orthotic shoes and quad cane. Initial difficulty and hesitancy with performance of therapeutic activities although all improved over time of performance. Patient has met short term knee ROM goal and long term strength goal; ankle ROM goals not applicable due to aforementioned hardware limitations.     Herman is progressing well towards his goals.   Pt prognosis is Fair.  Pt will continue to benefit from skilled outpatient physical therapy to address the deficits listed in the problem list box on initial evaluation, provide pt/family education and to maximize pt's level of independence in the home and community environment.     Pt's spiritual, cultural and educational needs considered and pt agreeable to plan of care and goals.  Anticipated barriers to physical therapy: hardware limiting ankle ROM    Goals:   Short Term Goals (4 Weeks):   1. Pt will be independent with HEP to supplement PT in improving mobility. MET  2. Pt will improve R DF AROM to neutral to promote normal gait mechanics N/A due to hardware placement; discharge  3. Pt will walk with single point cane with minimal deficits or pain to improve functional QOL NOT MET adjust to quad cane and rocker boot with minimal L trunk lean  4. Pt will improve knee AROM to 0-120 to promote independent LE dressing. MET  New Short Term Goals Status: Continue goal 3  Long Term Goals (12 Weeks):   1. Pt will improve FOTO score to </= 49% limited to decrease perceived limitation with mobility MET progress to <30%  2. Pt will improve impaired LE strength to >/= 4+/5 to improve strength for functional tasks. MET  3. Pt will improve R ankle AROM to WNL in all planes to promote mobility for return to work. N/A due to hardware placement; discharge  4. Pt will walk without AD with minimal gait deficits to promote return to work. NOT MET adjust to single  point cane and rocker boot with minimal L trunk lean  5. Pt will walk 1/2 mile without pain to promote community ambulation. MET  Long Term Goal Status: Continue goal 1 and 4  Reasons for Recertification of Therapy: POC expiration; change in status secondary to AD and shoe wear    Plan     Updated Certification Period: 7/30/2019 to 9/27/2019  Recommended Treatment Plan: 18 visits in 60 days: Electrical Stimulation -, Gait Training, Manual Therapy, Moist Heat/ Ice, Neuromuscular Re-ed, Orthotic Management and Training, Patient Education, Therapeutic Activites and Therapeutic Exercise  Other Recommendations: modalities prn    Chiqui June, PT  7/30/2019      I CERTIFY THE NEED FOR THESE SERVICES FURNISHED UNDER THIS PLAN OF TREATMENT AND WHILE UNDER MY CARE    Physician's comments:        Physician's Signature: ___________________________________________________

## 2019-08-01 ENCOUNTER — CLINICAL SUPPORT (OUTPATIENT)
Dept: REHABILITATION | Facility: HOSPITAL | Age: 49
End: 2019-08-01
Attending: ORTHOPAEDIC SURGERY
Payer: OTHER MISCELLANEOUS

## 2019-08-01 DIAGNOSIS — R26.89 IMPAIRED GAIT AND MOBILITY: ICD-10-CM

## 2019-08-01 DIAGNOSIS — G89.29 CHRONIC ANKLE PAIN, BILATERAL: ICD-10-CM

## 2019-08-01 DIAGNOSIS — M25.572 CHRONIC ANKLE PAIN, BILATERAL: ICD-10-CM

## 2019-08-01 DIAGNOSIS — M25.571 CHRONIC ANKLE PAIN, BILATERAL: ICD-10-CM

## 2019-08-01 DIAGNOSIS — M25.671 DECREASED RANGE OF MOTION OF RIGHT ANKLE: ICD-10-CM

## 2019-08-01 PROCEDURE — 97110 THERAPEUTIC EXERCISES: CPT | Mod: PN

## 2019-08-01 NOTE — PROGRESS NOTES
Physical Therapy Daily Treatment Note     Name: Herman GUERRERO [facility] Number: [id number]    Therapy Diagnosis:   No diagnosis found.  Physician: Deejay Mitchell MD    Visit Date: 8/1/2019    Physician Orders: PT Eval and Treat: R LE WBAT, advance slowly  Medical Diagnosis: S82.871A (ICD-10-CM) - Pilon fracture of right tibia  Evaluation Date: 2/6/2019  Authorization Period Expiration: 9/30/2019  Plan of Care Certification Period:7/30/2019 to 9/27/2019  Visit #: 26  Visits authorized: 2/12  FOTO: 2/5     Time In: 1315  Time Out: 1400  Total Billable Time: 30 minutes       Precautions: Standard and Weightbearing    Subjective     Pt reports: no new c/o  He was compliant with home exercise program.  Response to previous treatment: not applicable due to hold in therapy.  Functional change: not applicable due to hold in therapy.    Pain: 0/10  Location: R ankle unable to rate pain    Objective   Arrived 15 minutes late for appointment  Patient presents to therapy with walking shoes and quad cane using 2 point pattern. B rocker boot worn for treatment.         Herman received therapeutic exercises to develop strength, endurance, ROM and flexibility for 45 minutes including:    Supine:   Ankle alphabet A-Z x 1 trial    Seated:   Heel raises toe raises x 30 reps    Standing:  Hip Abd            2x15 with Green TB  Hip Flex           3x10 with Green TB  Hip Ext             3x10 with Green TB    Herman received gait Training to develop strength, balance, coordination and safety for 15 minutes including the following:  //Bars:   Pre gait weight shifts laterally, anterior posteriorly x 20   Fwd ambulation 4 laps minimal UE support  Retro ambulation 4 laps minimal UE support  Lateral ambulation 4 laps minimal UE support      Herman received the following manual therapy techniques: were applied to the: L LE for 00 minutes, including:  Retrograde edema reduction to lower leg Not performed today    Herman participated in dynamic  functional therapeutic activities to improve functional performance of standing and ambulating in rocker boot for 20  minutes, including:  Anternating heel and toe raises: 2x5 double leg  Lateral weight shifting:     Home Exercises Provided and Patient Education Provided       Education provided:   - Current hardware limiting ankle motion in all directions.     Written Home Exercises Provided:   Hip abd , flex and ext with RTB, ankle alphabet and seated PF/DF  Assessment   Herman tolerated treatment interventions well. Somewhat tentative with exercise. Issued HEP and  Instructed in same. Treatment limited due to patient late.      Short Term Goals (4 Weeks):   1. Pt will be independent with HEP to supplement PT in improving mobility. MET  2. Pt will improve R DF AROM to neutral to promote normal gait mechanics N/A due to hardware placement; discharge  3. Pt will walk with single point cane with minimal deficits or pain to improve functional QOL NOT MET adjust to quad cane and rocker boot with minimal L trunk lean  4. Pt will improve knee AROM to 0-120 to promote independent LE dressing. MET  New Short Term Goals Status: Continue goal 3  Long Term Goals (12 Weeks):   1. Pt will improve FOTO score to </= 49% limited to decrease perceived limitation with mobility MET progress to <30%  2. Pt will improve impaired LE strength to >/= 4+/5 to improve strength for functional tasks. MET  3. Pt will improve R ankle AROM to WNL in all planes to promote mobility for return to work. N/A due to hardware placement; discharge  4. Pt will walk without AD with minimal gait deficits to promote return to work. NOT MET adjust to single point cane and rocker boot with minimal L trunk lean  5. Pt will walk 1/2 mile without pain to promote community ambulation. MET  Long Term Goal Status: Continue goal 1 and 4  Reasons for Recertification of Therapy: POC expiration; change in status secondary to AD and shoe wear       Plan     Continue PT,  POC.    Jc Sol, PTA

## 2019-08-02 ENCOUNTER — CLINICAL SUPPORT (OUTPATIENT)
Dept: REHABILITATION | Facility: HOSPITAL | Age: 49
End: 2019-08-02
Payer: OTHER MISCELLANEOUS

## 2019-08-02 DIAGNOSIS — R26.89 IMPAIRED GAIT AND MOBILITY: ICD-10-CM

## 2019-08-02 DIAGNOSIS — M25.572 CHRONIC ANKLE PAIN, BILATERAL: ICD-10-CM

## 2019-08-02 DIAGNOSIS — G89.29 CHRONIC ANKLE PAIN, BILATERAL: ICD-10-CM

## 2019-08-02 DIAGNOSIS — M25.671 DECREASED RANGE OF MOTION OF RIGHT ANKLE: ICD-10-CM

## 2019-08-02 DIAGNOSIS — M25.571 CHRONIC ANKLE PAIN, BILATERAL: ICD-10-CM

## 2019-08-02 PROCEDURE — 97112 NEUROMUSCULAR REEDUCATION: CPT | Mod: PN

## 2019-08-02 PROCEDURE — 97110 THERAPEUTIC EXERCISES: CPT | Mod: PN

## 2019-08-02 PROCEDURE — 97116 GAIT TRAINING THERAPY: CPT | Mod: PN

## 2019-08-02 NOTE — PROGRESS NOTES
"  Physical Therapy Daily Treatment Note     Name: Herman GUERRERO [facility] Number: [id number]    Therapy Diagnosis:   Encounter Diagnoses   Name Primary?    Chronic ankle pain, bilateral     Impaired gait and mobility     Decreased range of motion of right ankle      Physician: Deejay Mitchell MD    Visit Date: 8/2/2019    Physician Orders: PT Eval and Treat: R LE WBAT, advance slowly  Medical Diagnosis: S82.871A (ICD-10-CM) - Pilon fracture of right tibia  Evaluation Date: 2/6/2019  Authorization Period Expiration: 9/30/2019  Plan of Care Certification Period:7/30/2019 to 9/27/2019  Visit #: 27  Visits authorized: 3/12  FOTO: 3/5     Time In: 0905  Time Out: 0944  Total Billable Time: 39 minutes       Precautions: Standard and Weightbearing    Subjective     Pt reports: stiffness in R ankle upon standing after long durations of sitting  He was compliant with home exercise program.  Response to previous treatment: decreased swelling  Functional change: increased ease of donning shoes    Pain: 0/10  Location: R ankle    Objective     Patient presents to therapy with B rocker shoes and quad cane using 2 point pattern.     Herman received therapeutic exercises to develop strength, endurance, ROM and flexibility for 10 minutes including:  Gait deviations: L lateral trunk lean, L hip adduction and R hip abduction     Seated:   Heel raises for ROM: x20  Toe raises for ROM: x20   BAPs on level 1 for ROM: x30 clockwise and counterclockwise    Herman participated in neuromuscular re-education activities to improve: Balance and Proprioception for 10 minutes. The following activities were included:  Lateral weight shifting, focus to R LE  Lateral step holds: 10" holds 2x10 L    Herman participated in gait training to improve functional mobility and safety for 29 minutes, including:  Level walking for 600 feet with quad cane and 2 point pattern; additional 400 feet after adjustment of cane height.  Treadmill walking forwards at " "0.8 MPH x 3'  Treadmill walking side stepping at 0.4 MPH x 1' B  Treadmill walking backwards at 0.4 MPH x 1'  Unlevel walking for 400 feet including unlevel concrete and brick, up and down 6" curb    Home Exercises Provided and Patient Education Provided     Education provided:   - Continue HEP    Written Home Exercises Provided: Not today.  Exercises were reviewed and Herman was able to demonstrate them prior to the end of the session.  Herman demonstrated good  understanding of the education provided.     Assessment     Decreased L lateral trunk lean with adjustment of cane height. Difficulty with treadmill walking forwards and oriented R due to increased WB on R LE.    Herman is progressing well towards his goals.   Pt prognosis is Fair.  Pt will continue to benefit from skilled outpatient physical therapy to address the deficits listed in the problem list box on initial evaluation, provide pt/family education and to maximize pt's level of independence in the home and community environment.      Pt's spiritual, cultural and educational needs considered and pt agreeable to plan of care and goals.  Anticipated barriers to physical therapy: hardware limiting ankle ROM    Short Term Goals (4 Weeks):   3. Pt will walk with quad cane and rocker boot with minimal deficits or pain to improve functional QOL PROGRESSING, NOT MET 8/2/2019   Long Term Goals (12 Weeks):   1. Pt will improve FOTO score to <30% limited to decrease perceived limitation with mobility PROGRESSING, NOT MET 8/2/2019   4. Pt will walk with single point cane and rocker boot with minimal L trunk lean to promote return to work. PROGRESSING, NOT MET 8/2/2019     Plan     Continue gait training.     Chiqui June, PT   "

## 2019-08-06 ENCOUNTER — CLINICAL SUPPORT (OUTPATIENT)
Dept: REHABILITATION | Facility: HOSPITAL | Age: 49
End: 2019-08-06
Payer: OTHER MISCELLANEOUS

## 2019-08-06 DIAGNOSIS — M25.571 CHRONIC ANKLE PAIN, BILATERAL: ICD-10-CM

## 2019-08-06 DIAGNOSIS — G89.29 CHRONIC ANKLE PAIN, BILATERAL: ICD-10-CM

## 2019-08-06 DIAGNOSIS — M25.671 DECREASED RANGE OF MOTION OF RIGHT ANKLE: ICD-10-CM

## 2019-08-06 DIAGNOSIS — M25.572 CHRONIC ANKLE PAIN, BILATERAL: ICD-10-CM

## 2019-08-06 DIAGNOSIS — R26.89 IMPAIRED GAIT AND MOBILITY: ICD-10-CM

## 2019-08-06 PROCEDURE — 97112 NEUROMUSCULAR REEDUCATION: CPT | Mod: PN

## 2019-08-06 PROCEDURE — 97116 GAIT TRAINING THERAPY: CPT | Mod: PN

## 2019-08-06 PROCEDURE — 97110 THERAPEUTIC EXERCISES: CPT | Mod: PN

## 2019-08-06 NOTE — PROGRESS NOTES
"  Physical Therapy Daily Treatment Note     Name: Herman GUERRERO [facility] Number: [id number]    Therapy Diagnosis:   No diagnosis found.  Physician: Deejay Mitchell MD    Visit Date: 8/6/2019    Physician Orders: PT Eval and Treat: JULISSA LOERA, advance slowly  Medical Diagnosis: S82.871A (ICD-10-CM) - Pilon fracture of right tibia  Evaluation Date: 2/6/2019  Authorization Period Expiration: 9/30/2019  Plan of Care Certification Period:7/30/2019 to 9/27/2019  Visit #: 28  Visits authorized: 4/12  FOTO: 4/5 NEXT!     Time In: 0908  Time Out: 0954  Total Billable Time: 46 minutes       Precautions: Standard and Weightbearing    Subjective     Pt reports: increased pain and swelling after session on Friday; patient also ran several errands after appointment. Patient had to wear boot this weekend as a result of swelling. Patient reports he should be receiving compression stockings soon to help with swelling.   He was compliant with home exercise program.  Response to previous treatment: increased swelling  Functional change: decreased ease of donning shoes    Pain: 2/10  Location: R heel, where screw is located; patient attributes this to swelling    Objective     Patient presents to therapy with B rocker shoes and quad cane using 2 point pattern.     Herman received therapeutic exercises to develop strength, endurance, ROM and flexibility for 23 minutes including:  Gait deviations: L lateral trunk lean, L hip adduction and R hip abduction, and R toe out  Pitting edema: 2+ R anterior mid-length of lower leg; 1+ L    Seated:   Heel raises for ROM: x4'  Toe raises for ROM: x4'   BAPs on level 1 for ROM: x15 clockwise and counterclockwise  Ankle pumps: 2' R    Herman participated in neuromuscular re-education activities to improve: Balance and Proprioception for 8 minutes. The following activities were included:  Lateral step to holds: 10" holds x10 L, B UE support  Narrow base of support: 5x20", no UE support    Herman " "participated in gait training to improve functional mobility and safety for 15 minutes, including:  Treadmill walking forwards at 0.8 MPH x 2'  Treadmill walking side stepping at 0.4 MPH x 1' B  Treadmill walking backwards at 0.4 MPH x 1'  Unlevel walking including up and down 6" curb x 8 trials, emphasis on step up leading with R LE and step down with R LE    Home Exercises Provided and Patient Education Provided     Education provided:   - Patient given updated HEP including: seated heel raises and toe raises, ankle circles clockwise and counter clockwise, narrow base of support balance, lateral step to holds, and steamboats.    Written Home Exercises Provided: yes.  Exercises were reviewed and Herman was able to demonstrate them prior to the end of the session.  Herman demonstrated good  understanding of the education provided.     See EMR under Patient Instructions for exercises provided 8/6/2019.    Assessment     Swelling likely related to increased gait training on Friday. Improved performance of side stepping on treadmill oriented R compared to last visit; difficulty persists with forward ambulation, likely related to decreased ability to perform gait deviations identified in objective. Mild pitting edema on R lower leg; patient would benefit from compression stockings to control for swelling. Step ups and downs both leading with R LE appeared more stable and comfortable than vice versa     Herman is progressing well towards his goals.   Pt prognosis is Fair.  Pt will continue to benefit from skilled outpatient physical therapy to address the deficits listed in the problem list box on initial evaluation, provide pt/family education and to maximize pt's level of independence in the home and community environment.      Pt's spiritual, cultural and educational needs considered and pt agreeable to plan of care and goals.  Anticipated barriers to physical therapy: hardware limiting ankle ROM    Short Term Goals " (4 Weeks):   3. Pt will walk with quad cane and rocker boot with minimal deficits or pain to improve functional QOL PROGRESSING, NOT MET 8/6/2019   Long Term Goals (12 Weeks):   1. Pt will improve FOTO score to <30% limited to decrease perceived limitation with mobility PROGRESSING, NOT MET 8/6/2019   4. Pt will walk with single point cane and rocker boot with minimal L trunk lean to promote return to work. PROGRESSING, NOT MET 8/6/2019     Plan     Continue gait training.      Chiqui June, PT

## 2019-08-08 ENCOUNTER — CLINICAL SUPPORT (OUTPATIENT)
Dept: REHABILITATION | Facility: HOSPITAL | Age: 49
End: 2019-08-08
Attending: ORTHOPAEDIC SURGERY
Payer: OTHER MISCELLANEOUS

## 2019-08-08 DIAGNOSIS — G89.29 CHRONIC ANKLE PAIN, BILATERAL: ICD-10-CM

## 2019-08-08 DIAGNOSIS — R26.89 IMPAIRED GAIT AND MOBILITY: ICD-10-CM

## 2019-08-08 DIAGNOSIS — M25.571 CHRONIC ANKLE PAIN, BILATERAL: ICD-10-CM

## 2019-08-08 DIAGNOSIS — M25.671 DECREASED RANGE OF MOTION OF RIGHT ANKLE: ICD-10-CM

## 2019-08-08 DIAGNOSIS — M25.572 CHRONIC ANKLE PAIN, BILATERAL: ICD-10-CM

## 2019-08-08 PROCEDURE — 97140 MANUAL THERAPY 1/> REGIONS: CPT | Mod: PN

## 2019-08-08 PROCEDURE — 97110 THERAPEUTIC EXERCISES: CPT | Mod: PN

## 2019-08-08 NOTE — PROGRESS NOTES
Physical Therapy Daily Treatment Note     Name: Herman GUERRERO [facility] Number: [id number]    Therapy Diagnosis:   Encounter Diagnoses   Name Primary?    Chronic ankle pain, bilateral     Impaired gait and mobility     Decreased range of motion of right ankle      Physician: Deejay Mitchell MD    Visit Date: 8/8/2019    Physician Orders: PT Eval and Treat: R LE WBAT, advance slowly  Medical Diagnosis: S82.871A (ICD-10-CM) - Pilon fracture of right tibia  Evaluation Date: 2/6/2019  Authorization Period Expiration: 9/30/2019  Plan of Care Certification Period:7/30/2019 to 9/27/2019  Visit #: 29  Visits authorized: 5/12  FOTO: 5/5 done     Time In: 0908  Time Out: 1010  Total Billable Time: 42 minutes       Precautions: Standard and Weightbearing    Subjective     Pt reports: increased pain and swelling after session on Tuesday; patient also ran had to mop bathroom while wearing orthopedic boot due to leak from ceiling, went to sleep with the boot donned.  He was compliant with home exercise program.  Response to previous treatment: increased pain and swelling  Functional change: decreased ease of donning shoes    Pain: 6/10  Location: R heel, where screw is located; patient attributes this to swelling    Objective     Patient presents to therapy with B rocker shoes and quad cane using 2 point pattern.     Herman received hot pack for 10 minutes to R ankle and foot to improve tissue extensibility prior to interventions    Herman received the following manual therapy techniques: were applied to the: R foot, ankle and lower leg for 15 minutes, including:  Soft tissue mobilization around medial and lateral malleolus and talocrural joint line  Retrograde edema reduction    Herman received therapeutic exercises to develop strength, endurance, ROM and flexibility for 27 minutes including:  Gait deviations: L lateral trunk lean, L hip adduction and R hip abduction, and R toe out  Pitting edema: 2+ R anterior mid-length of  lower leg; 1+ L  Malleolar girth: 40.5 cm R at start of session; 39 at end    Supine: Ankle pumps: 2'  Ankle circles: 2'  Ankle AROM: x30 each    Seated:   Heel raises for ROM: x20  Toe raises for ROM: x20     Herman received cold pack for 10 minutes to R foot, ankle and distal lower leg to decrease pain and edema.    Home Exercises Provided and Patient Education Provided     Education provided:   - Continue updated HEP to tolerance.     Written Home Exercises Provided: Not today  Exercises were reviewed and Herman was able to demonstrate them prior to the end of the session.  Herman demonstrated good  understanding of the education provided.     See EMR under Patient Instructions for exercises provided 8/6/2019.    Assessment     Last two sessions focused on gait training. Increased pain and swelling after each, although outside activities may also contribute. As a result, regression of interventions performed today. 1.5 cm decrease in ankle swelling at malleolar line by end of session.     Herman is progressing well towards his goals.   Pt prognosis is Fair.  Pt will continue to benefit from skilled outpatient physical therapy to address the deficits listed in the problem list box on initial evaluation, provide pt/family education and to maximize pt's level of independence in the home and community environment.      Pt's spiritual, cultural and educational needs considered and pt agreeable to plan of care and goals.  Anticipated barriers to physical therapy: hardware limiting ankle ROM    Short Term Goals (4 Weeks):   3. Pt will walk with quad cane and rocker boot with minimal deficits or pain to improve functional QOL PROGRESSING, NOT MET 8/8/2019   Long Term Goals (12 Weeks):   1. Pt will improve FOTO score to <30% limited to decrease perceived limitation with mobility PROGRESSING, NOT MET 8/8/2019   4. Pt will walk with single point cane and rocker boot with minimal L trunk lean to promote return to work.  PROGRESSING, NOT MET 8/8/2019     Plan     Monitor pain and swelling. Resume gait training next, treadmill prn.     Chiqui June, PT

## 2019-08-12 ENCOUNTER — CLINICAL SUPPORT (OUTPATIENT)
Dept: REHABILITATION | Facility: HOSPITAL | Age: 49
End: 2019-08-12
Attending: ORTHOPAEDIC SURGERY
Payer: OTHER MISCELLANEOUS

## 2019-08-12 DIAGNOSIS — R26.89 IMPAIRED GAIT AND MOBILITY: ICD-10-CM

## 2019-08-12 DIAGNOSIS — M25.571 CHRONIC ANKLE PAIN, BILATERAL: ICD-10-CM

## 2019-08-12 DIAGNOSIS — G89.29 CHRONIC ANKLE PAIN, BILATERAL: ICD-10-CM

## 2019-08-12 DIAGNOSIS — M25.671 DECREASED RANGE OF MOTION OF RIGHT ANKLE: ICD-10-CM

## 2019-08-12 DIAGNOSIS — M25.572 CHRONIC ANKLE PAIN, BILATERAL: ICD-10-CM

## 2019-08-12 PROCEDURE — 97110 THERAPEUTIC EXERCISES: CPT | Mod: PN

## 2019-08-12 PROCEDURE — 97140 MANUAL THERAPY 1/> REGIONS: CPT | Mod: PN

## 2019-08-12 NOTE — PROGRESS NOTES
Physical Therapy Daily Treatment Note     Name: Herman GUERRERO [facility] Number: [id number]    Therapy Diagnosis:   Encounter Diagnoses   Name Primary?    Chronic ankle pain, bilateral     Impaired gait and mobility     Decreased range of motion of right ankle      Physician: Deejay Mitchell MD    Visit Date: 8/12/2019    Physician Orders: PT Eval and Treat: R LE WBAT, advance slowly  Medical Diagnosis: S82.871A (ICD-10-CM) - Pilon fracture of right tibia  Evaluation Date: 2/6/2019  Authorization Period Expiration: 9/30/2019  Plan of Care Certification Period:7/30/2019 to 9/27/2019  Visit #: 30  Visits authorized: 6/12  FOTO: 6/10     Time In: 1305  Time Out: 1400  Total Billable Time: 30 minutes  (1 TE, 1 MT)     Precautions: Standard and Weightbearing      Subjective     Pt reports: he has had workers at his house today and has been on feet since 7:00 this morning.  Having increased swelling today.  He was compliant with home exercise program.  Response to previous treatment: no adverse reaction  Functional change: none    Pain: 5/10  Location: R heel, where screw is located; patient attributes this to swelling    Objective     Herman received hot pack for 10 minutes to R ankle and foot to improve tissue extensibility prior to interventions     Herman received the following manual therapy techniques: were applied to the: R foot, ankle and lower leg for 15 minutes, including:  Soft tissue mobilization around medial and lateral malleolus and talocrural joint line  Retrograde edema reduction     Herman received therapeutic exercises to develop strength, endurance, ROM and flexibility for 27 minutes including:  Pitting edema: 2+ R anterior mid-length of lower leg; 1+ L     Supine: Ankle pumps: 2x2'  Ankle circles: 2x2'  Ankle AROM: x30 each     Seated:   Heel raises for ROM: x20  Toe raises for ROM: x20      Herman received cold pack for 10 minutes to R foot, ankle and distal lower leg to decrease pain and  edema.       Home Exercises Provided and Patient Education Provided     Education provided:   - cont HEP regularly  - stressed importance of elevating and taking breaks throughout day    Written Home Exercises Provided: Patient instructed to cont prior HEP.  Exercises were reviewed and Herman was able to demonstrate them prior to the end of the session.  Herman demonstrated good  understanding of the education provided.     See EMR under Patient Instructions for exercises provided 8/6/19.      Assessment     Pt arrived with increased swelling and pain again this date after being on feet all day.  Activities remained focused on edema control and ROM of ankle.  Will attempt to progress next visit.  Herman is progressing well towards his goals.   Pt prognosis is Fair.     Pt will continue to benefit from skilled outpatient physical therapy to address the deficits listed in the problem list box on initial evaluation, provide pt/family education and to maximize pt's level of independence in the home and community environment.     Pt's spiritual, cultural and educational needs considered and pt agreeable to plan of care and goals.    Anticipated barriers to physical therapy: hardware limiting ankle ROM    Goals:   Short Term Goals (4 Weeks):   3. Pt will walk with quad cane and rocker boot with minimal deficits or pain to improve functional QOL PROGRESSING, NOT MET 8/12/2019   Long Term Goals (12 Weeks):   1. Pt will improve FOTO score to <30% limited to decrease perceived limitation with mobility PROGRESSING, NOT MET 8/12/2019   4. Pt will walk with single point cane and rocker boot with minimal L trunk lean to promote return to work. PROGRESSING, NOT MET 8/12/2019     Plan     Cont POC to progress towards established goals    Juanita Manzano, PTA

## 2019-08-14 ENCOUNTER — CLINICAL SUPPORT (OUTPATIENT)
Dept: REHABILITATION | Facility: HOSPITAL | Age: 49
End: 2019-08-14
Payer: OTHER MISCELLANEOUS

## 2019-08-14 DIAGNOSIS — M25.571 CHRONIC ANKLE PAIN, BILATERAL: ICD-10-CM

## 2019-08-14 DIAGNOSIS — M25.572 CHRONIC ANKLE PAIN, BILATERAL: ICD-10-CM

## 2019-08-14 DIAGNOSIS — M25.671 DECREASED RANGE OF MOTION OF RIGHT ANKLE: ICD-10-CM

## 2019-08-14 DIAGNOSIS — G89.29 CHRONIC ANKLE PAIN, BILATERAL: ICD-10-CM

## 2019-08-14 DIAGNOSIS — R26.89 IMPAIRED GAIT AND MOBILITY: ICD-10-CM

## 2019-08-14 PROCEDURE — 97116 GAIT TRAINING THERAPY: CPT | Mod: PN

## 2019-08-14 PROCEDURE — 97140 MANUAL THERAPY 1/> REGIONS: CPT | Mod: PN

## 2019-08-14 PROCEDURE — 97110 THERAPEUTIC EXERCISES: CPT | Mod: PN

## 2019-08-14 NOTE — PROGRESS NOTES
Physical Therapy Daily Treatment Note     Name: Herman GUERRERO [facility] Number: [id number]    Therapy Diagnosis:   Encounter Diagnoses   Name Primary?    Chronic ankle pain, bilateral     Impaired gait and mobility     Decreased range of motion of right ankle      Physician: Deejay Mitchell MD    Visit Date: 8/14/2019    Physician Orders: PT Eval and Treat: JULISSA LOERA, advance slowly  Medical Diagnosis: S82.871A (ICD-10-CM) - Pilon fracture of right tibia  Evaluation Date: 2/6/2019  Authorization Period Expiration: 9/30/2019  Plan of Care Certification Period:7/30/2019 to 9/27/2019  Visit #: 31  Visits authorized: 7/12  FOTO: 7/10     Time In: 1000   Time Out: 1055  Total Billable Time: 55 minutes       Precautions: Standard and Weightbearing    Subjective     Pt reports: receiving compression garments yesterday for swelling.  Continues to stand for longer amounts with workers at his home to fix leak.  He was compliant with home exercise program.  Response to previous treatment: no adverse reactions  Functional change: none    Pain: 5/10  Location: R heel    Objective     Herman received the following manual therapy techniques: were applied to the: R foot, ankle and lower leg for 30 minutes, including:  Girth Pre MT Post MT   R Malleolar Line 41 cm 38.5 cm     Retrograde edema reduction  Donning of compression garment to R LUMA Sutton received therapeutic exercises to develop strength, endurance, ROM and flexibility for 15 minutes including:     Ankle circles: x40 R clockwise and counterclockwise  Ankle AROM: x30 R each     Herman participated in gait training to improve functional mobility and safety for 10 minutes, including:  Level walking with quad cane using 2 point pattern, compression stocking donned    Home Exercises Provided and Patient Education Provided     Education provided:   - Continue HEP.    Written Home Exercises Provided: Not today.  Exercises were reviewed and Herman was able to demonstrate  them prior to the end of the session.  Herman demonstrated good  understanding of the education provided.     See EMR under Patient Instructions for exercises provided 8/6/19.    Assessment     Session limited to increased time to don compression stocking on R; length resulting in folding over above knee as rather than at proximal thigh to prevent tourniquet effect. Swelling improved 2.5 cm following manual interventions.     Herman is progressing well towards his goals.   Pt prognosis is Fair.   Pt will continue to benefit from skilled outpatient physical therapy to address the deficits listed in the problem list box on initial evaluation, provide pt/family education and to maximize pt's level of independence in the home and community environment.     Pt's spiritual, cultural and educational needs considered and pt agreeable to plan of care and goals.  Anticipated barriers to physical therapy: hardware limiting ankle ROM    Goals:   Short Term Goals (4 Weeks):   3. Pt will walk with quad cane and rocker boot with minimal deficits or pain to improve functional QOL PROGRESSING, NOT MET 08/14/2019   Long Term Goals (12 Weeks):   1. Pt will improve FOTO score to <30% limited to decrease perceived limitation with mobility PROGRESSING, NOT MET 08/14/2019   4. Pt will walk with single point cane and rocker boot with minimal L trunk lean to promote return to work. PROGRESSING, NOT MET 08/14/2019     Plan     Resume gait training on treadmill next.     Chiqui June, PT

## 2019-08-16 ENCOUNTER — CLINICAL SUPPORT (OUTPATIENT)
Dept: REHABILITATION | Facility: HOSPITAL | Age: 49
End: 2019-08-16
Attending: ORTHOPAEDIC SURGERY
Payer: OTHER MISCELLANEOUS

## 2019-08-16 DIAGNOSIS — G89.29 CHRONIC ANKLE PAIN, BILATERAL: ICD-10-CM

## 2019-08-16 DIAGNOSIS — M25.671 DECREASED RANGE OF MOTION OF RIGHT ANKLE: ICD-10-CM

## 2019-08-16 DIAGNOSIS — M25.572 CHRONIC ANKLE PAIN, BILATERAL: ICD-10-CM

## 2019-08-16 DIAGNOSIS — R26.89 IMPAIRED GAIT AND MOBILITY: ICD-10-CM

## 2019-08-16 DIAGNOSIS — M25.571 CHRONIC ANKLE PAIN, BILATERAL: ICD-10-CM

## 2019-08-16 PROCEDURE — 97116 GAIT TRAINING THERAPY: CPT | Mod: PN

## 2019-08-16 PROCEDURE — 97110 THERAPEUTIC EXERCISES: CPT | Mod: PN

## 2019-08-16 PROCEDURE — 97140 MANUAL THERAPY 1/> REGIONS: CPT | Mod: PN

## 2019-08-16 NOTE — PROGRESS NOTES
"  Physical Therapy Daily Treatment Note     Name: Herman GUERRERO [facility] Number: [id number]    Therapy Diagnosis:   Encounter Diagnoses   Name Primary?    Chronic ankle pain, bilateral     Impaired gait and mobility     Decreased range of motion of right ankle      Physician: Deejay Mitchell MD    Visit Date: 8/16/2019    Physician Orders: PT Eval and Treat: R LE WBAT, advance slowly  Medical Diagnosis: S82.871A (ICD-10-CM) - Pilon fracture of right tibia  Evaluation Date: 2/6/2019  Authorization Period Expiration: 9/30/2019  Plan of Care Certification Period:7/30/2019 to 9/27/2019  Visit #: 32  Visits authorized: 8/12  FOTO: 8/10     Time In: 1000   Time Out: 1055  Total Billable Time: 55 minutes       Precautions: Standard and Weightbearing    Subjective     Pt reports: wearing compression stocking caused increased pain especially in ankle.  "I t was so tight I could see the plate in my ankle. I had to take it off - I couldn't stand it."  States he is going back to [employer] today to exchange it.  He was compliant with home exercise program.  Response to previous treatment: no adverse reactions  Functional change: none    Pain: 2-3/10  Location: R heel    Objective     Herman received the following manual therapy techniques: were applied to the: R foot, ankle and lower leg for 20 minutes, including:    Retrograde edema reduction massage  STM/MFR to R ankle and foot       Herman received therapeutic exercises to develop strength, endurance, ROM and flexibility for 20 minutes including:     Ankle circles: x40 R clockwise and counterclockwise  Ankle AROM: x30 R each  Heel raises 3x10  Calf stretch on fitter:  20"x3     Herman participated in gait training to improve functional mobility and safety for 15 minutes, including:  Treadmill walking forwards at 0.6 MPH x 2'  Treadmill walking side stepping at 0.4 MPH x 1' B  Treadmill walking backwards at 0.4 MPH x 1'  Ambulated in and around gym with quad cane x 150' " "    Home Exercises Provided and Patient Education Provided     Education provided:   - Continue HEP.    Written Home Exercises Provided: Not today.  Exercises were reviewed and Herman was able to demonstrate them prior to the end of the session.  Herman demonstrated good  understanding of the education provided.     See EMR under Patient Instructions for exercises provided 8/6/19.    Assessment     Patient able to resume gait training on treadmill today, but decreased speed time at his request.  "That's all I can do" as well as increased activity with therex with reports of pain"about the same" after treatment.    Herman is progressing well towards his goals.   Pt prognosis is Fair.   Pt will continue to benefit from skilled outpatient physical therapy to address the deficits listed in the problem list box on initial evaluation, provide pt/family education and to maximize pt's level of independence in the home and community environment.     Pt's spiritual, cultural and educational needs considered and pt agreeable to plan of care and goals.  Anticipated barriers to physical therapy: hardware limiting ankle ROM    Goals:   Short Term Goals (4 Weeks):   3. Pt will walk with quad cane and rocker boot with minimal deficits or pain to improve functional QOL PROGRESSING, NOT MET 08/16/2019   Long Term Goals (12 Weeks):   1. Pt will improve FOTO score to <30% limited to decrease perceived limitation with mobility PROGRESSING, NOT MET 08/16/2019   4. Pt will walk with single point cane and rocker boot with minimal L trunk lean to promote return to work. PROGRESSING, NOT MET 08/16/2019     Plan     Continue PT per POC to progress towards established goals.  Progress with gait training on treadmill as able.     Stephanie Orosco, PTA   "

## 2019-08-20 ENCOUNTER — TELEPHONE (OUTPATIENT)
Dept: CARDIOLOGY | Facility: CLINIC | Age: 49
End: 2019-08-20

## 2019-08-20 ENCOUNTER — CLINICAL SUPPORT (OUTPATIENT)
Dept: REHABILITATION | Facility: HOSPITAL | Age: 49
End: 2019-08-20
Attending: ORTHOPAEDIC SURGERY
Payer: OTHER MISCELLANEOUS

## 2019-08-20 DIAGNOSIS — M25.572 CHRONIC ANKLE PAIN, BILATERAL: ICD-10-CM

## 2019-08-20 DIAGNOSIS — M25.571 CHRONIC ANKLE PAIN, BILATERAL: ICD-10-CM

## 2019-08-20 DIAGNOSIS — R26.89 IMPAIRED GAIT AND MOBILITY: ICD-10-CM

## 2019-08-20 DIAGNOSIS — M25.671 DECREASED RANGE OF MOTION OF RIGHT ANKLE: ICD-10-CM

## 2019-08-20 DIAGNOSIS — G89.29 CHRONIC ANKLE PAIN, BILATERAL: ICD-10-CM

## 2019-08-20 PROCEDURE — 97116 GAIT TRAINING THERAPY: CPT | Mod: PN

## 2019-08-20 PROCEDURE — 97110 THERAPEUTIC EXERCISES: CPT | Mod: PN

## 2019-08-20 NOTE — TELEPHONE ENCOUNTER
----- Message from Paris Yañez sent at 8/19/2019  4:58 PM CDT -----  Contact: [phone]  Patient requesting to be seen sooner for a f/u, states next available appt does not work. Please call and advise.

## 2019-08-20 NOTE — PROGRESS NOTES
"  Physical Therapy Daily Treatment Note     Name: Herman GUERRERO [facility] Number: [id number]    Therapy Diagnosis:   Encounter Diagnoses   Name Primary?    Chronic ankle pain, bilateral     Impaired gait and mobility     Decreased range of motion of right ankle      Physician: Deejay Mitchell MD    Visit Date: 8/20/2019    Physician Orders: PT Eval and Treat: R LE WBAT, advance slowly  Medical Diagnosis: S82.871A (ICD-10-CM) - Pilon fracture of right tibia  Evaluation Date: 2/6/2019  Authorization Period Expiration: 9/30/2019  Plan of Care Certification Period:7/30/2019 to 9/27/2019  Visit #: 33  Visits authorized: 9/12  FOTO: 9/10     Time In: 1106  Time Out: 1200  Total Billable Time: 54 minutes       Precautions: Standard and Weightbearing    Subjective     Pt reports: went back to [employer] and had compression garments switched; drastic reduction in swelling  He was compliant with home exercise program.  Response to previous treatment: decreased pain and swelling  Functional change: improved tolerance to standing and walking    Pain: 3-4/10 at start of session  Location: R lateral malleolus, anterior talocrural joint line, and anteromedial lower leg    Objective     Herman received therapeutic exercises to develop strength, endurance, ROM and flexibility for 44 minutes including:    Chair:  BAPs: Level 1, 4-way talocrural motion through comfortable range x20 R; verbal cues to avoid pain and hip motion  Ankle circles: clockwise and counterclockwise x10 each R through comfortable range  Trunk flexion and sidebend c/ Swiss ball: x10 each to decrease low back pain    Mat:  Long arc quads: GTB 2x10 R; adjusted to 15# ankle weight x10 reps for improved quad activation    Standing:  Gastroc stretch via lunge (NOT ON FITTER/STEP): 3x30" B  Marching: GTB 2x10 R  Hip abduction: GTB 2x10 R  Hip extension: GTB 3x10 R with verbal cues to perform through limited range with toe tap for glute activation on last set; reports of " increased low back pain on first 2 sets  Hamstring curls: 15# ankle weight x10 R     Herman participated in gait training to improve functional mobility and safety for 10 minutes, including:  Side stepping in // without UE support, x6 laps x 8 feet (48 feet leading with R LE and 48 feet leading with L LE  Backwards followed by forwards walking in // without UE support, x6 laps x 8 feet (48 feet forwards and 48 feet backwards)    Home Exercises Provided and Patient Education Provided     Education provided:   - Continue HEP.    Written Home Exercises Provided: Not today.  Exercises were reviewed and Herman was able to demonstrate them prior to the end of the session.  Herman demonstrated good  understanding of the education provided.     See EMR under Patient Instructions for exercises provided 8/6/19.    Assessment     Mechanics of and confidence in gait improved on level surface opposed to treadmill; able to perform all without UE support. Initial pain with side stepping leading to the R however improved on 2nd lap. Low back pain with resisted hip extension; alleviated with trunk flexion and side bend stretching. Fatigued with resisted hip exercises, long arc quads, and hamstring curls. Would benefit from incorporation of more functional strengthening in closed chain.      Herman is progressing well towards his goals.   Pt prognosis is Fair.   Pt will continue to benefit from skilled outpatient physical therapy to address the deficits listed in the problem list box on initial evaluation, provide pt/family education and to maximize pt's level of independence in the home and community environment.     Pt's spiritual, cultural and educational needs considered and pt agreeable to plan of care and goals.  Anticipated barriers to physical therapy: hardware limiting ankle ROM    Goals:   Short Term Goals (4 Weeks):   3. Pt will walk with quad cane and rocker boot with minimal deficits or pain to improve functional  QOL PROGRESSING, NOT MET 08/20/2019   Long Term Goals (12 Weeks):   1. Pt will improve FOTO score to <30% limited to decrease perceived limitation with mobility PROGRESSING, NOT MET 08/20/2019   4. Pt will walk with single point cane and rocker boot with minimal L trunk lean to promote return to work. PROGRESSING, NOT MET 08/20/2019     Plan     Continue gait training. Add mini squats, sit<>stands, lunges, and step ups.     Chiqui June, PT

## 2019-08-22 ENCOUNTER — CLINICAL SUPPORT (OUTPATIENT)
Dept: REHABILITATION | Facility: HOSPITAL | Age: 49
End: 2019-08-22
Payer: OTHER MISCELLANEOUS

## 2019-08-22 DIAGNOSIS — M25.572 CHRONIC ANKLE PAIN, BILATERAL: ICD-10-CM

## 2019-08-22 DIAGNOSIS — M25.571 CHRONIC ANKLE PAIN, BILATERAL: ICD-10-CM

## 2019-08-22 DIAGNOSIS — R26.89 IMPAIRED GAIT AND MOBILITY: ICD-10-CM

## 2019-08-22 DIAGNOSIS — M25.671 DECREASED RANGE OF MOTION OF RIGHT ANKLE: ICD-10-CM

## 2019-08-22 DIAGNOSIS — G89.29 CHRONIC ANKLE PAIN, BILATERAL: ICD-10-CM

## 2019-08-22 PROCEDURE — 97116 GAIT TRAINING THERAPY: CPT | Mod: PN

## 2019-08-22 PROCEDURE — 97110 THERAPEUTIC EXERCISES: CPT | Mod: PN

## 2019-08-22 NOTE — PROGRESS NOTES
"  Physical Therapy Daily Treatment Note     Name: Herman GUERRERO [facility] Number: [id number]    Therapy Diagnosis:   No diagnosis found.  Physician: Deejay Mitchell MD    Visit Date: 8/22/2019    Physician Orders: PT Eval and Treat: R LE PENELOPEAT, advance slowly  Medical Diagnosis: S82.871A (ICD-10-CM) - Pilon fracture of right tibia  Evaluation Date: 2/6/2019  Authorization Period Expiration: 9/30/2019  Plan of Care Certification Period:7/30/2019 to 9/27/2019  Visit #: 33  Visits authorized: 10/12  FOTO: 10/10 done     Time In: ***  Time Out: ***  Total Billable Time: *** minutes       Precautions: Standard and Weightbearing    Subjective     Pt reports: went back to [employer] and had compression garments switched; drastic reduction in swelling  He was compliant with home exercise program.  Response to previous treatment: decreased pain and swelling  Functional change: improved tolerance to standing and walking    Pain: ***/10 at start of session  Location: R lateral malleolus, anterior talocrural joint line, and anteromedial lower leg    Objective     Herman received therapeutic exercises to develop strength, endurance, ROM and flexibility for *** minutes including:    Chair:  BAPs: Level 1, 4-way talocrural motion through comfortable range x20 R; verbal cues to avoid pain and hip motion  Ankle circles: clockwise and counterclockwise x10 each R through comfortable range  Trunk flexion and sidebend c/ Swiss ball: x10 each to decrease low back pain    Mat:  Long arc quads: GTB 2x10 R; adjusted to 15# ankle weight x10 reps for improved quad activation    Standing:  Gastroc stretch via lunge (NOT ON FITTER/STEP): 3x30" B  Marching: GTB 2x10 R  Hip abduction: GTB 2x10 R  Hip extension: GTB 3x10 R with verbal cues to perform through limited range with toe tap for glute activation on last set; reports of increased low back pain on first 2 sets  Hamstring curls: 15# ankle weight x10 R     Herman participated in gait training to " improve functional mobility and safety for *** minutes, including:  Side stepping in // without UE support, x6 laps x 8 feet (48 feet leading with R LE and 48 feet leading with L LE  Backwards followed by forwards walking in // without UE support, x6 laps x 8 feet (48 feet forwards and 48 feet backwards)    Home Exercises Provided and Patient Education Provided     Education provided:   - Continue HEP.    Written Home Exercises Provided: Not today.  Exercises were reviewed and Herman was able to demonstrate them prior to the end of the session.  Herman demonstrated good  understanding of the education provided.     See EMR under Patient Instructions for exercises provided 8/6/19.    Assessment     Mechanics of and confidence in gait improved on level surface opposed to treadmill; able to perform all without UE support. Initial pain with side stepping leading to the R however improved on 2nd lap. Low back pain with resisted hip extension; alleviated with trunk flexion and side bend stretching. Fatigued with resisted hip exercises, long arc quads, and hamstring curls. Would benefit from incorporation of more functional strengthening in closed chain.      Herman is progressing well towards his goals.   Pt prognosis is Fair.   Pt will continue to benefit from skilled outpatient physical therapy to address the deficits listed in the problem list box on initial evaluation, provide pt/family education and to maximize pt's level of independence in the home and community environment.     Pt's spiritual, cultural and educational needs considered and pt agreeable to plan of care and goals.  Anticipated barriers to physical therapy: hardware limiting ankle ROM    Goals:   Short Term Goals (4 Weeks):   3. Pt will walk with quad cane and rocker boot with minimal deficits or pain to improve functional QOL PROGRESSING, NOT MET 08/22/2019   Long Term Goals (12 Weeks):   1. Pt will improve FOTO score to <30% limited to decrease  perceived limitation with mobility PROGRESSING, NOT MET 08/22/2019   4. Pt will walk with single point cane and rocker boot with minimal L trunk lean to promote return to work. PROGRESSING, NOT MET 08/22/2019     Plan     Continue gait training. Add mini squats, sit<>stands, lunges, and step ups.     Chiqui June, PT

## 2019-08-22 NOTE — PROGRESS NOTES
"  Physical Therapy Daily Treatment Note     Name: Herman GUERRERO [facility] Number: [id number]    Therapy Diagnosis:   Encounter Diagnoses   Name Primary?    Chronic ankle pain, bilateral     Impaired gait and mobility     Decreased range of motion of right ankle      Physician: Deejay Mitchell MD    Visit Date: 8/22/2019    Physician Orders: PT Eval and Treat: R LE WBAT, advance slowly  Medical Diagnosis: S82.871A (ICD-10-CM) - Pilon fracture of right tibia  Evaluation Date: 2/6/2019  Authorization Period Expiration: 9/30/2019  Plan of Care Certification Period:7/30/2019 to 9/27/2019  Visit #: 33  Visits authorized: 9/12  FOTO: 9/10  NEXT     Time In: 1110  Time Out: 1205  Total Billable Time: 55 minutes  TEx3, Gait x 1     Precautions: Standard and Weightbearing    Subjective     Pt reports: went back to [facility] and had compression garments switched; drastic reduction in swelling  He was compliant with home exercise program.  Response to previous treatment: decreased pain and swelling  Functional change: improved tolerance to standing and walking    Pain: 3-4/10 at start of session  Location: R lateral malleolus, anterior talocrural joint line, and anteromedial lower leg    Objective     Herman received therapeutic exercises to develop strength, endurance, ROM and flexibility for 45 minutes including:    Chair:  BAPs: Level 1, 4-way talocrural motion through comfortable range x 20 R; verbal cues to avoid pain and hip motion  Ankle circles: clockwise and counterclockwise x10 each R through comfortable range  Trunk flexion and sidebend c/ Swiss ball: x10 each to decrease low back pain    Mat:  Long arc quads:  10# ankle weight 3x10 reps for improved quad activation    Standing:  Gastroc stretch via lunge (NOT ON FITTER/STEP): 3x30" B  Marching: GTB 3x10 R  Hip abduction: GTB 3x10 R  Hip extension: GTB 3x10 R with verbal cues to perform through limited range with toe tap for glute activation on each set; reports no " increased low back pain today  Hamstring curls: 10# ankle weight 3x10 R  Mini squats:  Next  Sit <> stand:  Next  Step ups:  Next  Lunges:       Herman participated in gait training to improve functional mobility and safety for 10 minutes, including:  Side stepping in // without UE support, x6 laps x 8 feet (48 feet leading with R LE and 48 feet leading with L LE  Backwards followed by forwards walking in // without UE support, x4 laps x 8 feet (32 feet forwards and 32 feet backwards)  Worked on weight shifting with hold over R in // bars x 10 with B UE assist. Then with left swing x 10 with B UE support, additional 10 with left UE support only. Ambulated around gym with focus on weight shift over right before left swing x 140'    Home Exercises Provided and Patient Education Provided     Education provided:   - Continue HEP.    Written Home Exercises Provided: Not today.  Exercises were reviewed and Herman was able to demonstrate them prior to the end of the session.  Herman demonstrated good  understanding of the education provided.     See EMR under Patient Instructions for exercises provided 8/6/19.    Assessment     Confidence in gait and weight shifting over right improved on level surface when ambulating in // bars and around gym. Initial pain with side stepping leading to the R compared to left, but able to perform.  Less fatigue with resisted hip exercises, long arc quads, and hamstring curls when performed with decreased weight today.  Would benefit from incorporation of more functional strengthening in closed chain.      Herman is progressing well towards his goals.   Pt prognosis is Fair.   Pt will continue to benefit from skilled outpatient physical therapy to address the deficits listed in the problem list box on initial evaluation, provide pt/family education and to maximize pt's level of independence in the home and community environment.     Pt's spiritual, cultural and educational needs considered and  pt agreeable to plan of care and goals.  Anticipated barriers to physical therapy: hardware limiting ankle ROM    Goals:   Short Term Goals (4 Weeks):   3. Pt will walk with quad cane and rocker boot with minimal deficits or pain to improve functional QOL PROGRESSING, NOT MET 08/22/2019   Long Term Goals (12 Weeks):   1. Pt will improve FOTO score to <30% limited to decrease perceived limitation with mobility PROGRESSING, NOT MET 08/22/2019   4. Pt will walk with single point cane and rocker boot with minimal L trunk lean to promote return to work. PROGRESSING, NOT MET 08/22/2019     Plan     Continue gait training. Add mini squats, sit<>stands, lunges, and step ups.     Stephanie Orosco, PTA

## 2019-08-23 ENCOUNTER — CLINICAL SUPPORT (OUTPATIENT)
Dept: REHABILITATION | Facility: HOSPITAL | Age: 49
End: 2019-08-23
Payer: OTHER MISCELLANEOUS

## 2019-08-23 DIAGNOSIS — M25.671 DECREASED RANGE OF MOTION OF RIGHT ANKLE: ICD-10-CM

## 2019-08-23 DIAGNOSIS — R26.89 IMPAIRED GAIT AND MOBILITY: ICD-10-CM

## 2019-08-23 DIAGNOSIS — G89.29 CHRONIC ANKLE PAIN, BILATERAL: ICD-10-CM

## 2019-08-23 DIAGNOSIS — M25.572 CHRONIC ANKLE PAIN, BILATERAL: ICD-10-CM

## 2019-08-23 DIAGNOSIS — M25.571 CHRONIC ANKLE PAIN, BILATERAL: ICD-10-CM

## 2019-08-23 PROCEDURE — 97110 THERAPEUTIC EXERCISES: CPT | Mod: PN

## 2019-08-23 PROCEDURE — 97116 GAIT TRAINING THERAPY: CPT | Mod: PN

## 2019-08-23 NOTE — PROGRESS NOTES
"  Physical Therapy Daily Treatment Note     Name: Herman GUERRERO [facility] Number: [id number]    Therapy Diagnosis:   Encounter Diagnoses   Name Primary?    Chronic ankle pain, bilateral     Impaired gait and mobility     Decreased range of motion of right ankle      Physician: Deejay Mitchell MD    Visit Date: 8/23/2019    Physician Orders: PT Eval and Treat: R LE WBAT, advance slowly  Medical Diagnosis: S82.871A (ICD-10-CM) - Pilon fracture of right tibia  Evaluation Date: 2/6/2019  Authorization Period Expiration: 9/30/2019  Plan of Care Certification Period:7/30/2019 to 9/27/2019  Visit #: 33  Visits authorized: 10/12  FOTO: 10/10  NEXT     Time In: 1000 am  Time Out: 1100 am  Total Billable Time: 40 minutes  TEx2, Gait x 1     Precautions: Standard and Weightbearing    Subjective     Pt reports: He is having some heel/arch soreness since last visit  He was compliant with home exercise program.  Response to previous treatment: heel and arch soreness  Functional change: improved tolerance to standing and walking    Pain: 3-4/10 at start of session  Location: R lateral malleolus, anterior talocrural joint line, and anteromedial lower leg    Objective     Herman received therapeutic exercises to develop strength, endurance, ROM and flexibility for 45 minutes including:    Chair:  BAPs: Level 1, 4-way talocrural motion through comfortable range 2x20 R; verbal cues to avoid pain and hip motion  Ankle circles: clockwise and counterclockwise 2 x10 each R through comfortable range  Trunk flexion and sidebend c/ Swiss ball: x10 each to decrease low back pain    Mat:  Long arc quads:  10# ankle weight 3x10 reps for improved quad activation    Standing:  Gastroc stretch via lunge (NOT ON FITTER/STEP): 3x30" B  Marching: GTB 3x10 R  Hip abduction: GTB 3x10 R  Hip extension: GTB 3x10 R with verbal cues to perform through limited range with toe tap for glute activation on each set; reports no increased low back pain " "today  Hamstring curls: 10# ankle weight 3x10 R  Mini squats:  2x10  Sit <> stand:  Next  Step ups:  Blue 2x10 B  Lunges: 2x10 B       Herman participated in gait training to improve functional mobility and safety for 15 minutes, including:  Side stepping in // without UE support, x6 laps x 8 feet (48 feet leading with R LE and 48 feet leading with L LE  Backwards followed by forwards walking in // without UE support, x6 laps x 8 feet (48 feet forwards and 48 feet backwards)  Worked on weight shifting with hold over R in // bars x 10 with B UE assist. Then with left swing x 10 with B UE support, additional 10 with left UE support only. Ambulated around gym with focus on weight shift over right before left swing 2 x 140'    Home Exercises Provided and Patient Education Provided     Education provided:   - Continue HEP.    Written Home Exercises Provided: Not today.  Exercises were reviewed and Herman was able to demonstrate them prior to the end of the session.  Herman demonstrated good  understanding of the education provided.     See EMR under Patient Instructions for exercises provided 8/6/19.    Assessment     Confidence improvement with weight shifting over right improved on level surface when ambulating in // bars and around gym. No complaints of pain with sidestepping today.  Demonstrates antalgic gait without use of LBQC when ambulating.  Unable to fully weight bear on right without AD.  Less fatigue with resisted hip exercises, long arc quads, and hamstring curls when performed with decreased weight today.  Able to perform increased functional strengthening in closed chain as noted.  Reports "I'm tired now" after treatment, no complaints of increased pain.      Herman is progressing well towards his goals.   Pt prognosis is Fair.   Pt will continue to benefit from skilled outpatient physical therapy to address the deficits listed in the problem list box on initial evaluation, provide pt/family education and to " maximize pt's level of independence in the home and community environment.     Pt's spiritual, cultural and educational needs considered and pt agreeable to plan of care and goals.  Anticipated barriers to physical therapy: hardware limiting ankle ROM    Goals:   Short Term Goals (4 Weeks):   3. Pt will walk with quad cane and rocker boot with minimal deficits or pain to improve functional QOL PROGRESSING, NOT MET 08/23/2019   Long Term Goals (12 Weeks):   1. Pt will improve FOTO score to <30% limited to decrease perceived limitation with mobility PROGRESSING, NOT MET 08/23/2019   4. Pt will walk with single point cane and rocker boot with minimal L trunk lean to promote return to work. PROGRESSING, NOT MET 08/23/2019     Plan     Continue gait training. Add mini squats, sit<>stands, lunges, and step ups.     Stephanie Orosco, PTA

## 2019-08-27 ENCOUNTER — CLINICAL SUPPORT (OUTPATIENT)
Dept: REHABILITATION | Facility: HOSPITAL | Age: 49
End: 2019-08-27
Payer: OTHER MISCELLANEOUS

## 2019-08-27 ENCOUNTER — DOCUMENTATION ONLY (OUTPATIENT)
Dept: REHABILITATION | Facility: HOSPITAL | Age: 49
End: 2019-08-27

## 2019-08-27 DIAGNOSIS — G89.29 CHRONIC ANKLE PAIN, BILATERAL: ICD-10-CM

## 2019-08-27 DIAGNOSIS — R26.89 IMPAIRED GAIT AND MOBILITY: ICD-10-CM

## 2019-08-27 DIAGNOSIS — M25.671 DECREASED RANGE OF MOTION OF RIGHT ANKLE: ICD-10-CM

## 2019-08-27 DIAGNOSIS — M25.572 CHRONIC ANKLE PAIN, BILATERAL: ICD-10-CM

## 2019-08-27 DIAGNOSIS — M25.571 CHRONIC ANKLE PAIN, BILATERAL: ICD-10-CM

## 2019-08-27 PROCEDURE — 97110 THERAPEUTIC EXERCISES: CPT | Mod: PN

## 2019-08-27 PROCEDURE — 97116 GAIT TRAINING THERAPY: CPT | Mod: PN

## 2019-08-27 PROCEDURE — 97112 NEUROMUSCULAR REEDUCATION: CPT | Mod: PN

## 2019-08-27 NOTE — PROGRESS NOTES
Face to Face PTA Conference performed with the following regarding patient's current status, overall progress, and plan of care:  Stephanie Orosco PTA, Juanita Manzano PTA and Jc Sol PTA  Chiqui June, PT    Juanita Manzano, PTA     Jc Sol, PTA     Stephanie Orosco, PTA

## 2019-08-27 NOTE — PROGRESS NOTES
Physical Therapy Daily Treatment Note     Name: Herman GUERRERO [facility] Number: [id number]    Therapy Diagnosis:   Encounter Diagnoses   Name Primary?    Chronic ankle pain, bilateral     Impaired gait and mobility     Decreased range of motion of right ankle      Physician: Deejay Mitchell MD    Visit Date: 8/27/2019    Physician Orders: PT Eval and Treat: R LE WBAT, advance slowly  Medical Diagnosis: S82.871A (ICD-10-CM) - Pilon fracture of right tibia  Evaluation Date: 2/6/2019  Authorization Period Expiration: 9/30/2019  Plan of Care Certification Period:7/30/2019 to 9/27/2019  Visit #: 34  Visits authorized: 11/12  FOTO: 11/10  done     Time In: 1010  Time Out: 1204  Total Billable Time: 54 minutes     Precautions: Standard and Weightbearing    Subjective     Pt reports: saw an ankle specialist yesterday. She discussed removing garcia from fibula and putting a plate at talocrural joint line to restore motion vs. ankle replacement surgery. Patient reports she informed him the fibula had not healed to the point of connecting to the garcia.  Tentative CAT scan this/next week to decide what is best. In the meantime, patient reports the specialist wants him to work on progressing gait to without AD.   He was compliant with home exercise program.  Response to previous treatment: soreness  Functional change: comfortable walking with cane     Pain: 4-5/10 at start of session, described as sharp  Location: R lateral malleolus    Objective     Herman participated in gait training to improve functional mobility and safety for 24 minutes, including:  Level walking in gym with quad cane using 2 point pattern for 140 feet  Side stepping in // without UE support, 6 laps x 8 feet (48 feet leading with R LE and 48 feet leading with L LE)  Forwards walking in // without UE support, 6 laps x 8 feet (96 feet); additional 3 laps after level SPC walking (48 feet)  Level walking in gym with single point cane using 2 point pattern for  "280 feet    Remainder of session, patient ambulated with SPC.    Herman received therapeutic exercises to develop strength, endurance, ROM and flexibility for 10 minutes including objective measurements:    Gait analysis with quad cane: WFL  Gait analysis with SPC: limp favoring L LE; minimal L trunk lean at L heel strike  Gait analysis without AD: limp favoring L LE; increased intensity of L heel strike accompanied by L lateral trunk lean and R shoulder abduction compared to that with SPC . Facial grimace with transition from stance to swing    CMS Impairment/Limitation/Restriction for FOTO Lower Leg without Knee Survey    Therapist reviewed FOTO scores for Herman Floyd on 2019.   FOTO documents entered into [employer] - see Media section.    Limitation Score: 52%  Category: Mobility     Cybex leg press: 6.0 plates 2x10 double leg    Herman participated in neuromuscular re-education activities to improve: Balance and Proprioception for 20 minutes. The following activities were included:  Lateral lunges: 2x10 R with unilateral UE support  Single leg standin" hold x 15 reps R without UE support; 3x30" R with B UE support, verbal cues to shift weight fully over R LE  Squats with equal WB through LE: x10 with tactile cues to promote increased R knee flexion for equal     Home Exercises Provided and Patient Education Provided     Education provided:   - Continue HEP.  - Purchase single point cane for household ambulation    Written Home Exercises Provided: Not today.  Exercises were reviewed and Herman was able to demonstrate them prior to the end of the session.  Herman demonstrated good  understanding of the education provided.     See EMR under Patient Instructions for exercises provided 19.    Assessment     Patient with history of R fibular pilon fracture. Multiple surgeries to address and discussion of future surgeries to promote full fibular bone healing and restoration of ankle joint motion. Hardware " placement extends through both talocrural and subtalar joints, preventing interventions to improve ankle ROM. Strengthening exercises are indirectly affected secondary to ROM restrictions. As a result, interventions are limited. However, patient has shown improvements since reassessment in gait with quad cane and today with single point cane; meeting both short and long term goals set. Gait without AD antalgic and with consistent pain; likely related to increased dorsiflexion requirement in terminal stance/preswing with less ability to distribute load through assistive device. Prognosis is in question pending decision to go forward with an additional surgery to promote bone healing and restore motion. Patient would benefit from continued therapy to improve gait without AD and increase overall LE strength for functional mobility.      Herman is progressing well towards his goals.   Pt prognosis is Fair.   Pt will continue to benefit from skilled outpatient physical therapy to address the deficits listed in the problem list box on initial evaluation, provide pt/family education and to maximize pt's level of independence in the home and community environment.     Pt's spiritual, cultural and educational needs considered and pt agreeable to plan of care and goals.  Anticipated barriers to physical therapy: hardware limiting ankle ROM    Goals:   Short Term Goals (4 Weeks):   3. Pt will walk with quad cane and rocker boot with minimal deficits or pain to improve functional QOL MET  Long Term Goals (12 Weeks):   1. Pt will improve FOTO score to <30% limited to decrease perceived limitation with mobility PROGRESSING, NOT MET 08/27/2019   4. Pt will walk with single point cane and rocker boot with minimal L trunk lean to promote return to work. MET adjust to without AD and minimal increase in intensity of L heel strike accompanied by L lateral trunk lean and R shoulder abduction    Plan     Gait train outdoors with  "SPC/indoors without AD. Add single leg leg press, Cybex steamboats, and step ups on 8" step next.     Chiqui June, PT   "

## 2019-08-30 ENCOUNTER — CLINICAL SUPPORT (OUTPATIENT)
Dept: REHABILITATION | Facility: HOSPITAL | Age: 49
End: 2019-08-30
Attending: ORTHOPAEDIC SURGERY
Payer: OTHER MISCELLANEOUS

## 2019-08-30 DIAGNOSIS — M25.571 CHRONIC ANKLE PAIN, BILATERAL: ICD-10-CM

## 2019-08-30 DIAGNOSIS — M25.671 DECREASED RANGE OF MOTION OF RIGHT ANKLE: ICD-10-CM

## 2019-08-30 DIAGNOSIS — R26.89 IMPAIRED GAIT AND MOBILITY: ICD-10-CM

## 2019-08-30 DIAGNOSIS — G89.29 CHRONIC ANKLE PAIN, BILATERAL: ICD-10-CM

## 2019-08-30 DIAGNOSIS — M25.572 CHRONIC ANKLE PAIN, BILATERAL: ICD-10-CM

## 2019-08-30 PROCEDURE — 97110 THERAPEUTIC EXERCISES: CPT | Mod: PN

## 2019-08-30 PROCEDURE — 97116 GAIT TRAINING THERAPY: CPT | Mod: PN

## 2019-08-30 PROCEDURE — 97112 NEUROMUSCULAR REEDUCATION: CPT | Mod: PN

## 2019-08-30 NOTE — PROGRESS NOTES
"  Physical Therapy Daily Treatment Note     Name: Herman GUERRERO [place]  Clinic Number: [id number]    Therapy Diagnosis:   Encounter Diagnoses   Name Primary?    Chronic ankle pain, bilateral     Impaired gait and mobility     Decreased range of motion of right ankle      Physician: Deejay Mitchell MD    Visit Date: 8/30/2019    Physician Orders: PT Eval and Treat: R LE WBAT, advance slowly  Medical Diagnosis: S82.871A (ICD-10-CM) - Pilon fracture of right tibia  Evaluation Date: 2/6/2019  Authorization Period Expiration: 9/30/2019  Plan of Care Certification Period:7/30/2019 to 9/27/2019  Visit #: 34  Visits authorized: TBD/12  FOTO: 2/10     Time In: 1111  Time Out: 1205  Total Billable Time: 54 minutes     Precautions: Standard and Weightbearing    Subjective     Pt reports: increased swelling today. Has been on his feet more the last couple of days.  He was compliant with home exercise program.  Response to previous treatment: soreness.   Functional change: quad cane feels heavier now that he has gait trained with single point cane.    Pain: 4-5/10 at start of session, described as sharp  Location: R lateral malleolus    Objective     Patient presents to clinic with quad cane.     Herman participated in gait training to improve functional mobility and safety for 19 minutes, including:  Level walking in gym with single point cane using 2 point pattern for 280 feet. Modified independent  Unlevel walking outdoors with single point cane using 2 point pattern for 200 feet; including: uneven brick and concrete, up and down 6" curb x3 trials. Curb ascent and descent both leg with R LE; stand by assistance. Brick and concrete navigation supervised.     Herman received therapeutic exercises to develop strength, endurance, ROM and flexibility for 35 minutes including objective measurements:    Cybex leg press: 6.0 plates 2x10 double leg; 3.0 plates 2x10 R  Cybex steamboats: 2.0 plates 2x10 B  Forward step ups: 6" step x20 R " "with unilateral UE support    Herman participated in neuromuscular re-education activities to improve: Balance and Proprioception for 10 minutes. The following activities were included:  Lateral lunges: 2x10 R with unilateral UE support  Single leg standin" hold x 15 reps R without UE support; 3x30" R with B UE support, verbal cues to shift weight fully over R LE  Squats with equal WB through LE: out of time    Home Exercises Provided and Patient Education Provided     Education provided:   - Continue HEP.  - Purchase single point cane for household ambulation.    Written Home Exercises Provided: Not today.  Exercises were reviewed and Herman was able to demonstrate them prior to the end of the session.  Herman demonstrated good  understanding of the education provided.     See EMR under Patient Instructions for exercises provided 19.    Assessment     Initial pain accompanied by facial grimace with gait using SPC. Possibly related to performance at end of session, especially following addition of therapeutic exercises. Fear avoidance with curb descent; mild unsteadiness in addition, warranting stand by assistance.      Herman is progressing well towards his goals.   Pt prognosis is Fair.   Pt will continue to benefit from skilled outpatient physical therapy to address the deficits listed in the problem list box on initial evaluation, provide pt/family education and to maximize pt's level of independence in the home and community environment.     Pt's spiritual, cultural and educational needs considered and pt agreeable to plan of care and goals.  Anticipated barriers to physical therapy: hardware limiting ankle ROM    Goals:   Short Term Goals (4 Weeks):   3. Pt will walk with quad cane and rocker boot with minimal deficits or pain to improve functional QOL MET  Long Term Goals (12 Weeks):   1. Pt will improve FOTO score to <30% limited to decrease perceived limitation with mobility PROGRESSING, NOT MET " 08/30/2019   4. Pt will walk with single point cane and rocker boot with minimal L trunk lean to promote return to work. MET adjust to without AD and minimal increase in intensity of L heel strike accompanied by L lateral trunk lean and R shoulder abduction    Plan     Gait train outdoors with SPC; indoors without AD.     Chiqui June, PT

## 2019-09-03 ENCOUNTER — CLINICAL SUPPORT (OUTPATIENT)
Dept: REHABILITATION | Facility: HOSPITAL | Age: 49
End: 2019-09-03
Attending: ORTHOPAEDIC SURGERY
Payer: OTHER MISCELLANEOUS

## 2019-09-03 DIAGNOSIS — R26.89 IMPAIRED GAIT AND MOBILITY: ICD-10-CM

## 2019-09-03 DIAGNOSIS — M25.671 DECREASED RANGE OF MOTION OF RIGHT ANKLE: ICD-10-CM

## 2019-09-03 DIAGNOSIS — M25.571 CHRONIC ANKLE PAIN, BILATERAL: ICD-10-CM

## 2019-09-03 DIAGNOSIS — G89.29 CHRONIC ANKLE PAIN, BILATERAL: ICD-10-CM

## 2019-09-03 DIAGNOSIS — M25.572 CHRONIC ANKLE PAIN, BILATERAL: ICD-10-CM

## 2019-09-03 PROCEDURE — 97112 NEUROMUSCULAR REEDUCATION: CPT | Mod: PN

## 2019-09-03 PROCEDURE — 97110 THERAPEUTIC EXERCISES: CPT | Mod: PN

## 2019-09-03 NOTE — PROGRESS NOTES
"  Physical Therapy Daily Treatment Note     Name: Herman GUERRERO [facility] Number: [id number]    Therapy Diagnosis:   Encounter Diagnoses   Name Primary?    Chronic ankle pain, bilateral     Impaired gait and mobility     Decreased range of motion of right ankle      Physician: Deejay Mitchell MD    Visit Date: 9/3/2019    Physician Orders: PT Eval and Treat: R LE WBAT, advance slowly  Medical Diagnosis: S82.871A (ICD-10-CM) - Pilon fracture of right tibia  Evaluation Date: 2/6/2019  Authorization Period Expiration: 9/30/2019  Plan of Care Certification Period:7/30/2019 to 9/27/2019  Visit #: 35  Visits authorized: TBD/12  FOTO: 3/10  PTA visit: 1/6     Time In: 1400  Time Out: 1455  Total Billable Time: 30 minutes (1 NMR, 1 TE)     Precautions: Standard and Weightbearing      Subjective     Pt reports: he feels better today than last therapy session.  Less swelling.  Pt has purchased his own SPC and brought to therapy session  He was compliant with home exercise program.  Response to previous treatment: no adverse reaction  Functional change: none, still ambulating with quad cane    Pain: 3/10  Location: right ankles     Objective     Herman participated in gait training to improve functional mobility and safety for 15 minutes, including:  Level walking in gym with single point cane using 2 point pattern for 280 feet. Modified independent  Unlevel walking outdoors with single point cane using 2 point pattern for 200 feet; including: uneven brick and concrete, up and down 6" curb x3 trials. Curb ascent and descent both leg with R LE; stand by assistance. Brick and concrete navigation supervised.     Herman received therapeutic exercises to develop strength, endurance and ROM for 30 minutes including:    Cybex leg press: 6.0 plates 2x10 double leg; 3.0 plates 2x10 R  Cybex steamboats: 2.0 plates 2x10 B  Forward step ups: 6" step x20 R with unilateral UE support    Herman participated in neuromuscular re-education " "activities to improve: Balance and Proprioception for 10 minutes. The following activities were included:    Lateral lunges: 2x10 R with unilateral UE support  Single leg standin" hold x 15 reps R without UE support; 3x30" R with B UE support, verbal cues to shift weight fully over R LE  Squats with equal WB through LE: out of time    Home Exercises Provided and Patient Education Provided     Education provided:   - cont HEP regularly  - ambulate with QC until otherwise told by PT    Written Home Exercises Provided: Patient instructed to cont prior HEP.  Exercises were reviewed and Herman was able to demonstrate them prior to the end of the session.  Herman demonstrated good  understanding of the education provided.     See EMR under Patient Instructions for exercises provided 19.      Assessment     Pt tolerates therapy activities without difficulties.  Still requires encouragement to perform trial stepping on/off curb with max verbal cues for correct technique.  Denies increase in pain upon completion of therapy session  Herman is progressing well towards his goals.   Pt prognosis is Fair.     Pt will continue to benefit from skilled outpatient physical therapy to address the deficits listed in the problem list box on initial evaluation, provide pt/family education and to maximize pt's level of independence in the home and community environment.     Pt's spiritual, cultural and educational needs considered and pt agreeable to plan of care and goals.    Anticipated barriers to physical therapy: hardware limiting ankle ROM    Goals:   Short Term Goals (4 Weeks):   3. Pt will walk with quad cane and rocker boot with minimal deficits or pain to improve functional QOL MET  Long Term Goals (12 Weeks):   1. Pt will improve FOTO score to <30% limited to decrease perceived limitation with mobility PROGRESSING, NOT MET 2019   4. Pt will walk with single point cane and rocker boot with minimal L trunk lean " to promote return to work. MET adjust to without AD and minimal increase in intensity of L heel strike accompanied by L lateral trunk lean and R shoulder abduction    Plan     Cont POC to progress towards established goals    Juanita Manzano, PTA

## 2019-09-05 ENCOUNTER — CLINICAL SUPPORT (OUTPATIENT)
Dept: REHABILITATION | Facility: HOSPITAL | Age: 49
End: 2019-09-05
Payer: OTHER MISCELLANEOUS

## 2019-09-05 DIAGNOSIS — M25.571 CHRONIC ANKLE PAIN, BILATERAL: ICD-10-CM

## 2019-09-05 DIAGNOSIS — R26.89 IMPAIRED GAIT AND MOBILITY: ICD-10-CM

## 2019-09-05 DIAGNOSIS — M25.572 CHRONIC ANKLE PAIN, BILATERAL: ICD-10-CM

## 2019-09-05 DIAGNOSIS — G89.29 CHRONIC ANKLE PAIN, BILATERAL: ICD-10-CM

## 2019-09-05 DIAGNOSIS — M25.671 DECREASED RANGE OF MOTION OF RIGHT ANKLE: ICD-10-CM

## 2019-09-05 PROCEDURE — 97110 THERAPEUTIC EXERCISES: CPT | Mod: PN

## 2019-09-05 PROCEDURE — 97112 NEUROMUSCULAR REEDUCATION: CPT | Mod: PN

## 2019-09-05 PROCEDURE — 97116 GAIT TRAINING THERAPY: CPT | Mod: PN

## 2019-09-05 NOTE — PROGRESS NOTES
"  Physical Therapy Daily Treatment Note     Name: Herman GUERRERO [facility] Number: [id number]    Therapy Diagnosis:   Encounter Diagnoses   Name Primary?    Chronic ankle pain, bilateral     Impaired gait and mobility     Decreased range of motion of right ankle      Physician: Deejay Mitchell MD    Visit Date: 9/5/2019    Physician Orders: PT Eval and Treat: R LE WBAT, advance slowly  Medical Diagnosis: S82.871A (ICD-10-CM) - Pilon fracture of right tibia  Evaluation Date: 2/6/2019  Authorization Period Expiration: 9/30/2019  Plan of Care Certification Period:7/30/2019 to 9/27/2019  Visit #: 36  Visits authorized: 3/12  FOTO: 4/10     Time In: 1005  Time Out: 1100  Total Billable Time: 55 minutes     Precautions: Standard and Weightbearing    Subjective     Pt reports: ice pack application decreases pain and swelling.  He was compliant with home exercise program.  Response to previous treatment: soreness  Functional change: increasing comfort with walking using SPC.    Pain: 2/10  Location: R ankle    Objective     Herman participated in gait training to improve functional mobility and safety for 25 minutes, including assessment:  Level walking in gym with single point cane using 2 point pattern for 280 feet. Modified independent.   Unlevel walking outdoors with single point cane using 2 point pattern for 300 feet; including: uneven brick and concrete, up and down 6" curb x5 trials. Curb ascent and descent both leg with R LE; stand by assistance to supervision. Brick and concrete navigation supervised.   Level walking without UE support, 3 laps x 10 feet (60 feet total) in // bars. Additional trial of 3 laps x 10 feet (60 feet total) after side stepping. Supervised  Side stepping without UE support, 3 laps x 10 feet. Supervised    Herman received therapeutic exercises to develop strength, endurance and ROM for 15 minutes including:    Seated heel prop with ankle pumps: x5' after gait training    3-way step down: " "6" step x10 L (stance on L LE, descent on R). Heel taps with lateral and forward step downs, toe taps on backward step downs  Cybex steamboats: 2.5 plates 2x10 B    Herman participated in neuromuscular re-education activities to improve: Balance and Proprioception for 15 minutes. The following activities were included:    Mini lateral lunges: 2x10 R without UE support  Single leg standin-2" hold x 15 reps R without UE support  Squats with equal WB through LE: 2x10    Home Exercises Provided and Patient Education Provided     Education provided:   - Continue icing and elevating R LE to address pain and swelling.  - Ambulate with SPC in- and outdoors    Written Home Exercises Provided: Not today.  Exercises were reviewed and Herman was able to demonstrate them prior to the end of the session.  Herman demonstrated good  understanding of the education provided.     See EMR under Patient Instructions for exercises provided 2019.    Assessment     Fair-good outdoor ambulation with SPC; step downs added to improve strength in L LE for curb descent. Grimace continuously in R stance; decreased on second trial. Ankle pumps added with heel prop to address increased R ankle pain after gait training and NMR performance.     Herman is progressing well towards his goals.   Pt prognosis is Fair.   Pt will continue to benefit from skilled outpatient physical therapy to address the deficits listed in the problem list box on initial evaluation, provide pt/family education and to maximize pt's level of independence in the home and community environment.     Pt's spiritual, cultural and educational needs considered and pt agreeable to plan of care and goals.  Anticipated barriers to physical therapy: hardware limiting ankle ROM    Goals:   Long Term Goals (6 Weeks):   1. Pt will improve FOTO score to <30% limited to decrease perceived limitation with mobility PROGRESSING, NOT MET 2019   4. Pt will walk with without AD and " minimal increase in intensity of L heel strike accompanied by L lateral trunk lean and R shoulder abduction to promote return to work. PROGRESSING, NOT MET 9/5/2019     Plan     Continue gait training-SPC outdoors and no AD indoors. Monitor pain throughout session     Chiqui June, PT

## 2019-09-10 ENCOUNTER — CLINICAL SUPPORT (OUTPATIENT)
Dept: REHABILITATION | Facility: HOSPITAL | Age: 49
End: 2019-09-10
Payer: OTHER MISCELLANEOUS

## 2019-09-10 DIAGNOSIS — M25.671 DECREASED RANGE OF MOTION OF RIGHT ANKLE: ICD-10-CM

## 2019-09-10 DIAGNOSIS — M25.572 CHRONIC ANKLE PAIN, BILATERAL: ICD-10-CM

## 2019-09-10 DIAGNOSIS — M25.571 CHRONIC ANKLE PAIN, BILATERAL: ICD-10-CM

## 2019-09-10 DIAGNOSIS — R26.89 IMPAIRED GAIT AND MOBILITY: ICD-10-CM

## 2019-09-10 DIAGNOSIS — G89.29 CHRONIC ANKLE PAIN, BILATERAL: ICD-10-CM

## 2019-09-10 PROCEDURE — 97110 THERAPEUTIC EXERCISES: CPT | Mod: PN

## 2019-09-10 PROCEDURE — 97112 NEUROMUSCULAR REEDUCATION: CPT | Mod: PN

## 2019-09-10 PROCEDURE — 97116 GAIT TRAINING THERAPY: CPT | Mod: PN

## 2019-09-10 NOTE — PROGRESS NOTES
"  Physical Therapy Daily Treatment Note     Name: Herman GUERRERO [facility] Number: [id number]    Therapy Diagnosis:   Encounter Diagnoses   Name Primary?    Chronic ankle pain, bilateral     Impaired gait and mobility     Decreased range of motion of right ankle      Physician: Deejay Mitchell MD    Visit Date: 9/10/2019    Physician Orders: PT Eval and Treat: R LE WBAT, advance slowly  Medical Diagnosis: S82.871A (ICD-10-CM) - Pilon fracture of right tibia  Evaluation Date: 2/6/2019  Authorization Period Expiration: 9/30/2019  Plan of Care Certification Period:7/30/2019 to 9/27/2019  Visit #: 37  Visits authorized: 4/12  FOTO: 5/10     Time In: 1013  Time Out: 1050  Total Billable Time: 37 minutes     Precautions: Standard and Weightbearing    Subjective     Pt reports: increased pain and swelling with lateral lunge performance last visit. Patient reports he was late to session secondary to increased pain with increased waking from attending [employer] game last night  He was compliant with home exercise program.  Response to previous treatment: pain and swelling  Functional change: continued ease with walking using SPC.    Pain: 2/10  Location: R ankle    Objective     Herman participated in gait training to improve functional mobility and safety for 12 minutes, including assessment:    Level walking in gym with single point cane using 2 point pattern for 220 feet. Modified independent.   Unlevel walking up and down 6" curb x 8 trials with SPC. Additional 3 trials without AD. Curb ascent and descent both leg with R LE. Supervision.   Level walking in gym without AD for 220 feet. Modified independent. Initial increase in intensity of L heel strike accompanied by L lateral trunk lean and R shoulder abduction, reduced towards end of trial.     Herman received therapeutic exercises to develop strength, endurance and ROM for 15 minutes including:    3-way step down: 6" step x10 L (stance on L LE, descent on R). Heel taps " "with lateral and forward step downs, toe taps on backward step downs  Cybex steamboats: 2.5 plates 2x10 B    Herman participated in neuromuscular re-education activities to improve: Balance and Proprioception for 10 minutes. The following activities were included:    Single leg standin-2" hold x 15 reps R without UE support  Squats with equal WB through LE: 2x10    Home Exercises Provided and Patient Education Provided     Education provided:   - Continue icing and elevating R LE to address pain and swelling.  - Ambulate with SPC in- and outdoors    Written Home Exercises Provided: Not today.  Exercises were reviewed and Herman was able to demonstrate them prior to the end of the session.  Herman demonstrated good  understanding of the education provided.     See EMR under Patient Instructions for exercises provided 2019.    Assessment     Good outdoor ambulation with SPC; fair-good performance without AD. Facial grimacing and gait deficits with level walking without AD improved over distance.     Herman is progressing well towards his goals.   Pt prognosis is Fair.   Pt will continue to benefit from skilled outpatient physical therapy to address the deficits listed in the problem list box on initial evaluation, provide pt/family education and to maximize pt's level of independence in the home and community environment.     Pt's spiritual, cultural and educational needs considered and pt agreeable to plan of care and goals.  Anticipated barriers to physical therapy: hardware limiting ankle ROM    Goals:   Long Term Goals (6 Weeks):   1. Pt will improve FOTO score to <30% limited to decrease perceived limitation with mobility PROGRESSING, NOT MET 09/10/2019   4. Pt will walk with without AD and minimal increase in intensity of L heel strike accompanied by L lateral trunk lean and R shoulder abduction to promote return to work. PROGRESSING, NOT MET 9/10/2019     Plan     Continue gait training without AD. Monitor " pain throughout session. Nearing break in therapy.     Chiqui June, PT

## 2019-09-12 ENCOUNTER — CLINICAL SUPPORT (OUTPATIENT)
Dept: REHABILITATION | Facility: HOSPITAL | Age: 49
End: 2019-09-12
Payer: OTHER MISCELLANEOUS

## 2019-09-12 DIAGNOSIS — M25.572 CHRONIC ANKLE PAIN, BILATERAL: ICD-10-CM

## 2019-09-12 DIAGNOSIS — R26.89 IMPAIRED GAIT AND MOBILITY: ICD-10-CM

## 2019-09-12 DIAGNOSIS — M25.671 DECREASED RANGE OF MOTION OF RIGHT ANKLE: ICD-10-CM

## 2019-09-12 DIAGNOSIS — G89.29 CHRONIC ANKLE PAIN, BILATERAL: ICD-10-CM

## 2019-09-12 DIAGNOSIS — M25.571 CHRONIC ANKLE PAIN, BILATERAL: ICD-10-CM

## 2019-09-12 PROCEDURE — 97116 GAIT TRAINING THERAPY: CPT | Mod: PN

## 2019-09-12 PROCEDURE — 97112 NEUROMUSCULAR REEDUCATION: CPT | Mod: PN

## 2019-09-12 PROCEDURE — 97140 MANUAL THERAPY 1/> REGIONS: CPT | Mod: PN

## 2019-09-12 NOTE — PROGRESS NOTES
"  Physical Therapy Daily Treatment Note     Name: Herman GUERRERO [facility]  [facility] Number: [id number]    Therapy Diagnosis:   Encounter Diagnoses   Name Primary?    Chronic ankle pain, bilateral     Impaired gait and mobility     Decreased range of motion of right ankle      Physician: Deejay Mitchell MD    Visit Date: 2019    Physician Orders: PT Eval and Treat: R LE WBAT, advance slowly  Medical Diagnosis: S82.871A (ICD-10-CM) - Pilon fracture of right tibia  Evaluation Date: 2019  Authorization Period Expiration: 2019  Plan of Care Certification Period:2019 to 2019  Visit #: 38  Visits authorized:   FOTO: 6/10     Time In: 1010  Time Out: 1050  Total Billable Time: 25 minutes (2 TE)     Precautions: Standard and Weightbearing      Subjective     Pt reports: he has no increased pain in ankle today.  Ambulates into clinic with SPC  He was compliant with home exercise program.  Response to previous treatment: no adverse reaction  Functional change: none    Pain: 1/10  Location: right ankles     Objective     Herman received therapeutic exercises to develop strength, endurance and ROM for 15 minutes including:    3-way step down: 6" step x10 L (stance on L LE, descent on R). Heel taps with lateral and forward step downs, toe taps on backward step downs  Cybex steamboats: 2.5 plates 2x10 B    Herman participated in neuromuscular re-education activities to improve: Balance, Proprioception and Posture for 10 minutes. The following activities were included:     Single leg standin-2" hold x 15 reps R without UE support  Squats with equal WB through LE: 2x10    Herman participated in gait training to improve functional mobility and safety for 15 minutes, including assessment:     Level walking in gym with single point cane using 2 point pattern for 280 feet. Modified independent.   Unlevel walking up and down 6" curb x 8 trials with SPC.. Curb ascent and descent both leg with R LE. Supervision. "   Level walking in gym without AD for 280 feet. Modified independent. Initial increase in intensity of L heel strike accompanied by L lateral trunk lean and R shoulder abduction, reduced towards end of trial.     Home Exercises Provided and Patient Education Provided     Education provided:   - cont HEP regularly  - begin short distance in home without AD    Written Home Exercises Provided: Patient instructed to cont prior HEP.  Exercises were reviewed and Herman was able to demonstrate them prior to the end of the session.  Herman demonstrated good  understanding of the education provided.     See EMR under Patient Instructions for exercises provided 8/6/19.      Assessment     Pt tolerates therapy activities without difficulties or c/o increased pain  Herman is progressing well towards his goals.   Pt prognosis is Fair.     Pt will continue to benefit from skilled outpatient physical therapy to address the deficits listed in the problem list box on initial evaluation, provide pt/family education and to maximize pt's level of independence in the home and community environment.     Pt's spiritual, cultural and educational needs considered and pt agreeable to plan of care and goals.    Anticipated barriers to physical therapy: hardware limiting ankle ROM    Goals:   Long Term Goals (6 Weeks):   1. Pt will improve FOTO score to <30% limited to decrease perceived limitation with mobility PROGRESSING, NOT MET 09/12/2019   4. Pt will walk with without AD and minimal increase in intensity of L heel strike accompanied by L lateral trunk lean and R shoulder abduction to promote return to work. PROGRESSING, NOT MET 9/12/2019     Plan     Cont POC to progress towards established goals    Juanita Manzano, PTA

## 2019-09-17 ENCOUNTER — CLINICAL SUPPORT (OUTPATIENT)
Dept: REHABILITATION | Facility: HOSPITAL | Age: 49
End: 2019-09-17
Attending: ORTHOPAEDIC SURGERY
Payer: OTHER MISCELLANEOUS

## 2019-09-17 DIAGNOSIS — G89.29 CHRONIC ANKLE PAIN, BILATERAL: ICD-10-CM

## 2019-09-17 DIAGNOSIS — M25.572 CHRONIC ANKLE PAIN, BILATERAL: ICD-10-CM

## 2019-09-17 DIAGNOSIS — M25.671 DECREASED RANGE OF MOTION OF RIGHT ANKLE: ICD-10-CM

## 2019-09-17 DIAGNOSIS — R26.89 IMPAIRED GAIT AND MOBILITY: ICD-10-CM

## 2019-09-17 DIAGNOSIS — M25.571 CHRONIC ANKLE PAIN, BILATERAL: ICD-10-CM

## 2019-09-17 PROCEDURE — 97110 THERAPEUTIC EXERCISES: CPT | Mod: PN

## 2019-09-17 PROCEDURE — 97116 GAIT TRAINING THERAPY: CPT | Mod: PN

## 2019-09-17 PROCEDURE — 97112 NEUROMUSCULAR REEDUCATION: CPT | Mod: PN

## 2019-09-17 NOTE — PROGRESS NOTES
"  Physical Therapy Daily Treatment Note     Name: Herman GUERRERO [facility] Number: [id number]    Therapy Diagnosis:   Encounter Diagnoses   Name Primary?    Chronic ankle pain, bilateral     Impaired gait and mobility     Decreased range of motion of right ankle      Physician: Deejay Mitchell MD    Visit Date: 2019    Physician Orders: PT Eval and Treat: R LE WBAT, advance slowly  Medical Diagnosis: S82.871A (ICD-10-CM) - Pilon fracture of right tibia  Evaluation Date: 2019  Authorization Period Expiration: 2019  Plan of Care Certification Period:2019 to 2019  Visit #: 39  Visits authorized:   FOTO: 7/10  PTA visit:      Time In: 910  Time Out: 955  Total Billable Time: 45 minutes (1 TE, 1 GT, 1 NMR)     Precautions: Standard and Weightbearing      Subjective     Pt reports: he feels right ankle is a little swollen today, but not like it used to get.  Reports he has been elevated like he was instructed.  Also reports he was on his feet a little more than usual today  He was compliant with home exercise program.  Response to previous treatment: no adverse reaction  Functional change: none    Pain: 0/10  Location: right ankles     Objective       Herman received therapeutic exercises to develop strength and endurance for 20 minutes including:    3-way step down: 6" step 2x10 L (stance on L LE, descent on R). Heel taps with lateral and forward step downs, toe taps on backward step downs  Cybex steamboats: 3.0 plates 2x10 B    Herman participated in neuromuscular re-education activities to improve: Balance, Proprioception and Posture for 10 minutes. The following activities were included:    Single leg standin-2" hold x 15 reps R without UE support  Squats with equal WB through LE: 2x10    Herman participated in gait training to improve functional mobility and safety for 15 minutes, including assessment:     Level walking in gym with single point cane using 2 point pattern " "for 280 feet. Modified independent.   Unlevel walking up and down 6" curb x 8 trials with SPC.. Curb ascent and descent both leg with R LE. Supervision.   Level walking in gym without AD for 300 feet. Modified independent.     Home Exercises Provided and Patient Education Provided     Education provided:   - cont HEP regularly to maximize therapy benefits  - continued elevation of right LE    Written Home Exercises Provided: Patient instructed to cont prior HEP.  Exercises were reviewed and Herman was able to demonstrate them prior to the end of the session.  Herman demonstrated good  understanding of the education provided.     See EMR under Patient Instructions for exercises provided 8/6/19.      Assessment     Pt tolerates therapy activities without difficulties.  Increased reps for some therex this date.  Pt relays mild increase in soreness upon completion of therapy session, but number not given  Herman is progressing well towards his goals.   Pt prognosis is Good.     Pt will continue to benefit from skilled outpatient physical therapy to address the deficits listed in the problem list box on initial evaluation, provide pt/family education and to maximize pt's level of independence in the home and community environment.     Pt's spiritual, cultural and educational needs considered and pt agreeable to plan of care and goals.    Anticipated barriers to physical therapy: hardware limiting ankle ROM    Goals:   Long Term Goals (6 Weeks):   1. Pt will improve FOTO score to <30% limited to decrease perceived limitation with mobility PROGRESSING, NOT MET 09/12/2019   4. Pt will walk with without AD and minimal increase in intensity of L heel strike accompanied by L lateral trunk lean and R shoulder abduction to promote return to work. PROGRESSING, NOT MET 9/12/2019     Plan     Cont POC to progress towards established goals    Juanita Manzano PTA   "

## 2019-09-19 ENCOUNTER — CLINICAL SUPPORT (OUTPATIENT)
Dept: REHABILITATION | Facility: HOSPITAL | Age: 49
End: 2019-09-19
Payer: OTHER MISCELLANEOUS

## 2019-09-19 DIAGNOSIS — M25.572 CHRONIC ANKLE PAIN, BILATERAL: ICD-10-CM

## 2019-09-19 DIAGNOSIS — G89.29 CHRONIC ANKLE PAIN, BILATERAL: ICD-10-CM

## 2019-09-19 DIAGNOSIS — R26.89 IMPAIRED GAIT AND MOBILITY: ICD-10-CM

## 2019-09-19 DIAGNOSIS — M25.571 CHRONIC ANKLE PAIN, BILATERAL: ICD-10-CM

## 2019-09-19 DIAGNOSIS — M25.671 DECREASED RANGE OF MOTION OF RIGHT ANKLE: ICD-10-CM

## 2019-09-19 PROCEDURE — 97116 GAIT TRAINING THERAPY: CPT | Mod: PN

## 2019-09-19 PROCEDURE — 97112 NEUROMUSCULAR REEDUCATION: CPT | Mod: PN

## 2019-09-19 PROCEDURE — 97110 THERAPEUTIC EXERCISES: CPT | Mod: PN

## 2019-09-19 NOTE — PROGRESS NOTES
Physical Therapy Daily Treatment Note     Name: Herman GUERRERO [facility] Number: [id number]    Therapy Diagnosis:   Encounter Diagnoses   Name Primary?    Chronic ankle pain, bilateral     Impaired gait and mobility     Decreased range of motion of right ankle      Physician: Deejay Mitchell MD    Visit Date: 9/19/2019    Physician Orders: PT Eval and Treat: R LE WBAT, advance slowly  Medical Diagnosis: S82.871A (ICD-10-CM) - Pilon fracture of right tibia  Evaluation Date: 2/6/2019  Authorization Period Expiration: 9/30/2019  Plan of Care Certification Period:7/30/2019 to 9/27/2019  Visit #: 40  Visits authorized: 7/12  FOTO: 8/10  PTA visit: 0/6     Time In: 0901  Time Out: 1000  Total Billable Time: 59 minutes (2 TE, 1 GT, 1 NMR)     Precautions: Standard and Weightbearing    Subjective     Pt reports: arrived with SPC and compression wrap on R lower leg/ankle. Pain is mainly with weight bearing though not bad. Nervous about descending stairs and curb with left foot leading. Pain with full weight bearing graded about 3/10/  He was compliant with home exercise program.  Response to previous treatment: no adverse reaction  Functional change: none    Pain: 0/10  Location: right ankles     Objective     Herman received therapeutic exercises to develop strength and endurance for 29 minutes including:  Forward lunges on step: 20x5'' R, foot on 2nd step  Heel raises: 3x10 DL, cues to decrease UE pressure  Foot taps: 30x tapping with left foot, 1 UE use  Cybex steamboats: 3.0 plates 2x10 B    Herman participated in neuromuscular re-education activities to improve: Balance, Proprioception and Posture for 10 minutes. The following activities were included:  Single leg standing: 4x30'', R SLS, 1 UE use  Squats with equal WB through LE: 2x10, mod cues for decreased right rotation, decreased backwards body shift    Herman participated in gait training to improve functional mobility and safety for 15 minutes, including  "assessment:  Level walking in gym with single point cane using 2 point pattern 140'. Modified independent.   Unlevel walking up and down 6" curb x 8 trials with SPC.. Curb ascent and descent both leg with R LE. Supervision. - not performed today  Level walking in gym without AD for 300 feet. Modified independent.     Home Exercises Provided and Patient Education Provided   Education provided:   - cont HEP regularly to maximize therapy benefits  - continued elevation of right LE    Written Home Exercises Provided: Patient instructed to cont prior HEP.  Exercises were reviewed and Herman was able to demonstrate them prior to the end of the session.  Herman demonstrated good  understanding of the education provided.     See EMR under Patient Instructions for exercises provided 8/6/19.    Assessment     R medial mid-upper tibial pain with increased weight bearing, and increased R anterior ankle at TC joint pain with dorsiflexion. Fair RLE weight bearing in gait and mobility. Decreased R ankle dorsiflexion in main limitation in mobility that creates mild anxiety with stepping activities for R leg. Pt refused ice today.  Herman is progressing well towards his goals.   Pt prognosis is Good.     Pt will continue to benefit from skilled outpatient physical therapy to address the deficits listed in the problem list box on initial evaluation, provide pt/family education and to maximize pt's level of independence in the home and community environment.   Pt's spiritual, cultural and educational needs considered and pt agreeable to plan of care and goals.    Anticipated barriers to physical therapy: hardware limiting ankle ROM    Goals:   Long Term Goals (6 Weeks):   1. Pt will improve FOTO score to <30% limited to decrease perceived limitation with mobility PROGRESSING, NOT MET 09/12/2019   2. Pt will walk with without AD and minimal increase in intensity of L heel strike accompanied by L lateral trunk lean and R shoulder abduction " to promote return to work. PROGRESSING, NOT MET 9/12/2019     Plan     Cont POC to progress towards established goals. Pt nearing d/c.    Nate Ruiz, PT

## 2019-09-23 ENCOUNTER — CLINICAL SUPPORT (OUTPATIENT)
Dept: REHABILITATION | Facility: HOSPITAL | Age: 49
End: 2019-09-23
Attending: ORTHOPAEDIC SURGERY
Payer: OTHER MISCELLANEOUS

## 2019-09-23 DIAGNOSIS — M25.671 DECREASED RANGE OF MOTION OF RIGHT ANKLE: ICD-10-CM

## 2019-09-23 DIAGNOSIS — G89.29 CHRONIC ANKLE PAIN, BILATERAL: ICD-10-CM

## 2019-09-23 DIAGNOSIS — M25.571 CHRONIC ANKLE PAIN, BILATERAL: ICD-10-CM

## 2019-09-23 DIAGNOSIS — M25.572 CHRONIC ANKLE PAIN, BILATERAL: ICD-10-CM

## 2019-09-23 DIAGNOSIS — R26.89 IMPAIRED GAIT AND MOBILITY: ICD-10-CM

## 2019-09-23 PROCEDURE — 97112 NEUROMUSCULAR REEDUCATION: CPT | Mod: PN

## 2019-09-23 PROCEDURE — 97110 THERAPEUTIC EXERCISES: CPT | Mod: PN

## 2019-09-23 NOTE — PROGRESS NOTES
Physical Therapy Daily Treatment Note     Name: Herman GUERRERO [facility] Number: [id number]    Therapy Diagnosis:   Encounter Diagnoses   Name Primary?    Chronic ankle pain, bilateral     Impaired gait and mobility     Decreased range of motion of right ankle      Physician: Deejay Mitchell MD    Visit Date: 9/23/2019    Physician Orders: PT Eval and Treat: R LE WBAT, advance slowly  Medical Diagnosis: S82.871A (ICD-10-CM) - Pilon fracture of right tibia  Evaluation Date: 2/6/2019  Authorization Period Expiration: 9/30/2019  Plan of Care Certification Period:7/30/2019 to 9/27/2019  Visit #: 41  Visits authorized: 8/12  FOTO: 8/10  PTA visit: 0/6     Time In: 0901  Time Out: 0958  Total Billable Time: 45 minutes (2 TE, 1 NMR)     Precautions: Standard and Weightbearing    Subjective     Pt reports: arrived with SPC and compression wrap on R lower leg/ankle. Pain is mainly with weight bearing though not bad. Nervous about descending stairs and curb with left foot leading. Pain with full weight bearing graded about 3/10/  He was compliant with home exercise program.  Response to previous treatment: no adverse reaction  Functional change: none    Pain: 0/10  Location: right ankles     Objective     Herman received therapeutic exercises to develop strength and endurance for 30 minutes including:  Forward lunges on step: 20x5'' R, foot on 2nd step  Heel raises: 3x10 DL, cues to decrease UE pressure  Foot taps: 30x tapping with left foot, 1 UE use  Cybex steamboats: 3.0 plates 2x10 B    Herman participated in neuromuscular re-education activities to improve: Balance, Proprioception and Posture for 15 minutes. The following activities were included:  Single leg standing: 4x30'', R SLS, 1 UE use  Squats with equal WB through LE: 2x10, mod cues for decreased right rotation, decreased backwards body shift  Assisted squats with Yellow sport cord  Lateral step to (R) in ll bars x 10    Herman participated in gait training to  "improve functional mobility and safety for 15 minutes, including assessment: Not performed today  Level walking in gym with single point cane using 2 point pattern 140'. Modified independent.   Unlevel walking up and down 6" curb x 8 trials with SPC.. Curb ascent and descent both leg with R LE. Supervision. - not performed today  Level walking in gym without AD for 300 feet. Modified independent.     Home Exercises Provided and Patient Education Provided   Education provided:   - cont HEP regularly to maximize therapy benefits  - continued elevation of right LE    Written Home Exercises Provided: Patient instructed to cont prior HEP.  Exercises were reviewed and Herman was able to demonstrate them prior to the end of the session.  Herman demonstrated good  understanding of the education provided.     See EMR under Patient Instructions for exercises provided 8/6/19.    Assessment     Fair tolerance to exercise activity but pt is compliant with all requests. Incorporated assisted squatting activity which resulted in muscular fatigue without pain. Pt refused ice today.  Herman is progressing well towards his goals.   Pt prognosis is Good.     Pt will continue to benefit from skilled outpatient physical therapy to address the deficits listed in the problem list box on initial evaluation, provide pt/family education and to maximize pt's level of independence in the home and community environment.   Pt's spiritual, cultural and educational needs considered and pt agreeable to plan of care and goals.    Anticipated barriers to physical therapy: hardware limiting ankle ROM    Goals:   Long Term Goals (6 Weeks):   1. Pt will improve FOTO score to <30% limited to decrease perceived limitation with mobility PROGRESSING, NOT MET 09/12/2019   2. Pt will walk with without AD and minimal increase in intensity of L heel strike accompanied by L lateral trunk lean and R shoulder abduction to promote return to work. PROGRESSING, NOT " MET 9/12/2019     Plan     Cont POC to progress towards established goals. Pt nearing d/c.    Jere Kahn, PT

## 2019-09-26 ENCOUNTER — OFFICE VISIT (OUTPATIENT)
Dept: CARDIOLOGY | Facility: CLINIC | Age: 49
End: 2019-09-26

## 2019-09-26 VITALS
DIASTOLIC BLOOD PRESSURE: 70 MMHG | SYSTOLIC BLOOD PRESSURE: 110 MMHG | OXYGEN SATURATION: 97 % | WEIGHT: 306.56 LBS | HEART RATE: 80 BPM | HEIGHT: 72 IN | BODY MASS INDEX: 41.52 KG/M2

## 2019-09-26 DIAGNOSIS — E66.01 CLASS 3 SEVERE OBESITY DUE TO EXCESS CALORIES WITHOUT SERIOUS COMORBIDITY WITH BODY MASS INDEX (BMI) OF 40.0 TO 44.9 IN ADULT: ICD-10-CM

## 2019-09-26 DIAGNOSIS — I26.99 OTHER PULMONARY EMBOLISM WITHOUT ACUTE COR PULMONALE, UNSPECIFIED CHRONICITY: Primary | ICD-10-CM

## 2019-09-26 DIAGNOSIS — R60.9 EDEMA, UNSPECIFIED TYPE: ICD-10-CM

## 2019-09-26 PROCEDURE — 99215 OFFICE O/P EST HI 40 MIN: CPT | Mod: S$PBB,,, | Performed by: INTERNAL MEDICINE

## 2019-09-26 PROCEDURE — 99213 OFFICE O/P EST LOW 20 MIN: CPT | Mod: PBBFAC,PO | Performed by: INTERNAL MEDICINE

## 2019-09-26 PROCEDURE — 99999 PR PBB SHADOW E&M-EST. PATIENT-LVL III: ICD-10-PCS | Mod: PBBFAC,,, | Performed by: INTERNAL MEDICINE

## 2019-09-26 PROCEDURE — 99999 PR PBB SHADOW E&M-EST. PATIENT-LVL III: CPT | Mod: PBBFAC,,, | Performed by: INTERNAL MEDICINE

## 2019-09-26 PROCEDURE — 99215 PR OFFICE/OUTPT VISIT, EST, LEVL V, 40-54 MIN: ICD-10-PCS | Mod: S$PBB,,, | Performed by: INTERNAL MEDICINE

## 2019-09-26 NOTE — PATIENT INSTRUCTIONS
Weight Management: Fact and Fiction    Knowing the truth about losing weight can help you separate what works from what doesnt. Dont be taken in by expensive weight-loss fads like pills, herbs, and special foods that promise unbelievable results. Theres no magic way to lose weight. If you have questions about weight loss, ask your healthcare provider.  Fiction: The faster I lose weight, the better.  Fact: Rapid weight loss is usually due to loss of water or muscle mass. What youre trying to get rid of is extra fat. Aim to lose a 1/2 pound to 2 pounds a week. Then youre more likely to lose fat rather than water or muscle.  Fiction: Skipping meals will help me lose weight.  Fact: When you skip meals, you dont give your body the energy it needs to work. Hunger makes you more likely to overeat later on. Its best to spread your meals throughout the day. Eat at least three meals a day.  Fiction: I cant start exercising until I lose weight.  Fact: The sooner you start exercising the better. Exercise helps burn more calories, tone your muscles, and keep your appetite in check. People who continue to exercise after they lose weight are more likely to keep the weight off.  Fiction: The fewer calories I eat, the better.  Fact: This seems like it should be true, but its not. When you eat too few calories, your body acts as if its on a desert island. It thinks food is scarce, so it slows down your metabolism (how fast you burn calories) to save energy. By eating too few calories, you make it harder to lose weight.  Fiction: Once I lose weight, I can go back to living the way I did before.  Fact: Going back to your old eating habits and giving up exercise is a sure way to regain any weight youve lost. The lifestyle changes that help you lose extra weight can also help keep it off. This is why you need to make realistic changes you can stick with.  Fiction: Low-fat and fat-free mean low-calorie.  Fact: All  foods, even fat-free ones, have calories. Eat too many calories and youll gain weight. Its OK to treat yourself to a fat-free cookie or two. Just dont eat the whole box! A dietitian will help you figure this out, and will likely recommend that you eat three meals a day, with protein with each meal.   Date Last Reviewed: 2/4/2016  © 8168-1535 [employer]. [address] [place] [address], [place] [address]. All rights reserved. This information is not intended as a substitute for professional medical care. Always follow your healthcare professional's instructions.        Pulmonary Embolism (PE)  A pulmonary embolus is most often due to a blood clot that develops in a deep vein of the leg (deep vein thrombosis). If that clot, breaks loose and travels to the lung, it is called a pulmonary embolism (PE). This can cut off the flow of blood in the lungs.  A blood clot in the lungs is a medical emergency and may cause death.   Healthcare providers use the term venous thromboembolism (VTE) to describe these 2 conditions: deep vein thrombosis and pulmonary embolism. They use the term VTE because the 2 conditions are very closely related. And, because their prevention and treatment are also closely related.      A pulmonary embolism occurs when a blood clot forms in a vein and travels to the lungs.   How is pulmonary embolism diagnosed?  Your healthcare provider examines you and asks about your symptoms and health history. You may also have one or more of the following:  · Blood tests to check for blood clotting or other problems  · Imaging tests to look for clots in the veins or lung  · Electrocardiography (ECG) to test how well the heart is working  How is pulmonary embolism treated?  · Blood-thinning medicines (anticoagulants). These medicines thin the blood. They may be given as a pill, as an injection, or through a tube into a vein (intravenous or IV). Blood thinners help prevent more blood clots from forming.  They also help to prevent an existing clot from getting larger.  · Thrombolysis. Thrombolytic medicines are used to quickly dissolve a blood clot. A long, narrow tube (catheter) is used to deliver medicine directly to the clot. Thrombolytic medicines increase the risk of bleeding so they are used very carefully.  · Inferior vena cava (IVC) filter surgery. The vena cava is the bodys largest vein. It carries blood from the body to the heart. A small filter traps blood clots in the lower body and prevents them from traveling to the lungs. The filter is inserted into the vein through a catheter. The filter may be used if blood thinners cannot be taken or if they don't work.   · Pulmonary embolectomy. This is a procedure to remove a blood clot in the lungs. It may be done with surgery or with a catheter inserted in the body. It may be done when other treatments aren't safe or don't work.  What are the long-term concerns?  With treatment, blood clots are usually dissolved or removed. Some treatments can even help prevent future clots. But having a PE can put you at risk for another life-threatening blood clot. So, you will likely need to take anticoagulants to help keep blood clots from forming again. You may need to take this medicine for months or years.  You may also need to make lifestyle changes. This may include getting more active and eating healthier. You may need to wear elastic (compression) stockings and and take breaks on long trips.  Call 911  Call 911 or get emergency help if you have symptoms of a blood clot that has traveled to the lungs. The symptoms include:  · Chest pain  · Trouble breathing  · Coughing (may cough up blood)  · Fainting  · Fast heartbeat  · Sweating  Call 911 if you have heavy or uncontrolled bleeding.  When to call your healthcare provider  Call your healthcare provider if you have swelling or pain in your leg, arm, or other area. These are symptoms of a blood clot.  You may have  bleeding if you take medicine to help prevent blood clots.  Call your healthcare provider if you have signs or symptoms of bleeding. This includes:  · Blood in the urine  · Bleeding with bowel movements  · Bleeding from the nose, gums, a cut, or vagina   Date Last Reviewed: 2/1/2017  © 4162-2702 [employer]. [address] [place] [address], [place] [address]. All rights reserved. This information is not intended as a substitute for professional medical care. Always follow your healthcare professional's instructions.

## 2019-09-26 NOTE — PROGRESS NOTES
Subjective:   Patient ID:  Herman Floyd is a 49 y.o. male who presents for follow up of h/o PE after injury; Obesity; and Leg Swelling      HPI:       Herman Floyd 49 y.o. male is here follow up and feeling well without any new complaints.       He had bilateral PE (9/2018) due to immobility after an ankle fracture(8/2018).  He presented a month later in 9/2018 to the ED with suddden chest discomfort and SOB with a near syncopal episode. He had been mostly bed bound prior to the PE.  CTA chest in the ED noted diffuse bilateral pulmonary thromboembolism, originating in the distal aspects of the right and left main pulmonary arteries, and extending distally. He was hemodynamically stable and oxygenating well. No DVT. Admitted to cardiology service for PE. Patient was transferred to ICU and evaluated by interventional cardiology. His troponin was negative and there was no evidence of right heart strain on CTA. No criteria for massive or submassive PE and no indication for systemic/catheter directed thrombolysis. He tolerated Eliquis well. Repeat CTA 11/9/2018 revealed complete resolution of PE. He was instructed to take DOAC for at least 6 months. He stopped in 1/2019 because of insurance and work compensation related issues not approving. He tolerated R ankle surgery in 11/2018 and 12/2018. He has R leg edema that seems well controlled with compression gears.              Patient Active Problem List    Diagnosis Date Noted    Edema 09/26/2019    Chronic ankle pain, bilateral 02/06/2019    Impaired gait and mobility 02/06/2019    Decreased range of motion of right ankle 02/06/2019    Class 3 severe obesity due to excess calories with body mass index (BMI) of 40.0 to 44.9 in adult 12/03/2018    Leg pain 11/06/2018    Costochondral chest pain 09/24/2018    Chest pain     Shortness of breath     Other pulmonary embolism without acute cor pulmonale 09/18/2018           CTA 9/18/2018   Bilateral PE   No  cor pulmonale   No DVT   Treated with Eliquis           CTA 2018   Resolution of PE      Closed fracture of right ankle 2018           Right Arm BP - Sittin/70  Left Arm BP - Sittin/70        LABS  LAST HbA1c  Lab Results   Component Value Date    HGBA1C 4.6 2018         Review of Systems   Constitution: Negative for diaphoresis, night sweats, weight gain and weight loss.   HENT: Positive for hearing loss. Negative for congestion.    Eyes: Negative for blurred vision, discharge and double vision.   Cardiovascular: Negative for chest pain, claudication, cyanosis, dyspnea on exertion, irregular heartbeat, leg swelling, near-syncope, orthopnea, palpitations, paroxysmal nocturnal dyspnea and syncope.   Respiratory: Negative for cough, shortness of breath and wheezing.    Endocrine: Negative for cold intolerance, heat intolerance and polyphagia.   Hematologic/Lymphatic: Negative for adenopathy and bleeding problem. Does not bruise/bleed easily.   Skin: Negative for dry skin and nail changes.   Musculoskeletal: Negative for arthritis, back pain, falls, joint pain, myalgias and neck pain.          R ankle and foot in a cast      Gastrointestinal: Negative for bloating, abdominal pain, change in bowel habit and constipation.   Genitourinary: Negative for bladder incontinence, dysuria, flank pain, genital sores and missed menses.   Neurological: Negative for aphonia, brief paralysis, difficulty with concentration, dizziness and weakness.   Psychiatric/Behavioral: Negative for altered mental status and memory loss. The patient does not have insomnia.    Allergic/Immunologic: Negative for environmental allergies.       Objective:   Physical Exam   Constitutional: He is oriented to person, place, and time. He appears well-developed and well-nourished. He is not intubated.   HENT:   Head: Normocephalic and atraumatic.   Right Ear: External ear normal.   Left Ear: External ear normal.   Mouth/Throat:  Oropharynx is clear and moist.   Eyes: Pupils are equal, round, and reactive to light. Conjunctivae and EOM are normal. Right eye exhibits no discharge. Left eye exhibits no discharge. No scleral icterus.   Neck: Normal range of motion. Neck supple. Normal carotid pulses, no hepatojugular reflux and no JVD present. Carotid bruit is not present. No tracheal deviation present. No thyromegaly present.   Cardiovascular: Normal rate, regular rhythm, S1 normal and S2 normal.  No extrasystoles are present. PMI is not displaced. Exam reveals no gallop, no S3, no distant heart sounds, no friction rub and no midsystolic click.   No murmur heard.  Pulses:       Carotid pulses are 2+ on the right side, and 2+ on the left side.       Radial pulses are 2+ on the right side, and 2+ on the left side.        Femoral pulses are 2+ on the right side, and 2+ on the left side.       Popliteal pulses are 2+ on the right side, and 2+ on the left side.        Dorsalis pedis pulses are 2+ on the right side, and 2+ on the left side.        Posterior tibial pulses are 2+ on the right side, and 2+ on the left side.   Pulmonary/Chest: Effort normal and breath sounds normal. No accessory muscle usage or stridor. No apnea, no tachypnea and no bradypnea. He is not intubated. No respiratory distress. He has no decreased breath sounds. He has no wheezes. He has no rales. He exhibits no tenderness and no bony tenderness.   Abdominal: He exhibits no distension, no pulsatile liver, no abdominal bruit, no ascites, no pulsatile midline mass and no mass. There is no tenderness. There is no rebound and no guarding.   Musculoskeletal: Normal range of motion. He exhibits no edema or tenderness.       R foot + ankle in a cast      Lymphadenopathy:     He has no cervical adenopathy.   Neurological: He is alert and oriented to person, place, and time. He has normal reflexes. No cranial nerve deficit. Coordination normal.   Skin: Skin is warm. No rash noted. No  erythema. No pallor.   Psychiatric: He has a normal mood and affect. His behavior is normal. Judgment and thought content normal.       Assessment:     1. Other pulmonary embolism without acute cor pulmonale, unspecified chronicity    2. Class 3 severe obesity due to excess calories without serious comorbidity with body mass index (BMI) of 40.0 to 44.9 in adult    3. Edema, unspecified type        Plan:         He can proceed with his R ankle surgery with acceptable cardiac risks  Aggressive DVT prophylaxis post surgery   Consider Eliquis or at least lovenox especially if he be will non-ambulatory       PT/OT and resume activities within the limit of his surgery immediately post procedure      Weight loss  Dietary changes  Establish care with PCP        Continue with current medical plan and lifestyle changes.  Return sooner for concerns or questions. If symptoms persist go to the ED  I have reviewed all pertinent data on this patient       I have reviewed the patient's medical history in detail and updated the computerized patient record.        Follow up as scheduled. Return sooner for concerns or questions            He expressed verbal understanding and agreed with the plan        Greater than 50% of the visit of 45 minutes was spent counseling, educating, and coordinating the care of the patient.  -In today's visit, at least 4 established conditions that pose a risk to life or bodily function have been addressed and the conditions are severe.    -In today's visit, monitoring for drug toxicity was accomplished.          Patient's Medications   New Prescriptions    No medications on file   Previous Medications    No medications on file   Modified Medications    No medications on file   Discontinued Medications

## 2019-09-27 ENCOUNTER — CLINICAL SUPPORT (OUTPATIENT)
Dept: REHABILITATION | Facility: HOSPITAL | Age: 49
End: 2019-09-27
Payer: OTHER MISCELLANEOUS

## 2019-09-27 DIAGNOSIS — R26.89 IMPAIRED GAIT AND MOBILITY: ICD-10-CM

## 2019-09-27 DIAGNOSIS — M25.571 CHRONIC ANKLE PAIN, BILATERAL: ICD-10-CM

## 2019-09-27 DIAGNOSIS — M25.572 CHRONIC ANKLE PAIN, BILATERAL: ICD-10-CM

## 2019-09-27 DIAGNOSIS — M25.671 DECREASED RANGE OF MOTION OF RIGHT ANKLE: ICD-10-CM

## 2019-09-27 DIAGNOSIS — G89.29 CHRONIC ANKLE PAIN, BILATERAL: ICD-10-CM

## 2019-09-27 PROCEDURE — 97112 NEUROMUSCULAR REEDUCATION: CPT | Mod: PN

## 2019-09-27 PROCEDURE — 97110 THERAPEUTIC EXERCISES: CPT | Mod: PN

## 2019-09-27 PROCEDURE — 97116 GAIT TRAINING THERAPY: CPT | Mod: PN

## 2019-09-27 NOTE — PROGRESS NOTES
"  Physical Therapy Daily Treatment Note     Name: Herman GUERRERO [facility] Number: [id number]    Therapy Diagnosis:   Encounter Diagnoses   Name Primary?    Chronic ankle pain, bilateral     Impaired gait and mobility     Decreased range of motion of right ankle      Physician: Deejay Mitchell MD    Visit Date: 9/27/2019    Physician Orders: PT Eval and Treat: JULISSA GE WBAT, advance slowly  Medical Diagnosis: S82.871A (ICD-10-CM) - Pilon fracture of right tibia  Evaluation Date: 2/6/2019  Authorization Period Expiration: 9/30/2019  Plan of Care Certification Period:7/30/2019 to 9/27/2019  Visit #: [id number]  Visits authorized: 9/12  FOTO: 9/10     Time In: 0909  Time Out: 1004  Total Billable Time: 55 minutes      Precautions: Standard and Weightbearing    Subjective     Pt reports: swelling fairly minimal. Patient feels that therapy has been going well. CAT scan performed 2 weeks ago; unsure of results or next appointment. Course of action with regards to surgery still up in the air. Agreeable to taking a break from therapy after today's visit today his last   He was compliant with home exercise program.  Response to previous treatment: soreness after each session.   Functional change: increasing comfort in gait with SPC.     Pain: 2/10  Location: R lateral foot/ankle    Objective     Herman participated in gait training to improve functional mobility and safety for 25 minutes, including assessment:     Level walking in gym with single point cane using 2 point pattern for 450 feet. Modified independent. Initial moderate increase in intensity of L heel strike accompanied by L lateral trunk lean and R shoulder abduction, eliminated by end of trial.  Unlevel walking with SPC for 300 feet including: Over uneven brick and concrete and on grassy plain. Ascent and descent on 6" curb x 10 trials with SPC. Curb ascent and descent both leg with R LE. Supervision.   Level walking in gym without AD for 140 feet. Modified independent. " Minimal increase in intensity of L heel strike accompanied by L lateral trunk lean and R shoulder abduction.     Herman received therapeutic exercises to develop strength, endurance and ROM for 20 minutes including:     3-way step down (forward/lateral/backward: Reviewed for HEP. Heel taps with lateral and forward step downs, toe taps on backward step downs  Cybex steamboats: 2.5 plates 2x10 B  Heel and toe raises: x30 double leg, alternating heel and toe raises.    CMS Impairment/Limitation/Restriction for FOTO Lower Leg without Knee Survey    Therapist reviewed FOTO scores for Herman Floyd on 8/27/2019  FOTO documents entered into [employer] - see Media section.    Limitation Score: 52%  Category: Mobility      Herman participated in neuromuscular re-education activities to improve: Balance and Proprioception for 20 minutes. The following activities were included:     Single leg standing: Reviewed for HEP  Squats with equal WB through LE: B UE support overhead on black TB anchored in door frame. 2x10    Home Exercises Provided and Patient Education Provided   Education provided:   - Patient given updated HEP including: therapeutic exercises and neuromuscular re-education activities listed above.   - Walk household and short community distances without AD, longer community distances with SPC.    Written Home Exercises Provided: Yes.   Exercises were reviewed and Herman was able to demonstrate them prior to the end of the session.  Herman demonstrated good  understanding of the education provided.     See EMR under Patient Instructions for exercises provided 9/27/2019.    Assessment     Plateau in terms of progression secondary hardware limiting talocrural and subtalar motion. Met long term gait goal without AD; appropriate to walk household and short community distances without AD, longer community distances with SPC.     Herman is progressing well towards his goals.   Pt prognosis is Good.   Pt will continue to benefit  from skilled outpatient physical therapy to address the deficits listed in the problem list box on initial evaluation, provide pt/family education and to maximize pt's level of independence in the home and community environment.     Pt's spiritual, cultural and educational needs considered and pt agreeable to plan of care and goals.  Anticipated barriers to physical therapy: hardware limiting ankle ROM    Goals:   Long Term Goals (6 Weeks):   1. Pt will improve FOTO score to <30% limited to decrease perceived limitation with mobility NOT MET  2. Pt will walk with without AD and minimal increase in intensity of L heel strike accompanied by L lateral trunk lean and R shoulder abduction to promote return to work. MET    Plan     Break from physical therapy.    Chiqui June, PT

## 2019-11-06 ENCOUNTER — OFFICE VISIT (OUTPATIENT)
Dept: CARDIOLOGY | Facility: CLINIC | Age: 49
End: 2019-11-06

## 2019-11-06 VITALS
SYSTOLIC BLOOD PRESSURE: 143 MMHG | OXYGEN SATURATION: 94 % | WEIGHT: 314.38 LBS | DIASTOLIC BLOOD PRESSURE: 75 MMHG | HEART RATE: 82 BPM | BODY MASS INDEX: 42.64 KG/M2

## 2019-11-06 DIAGNOSIS — R60.9 EDEMA, UNSPECIFIED TYPE: ICD-10-CM

## 2019-11-06 DIAGNOSIS — Z01.810 PRE-OPERATIVE CARDIOVASCULAR EXAMINATION: ICD-10-CM

## 2019-11-06 DIAGNOSIS — E66.01 CLASS 3 SEVERE OBESITY DUE TO EXCESS CALORIES WITHOUT SERIOUS COMORBIDITY WITH BODY MASS INDEX (BMI) OF 40.0 TO 44.9 IN ADULT: Primary | ICD-10-CM

## 2019-11-06 DIAGNOSIS — I26.99 OTHER PULMONARY EMBOLISM WITHOUT ACUTE COR PULMONALE, UNSPECIFIED CHRONICITY: ICD-10-CM

## 2019-11-06 PROCEDURE — 99214 OFFICE O/P EST MOD 30 MIN: CPT | Mod: S$PBB,25,, | Performed by: STUDENT IN AN ORGANIZED HEALTH CARE EDUCATION/TRAINING PROGRAM

## 2019-11-06 PROCEDURE — 93005 ELECTROCARDIOGRAM TRACING: CPT | Mod: PBBFAC,PO | Performed by: STUDENT IN AN ORGANIZED HEALTH CARE EDUCATION/TRAINING PROGRAM

## 2019-11-06 PROCEDURE — 99999 PR PBB SHADOW E&M-EST. PATIENT-LVL III: CPT | Mod: PBBFAC,,, | Performed by: STUDENT IN AN ORGANIZED HEALTH CARE EDUCATION/TRAINING PROGRAM

## 2019-11-06 PROCEDURE — 99214 PR OFFICE/OUTPT VISIT, EST, LEVL IV, 30-39 MIN: ICD-10-PCS | Mod: S$PBB,25,, | Performed by: STUDENT IN AN ORGANIZED HEALTH CARE EDUCATION/TRAINING PROGRAM

## 2019-11-06 PROCEDURE — 93010 EKG 12-LEAD: ICD-10-PCS | Mod: S$PBB,,, | Performed by: STUDENT IN AN ORGANIZED HEALTH CARE EDUCATION/TRAINING PROGRAM

## 2019-11-06 PROCEDURE — 93010 ELECTROCARDIOGRAM REPORT: CPT | Mod: S$PBB,,, | Performed by: STUDENT IN AN ORGANIZED HEALTH CARE EDUCATION/TRAINING PROGRAM

## 2019-11-06 PROCEDURE — 99999 PR PBB SHADOW E&M-EST. PATIENT-LVL III: ICD-10-PCS | Mod: PBBFAC,,, | Performed by: STUDENT IN AN ORGANIZED HEALTH CARE EDUCATION/TRAINING PROGRAM

## 2019-11-06 PROCEDURE — 99213 OFFICE O/P EST LOW 20 MIN: CPT | Mod: PBBFAC,PO | Performed by: STUDENT IN AN ORGANIZED HEALTH CARE EDUCATION/TRAINING PROGRAM

## 2019-11-06 NOTE — LETTER
2019    Herman Floyd  [address]  [place] [address]             [facility]  [address] [place] [address] [address]  [place] [address]  Phone: [phone] Patient: Herman Floyd  : 1970  Referring Doctor:  Date of Last Office Visit: 19    This patient has been assessed for risk factors for clearance of surgery with the following stipulations:    _x_ No contraindications    Mr. Floyd is at low risk for cardiovascular events for an intermediate orthopedic risk surgery. He does not have any acute coronary syndrome, decompensated heart failure or malignant arrhythmia. He has greater than 4 METs of functional capacity (walks more than 2 block). No further cardiac interventions are planned. Proceed with surgery as planned.    He can proceed with his R ankle surgery with acceptable cardiac risks.  Consider aggressive DVT prophylaxis post surgery   Consider Eliquis or at least lovenox especially if he be will non-ambulatory for a prolong period due to his of provoked DVT.    If you have any questions regarding the above, please contact my office at   [phone]    Sincerely,      Dr. Mario Leon MD [facility]

## 2019-11-06 NOTE — PROGRESS NOTES
Cardiology Clinic note    Subjective:   Patient ID:  Herman Floyd is a 49 y.o. male who presents for follow-up of PE and chest pain    HPI:   Herman Floyd  has a past medical history of Deafness in right ear.  Herman Floyd is a 47 y.o. Male with right ear deafness, and ankle fracture who presented to the ED due to sudden chest discomfort and SOB approximately 30 minutes PTA with near syncopal episode. On 08/03/2018 patient note slipped from the back of his truck trying to attach a trailer. He states he fell about 3 ft to the ground. Patient sustained a right comminuted pilon fracture and underwent external fixator. He has been mostly bed bound since that time. Patient is afraid to use his crutches and reportedly gets OOB twice a day. Wife reports poor participation with prescribed PT. CTA chest in the ED noted diffuse bilateral pulmonary thromboembolism, originating in the distal aspects of the right and left main pulmonary arteries, and extending distally. He is hemodynamically stable and oxygenating well. Admitted to cardiology service for PE. Patient was transferred to ICU and evaluated by interventional cardiology. His troponin was negative and there was no evidence of right heart strain on CTA. No criteria for massive or submassive PE and no indication for systemic/catheter directed thrombolysis. Patient on therapeutic Lovenox.    He tolerated Eliquis well. Repeat CTA 11/9/2018 revealed complete resolution of PE. He was instructed to take DOAC for at least 6 months. He stopped in 1/2019 because of insurance and work compensation related issues not approving. He tolerated R ankle surgery in 11/2018 and 12/2018. He has R leg edema that seems well controlled with compression gears.   He completed 6 months of eliquis.    11/6/19: He presents for pre-op cardiac clearance for R ankle surgery-revision. (removal of screw and bx). Pt is doing well otherwise. Not taking any other medications. He is able  to walk about 2 blocks and 1 flight of stairs w/o stopping using his cane for support only. No out of portion AYALA.    Vitals  Vitals:    11/06/19 1457   BP: (!) 143/75   Pulse: 82   SpO2: (!) 94%   Weight: (!) 142.6 kg (314 lb 6 oz)       Patient Active Problem List    Diagnosis Date Noted    Edema 09/26/2019    Chronic ankle pain, bilateral 02/06/2019    Impaired gait and mobility 02/06/2019    Decreased range of motion of right ankle 02/06/2019    Class 3 severe obesity due to excess calories with body mass index (BMI) of 40.0 to 44.9 in adult 12/03/2018    Leg pain 11/06/2018    Costochondral chest pain 09/24/2018    Chest pain     Shortness of breath     Other pulmonary embolism without acute cor pulmonale 09/18/2018           CTA 9/18/2018   Bilateral PE   No cor pulmonale   No DVT   Treated with Eliquis           CTA 11/9/2018   Resolution of PE      Closed fracture of right ankle 08/03/2018       Patient's Medications    No medications on file         Review of Systems   Constitution: Negative for fever, malaise/fatigue and night sweats.   HENT: Positive for hearing loss. Negative for congestion, hoarse voice and sore throat.    Cardiovascular: Negative for chest pain, irregular heartbeat, palpitations and syncope.   Respiratory: Positive for shortness of breath. Negative for cough, hemoptysis, sleep disturbances due to breathing, sputum production and wheezing.    Musculoskeletal: Positive for joint pain. Negative for muscle weakness.   Gastrointestinal: Negative for abdominal pain, hematemesis and hematochezia.   Neurological: Negative.  Negative for weakness.   Psychiatric/Behavioral: Negative.          Objective:   Physical Exam   Constitutional: He is oriented to person, place, and time. He appears well-developed and well-nourished.   HENT:   Head: Normocephalic.   Eyes: EOM are normal. Left eye exhibits no discharge.   Neck: Neck supple. No JVD present.   Cardiovascular: Normal rate, regular  rhythm and normal heart sounds.   No murmur heard.  Pulmonary/Chest: Effort normal and breath sounds normal. No respiratory distress. He has no wheezes. He has no rales. He exhibits no tenderness.   Abdominal: Soft. He exhibits no distension.   Musculoskeletal: Normal range of motion. He exhibits edema.   1+ b/l   Neurological: He is alert and oriented to person, place, and time.   Skin: Skin is dry. He is not diaphoretic. No erythema.   Psychiatric: He has a normal mood and affect. His behavior is normal. Judgment and thought content normal.     Lab Results    Lab Results   Component Value Date     11/05/2018    K 3.8 11/05/2018     11/05/2018    CO2 26 11/05/2018    BUN 11 11/05/2018    CREATININE 0.9 11/05/2018     (H) 11/05/2018    HGBA1C 4.6 11/05/2018    AST 15 09/18/2018    ALT 19 09/18/2018    ALBUMIN 4.0 09/18/2018    PROT 7.7 09/18/2018    BILITOT 1.6 (H) 09/18/2018    WBC 4.18 11/05/2018    HGB 14.2 11/05/2018    HCT 42.2 11/05/2018    MCV 87 11/05/2018     11/05/2018    INR 1.0 11/05/2018    CRP 2.8 11/05/2018    BNP <10 09/18/2018       Lipid panel  No results found for: CHOL  No results found for: HDL  No results found for: LDLCALC  No results found for: TRIG    Cardiac Studies  ECG:  normal EKG, normal sinus rhythm, unchanged from previous tracings.    Echo: 9/19/18  CONCLUSIONS     1 - Normal left ventricular systolic function (EF 55-60%).     2 - Normal left ventricular diastolic function.     3 - Normal right ventricular systolic function .     4 - The estimated PA systolic pressure is 32 mmHg.     Cath study  11/6/19  ECG: NSR with LVH.  Assessment:     1. Class 3 severe obesity due to excess calories without serious comorbidity with body mass index (BMI) of 40.0 to 44.9 in adult    2. Other pulmonary embolism without acute cor pulmonale, unspecified chronicity    3. Edema, unspecified type        Plan:     1. Chronic Pulmonary embolism - provoked  - now resolved  -  immobility presents a stimulus for reoccurrence of a DVT/PE    2. Pre-operative cardiovascular clearance: R ankle surgery   - would recommend pt go back on eliquis or lovenox post-op  Mr. Floyd is at low risk for cardiovascular events for an intermediate orthopedic risk surgery. He does not have any acute coronary syndrome, decompensated heart failure or malignant arrhythmia. He has greater than 4 METs of functional capacity (walks more than 2 block). No further cardiac interventions are planned. Proceed with surgery as planned.    He can proceed with his R ankle surgery with acceptable cardiac risks.  Consider aggressive DVT prophylaxis post surgery   Consider Eliquis or at least lovenox especially if he be will non-ambulatory for a prolong period due to his of provoked DVT.        PT/OT and resume activities within the limit of his surgery immediately post procedure        2. Obesity  I spent 5-10 minutes asking, assessing, assisting, arranging and advising heart healthy diet improvements. This included low-salt meals, portion control and health food alternatives. I also encourage 30 minutes of moderate exercise 3-4x a week.   Weight loss  Dietary changes  Establish care with PCP    Continue with current medical plan and lifestyle changes.  Return sooner for concerns or questions. If symptoms persist go to the ED    No orders of the defined types were placed in this encounter.      Follow up as scheduled. Return to clinic in 4 month  He expressed verbal understanding and agreed with the plan    Thank you for the opportunity to care for this patient. Will be available for questions if needed.     Mario Leon MD [facility]  Interventional Cardiology  [facility]  Pager: [phone]

## 2019-11-07 ENCOUNTER — TELEPHONE (OUTPATIENT)
Dept: CARDIOLOGY | Facility: CLINIC | Age: 49
End: 2019-11-07

## 2019-11-07 NOTE — TELEPHONE ENCOUNTER
----- Message from Kinga Salvador sent at 11/7/2019  9:23 AM CST -----  Regarding: EKG ORDER  Please place and link EKG order to the EKG or Doctor appointment scheduled on 11/06/19 thanks. Kinga [phone]

## 2020-01-13 ENCOUNTER — DOCUMENTATION ONLY (OUTPATIENT)
Dept: REHABILITATION | Facility: HOSPITAL | Age: 50
End: 2020-01-13

## 2020-01-13 DIAGNOSIS — M25.671 DECREASED RANGE OF MOTION OF RIGHT ANKLE: ICD-10-CM

## 2020-01-13 DIAGNOSIS — R26.89 IMPAIRED GAIT AND MOBILITY: ICD-10-CM

## 2020-01-13 DIAGNOSIS — M25.571 CHRONIC ANKLE PAIN, BILATERAL: ICD-10-CM

## 2020-01-13 DIAGNOSIS — M25.572 CHRONIC ANKLE PAIN, BILATERAL: ICD-10-CM

## 2020-01-13 DIAGNOSIS — G89.29 CHRONIC ANKLE PAIN, BILATERAL: ICD-10-CM

## 2020-01-13 NOTE — PROGRESS NOTES
Pt was evaluated on 2/6/2019 and was seen 42 times for PT. Pt has not attended PT since 9/27/2019; placed on hold from therapy due to plateau in progress. Pt was given HEP. Current status is unknown. Start of the new calendar year. Pt to be d/c'd at this time.

## 2020-02-18 ENCOUNTER — CLINICAL SUPPORT (OUTPATIENT)
Dept: REHABILITATION | Facility: HOSPITAL | Age: 50
End: 2020-02-18
Payer: OTHER MISCELLANEOUS

## 2020-02-18 DIAGNOSIS — M25.571 CHRONIC ANKLE PAIN, BILATERAL: Primary | ICD-10-CM

## 2020-02-18 DIAGNOSIS — G89.29 CHRONIC ANKLE PAIN, BILATERAL: Primary | ICD-10-CM

## 2020-02-18 DIAGNOSIS — Z98.1 STATUS POST ANKLE FUSION: ICD-10-CM

## 2020-02-18 DIAGNOSIS — R26.89 IMPAIRED GAIT AND MOBILITY: ICD-10-CM

## 2020-02-18 DIAGNOSIS — M25.572 CHRONIC ANKLE PAIN, BILATERAL: Primary | ICD-10-CM

## 2020-02-18 DIAGNOSIS — M25.671 ANKLE STIFFNESS, RIGHT: ICD-10-CM

## 2020-02-18 DIAGNOSIS — M79.604 PAIN OF RIGHT LOWER EXTREMITY: ICD-10-CM

## 2020-02-18 DIAGNOSIS — M25.552 BILATERAL HIP PAIN: ICD-10-CM

## 2020-02-18 DIAGNOSIS — M25.551 BILATERAL HIP PAIN: ICD-10-CM

## 2020-02-18 PROCEDURE — 97162 PT EVAL MOD COMPLEX 30 MIN: CPT | Mod: PN

## 2020-02-18 PROCEDURE — 97110 THERAPEUTIC EXERCISES: CPT | Mod: PN

## 2020-02-19 PROBLEM — M25.671 ANKLE STIFFNESS, RIGHT: Status: ACTIVE | Noted: 2020-02-19

## 2020-02-19 NOTE — PLAN OF CARE
[facility]  Physical Therapy Initial Evaluation    Name: Herman GUERRERO [facility] Number: [id number]    Therapy Diagnosis:   Encounter Diagnoses   Name Primary?    Status post ankle fusion     Bilateral hip pain     Bilateral knee pain     Chronic ankle pain, bilateral Yes    Pain of right lower extremity     Ankle stiffness, right     Impaired gait and mobility      Physician: Kadie Grider MD    Physician Orders: PT Eval and Treat   Medical Diagnosis from Referral:   Z98.1 (ICD-10-CM) - Status post ankle fusion   M25.551,M25.552 (ICD-10-CM) - Bilateral hip pain   M25.561,M25.562 (ICD-10-CM) - Bilateral knee pain     Evaluation Date: 2/18/2020  Authorization Period Expiration: 6/30/2020  Plan of Care Expiration: 3/20/2020  Visit # / Visits authorized: 1/ 12    Time In: 225pm  Time Out: 300pm  Total Appointment Time (timed & untimed codes): 35 minutes    Precautions: Standard and Weightbearing, Pt currently NWB until next f/u with MD    Subjective   Date of injury: August 2018 from a slip out of a truck  Current procedure: Ankle fusion in January 2020. Currently NWB.   Prior Sx performed: INTRAMEDULLARY NAIL PLACEMENT LOWER EXTREMITY TTC NAIL 12/10/2019  History of current condition - Herman reports: that he has had 6 total surgeries to the R ankle since his injury in August of 2018. Most recent Sx in January of 2020 - ankle fusion. PT reports no complications; does however reports Hx of blood clots with prior ankle Sx. Reports since Sx that he has been fairly ok, reports that is currently NWB for 6-12 weeks from Sx. Reports he is supposed to potentially be able to WB at his next MD f/u.     Will see MD on 2/28/2020.      Occupation (including job description if provided):   Job Demands: Heavy  Prior Medical Treatment: Medication, Therapy  and Surgery  Current Work Restrictions: Not working at this time and no return to work date set.     Medical History:   Past  "Medical History:   Diagnosis Date    Deafness in right ear        Surgical History:   Herman Floyd  has a past surgical history that includes Finger fracture surgery and Ankle fracture surgery (Right, 08/2018).    Medications:   Herman has a current medication list which includes the following Facility-Administered Medications: sodium chloride 0.9% and lidocaine (pf) 10 mg/ml (1%).    Allergies:   Review of patient's allergies indicates:  No Known Allergies     Imaging, CT scan films: Severely comminuted fracture of the distal tibia and fibula status post fusion with a tibiocalcaneal nail. Significant osteopenia       Social History:  lives with their family    Pain:  Current 0/10, worst 3/10, best 0/10   Location: R ankle  Description: None currently, though NWB  Aggravating Factors: Any attempts to move the ankle around. No worse than 3/10; however, has not WB at this time due to Sx precautions.   Alleviating Factors: meditation, ice and elevation    Pt's goals: Return to gainful employment         Be able to walk again, negotiate stairs, squat, lunge, "normal things"         Decrease swelling and improve his ankle mobility    Objective     Functional Screening:  - Mickey with mobility with WC, able to negotiate around obstacles with no issues  - Independent with T/F from WC<>Chair NWB on the R LE consistent with Sx precautions.     Ankle AROM (R/L):   Ankle AROM Right (*) = in degrees Left (*) = in degrees Comments    Dorsiflexion -18 WNL Stiffness noted.    Plantarflexion 22 WNL Stiffness noted.    Inversion 2 WNL Stiffness and pain noted.    Eversion 2 WNL Stiffness and pain noted.    Great toe extension 21 WNL Stiffness and pain noted.    Ankle PROM Right (*) = in degrees Left (*) = in degrees Comments    Dorsiflexion -10 WNL Stiffness and pain noted.    Plantarflexion 26 WNL Stiffness and pain noted.    Inversion 10 WNL Stiffness and pain noted.    Eversion 6 WNL Stiffness and pain noted.    Great toe " extension 43  WNL Stiffness noted.      Ankle MMT:   Ankle MMT scores Right: X/5 Left: x/5   Dorsiflexion 2+ /5  4 /5   Plantarflexion  3- /5 4 /5    Inversion 2+ /5 4+ /5    Eversion 2+ /5 4 /5    Great toe extension 2+ /5 4 /5     Circumferential measures:   Circumferential measures Right in cm   Figure 8 68   At [place] 37   At Metatarsal heads 26       Physical Demand Level (PDL) Reference   Physical Demand Level (PDL) Occasional: 1-33% of the Workday (O) Frequent: 34-66% of the Workday (F) Constant: 67% of the Workday (C)   Sedentary (S) 0 - 10 lbs. Negligible Negligible   Light (L) 11 - 20 lbs. 0 - 10 lbs, Negligible   Medium (M) 21 - 50 lbs. 11 - 25lbs. 0 - 10 lbs   Heavy (H) 51 - 100 lbs. 26 - 50 lbs. 10 - 20 lbs.   Very Heavy (VH) 100 + lbs. 50+ lbs. 20+ lbs.     PDLs not determined at this time due to NWB status. Pt currently unable to perform work duties.       Limitation/Restriction for FOTO Ankle Survey    Therapist reviewed FOTO scores for Herman Floyd on 2/18/2020.   FOTO documents entered into [employer] - see Media section.    Limitation Score: 70%  Goals: 48%        TREATMENT   Treatment Time In: 250pm  Treatment Time Out: 300pm  Total Treatment time (time-based codes) separate from Evaluation: 10 minutes       Herman received the following treatment separate from the evaluation:             - THERAPEUTIC EXERCISES to develop  strength, ROM and flexibility for 15 minutes including the following:.  - Ankle pumps PF/DF   x20  - SLR     x20  - SL Hip abd (R)   x20  - Seated heel raise   x20  - Seated toe raise   x20  - Seated towel toe crunch  x20  - Seated LAQ    x20    Home Exercises and Patient Education Provided    Education provided:   - ongoing NWB status, expectations s/p status, HEP, POC    Written Home Exercises Provided: yes.  Exercises were reviewed and Herman was able to demonstrate them prior to the end of the session.  Herman demonstrated good  understanding of the education provided.      See EMR under Patient Instructions for exercises provided 2/18/2020.    Assessment   Herman is a 49 y.o. male referred to outpatient Physical Therapy with a medical diagnosis of Status post ankle fusion with B knee and hip pain. Pt presented to the clinic today mobI with mobility with WC. He is currently s/p R ankle fusion and is currently NWB until next follow up with MD. He is with a significant past Sx Hx to the R ankle with a Hx of 6 procedures to the R ankle/foot since his original injury in August of 2018.   His examination today was limited due to pt being late to his appointment as well as currently NWB. He presents with decreased A/PAROM of the R foot/ankle, edema to the R ankle/foot, B LE weakness and is currently unable to stand, ambulate, negotiate stairs or any WB activity at this time due to precautions.     Current PDL: Unable to Perform ; pt with current NWB status  Return to Work PDL: Heavy    Pt prognosis is Fair.     Skilled Physical Therapy intervention is required at this time for the injured worker to address the musculoskeletal limitations and work-related functional deficits for their job as a  which requires a PDL level of Heavy.     Plan of care discussed with patient: Yes  Pt's spiritual, cultural and educational needs considered and patient is agreeable to the plan of care and goals as stated below:     Anticipated Barriers for therapy: chronicity of current injury and significant SxHx with recent ankel fusion.     Medical Necessity is demonstrated by the following  History  Co-morbidities and personal factors that may impact the plan of care Co-morbidities:   high BMI and SxHx    Personal Factors:   age  lifestyle     moderate   Examination  Body Structures and Functions, activity limitations and participation restrictions that may impact the plan of care Body Regions:   lower extremities    Body Systems:    gross symmetry  ROM  strength  balance  gait    Participation  Restrictions:   WB status, activity tolerance    Activity limitations:   Learning and applying knowledge  no deficits    General Tasks and Commands  No deficits.     Communication  no deficits    Mobility  walking    Self care  no deficits    Domestic Life  shopping  cooking  doing house work (cleaning house, washing dishes, laundry)  assisting others    Interactions/Relationships  no deficits    Life Areas  employment    Community and Social Life  community life  recreation and leisure         high   Clinical Presentation evolving clinical presentation with changing clinical characteristics moderate   Decision Making/ Complexity Score: moderate       Goals:     Short Term Goals: 4 weeks   1.) Pt will become compliant and independent with his initial HEP to promote decreased pain and improved mobility and strength.   2) Pt to report decrease in pain levels from 3/10 at worse to 1/10 at worse.   3.) Pt will improve R ankle MMT scores by 1 muscle grade to improve functional stability and strength.   4.) Pt will be able to tolerate 60 minute exercise session with 3 resting breaks or less to demonstrate improved activity tolerance.     Long Term Goals: 8 weeks   1.) Pt will transition from NWB to WBAT and able to ambulate 30ft with a RW.   2.) Pt will be able to ambulate 300ft with a RW with no resting breaks to demonstrate improved ambulatory tolerance (10 weeks).   3.) Pt will transition from ambulation with RW to ambulation with SPC to demonstrate improved independence. (12 weeks)  4.) Pt will transition from ambulation with SPC to ambulation with no AD to demonstrate further independence. (14 weeks)  5.) Pt will improve her FOTO score from 70% impairment to 48% improvement to indicate improvement in overall function.    *Additoinal LTG will be set when appropriate functional measures are able to be tested.*      Pt will return to work when MD deems pt able to.return to work safely.     Plan   Plan of care  Certification: 2/18/2020 to 4/17/2020.    Outpatient Physical Therapy 3 times weekly for 4 weeks to include the following interventions: Gait Training, Manual Therapy, Moist Heat/ Ice, Neuromuscular Re-ed, Patient Education, Self Care, Therapeutic Activites, Therapeutic Exercise and Astym, other soft tissue modalities as needed.      Initial POC set for 4 weeks per per pt has been authorized 12 treatment sessions at this time. Highly suspect the patient will require much longer intervention due to the nature and chronicity of his current presentation. Initial POC set for 3 x per week; however, once established, prior to WBAT, frequency may decreased based on pt's progress or lack of progress.     Upon discharge from further skilled PT intervention it will determined if the need for a work conditioning program or Functional Capacity Evaluation is required to allow the injured worker to return to work with full potential achieved, continued improvement with body mechanics with advanced functional activities, and further prevention of future work-related injuries.     Vinicius Ferreira, PT  2/19/2020

## 2020-02-20 ENCOUNTER — CLINICAL SUPPORT (OUTPATIENT)
Dept: REHABILITATION | Facility: HOSPITAL | Age: 50
End: 2020-02-20
Payer: OTHER MISCELLANEOUS

## 2020-02-20 DIAGNOSIS — M25.671 ANKLE STIFFNESS, RIGHT: ICD-10-CM

## 2020-02-20 PROBLEM — M25.571 CHRONIC ANKLE PAIN, BILATERAL: Status: RESOLVED | Noted: 2019-02-06 | Resolved: 2020-02-20

## 2020-02-20 PROBLEM — M25.572 CHRONIC ANKLE PAIN, BILATERAL: Status: RESOLVED | Noted: 2019-02-06 | Resolved: 2020-02-20

## 2020-02-20 PROBLEM — G89.29 CHRONIC ANKLE PAIN, BILATERAL: Status: RESOLVED | Noted: 2019-02-06 | Resolved: 2020-02-20

## 2020-02-20 PROBLEM — R26.89 IMPAIRED GAIT AND MOBILITY: Status: RESOLVED | Noted: 2019-02-06 | Resolved: 2020-02-20

## 2020-02-20 PROCEDURE — 97110 THERAPEUTIC EXERCISES: CPT | Mod: PN

## 2020-02-20 PROCEDURE — 97140 MANUAL THERAPY 1/> REGIONS: CPT | Mod: PN

## 2020-02-20 NOTE — PROGRESS NOTES
"                            Physical Therapy Daily Treatment Note     Name: Herman GUERRERO [facility] Number: [id number]    Therapy Diagnosis:   Encounter Diagnosis   Name Primary?    Ankle stiffness, right      Physician: Kadie Grider MD    Visit Date: 2/20/2020    Physician Orders: PT Eval and Treat   Medical Diagnosis from Referral:   Z98.1 (ICD-10-CM) - Status post ankle fusion   M25.551,M25.552 (ICD-10-CM) - Bilateral hip pain   M25.561,M25.562 (ICD-10-CM) - Bilateral knee pain   Evaluation Date: 2/18/2020  Authorization Period Expiration: 6/30/2020  Plan of Care Expiration: 3/20/2020  Visit # / Visits authorized: 2/12    Time In: 1110  Time Out: 1207  Total Billable Time: 57 minutes    Precautions: Precautions: Standard; nonweightbearing (NWB) until next follow-up with MD     Subjective     Pt reports: follow-up with surgeon is next Friday.  He was PARTIALLY compliant with home exercise program. Did not receive handout.   Response to previous treatment: soreness around R patella and heel  Functional change: ease of transfers in nonweightbearing    Pain: 0/10  Location: R lower leg/ankle/foot     Objective     Herman received therapeutic exercises to develop strength and ROM for 47 minutes including:    Mat:  - Ankle pumps plantarflexion/dorsiflexion: x20 R  - 4-way hip: adduction and extension x10 R; remaining 2x10 R  - Prone hamstring curls: 2x10 R    Seated:   - Heel raise: 3x10 R. Patient reports sharp pain to R heel throughout  - Toe raise: 3x10 R.  - Towel toe crunch: 5' R  - Toe yoga c/ hallux extension: 3' R  - Toe yoga c/ 2nd-5th phalanx extension: 3' R  - Long arc quads: 5" 3x10 R     Herman received the following manual therapy techniques to the R foot, lower leg and ankle for 10 minutes, including:  Retrograde edema reduction    Home Exercises Provided and Patient Education Provided:    Education provided:   - Continue HEP  - Continue NWB on R LE    Written Home Exercises Provided: " yes.  Exercises were reviewed and Nicholas able to demonstrate them prior to the end of the session.  Herman demonstrated good  understanding of the education provided.     See EMR under Patient Instructions for exercises provided 2/20/2020.    Assessment     Extreme difficulty performing toe flexion. Increased ease performing extension however difficulty isolating big toe from lateral 4. Quick fatigue with hip 4-way exercise.     Herman is progressing well towards his goals.   Pt prognosis is Fair.   Pt will continue to benefit from skilled outpatient physical therapy to address the deficits listed in the problem list box on initial evaluation, provide pt/family education and to maximize pt's level of independence in the home and community environment.     Pt's spiritual, cultural and educational needs considered and pt agreeable to plan of care and goals.  Anticipated barriers to physical therapy: chronicity of current injury and significant SxHx with recent ankle fusion.     Short Term Goals: 4 weeks   1.) Pt will become compliant and independent with his initial HEP to promote decreased pain and improved mobility and strength. PROGRESSING, NOT MET 2/20/2020   2) Pt to report decrease in pain levels from 3/10 at worse to 1/10 at worse. PROGRESSING, NOT MET 2/20/2020   3.) Pt will improve R ankle MMT scores by 1 muscle grade to improve functional stability and strength. PROGRESSING, NOT MET 2/20/2020   4.) Pt will be able to tolerate 60 minute exercise session with 3 resting breaks or less to demonstrate improved activity tolerance. PROGRESSING, NOT MET 2/20/2020   Long Term Goals: 8 weeks   1.) Pt will transition from NWB to WBAT and able to ambulate 30ft with a RW. PROGRESSING, NOT MET 2/20/2020   2.) Pt will be able to ambulate 300ft with a RW with no resting breaks to demonstrate improved ambulatory tolerance (10 weeks). PROGRESSING, NOT MET 2/20/2020   3.) Pt will transition from ambulation with RW to  ambulation with SPC to demonstrate improved independence. (12 weeks). PROGRESSING, NOT MET 2/20/2020   4.) Pt will transition from ambulation with SPC to ambulation with no AD to demonstrate further independence. (14 weeks). PROGRESSING, NOT MET 2/20/2020   5.) Pt will improve her FOTO score from 70% impairment to 48% improvement to indicate improvement in overall function. PROGRESSING, NOT MET 2/20/2020   *Additoinal LTG will be set when appropriate functional measures are able to be tested.*     Plan     Improve intrinsic foot strength.     Chiqui Wells, PT, DPT

## 2020-02-24 ENCOUNTER — CLINICAL SUPPORT (OUTPATIENT)
Dept: REHABILITATION | Facility: HOSPITAL | Age: 50
End: 2020-02-24
Payer: OTHER MISCELLANEOUS

## 2020-02-24 DIAGNOSIS — M25.671 ANKLE STIFFNESS, RIGHT: ICD-10-CM

## 2020-02-24 PROCEDURE — 97140 MANUAL THERAPY 1/> REGIONS: CPT | Mod: PN

## 2020-02-24 PROCEDURE — 97110 THERAPEUTIC EXERCISES: CPT | Mod: PN

## 2020-02-24 NOTE — PROGRESS NOTES
"                            Physical Therapy Daily Treatment Note     Name: Herman GUERRERO [facility] Number: [id number]    Therapy Diagnosis:   Encounter Diagnosis   Name Primary?    Ankle stiffness, right      Physician: Kadie Grider MD    Visit Date: 2/24/2020    Physician Orders: PT Eval and Treat   Medical Diagnosis from Referral:   Z98.1 (ICD-10-CM) - Status post ankle fusion   M25.551,M25.552 (ICD-10-CM) - Bilateral hip pain   M25.561,M25.562 (ICD-10-CM) - Bilateral knee pain   Evaluation Date: 2/18/2020  Authorization Period Expiration: 6/30/2020  Plan of Care Expiration: 3/20/2020  Visit # / Visits authorized: 3/12    Time In: 415pm  Time Out: 505pm  Total Billable Time: 50 minutes (1MT, 3TE)    Precautions: Precautions: Standard; nonweightbearing (NWB) until next follow-up with MD     Subjective     Pt reports: follow-up with surgeon is Friday of this week. Reports that he is having some heel discomfort at times when resting his foot in the dependent position on the floor.  He was PARTIALLY compliant with home exercise program. Reports that he is doing what he can.   Response to previous treatment: soreness around R patella and heel.   Functional change: ease of transfers in nonweightbearing    Pain: 0/10  Location: R lower leg/ankle/foot     Objective     Herman received therapeutic exercises to develop strength and ROM for 40 minutes including:    Mat:  - Ankle pumps plantarflexion/dorsiflexion: x20 R  - 4-way hip: adduction and extension x10 R; remaining 2x10 R  - Prone hamstring curls: 2x10 R    Planned: UBE for endurance gains     Seated:   - Heel raise: 3x10 R. Patient reports sharp pain to R heel throughout  - Toe raise: 3x10 R.  - Towel toe crunch: 5' R  - Toe yoga c/ hallux extension: 3' R  - Toe yoga c/ 2nd-5th phalanx extension: 3' R  - Long arc quads: 5" 3x10 R     Herman received the following manual therapy techniques to the R foot, lower leg and ankle for 10 minutes, " including:  Retrograde edema reduction  First ray JM plantar glides and great toe distraction for improved first ray mobility  MT distraction 2-5, 5x each    Home Exercises Provided and Patient Education Provided:    Education provided:   - Continue HEP  - Continue NWB on R LE    Written Home Exercises Provided: yes.  Exercises were reviewed and Nicholas able to demonstrate them prior to the end of the session.  Herman demonstrated good  understanding of the education provided.     See EMR under Patient Instructions for exercises provided 2/20/2020.    Assessment     Ongoing marked stiffness (unable to achieve dorsiflexion to neutral at this time) and edema consistent with recent ankle fusion. He demonstrates extreme difficulty performing toe flexion as well as extension ROM. Increased ease performing extension as compared to flexion however difficulty isolating big toe from lateral 4. Quick fatigue with hip 4-way exercise as well. Consider introducing UBE at his next follow up visit to work on improving endurance due to deconditioned status. Pt is due to follow up with referring MD on Friday of this week.      Herman is progressing well towards his goals.   Pt prognosis is Fair.   Pt will continue to benefit from skilled outpatient physical therapy to address the deficits listed in the problem list box on initial evaluation, provide pt/family education and to maximize pt's level of independence in the home and community environment.     Pt's spiritual, cultural and educational needs considered and pt agreeable to plan of care and goals.  Anticipated barriers to physical therapy: chronicity of current injury and significant SxHx with recent ankle fusion.     Short Term Goals: 4 weeks   1.) Pt will become compliant and independent with his initial HEP to promote decreased pain and improved mobility and strength. PROGRESSING, NOT MET 2/24/2020   2) Pt to report decrease in pain levels from 3/10 at worse to 1/10 at  worse. PROGRESSING, NOT MET 2/24/2020   3.) Pt will improve R ankle MMT scores by 1 muscle grade to improve functional stability and strength. PROGRESSING, NOT MET 2/24/2020   4.) Pt will be able to tolerate 60 minute exercise session with 3 resting breaks or less to demonstrate improved activity tolerance. PROGRESSING, NOT MET 2/24/2020   Long Term Goals: 8 weeks   1.) Pt will transition from NWB to WBAT and able to ambulate 30ft with a RW. PROGRESSING, NOT MET 2/24/2020   2.) Pt will be able to ambulate 300ft with a RW with no resting breaks to demonstrate improved ambulatory tolerance (10 weeks). PROGRESSING, NOT MET 2/24/2020   3.) Pt will transition from ambulation with RW to ambulation with SPC to demonstrate improved independence. (12 weeks). PROGRESSING, NOT MET 2/24/2020   4.) Pt will transition from ambulation with SPC to ambulation with no AD to demonstrate further independence. (14 weeks). PROGRESSING, NOT MET 2/24/2020   5.) Pt will improve her FOTO score from 70% impairment to 48% improvement to indicate improvement in overall function. PROGRESSING, NOT MET 2/24/2020   *Additoinal LTG will be set when appropriate functional measures are able to be tested.*     Plan     Improve intrinsic foot strength.     Vinicius Ferreira, PT, DPT

## 2020-02-26 ENCOUNTER — CLINICAL SUPPORT (OUTPATIENT)
Dept: REHABILITATION | Facility: HOSPITAL | Age: 50
End: 2020-02-26
Payer: OTHER MISCELLANEOUS

## 2020-02-26 DIAGNOSIS — M25.671 ANKLE STIFFNESS, RIGHT: ICD-10-CM

## 2020-02-26 PROCEDURE — 97140 MANUAL THERAPY 1/> REGIONS: CPT | Mod: PN,CQ

## 2020-02-26 PROCEDURE — 97110 THERAPEUTIC EXERCISES: CPT | Mod: PN,CQ

## 2020-02-26 NOTE — PROGRESS NOTES
"                            Physical Therapy Daily Treatment Note     Name: Herman GUERRERO [facility] Number: [id number]    Therapy Diagnosis:   Encounter Diagnosis   Name Primary?    Ankle stiffness, right      Physician: Kadie Grider MD    Visit Date: 2/26/2020    Physician Orders: PT Eval and Treat   Medical Diagnosis from Referral:   Z98.1 (ICD-10-CM) - Status post ankle fusion   M25.551,M25.552 (ICD-10-CM) - Bilateral hip pain   M25.561,M25.562 (ICD-10-CM) - Bilateral knee pain   Evaluation Date: 2/18/2020  Authorization Period Expiration: 6/30/2020  Plan of Care Expiration: 3/20/2020  Visit # / Visits authorized: 4/12    Time In: 315pm  Time Out: 415 pm  Total Billable Time: 55 minutes (1MT, 3TE)    Precautions: Precautions: Standard; nonweightbearing (NWB) until next follow-up with MD     Subjective     Pt reports: follow-up with surgeon is Friday of this week. Reports that he is having less swelling today.    He was PARTIALLY compliant with home exercise program. Reports that he is doing what he can.   Response to previous treatment: soreness around R patella and heel.   Functional change: ease of transfers in nonweightbearing    Pain: 0/10  Location: R lower leg/ankle/foot     Objective     Herman received therapeutic exercises to develop strength and ROM for 50 minutes including:    Mat:  - Ankle pumps plantarflexion/dorsiflexion: x20 R  - 4-way hip: adduction 1x10 R, extension 2 x10 R; SLR and abduction 3x10 R  - Prone hamstring curls: 2x10 R    Planned: UBE for endurance gains     Seated:   - Heel raise: 3x10 R.   - Toe raise: 3x10 R.  - Towel toe crunch: 5' R  - Toe yoga c/ hallux extension: 3' R  - Toe yoga c/ 2nd-5th phalanx extension: 3' R  - Long arc quads: 5" 3x10 R     Herman received the following manual therapy techniques to the R foot, lower leg and ankle for 10 minutes, including:  Retrograde edema reduction  First ray JM plantar glides and great toe distraction for improved first ray " mobility  MT distraction 2-5, 5x each    Home Exercises Provided and Patient Education Provided:    Education provided:   - Continue HEP  - Continue NWB on R LE    Written Home Exercises Provided: yes.  Exercises were reviewed and Herman was able to demonstrate them prior to the end of the session.  Herman demonstrated good  understanding of the education provided.     See EMR under Patient Instructions for exercises provided 2/20/2020.    Assessment     Ongoing marked stiffness (unable to achieve dorsiflexion to neutral at this time). Decreased edema today. He demonstrates extreme difficulty performing toe flexion as well as extension ROM. Increased ease performing extension as compared to flexion however difficulty isolating big toe.  Quick fatigue with hip 4-way exercise but able to increase reps as noted.     Herman is progressing well towards his goals.   Pt prognosis is Fair.   Pt will continue to benefit from skilled outpatient physical therapy to address the deficits listed in the problem list box on initial evaluation, provide pt/family education and to maximize pt's level of independence in the home and community environment.     Pt's spiritual, cultural and educational needs considered and pt agreeable to plan of care and goals.  Anticipated barriers to physical therapy: chronicity of current injury and significant SxHx with recent ankle fusion.     Short Term Goals: 4 weeks   1.) Pt will become compliant and independent with his initial HEP to promote decreased pain and improved mobility and strength. PROGRESSING, NOT MET 2/26/2020   2) Pt to report decrease in pain levels from 3/10 at worse to 1/10 at worse. PROGRESSING, NOT MET 2/26/2020   3.) Pt will improve R ankle MMT scores by 1 muscle grade to improve functional stability and strength. PROGRESSING, NOT MET 2/26/2020   4.) Pt will be able to tolerate 60 minute exercise session with 3 resting breaks or less to demonstrate improved activity tolerance.  PROGRESSING, NOT MET 2/26/2020   Long Term Goals: 8 weeks   1.) Pt will transition from NWB to WBAT and able to ambulate 30ft with a RW. PROGRESSING, NOT MET 2/26/2020   2.) Pt will be able to ambulate 300ft with a RW with no resting breaks to demonstrate improved ambulatory tolerance (10 weeks). PROGRESSING, NOT MET 2/26/2020   3.) Pt will transition from ambulation with RW to ambulation with SPC to demonstrate improved independence. (12 weeks). PROGRESSING, NOT MET 2/26/2020   4.) Pt will transition from ambulation with SPC to ambulation with no AD to demonstrate further independence. (14 weeks). PROGRESSING, NOT MET 2/26/2020   5.) Pt will improve her FOTO score from 70% impairment to 48% improvement to indicate improvement in overall function. PROGRESSING, NOT MET 2/26/2020   *Additoinal LTG will be set when appropriate functional measures are able to be tested.*     Plan     Improve intrinsic foot strength. Consider introducing UBE at his next follow up visit to work on improving endurance due to deconditioned status.    Stephanie Orosco, PTA

## 2020-02-28 ENCOUNTER — TELEPHONE (OUTPATIENT)
Dept: REHABILITATION | Facility: HOSPITAL | Age: 50
End: 2020-02-28

## 2020-02-28 ENCOUNTER — CLINICAL SUPPORT (OUTPATIENT)
Dept: REHABILITATION | Facility: HOSPITAL | Age: 50
End: 2020-02-28
Payer: OTHER MISCELLANEOUS

## 2020-02-28 DIAGNOSIS — M25.671 ANKLE STIFFNESS, RIGHT: ICD-10-CM

## 2020-02-28 PROCEDURE — 97140 MANUAL THERAPY 1/> REGIONS: CPT | Mod: PN,CQ

## 2020-02-28 PROCEDURE — 97110 THERAPEUTIC EXERCISES: CPT | Mod: PN,CQ

## 2020-02-28 NOTE — PROGRESS NOTES
"                            Physical Therapy Daily Treatment Note     Name: Herman GUERRERO [facility] Number: [id number]    Therapy Diagnosis:   Encounter Diagnosis   Name Primary?    Ankle stiffness, right      Physician: Kadie Grider MD    Visit Date: 2/28/2020    Physician Orders: PT Eval and Treat   Medical Diagnosis from Referral:   Z98.1 (ICD-10-CM) - Status post ankle fusion   M25.551,M25.552 (ICD-10-CM) - Bilateral hip pain   M25.561,M25.562 (ICD-10-CM) - Bilateral knee pain   Evaluation Date: 2/18/2020  Authorization Period Expiration: 6/30/2020  Plan of Care Expiration: 3/20/2020  Visit # / Visits authorized: 5/12  FOTO: 5/5 DONE    Time In: 1300   Time Out: 1400   Total Billable Time: 55 minutes (1MT, 3TE)    Precautions: Precautions: Standard; nonweightbearing with ambulation (PWB-50% for standing only) until next follow-up with MD on March 12.   Subjective     Pt reports: follow-up with surgeon this morning.  Not able to walk on leg, per patient MD is allowing him to stand with 50% weight bearing. Reports that he is having more swelling today due to trip to and from [place].  He was PARTIALLY compliant with home exercise program. Reports that he is doing what he can.   Response to previous treatment: soreness around R patella and heel.   Functional change: ease of transfers in nonweightbearing    Pain: 0/10  Location: R lower leg/ankle/foot     Objective     Herman received therapeutic exercises to develop strength and ROM for 45 minutes including:    Mat:  - Ankle pumps plantarflexion/dorsiflexion: x30 R  - 4-way hip: adduction and extension 2 x10 R; SLR and abduction 3x10 R  - Prone hamstring curls: 2x10 R    Planned: UBE for endurance gains     Seated:   - Heel raise: 3x10 R.   - Toe raise: 3x10 R.  - Towel toe crunch: 5' R  - Toe yoga c/ hallux extension: 3' R  - Toe yoga c/ 2nd-5th phalanx extension: 3' R  - Long arc quads: 5" 3x10 R     Herman received the following manual therapy " techniques to the R foot, lower leg and ankle for 15 minutes, including:  Retrograde edema reduction  First ray JM plantar glides and great toe distraction for improved first ray mobility  MT distraction 2-5, 5x each    Home Exercises Provided and Patient Education Provided:    Education provided:   - Continue HEP  - Continue NWB on R LE    Written Home Exercises Provided: yes.  Exercises were reviewed and Herman was able to demonstrate them prior to the end of the session.  Herman demonstrated good  understanding of the education provided.     See EMR under Patient Instructions for exercises provided 2/20/2020.    Assessment     Ongoing marked stiffness (still unable to achieve dorsiflexion to neutral at this time). Decreased edema today. He demonstrates decreased difficulty performing toe flexion as well as extension ROM. Continued difficulty isolating big toe.  increaed endurance with hip 4-way exercise able to increase reps as noted.     Herman is progressing well towards his goals.   Pt prognosis is Fair.   Pt will continue to benefit from skilled outpatient physical therapy to address the deficits listed in the problem list box on initial evaluation, provide pt/family education and to maximize pt's level of independence in the home and community environment.     Pt's spiritual, cultural and educational needs considered and pt agreeable to plan of care and goals.  Anticipated barriers to physical therapy: chronicity of current injury and significant SxHx with recent ankle fusion.     Short Term Goals: 4 weeks   1.) Pt will become compliant and independent with his initial HEP to promote decreased pain and improved mobility and strength. PROGRESSING, NOT MET 2/28/2020   2) Pt to report decrease in pain levels from 3/10 at worse to 1/10 at worse. PROGRESSING, NOT MET 2/28/2020   3.) Pt will improve R ankle MMT scores by 1 muscle grade to improve functional stability and strength. PROGRESSING, NOT MET 2/28/2020   4.)  Pt will be able to tolerate 60 minute exercise session with 3 resting breaks or less to demonstrate improved activity tolerance. PROGRESSING, NOT MET 2/28/2020   Long Term Goals: 8 weeks   1.) Pt will transition from NWB to WBAT and able to ambulate 30ft with a RW. PROGRESSING, NOT MET 2/28/2020   2.) Pt will be able to ambulate 300ft with a RW with no resting breaks to demonstrate improved ambulatory tolerance (10 weeks). PROGRESSING, NOT MET 2/28/2020   3.) Pt will transition from ambulation with RW to ambulation with SPC to demonstrate improved independence. (12 weeks). PROGRESSING, NOT MET 2/28/2020   4.) Pt will transition from ambulation with SPC to ambulation with no AD to demonstrate further independence. (14 weeks). PROGRESSING, NOT MET 2/28/2020   5.) Pt will improve her FOTO score from 70% impairment to 48% improvement to indicate improvement in overall function. PROGRESSING, NOT MET 2/28/2020   *Additoinal LTG will be set when appropriate functional measures are able to be tested.*     Plan     Improve intrinsic foot strength. Consider introducing UBE at his next follow up visit to work on improving endurance due to deconditioned status.    Stephanie Orosco, PTA

## 2020-02-28 NOTE — TELEPHONE ENCOUNTER
Called the referring physician's clinic to attain a protocol for the patients rehabilitation. The patient per original Rx was NWB. The pt f/u with his referring provider this morning and reports that he is now PWB 50%. Called to verify this and to attain a protocol or guidelines regarding. Asked to fax information to us at [phone]. All of this was communicated to the voice mailbox of the provider's MA.

## 2020-03-05 ENCOUNTER — CLINICAL SUPPORT (OUTPATIENT)
Dept: REHABILITATION | Facility: HOSPITAL | Age: 50
End: 2020-03-05
Payer: OTHER MISCELLANEOUS

## 2020-03-05 DIAGNOSIS — M25.671 ANKLE STIFFNESS, RIGHT: ICD-10-CM

## 2020-03-05 PROCEDURE — 97140 MANUAL THERAPY 1/> REGIONS: CPT | Mod: PN

## 2020-03-05 PROCEDURE — 97110 THERAPEUTIC EXERCISES: CPT | Mod: PN

## 2020-03-05 NOTE — PROGRESS NOTES
Physical Therapy Daily Treatment Note     Name: Herman GUERRERO [facility] Number: [id number]    Therapy Diagnosis:   Encounter Diagnosis   Name Primary?    Ankle stiffness, right      Physician: Kadie Grider MD    Visit Date: 3/5/2020    Physician Orders: PT Eval and Treat   Medical Diagnosis from Referral:   Z98.1 (ICD-10-CM) - Status post ankle fusion   M25.551,M25.552 (ICD-10-CM) - Bilateral hip pain   M25.561,M25.562 (ICD-10-CM) - Bilateral knee pain   Evaluation Date: 2/18/2020  Authorization Period Expiration: 6/30/2020  Plan of Care Expiration: 3/20/2020  Visit # / Visits authorized: 6/12  FOTO: 6/10    Time In: 0803  Time Out: 0916  Total Billable Time: 73 minutes    Precautions: Precautions: Standard; nonweightbearing with ambulation (PWB-50% for standing only) until next follow-up with MD on March 12.     Subjective     Pt reports: informed that he can put some weight on R LE in standing but cannot weight bear while ambulating. Pain occurs to R heel when his foot swells. Patient reports knee pain/stiffness on L, especially when hopping into bathroom.  He was compliant with home exercise program.   Response to previous treatment: decreased swelling   Functional change: increased ability to dorsiflex R LE    Pain: 0/10  Location: R lower leg/ankle/foot     Objective     Herman received therapeutic exercises to develop strength, endurance and ROM for 68 minutes including:    UBE: 30 RPM x2.5' forwards/1.5' backwards. Increase RPM/ decrease resistance next    Mat:  - 4-way hip: 1# 2x15     Seated:   - Heel raise: 3x15 R.   - Toe raise: 3x15 R.  - Towel toe crunch: 5' R  - Toe yoga c/ hallux extension: 3x10 R  - Toe yoga c/ 2nd-5th phalanx extension: 3x10 R  - Long arc quads: 3# 3x10 R     Standing:  - Hamstring curls: Out of time  - Single leg (L) sit<>stands on elevated mat: Next    Herman received the following manual therapy techniques to the R foot, lower leg and ankle for  15 minutes, including:  Anterior<>posterior MTP mobilizations with passive toe flexion and extension  Great toe distraction with passive extension     Home Exercises Provided and Patient Education Provided:    Education provided:   - Continue HEP  - Continue NWB on R LE until we receive clearance from referring MD.  - Use crutches vs hopping on L LE to ambulate to bathroom    Written Home Exercises Provided: Not today.   Exercises were reviewed and Herman was able to demonstrate them prior to the end of the session.  Herman demonstrated good  understanding of the education provided.     See EMR under Patient Instructions for exercises provided 2/20/2020.    Assessment     Toe flexion greater than extension. However flexion deficits present, noted on inablility to grasp towel on towel scrunch exercise. Difficulty dissociating big toe extension from lateral toes on toe yoga. Noted fatigue with weighted hip 4-way and UBE; increased time of 4-wayperformance. Recovered loss of balance upon initial ambulation with crutches after UBE performance; patient reports it was related to UE fatigue.    Herman is progressing well towards his goals.   Pt prognosis is Fair.   Pt will continue to benefit from skilled outpatient physical therapy to address the deficits listed in the problem list box on initial evaluation, provide pt/family education and to maximize pt's level of independence in the home and community environment.     Pt's spiritual, cultural and educational needs considered and pt agreeable to plan of care and goals.  Anticipated barriers to physical therapy: chronicity of current injury and significant SxHx with recent ankle fusion.     Short Term Goals: 4 weeks   1.) Pt will become compliant and independent with his initial HEP to promote decreased pain and improved mobility and strength. PROGRESSING, NOT MET 3/5/2020   2) Pt to report decrease in pain levels from 3/10 at worse to 1/10 at worse. PROGRESSING, NOT MET  3/5/2020   3.) Pt will improve R ankle MMT scores by 1 muscle grade to improve functional stability and strength. PROGRESSING, NOT MET 3/5/2020   4.) Pt will be able to tolerate 60 minute exercise session with 3 resting breaks or less to demonstrate improved activity tolerance. PROGRESSING, NOT MET 3/5/2020   Long Term Goals: 8 weeks   1.) Pt will transition from NWB to WBAT and able to ambulate 30ft with a RW. PROGRESSING, NOT MET 3/5/2020   2.) Pt will be able to ambulate 300ft with a RW with no resting breaks to demonstrate improved ambulatory tolerance (10 weeks). PROGRESSING, NOT MET 3/5/2020   3.) Pt will transition from ambulation with RW to ambulation with SPC to demonstrate improved independence. (12 weeks). PROGRESSING, NOT MET 3/5/2020   4.) Pt will transition from ambulation with SPC to ambulation with no AD to demonstrate further independence. (14 weeks). PROGRESSING, NOT MET 3/5/2020   5.) Pt will improve her FOTO score from 70% impairment to 48% improvement to indicate improvement in overall function. PROGRESSING, NOT MET 3/5/2020   *Additoinal LTG will be set when appropriate functional measures are able to be tested.*     Plan     Improve intrinsic foot strength and toe flexion/extension ROM. Add/modifty exercises indicated above next.     Chiqui Wells, PT

## 2020-03-06 ENCOUNTER — CLINICAL SUPPORT (OUTPATIENT)
Dept: REHABILITATION | Facility: HOSPITAL | Age: 50
End: 2020-03-06
Payer: OTHER MISCELLANEOUS

## 2020-03-06 DIAGNOSIS — M25.671 ANKLE STIFFNESS, RIGHT: ICD-10-CM

## 2020-03-06 PROCEDURE — 97110 THERAPEUTIC EXERCISES: CPT | Mod: PN

## 2020-03-06 PROCEDURE — 97140 MANUAL THERAPY 1/> REGIONS: CPT | Mod: PN

## 2020-03-06 NOTE — PROGRESS NOTES
Physical Therapy Daily Treatment Note     Name: Herman GUERRERO [facility] Number: [id number]    Therapy Diagnosis:   Encounter Diagnosis   Name Primary?    Ankle stiffness, right      Physician: Kadie Grider MD    Visit Date: 3/6/2020    Physician Orders: PT Eval and Treat   Medical Diagnosis from Referral:   Z98.1 (ICD-10-CM) - Status post ankle fusion   M25.551,M25.552 (ICD-10-CM) - Bilateral hip pain   M25.561,M25.562 (ICD-10-CM) - Bilateral knee pain   Evaluation Date: 2/18/2020  Authorization Period Expiration: 6/30/2020  Plan of Care Expiration: 3/20/2020  Visit # / Visits authorized: 7/12  FOTO: 7/10    Time In: 100pm  Time Out: 200pm  Total Billable Time: 60 minutes (3TE, 1MT)    Precautions: Precautions: Standard; nonweightbearing with ambulation (PWB-50% for standing only) until next follow-up with MD on March 12.     Subjective     Pt reports: reports that he is a little tired from prior visit. Reports felt like he worked very hard and had a good workout. Reports that he is still not putting any wt through the R LE other than TTWB while ambulating. Reports that he still has a lot of swelling in the foot and the longer he keeps it in the dependent position the more swelling comes on despite ongoing use of the compression sock. Pt also reports L knee is also beginning to bother him from having to use it so much - especially when he hos in the bathroom - asked pt to use B axillary crutches while completing.   He was compliant with home exercise program.   Response to previous treatment: decreased swelling   Functional change: increased ability to dorsiflex R LE    Pain: 0/10  Location: R lower leg/ankle/foot     Objective     Herman received therapeutic exercises to develop strength, endurance and ROM for 45 minutes including:    UBE: 30 RPM x2.5' forwards/1.5' backwards. Increase RPM/ decrease resistance next  Maryan bike 5 mins rocking     Mat:  - 4-way hip: 2# 3x15      Seated:   - Heel raise: 3x15 R.   - Toe raise: 3x15 R. (Out of time)  - Towel toe crunch: 5' R (Out of time)  - Toe yoga c/ hallux extension: 3x10 R (Out of time)  - Toe yoga c/ 2nd-5th phalanx extension: 3x10 R (Out of time)  - Long arc quads: 5# 3x15 R     Standing:  - Hamstring curls 3x10  - Standing weight shifting R to L (R 50%) x20  - Single leg (L) sit<>stands on elevated mat: Next      Herman received the following manual therapy techniques to the R foot, lower leg and ankle for 15 minutes, including:  Anterior<>posterior MTP mobilizations with passive toe flexion and extension  Great toe distraction with passive extension     Home Exercises Provided and Patient Education Provided:    Education provided:   - Continue HEP  - Continue with current WB status - Crutches NWB, standing 50%WB with boot on the R LE until further receive protocol from MD.   - Use crutches vs hopping on L LE to ambulate to bathroom    Written Home Exercises Provided: Not today.   Exercises were reviewed and Herman was able to demonstrate them prior to the end of the session.  Herman demonstrated good  understanding of the education provided.     See EMR under Patient Instructions for exercises provided 2/20/2020.    Assessment     Pt presents s/p R ankle fusion. He presents with ongoing R ankle restricted mobility and edema consistent with referral Dx and Sx.   Ongoing difficulty with intrinsic foot mobility and stability as well as difficulty dissociating big toe extension from lateral toes on toe yoga. Ongoing marked fatigue with weighted hip UBE and rec bike today as well as increased time of hip 4way.   Ongoing marked education regarding healing time, restrictions, and expectations in regards to recovering ROM.     Herman is progressing well towards his goals.   Pt prognosis is Fair.   Pt will continue to benefit from skilled outpatient physical therapy to address the deficits listed in the problem list box on initial evaluation,  provide pt/family education and to maximize pt's level of independence in the home and community environment.     Pt's spiritual, cultural and educational needs considered and pt agreeable to plan of care and goals.  Anticipated barriers to physical therapy: chronicity of current injury and significant SxHx with recent ankle fusion.     Short Term Goals: 4 weeks   1.) Pt will become compliant and independent with his initial HEP to promote decreased pain and improved mobility and strength. PROGRESSING, NOT MET 3/6/2020   2) Pt to report decrease in pain levels from 3/10 at worse to 1/10 at worse. PROGRESSING, NOT MET 3/6/2020   3.) Pt will improve R ankle MMT scores by 1 muscle grade to improve functional stability and strength. PROGRESSING, NOT MET 3/6/2020   4.) Pt will be able to tolerate 60 minute exercise session with 3 resting breaks or less to demonstrate improved activity tolerance. PROGRESSING, NOT MET 3/6/2020   Long Term Goals: 8 weeks   1.) Pt will transition from NWB to WBAT and able to ambulate 30ft with a RW. PROGRESSING, NOT MET 3/6/2020   2.) Pt will be able to ambulate 300ft with a RW with no resting breaks to demonstrate improved ambulatory tolerance (10 weeks). PROGRESSING, NOT MET 3/6/2020   3.) Pt will transition from ambulation with RW to ambulation with SPC to demonstrate improved independence. (12 weeks). PROGRESSING, NOT MET 3/6/2020   4.) Pt will transition from ambulation with SPC to ambulation with no AD to demonstrate further independence. (14 weeks). PROGRESSING, NOT MET 3/6/2020   5.) Pt will improve her FOTO score from 70% impairment to 48% improvement to indicate improvement in overall function. PROGRESSING, NOT MET 3/6/2020   *Additoinal LTG will be set when appropriate functional measures are able to be tested.*     Plan     Improve intrinsic foot strength and toe flexion/extension ROM. Add/modifty exercises indicated above next.     Vinicius Ferreira, PT

## 2020-03-09 ENCOUNTER — CLINICAL SUPPORT (OUTPATIENT)
Dept: REHABILITATION | Facility: HOSPITAL | Age: 50
End: 2020-03-09
Payer: OTHER MISCELLANEOUS

## 2020-03-09 DIAGNOSIS — M25.671 ANKLE STIFFNESS, RIGHT: ICD-10-CM

## 2020-03-09 PROCEDURE — 97110 THERAPEUTIC EXERCISES: CPT | Mod: PN

## 2020-03-09 NOTE — PROGRESS NOTES
"                            Physical Therapy Daily Treatment Note     Name: Herman GUERRERO [facility] Number: [id number]    Therapy Diagnosis:   Encounter Diagnosis   Name Primary?    Ankle stiffness, right      Physician: Kadie Grider MD    Visit Date: 3/9/2020    Physician Orders: PT Eval and Treat   Medical Diagnosis from Referral:   Z98.1 (ICD-10-CM) - Status post ankle fusion   M25.551,M25.552 (ICD-10-CM) - Bilateral hip pain   M25.561,M25.562 (ICD-10-CM) - Bilateral knee pain   Evaluation Date: 2/18/2020  Authorization Period Expiration: 6/30/2020  Plan of Care Expiration: 3/20/2020  Visit # / Visits authorized: 8/12  FOTO: 8/10    Time In: 1205  Time Out: 1300  Total Billable Time: 55 minutes (4TE)    Precautions: Precautions: Standard; nonweightbearing with ambulation (PWB-50% for standing only) until next follow-up with MD on March 12.     Subjective     Pt reports: His arms are sore from the UBE at 30 rpe last visit.   He was compliant with home exercise program.   Response to previous treatment: decreased swelling   Functional change: increased ability to dorsiflex R LE    Pain: 3/10  Location: R lower leg/ankle/foot     Objective     Herman received therapeutic exercises to develop strength, endurance and ROM for 55 minutes including:    UBE: 30 RPM x2.5' forwards/1.5' backwards. Increase RPM/ decrease resistance next  Maryan bike 5 mins rocking -OOT    Mat:  - 4-way hip: 2# 3x10, B    Seated:   - Heel raise: 3x15, B   - Toe raise: 3x15, B  - Towel toe crunch: 5' R (Out of time)  - Toe yoga c/ hallux extension: 3x10 R (Out of time)  - Toe yoga c/ 2nd-5th phalanx extension: 3x10 R (Out of time)  - Long arc quads: 5# 3x15 R   - Gastroc Stretch with towel 3x30", R    Standing:  - Hamstring curls 3x10  - Standing weight shifting R to L (R 50%) x20 - OOT  - Single leg (L) sit<>stands on elevated mat: Next      Herman received the following manual therapy techniques to the R foot, lower leg and ankle for 0 " minutes, including:  Anterior<>posterior MTP mobilizations with passive toe flexion and extension  Great toe distraction with passive extension     Home Exercises Provided and Patient Education Provided:    Education provided:   - Continue HEP  - Continue with current WB status - Crutches NWB, standing 50%WB with boot on the R LE until further receive protocol from MD.   - Use crutches vs hopping on L LE to ambulate to bathroom    Written Home Exercises Provided: Not today.   Exercises were reviewed and Herman was able to demonstrate them prior to the end of the session.  Herman demonstrated good  understanding of the education provided.     See EMR under Patient Instructions for exercises provided 2/20/2020.    Assessment     Pt able to tolerate all exercises without any c/o increased pain or discomfort. Pt unable to perform hip ADD in sidelying, will modify exercise next visit to ISO. Pt needs extended time to maneuver into proper position for mat exercises. Pt also completed all exercises bilaterally today.     Herman is progressing well towards his goals.   Pt prognosis is Fair.   Pt will continue to benefit from skilled outpatient physical therapy to address the deficits listed in the problem list box on initial evaluation, provide pt/family education and to maximize pt's level of independence in the home and community environment.     Pt's spiritual, cultural and educational needs considered and pt agreeable to plan of care and goals.  Anticipated barriers to physical therapy: chronicity of current injury and significant SxHx with recent ankle fusion.     Short Term Goals: 4 weeks   1.) Pt will become compliant and independent with his initial HEP to promote decreased pain and improved mobility and strength. PROGRESSING, NOT MET 3/9/2020   2) Pt to report decrease in pain levels from 3/10 at worse to 1/10 at worse. PROGRESSING, NOT MET 3/9/2020   3.) Pt will improve R ankle MMT scores by 1 muscle grade to improve  functional stability and strength. PROGRESSING, NOT MET 3/9/2020   4.) Pt will be able to tolerate 60 minute exercise session with 3 resting breaks or less to demonstrate improved activity tolerance. PROGRESSING, NOT MET 3/9/2020   Long Term Goals: 8 weeks   1.) Pt will transition from NWB to WBAT and able to ambulate 30ft with a RW. PROGRESSING, NOT MET 3/9/2020   2.) Pt will be able to ambulate 300ft with a RW with no resting breaks to demonstrate improved ambulatory tolerance (10 weeks). PROGRESSING, NOT MET 3/9/2020   3.) Pt will transition from ambulation with RW to ambulation with SPC to demonstrate improved independence. (12 weeks). PROGRESSING, NOT MET 3/9/2020   4.) Pt will transition from ambulation with SPC to ambulation with no AD to demonstrate further independence. (14 weeks). PROGRESSING, NOT MET 3/9/2020   5.) Pt will improve her FOTO score from 70% impairment to 48% improvement to indicate improvement in overall function. PROGRESSING, NOT MET 3/9/2020   *Additoinal LTG will be set when appropriate functional measures are able to be tested.*     Plan     Improve intrinsic foot strength and toe flexion/extension ROM. Add/modifty exercises indicated above next.     Joey Rider, PT

## 2020-03-11 ENCOUNTER — CLINICAL SUPPORT (OUTPATIENT)
Dept: REHABILITATION | Facility: HOSPITAL | Age: 50
End: 2020-03-11
Payer: OTHER MISCELLANEOUS

## 2020-03-11 DIAGNOSIS — M25.671 ANKLE STIFFNESS, RIGHT: ICD-10-CM

## 2020-03-11 PROCEDURE — 97140 MANUAL THERAPY 1/> REGIONS: CPT | Mod: PN

## 2020-03-11 PROCEDURE — 97110 THERAPEUTIC EXERCISES: CPT | Mod: PN

## 2020-03-11 NOTE — PROGRESS NOTES
"                            Physical Therapy Daily Treatment Note     Name: Herman GUERRERO [facility] Number: [id number]    Therapy Diagnosis:   Encounter Diagnosis   Name Primary?    Ankle stiffness, right      Physician: Kadie Grider MD    Visit Date: 3/11/2020    Physician Orders: PT Eval and Treat   Medical Diagnosis from Referral:   Z98.1 (ICD-10-CM) - Status post ankle fusion   M25.551,M25.552 (ICD-10-CM) - Bilateral hip pain   M25.561,M25.562 (ICD-10-CM) - Bilateral knee pain   Evaluation Date: 2/18/2020  Authorization Period Expiration: 6/30/2020  Plan of Care Expiration: 3/20/2020  Visit # / Visits authorized: 9/12  FOTO: 9/10    Time In: 9:00 AM  Time Out: 10:15 AM  Total Billable Time: 75 minutes (4 TE, 1 MT)    Precautions: Precautions: Standard; nonweightbearing with ambulation (PWB-50% for standing only) until next follow-up with MD on March 12.     Subjective     Pt reports: His ankle feels "alright", but the swelling makes his heel very tender and he has moderate pain that gets up to about a 2-3/10. States he only has pain when his foot swells. States when he's home he elevates his leg and performs exercises in order to keep the swelling down.   He was compliant with home exercise program.   Response to previous treatment: decreased swelling   Functional change: increased ability to dorsiflex R LE    Pain: 2-3/10  Location: R lower leg/ankle/foot     Objective     Herman received therapeutic exercises to develop strength, endurance and ROM for 60 minutes including:    UBE: 30 RPM x 2.5' forwards/1.5' backwards. Increase RPM/ decrease resistance next - OOT  Recumbent Bike 5 mins full revolutions    Mat:  - 3-way hip: 2# 3x10, B  - Hip ADD: 3x10, 5" holds    Seated:   - Heel raise: 3x15, B   - Toe raise: 3x15, B   - Towel toe crunch: 5' R  - Toe yoga c/ hallux extension: 3x10 R   - Toe yoga c/ 2nd-5th phalanx extension: 3x10 R  - Long arc quads: 5# 3x15 R   - Gastroc Stretch with towel 3x30", " R    Standing:  - Hamstring curls 3x10  - OOT  - Standing weight shifting R to L (R 50%) x20 - OOT  - Single leg (L) sit<>stands on elevated mat: - OOT      Herman received the following manual therapy techniques to the R foot, lower leg and ankle for 15 minutes, including:  Anterior<>posterior MTP mobilizations with passive toe flexion and extension  Great toe distraction with passive extension   STM with elevation to assist with edema      Home Exercises Provided and Patient Education Provided:    Education provided:   - Continue HEP  - Continue with current WB status - Crutches NWB, standing 50%WB with boot on the R LE until further receive protocol from MD.   - Use crutches vs hopping on L LE to ambulate to bathroom    Written Home Exercises Provided: Not today.   Exercises were reviewed and Herman was able to demonstrate them prior to the end of the session.  Herman demonstrated good  understanding of the education provided.     See EMR under Patient Instructions for exercises provided 2/20/2020.    Assessment     Herman presented to therapy with increased swelling in right foot/ankle. Responded well to manual therapy with no increases in pain or discomfort. Modified hip ADD to supine with iso ball squeezes. Able to perform full revolutions on recumbent bike.     Herman is progressing well towards his goals.   Pt prognosis is Fair.   Pt will continue to benefit from skilled outpatient physical therapy to address the deficits listed in the problem list box on initial evaluation, provide pt/family education and to maximize pt's level of independence in the home and community environment.     Pt's spiritual, cultural and educational needs considered and pt agreeable to plan of care and goals.  Anticipated barriers to physical therapy: chronicity of current injury and significant SxHx with recent ankle fusion.     Short Term Goals: 4 weeks   1.) Pt will become compliant and independent with his initial HEP to promote  decreased pain and improved mobility and strength. PROGRESSING, NOT MET 3/11/2020   2) Pt to report decrease in pain levels from 3/10 at worse to 1/10 at worse. PROGRESSING, NOT MET 3/11/2020   3.) Pt will improve R ankle MMT scores by 1 muscle grade to improve functional stability and strength. PROGRESSING, NOT MET 3/11/2020   4.) Pt will be able to tolerate 60 minute exercise session with 3 resting breaks or less to demonstrate improved activity tolerance. PROGRESSING, NOT MET 3/11/2020   Long Term Goals: 8 weeks   1.) Pt will transition from NWB to WBAT and able to ambulate 30ft with a RW. PROGRESSING, NOT MET 3/11/2020   2.) Pt will be able to ambulate 300ft with a RW with no resting breaks to demonstrate improved ambulatory tolerance (10 weeks). PROGRESSING, NOT MET 3/11/2020   3.) Pt will transition from ambulation with RW to ambulation with SPC to demonstrate improved independence. (12 weeks). PROGRESSING, NOT MET 3/11/2020   4.) Pt will transition from ambulation with SPC to ambulation with no AD to demonstrate further independence. (14 weeks). PROGRESSING, NOT MET 3/11/2020   5.) Pt will improve her FOTO score from 70% impairment to 48% improvement to indicate improvement in overall function. PROGRESSING, NOT MET 3/11/2020   *Additoinal LTG will be set when appropriate functional measures are able to be tested.*     Plan     Continue to improve intrinsic foot strength and toe flexion/extension ROM.    Namita Stearns, PT

## 2020-03-13 ENCOUNTER — CLINICAL SUPPORT (OUTPATIENT)
Dept: REHABILITATION | Facility: HOSPITAL | Age: 50
End: 2020-03-13
Payer: OTHER MISCELLANEOUS

## 2020-03-13 DIAGNOSIS — M25.671 ANKLE STIFFNESS, RIGHT: ICD-10-CM

## 2020-03-13 PROCEDURE — 97110 THERAPEUTIC EXERCISES: CPT | Mod: PN

## 2020-03-13 NOTE — PROGRESS NOTES
"                            Physical Therapy Daily Treatment Note     Name: Herman GUERRERO [facility] Number: [id number]    Therapy Diagnosis:   Encounter Diagnosis   Name Primary?    Ankle stiffness, right      Physician: Kadie Grider MD    Visit Date: 3/13/2020    Physician Orders: PT Eval and Treat   Medical Diagnosis from Referral:   Z98.1 (ICD-10-CM) - Status post ankle fusion   M25.551,M25.552 (ICD-10-CM) - Bilateral hip pain   M25.561,M25.562 (ICD-10-CM) - Bilateral knee pain   Evaluation Date: 2/18/2020  Authorization Period Expiration: 6/30/2020  Plan of Care Expiration: 3/20/2020  Visit # / Visits authorized: 10/12  FOTO: 10/10 DONE    Time In: 0900  Time Out: 1000  Total Billable Time: 60 minutes (4 TE)    Precautions: Precautions: Standard; nonweightbearing with ambulation (PWB-50% for standing only) until next follow-up with MD on March 12.     Subjective     Pt reports: His ankle feels "alright", but the swelling makes his heel very tender and he has moderate pain that gets up to about a 2-3/10. States he only has pain when his foot swells. States when he's home he elevates his leg and performs exercises in order to keep the swelling down.   He was compliant with home exercise program.   Response to previous treatment: decreased swelling   Functional change: increased ability to dorsiflex R LE    Pain: 2-3/10  Location: R lower leg/ankle/foot     Objective     Herman received therapeutic exercises to develop strength, endurance and ROM for 60 minutes including:    UBE: 30 RPM x 2.5' forwards/1.5' backwards. Increase RPM/ decrease resistance next - OOT  Recumbent Bike 5 mins full revolutions    Mat:  - 3-way hip: 2# 3x10, B  - Hip ADD ISO: 3x10, 5" holds    Seated:   - Heel raise: 3x15, B   - Toe raise: 3x15, B   - Towel toe crunch: 5' R  - Toe yoga c/ hallux extension: 3x10 R - OOT  - Toe yoga c/ 2nd-5th phalanx extension: 3x10 R- OOT  - Long arc quads: 5# 3x15 R - OOT  - Gastroc Stretch with " "towel 3x30", R    Standing:  - Hamstring curls 3x10  - OOT  - Standing weight shifting R to L (R 50%) x20 - OOT  - Single leg (L) sit<>stands on elevated mat: - OOT      Herman received the following manual therapy techniques to the R foot, lower leg and ankle for 0 minutes, including:  Anterior<>posterior MTP mobilizations with passive toe flexion and extension  Great toe distraction with passive extension   STM with elevation to assist with edema      Home Exercises Provided and Patient Education Provided:    Education provided:   - Continue HEP  - Continue with current WB status - Crutches NWB, standing 50%WB with boot on the R LE until further receive protocol from MD.   - Use crutches vs hopping on L LE to ambulate to bathroom    Written Home Exercises Provided: Not today.   Exercises were reviewed and Herman was able to demonstrate them prior to the end of the session.  Herman demonstrated good  understanding of the education provided.     See EMR under Patient Instructions for exercises provided 2/20/2020.    Assessment     Pt was able to tolerate all exercises during today's session with increased rest breaks and fatigue. Pt had mild muscle spasms after hip ADD isometrics. Pt with increased difficulty turning on the mat. Pt going to finish out therapy for next 2 weeks, before going to see his doctor and discuss the next course of action.     Herman is progressing well towards his goals.   Pt prognosis is Fair.   Pt will continue to benefit from skilled outpatient physical therapy to address the deficits listed in the problem list box on initial evaluation, provide pt/family education and to maximize pt's level of independence in the home and community environment.     Pt's spiritual, cultural and educational needs considered and pt agreeable to plan of care and goals.  Anticipated barriers to physical therapy: chronicity of current injury and significant SxHx with recent ankle fusion.     Short Term Goals: 4 " weeks   1.) Pt will become compliant and independent with his initial HEP to promote decreased pain and improved mobility and strength. PROGRESSING, NOT MET 3/13/2020   2) Pt to report decrease in pain levels from 3/10 at worse to 1/10 at worse. PROGRESSING, NOT MET 3/13/2020   3.) Pt will improve R ankle MMT scores by 1 muscle grade to improve functional stability and strength. PROGRESSING, NOT MET 3/13/2020   4.) Pt will be able to tolerate 60 minute exercise session with 3 resting breaks or less to demonstrate improved activity tolerance. PROGRESSING, NOT MET 3/13/2020   Long Term Goals: 8 weeks   1.) Pt will transition from NWB to WBAT and able to ambulate 30ft with a RW. PROGRESSING, NOT MET 3/13/2020   2.) Pt will be able to ambulate 300ft with a RW with no resting breaks to demonstrate improved ambulatory tolerance (10 weeks). PROGRESSING, NOT MET 3/13/2020   3.) Pt will transition from ambulation with RW to ambulation with SPC to demonstrate improved independence. (12 weeks). PROGRESSING, NOT MET 3/13/2020   4.) Pt will transition from ambulation with SPC to ambulation with no AD to demonstrate further independence. (14 weeks). PROGRESSING, NOT MET 3/13/2020   5.) Pt will improve her FOTO score from 70% impairment to 48% improvement to indicate improvement in overall function. PROGRESSING, NOT MET 3/13/2020   *Additoinal LTG will be set when appropriate functional measures are able to be tested.*     Plan     Continue to improve intrinsic foot strength and toe flexion/extension ROM.    Joey Rider, PT

## 2020-03-16 ENCOUNTER — CLINICAL SUPPORT (OUTPATIENT)
Dept: REHABILITATION | Facility: HOSPITAL | Age: 50
End: 2020-03-16
Payer: OTHER MISCELLANEOUS

## 2020-03-16 DIAGNOSIS — M25.671 ANKLE STIFFNESS, RIGHT: ICD-10-CM

## 2020-03-16 PROCEDURE — 97116 GAIT TRAINING THERAPY: CPT | Mod: PN

## 2020-03-16 PROCEDURE — 97110 THERAPEUTIC EXERCISES: CPT | Mod: PN

## 2020-03-16 NOTE — PROGRESS NOTES
"                            Physical Therapy Daily Treatment Note     Name: Herman GUERRERO [facility] Number: [id number]    Therapy Diagnosis:   Encounter Diagnosis   Name Primary?    Ankle stiffness, right      Physician: Kadie Grider MD    Visit Date: 3/16/2020    Physician Orders: PT Eval and Treat   Medical Diagnosis from Referral:   Z98.1 (ICD-10-CM) - Status post ankle fusion   M25.551,M25.552 (ICD-10-CM) - Bilateral hip pain   M25.561,M25.562 (ICD-10-CM) - Bilateral knee pain   Evaluation Date: 2/18/2020  Authorization Period Expiration: 6/30/2020  Plan of Care Expiration: 3/20/2020  Visit # / Visits authorized: 11/12  FOTO: 1/10    Time In: 600pm  Time Out: 707pm  Total Billable Time: 67 minutes (4 TE, 1 Gait)    Precautions: Standard; nonweightbearing with ambulation (PWB-50% per pt - f/u on 3/26/2020)      Subjective     Pt reports: His ankle feels "ok", but the swelling makes his heel very tender and he has moderate pain that gets up to about a 2-3/10. States he only has pain when his foot swells and when trying to put a little weight through the leg/foot. States when he's home he elevates his leg and performs exercises in order to keep the swelling down.   He was compliant with home exercise program.   Response to previous treatment: decreased swelling   Functional change: increased ability to dorsiflex R LE    Pain: 2-3/10  Location: R lower leg/ankle/foot     Objective     Ankle AROM (R/L):   Ankle AROM Right (*) = in degrees Left (*) = in degrees Comments    Dorsiflexion -18 > -3 WNL Stiffness noted.    Plantarflexion 22 > 25 WNL Stiffness noted.    Inversion 2 > 5 WNL Stiffness and pain noted.    Eversion 2 > 4 WNL Stiffness and pain noted.    Great toe extension 21 > 30 WNL Stiffness and pain noted.    Ankle PROM Right (*) = in degrees Left (*) = in degrees Comments    Dorsiflexion -10 > 2 WNL Stiffness and pain noted.    Plantarflexion 26 > 29 WNL Stiffness and pain noted.    Inversion 10 > " "14 WNL Stiffness and pain noted.    Eversion 6 > 10 WNL Stiffness and pain noted.    Great toe extension 43 > 51 WNL Stiffness noted.       Ankle MMT:   Ankle MMT scores Right: X/5 Left: x/5   Dorsiflexion 2+ /5 >  4 /5   Plantarflexion  3- /5 >  4 /5    Inversion 2+ /5 >  4+ /5    Eversion 2+ /5 >  4 /5    Great toe extension 2+ /5 >  4 /5      Circumferential measures:   Circumferential measures Right in cm   Figure 8 68 > 69.3   At [place] 37 > 37.8   At Metatarsal heads 26 > 26.9      Herman received therapeutic exercises to develop strength, endurance and ROM for 58 minutes including:    UBE: 30 RPM x 3' forwards/3' backwards.   Recumbent Bike 5 mins full revolutions (Out of Time)     Mat:  - 3-way hip: 2# 3x10, B  - Hip ADD ISO: 3x10, 5" holds    Seated:   - Heel raise: 3x15, B   - Toe raise: 3x15, B   - Towel toe crunch: 5' R  - Toe yoga c/ hallux extension: 3x10 R - OOT  - Toe yoga c/ 2nd-5th phalanx extension: 3x10 R- OOT  - Long arc quads: 6# 3x15 R  - Gastroc Stretch with towel 3x30", R    Standing:  - Hamstring curls 3x10  - OOT  - Standing weight shifting R to L (R 50%) x20  - Standing weight shifting lifting L heel off of the ground to improve wt acceptance x20      Herman received the following manual therapy techniques to the R foot, lower leg and ankle for 0 minutes, including:  Anterior<>posterior MTP mobilizations with passive toe flexion and extension  Great toe distraction with passive extension   STM with elevation to assist with edema    Herman participated in gait training to improve functional mobility and safety for 9 minutes, including:  Ambulation/Gait:   2x70' ambulation with B axillary crutches PWB R LE with boot in place. Focusing on upright posture and increased step length on the L LE to transition from a step to pattern to a step through pattern.       Home Exercises Provided and Patient Education Provided:    Education provided:   - Continue HEP  - Continue with current WB status - " Crutches NWB, standing 50%WB with boot on the R LE until further receive protocol from MD.   - Use crutches vs hopping on L LE to ambulate within bathroom for safety!     Written Home Exercises Provided: Not today.   Exercises were reviewed and Herman was able to demonstrate them prior to the end of the session.  Herman demonstrated good  understanding of the education provided.     See EMR under Patient Instructions for exercises provided 2/20/2020.    Assessment     Pt presents today for his 11 of 12 authorized treatment session with a medical Dx of s/p R ankle fusion. He has demonstrated fair improvements with ROM in most all planes with DF most gains as expected. Cont to educate the pt each visit on expectations with Sx and LTGs.   He is deconditioned and required ongoing intermittent resting breaks and experiences marked fatigue by the end of his treatment session. He has progressed from attending his therapy session NWB to PWB with axillary crutches and boot in place.   Suspect that the patient will need extensive PT intervention given current presentation with ongoing marked swelling, marked ROM limitations, poor standing tolerance, poor weight acceptance no the R LE, and poor activity tolerance/ endurance.     Herman is progressing well towards his goals.   Pt prognosis is Fair.   Pt will continue to benefit from skilled outpatient physical therapy to address the deficits listed in the problem list box on initial evaluation, provide pt/family education and to maximize pt's level of independence in the home and community environment.     Pt's spiritual, cultural and educational needs considered and pt agreeable to plan of care and goals.  Anticipated barriers to physical therapy: chronicity of current injury and significant SxHx with recent ankle fusion.     Short Term Goals: 4 weeks   1.) Pt will become compliant and independent with his initial HEP to promote decreased pain and improved mobility and strength.  PROGRESSING, NOT MET 3/16/2020  - ex's evolving  2) Pt to report decrease in pain levels from 3/10 at worse to 1/10 at worse. PROGRESSING, NOT MET 3/16/2020   3.) Pt will improve R ankle MMT scores by 1 muscle grade to improve functional stability and strength. PROGRESSING, NOT MET 3/16/2020   4.) Pt will be able to tolerate 60 minute exercise session with 3 resting breaks or less to demonstrate improved activity tolerance. MET 3/16/2020  Long Term Goals: 8 weeks   1.) Pt will transition from NWB to WBAT and able to ambulate 30ft with a RW. PROGRESSING, NOT MET 3/16/2020   2.) Pt will be able to ambulate 300ft with a RW with no resting breaks to demonstrate improved ambulatory tolerance (10 weeks). PROGRESSING, NOT MET 3/16/2020   3.) Pt will transition from ambulation with RW to ambulation with SPC to demonstrate improved independence. (12 weeks). PROGRESSING, NOT MET 3/16/2020   4.) Pt will transition from ambulation with SPC to ambulation with no AD to demonstrate further independence. (14 weeks). PROGRESSING, NOT MET 3/16/2020   5.) Pt will improve her FOTO score from 70% impairment to 48% improvement to indicate improvement in overall function. PROGRESSING, NOT MET 3/16/2020   *Additoinal LTG will be set when appropriate functional measures are able to be tested.*     Plan     See POC    Vinicius Ferreira, PT

## 2020-03-17 ENCOUNTER — TELEPHONE (OUTPATIENT)
Dept: CARDIOLOGY | Facility: CLINIC | Age: 50
End: 2020-03-17

## 2020-03-17 ENCOUNTER — CLINICAL SUPPORT (OUTPATIENT)
Dept: REHABILITATION | Facility: HOSPITAL | Age: 50
End: 2020-03-17
Payer: OTHER MISCELLANEOUS

## 2020-03-17 DIAGNOSIS — M25.671 ANKLE STIFFNESS, RIGHT: ICD-10-CM

## 2020-03-17 PROCEDURE — 97110 THERAPEUTIC EXERCISES: CPT | Mod: PN

## 2020-03-17 PROCEDURE — 97140 MANUAL THERAPY 1/> REGIONS: CPT | Mod: PN

## 2020-03-17 PROCEDURE — 97116 GAIT TRAINING THERAPY: CPT | Mod: PN

## 2020-03-17 NOTE — TELEPHONE ENCOUNTER
I called the pt to inform him we are rescheduling his appointment that was set for tomorrow with Dr. Leon due to Covid 19.  The pt did not answer, but I left a detailed voice message.  I informed the pt we will reschedule for his follow up 2-3 months from now.  I gave a call back number to reschedule.    ND

## 2020-03-17 NOTE — PLAN OF CARE
"  Outpatient Therapy Updated Plan of Care     Visit Date: 3/16/2020    Name: Herman GUERRERO [facility] Number: [id number]    Therapy Diagnosis:   Encounter Diagnosis   Name Primary?    Ankle stiffness, right      Physician: Kadie Grider MD    Physician Orders: PT Eval and Treat   Medical Diagnosis from Referral:   Z98.1 (ICD-10-CM) - Status post ankle fusion   M25.551,M25.552 (ICD-10-CM) - Bilateral hip pain   M25.561,M25.562 (ICD-10-CM) - Bilateral knee pain   Evaluation Date: 2/18/2020  Authorization Period Expiration: 6/30/2020  Plan of Care Expiration: 3/20/2020  Visit # / Visits authorized: 11/12  FOTO: 1/10    Precautions: Standard  Functional Level Prior to Evaluation:  I with WC use for community ambulation, NWB    Subjective     Update:   Pt reports: His ankle feels "ok", but the swelling makes his heel very tender and he has moderate pain that gets up to about a 2-3/10. States he only has pain when his foot swells and when trying to put a little weight through the leg/foot. States when he's home he elevates his leg and performs exercises in order to keep the swelling down.   He was compliant with home exercise program.   Response to previous treatment: decreased swelling   Functional change: increased ability to dorsiflex R LE    Objective     Update:     Ankle AROM (R/L):   Ankle AROM Right (*) = in degrees Left (*) = in degrees Comments    Dorsiflexion -18 > -3 WNL Stiffness noted.    Plantarflexion 22 > 25 WNL Stiffness noted.    Inversion 2 > 5 WNL Stiffness and pain noted.    Eversion 2 > 4 WNL Stiffness and pain noted.    Great toe extension 21 > 30 WNL Stiffness and pain noted.    Ankle PROM Right (*) = in degrees Left (*) = in degrees Comments    Dorsiflexion -10 > 2 WNL Stiffness and pain noted.    Plantarflexion 26 > 29 WNL Stiffness and pain noted.    Inversion 10 > 14 WNL Stiffness and pain noted.    Eversion 6 > 10 WNL Stiffness and pain noted.    Great toe extension 43 > 51 WNL " Stiffness noted.       Ankle MMT:   Ankle MMT scores Right: X/5 Left: x/5   Dorsiflexion 2+ /5 >  4 /5   Plantarflexion  3- /5 >  4 /5    Inversion 2+ /5 >  4+ /5    Eversion 2+ /5 >  4 /5    Great toe extension 2+ /5 >  4 /5      Circumferential measures:   Circumferential measures Right in cm   Figure 8 68 > 69.3   At [place] 37 > 37.8   At Metatarsal heads 26 > 26.9      Ambulation/Gait:   2x70' ambulation with B axillary crutches PWB R LE with boot in place. Focusing on upright posture and increased step length on the L LE to transition from a step to pattern to a step through pattern.       Assessment     Update:   Pt presents today for his 11 of 12 authorized treatment session with a medical Dx of s/p R ankle fusion. He has demonstrated fair improvements with ROM in most all planes with DF most gains as expected. Cont to educate the pt each visit on expectations with Sx and LTGs.   He is deconditioned and required ongoing intermittent resting breaks and experiences marked fatigue by the end of his treatment session. He has progressed from attending his therapy session NWB to PWB with axillary crutches and boot in place.   Suspect that the patient will need extensive PT intervention given current presentation with ongoing marked swelling, marked ROM limitations, poor standing tolerance, poor weight acceptance no the R LE, and poor activity tolerance/ endurance.       Previous Short Term Goals Status:      Short Term Goals: 4 weeks   1.) Pt will become compliant and independent with his initial HEP to promote decreased pain and improved mobility and strength. PROGRESSING, NOT MET 3/16/2020  - ex's evolving  2) Pt to report decrease in pain levels from 3/10 at worse to 1/10 at worse. PROGRESSING, NOT MET 3/16/2020   3.) Pt will improve R ankle MMT scores by 1 muscle grade to improve functional stability and strength. PROGRESSING, NOT MET 3/16/2020   4.) Pt will be able to tolerate 60 minute exercise session with  3 resting breaks or less to demonstrate improved activity tolerance. MET 3/16/2020     Long Term Goal Status:   continue per initial plan of care.    Long Term Goals: 8 weeks   1.) Pt will transition from NWB to WBAT and able to ambulate 30ft with a RW. PROGRESSING, NOT MET 3/16/2020   2.) Pt will be able to ambulate 300ft with a RW with no resting breaks to demonstrate improved ambulatory tolerance (10 weeks). PROGRESSING, NOT MET 3/16/2020   3.) Pt will transition from ambulation with RW to ambulation with SPC to demonstrate improved independence. (12 weeks). PROGRESSING, NOT MET 3/16/2020   4.) Pt will transition from ambulation with SPC to ambulation with no AD to demonstrate further independence. (14 weeks). PROGRESSING, NOT MET 3/16/2020   5.) Pt will improve her FOTO score from 70% impairment to 48% improvement to indicate improvement in overall function. PROGRESSING, NOT MET 3/16/2020   *Additoinal LTG will be set when appropriate functional measures are able to be tested.*      Reasons for Recertification of Therapy:   Suspect that the patient will need extensive PT intervention given current presentation with ongoing marked swelling, marked ROM limitations, poor standing tolerance, poor weight acceptance no the R LE, and poor activity tolerance/ endurance.     Plan     Updated Certification Period: 3/16/2020 to 4/24/2020  Recommended Treatment Plan: 3 times per week for 4 weeks: Gait Training, Manual Therapy, Moist Heat/ Ice, Neuromuscular Re-ed, Patient Education, Self Care, Therapeutic Activites and Therapeutic Exercise  Other Recommendations: N/A    Vinicius Ferreira, PT  3/16/2020      I CERTIFY THE NEED FOR THESE SERVICES FURNISHED UNDER THIS PLAN OF TREATMENT AND WHILE UNDER MY CARE    Physician's comments:        Physician's Signature: ___________________________________________________

## 2020-03-17 NOTE — PROGRESS NOTES
"                            Physical Therapy Daily Treatment Note     Name: Herman GUERRERO [facility] Number: [id number]    Therapy Diagnosis:   Encounter Diagnosis   Name Primary?    Ankle stiffness, right      Physician: Kadie Grider MD    Visit Date: 3/17/2020    Physician Orders: PT Eval and Treat   Medical Diagnosis from Referral:   Z98.1 (ICD-10-CM) - Status post ankle fusion   M25.551,M25.552 (ICD-10-CM) - Bilateral hip pain   M25.561,M25.562 (ICD-10-CM) - Bilateral knee pain   Evaluation Date: 2/18/2020  Authorization Period Expiration: 6/30/2020  Plan of Care Expiration: 3/20/2020  Visit # / Visits authorized: 12/12, since LV have sent message to [facility] team to attain further authorization.   FOTO: 2/10    Time In: 415pm  Time Out: 500pm  Total Billable Time: 45 minutes (1 TE, 1 Gait, 1 MT)    Precautions: Standard; nonweightbearing with ambulation (PWB-50% per pt - f/u on 3/26/2020)      Subjective     Pt reports: His ankle feels "ok" today with less swelling due to propping his leg up last night after therapy session and also this morning. Reports that he has been otherwise taking it easy today due to knowing he was coming into therapy today for a second day in a row.   He was compliant with home exercise program.   Response to previous treatment: decreased swelling   Functional change: increased ability to dorsiflex R LE    Pain: 2-3/10  Location: R lower leg/ankle/foot     Objective       Herman received therapeutic exercises to develop strength, endurance and ROM for 20 minutes including:    UBE: 30 RPM x 3' forwards/3' backwards.   Recumbent Bike 5 mins full revolutions (Out of Time)     Mat:  - 3-way hip: 2# 3x10, B  - Hip ADD ISO: 3x10, 5" holds (Out of Time)     Seated:   - Heel raise: 3x15, B (Out of Time)   - Toe raise: 3x15, B (Out of Time)   - Towel toe crunch: 5' R (Out of Time)   - Toe yoga c/ hallux extension: 3x10 R - OOT  - Toe yoga c/ 2nd-5th phalanx extension: 3x10 R- OOT  - Long " "arc quads: 6# 3x15 R  - Gastroc Stretch with towel 3x30", R    Standing:  - Hamstring curls 3x10  - OOT  - Standing weight shifting R to L (R 50%) x20  - Standing weight shifting lifting L heel off of the ground to improve wt acceptance x20  - Standing mini lunge position weight shifting to the R LE with cure to allow knee to unlock and upright posture x20      Herman received the following manual therapy techniques to the R foot, lower leg and ankle for 10 minutes, including:  Anterior<>posterior MTP mobilizations with passive toe flexion and extension  Great toe distraction with passive extension   STM with elevation to assist with edema    Herman participated in gait training to improve functional mobility and safety for 15 minutes, including:  Ambulation/Gait:   4x70' ambulation with B axillary crutches PWB R LE with boot in place approx 50% WB. Focusing on upright posture and increased step length on the L LE to transition from a step to pattern to a step through pattern as well as part practice intermittent activities between ambulation bouts to focus on R LE acceptance (mini lunge weight shifting and quiets stance R to L weight shifting)      Home Exercises Provided and Patient Education Provided:    Education provided:   - Continue HEP  - Continue with current WB status - Crutches NWB, standing 50%WB with boot on the R LE until further receive protocol from MD.   - Use crutches vs hopping on L LE to ambulate within bathroom for safety!     Written Home Exercises Provided: Not today.   Exercises were reviewed and Herman was able to demonstrate them prior to the end of the session.  Herman demonstrated good  understanding of the education provided.     See EMR under Patient Instructions for exercises provided 2/20/2020.    Assessment     Pt presents today for his 11 of 12 authorized treatment session with a medical Dx of s/p R ankle fusion. He has demonstrated fair improvements with ROM in most all planes with DF " most gains as expected. Cont to educate the pt each visit on expectations with Sx and LTGs.   He is deconditioned and required ongoing intermittent resting breaks and experiences marked fatigue by the end of his treatment session. He has progressed from attending his therapy session NWB to PWB with axillary crutches and boot in place.   Suspect that the patient will need extensive PT intervention given current presentation with ongoing marked swelling, marked ROM limitations, poor standing tolerance, poor weight acceptance no the R LE, and poor activity tolerance/ endurance.     Herman is progressing well towards his goals.   Pt prognosis is Fair.   Pt will continue to benefit from skilled outpatient physical therapy to address the deficits listed in the problem list box on initial evaluation, provide pt/family education and to maximize pt's level of independence in the home and community environment.     Pt's spiritual, cultural and educational needs considered and pt agreeable to plan of care and goals.  Anticipated barriers to physical therapy: chronicity of current injury and significant SxHx with recent ankle fusion.     Short Term Goals: 4 weeks   1.) Pt will become compliant and independent with his initial HEP to promote decreased pain and improved mobility and strength. PROGRESSING, NOT MET 3/17/2020  - ex's evolving  2) Pt to report decrease in pain levels from 3/10 at worse to 1/10 at worse. PROGRESSING, NOT MET 3/17/2020   3.) Pt will improve R ankle MMT scores by 1 muscle grade to improve functional stability and strength. PROGRESSING, NOT MET 3/17/2020   4.) Pt will be able to tolerate 60 minute exercise session with 3 resting breaks or less to demonstrate improved activity tolerance. MET 3/16/2020  Long Term Goals: 8 weeks   1.) Pt will transition from NWB to WBAT and able to ambulate 30ft with a RW. PROGRESSING, NOT MET 3/17/2020   2.) Pt will be able to ambulate 300ft with a RW with no resting breaks  to demonstrate improved ambulatory tolerance (10 weeks). PROGRESSING, NOT MET 3/17/2020   3.) Pt will transition from ambulation with RW to ambulation with SPC to demonstrate improved independence. (12 weeks). PROGRESSING, NOT MET 3/17/2020   4.) Pt will transition from ambulation with SPC to ambulation with no AD to demonstrate further independence. (14 weeks). PROGRESSING, NOT MET 3/17/2020   5.) Pt will improve her FOTO score from 70% impairment to 48% improvement to indicate improvement in overall function. PROGRESSING, NOT MET 3/17/2020   *Additoinal LTG will be set when appropriate functional measures are able to be tested.*     Plan     Continue to focus on ambulation with 50% PWB with vasquez and B axillary crutches.     Vinicius Ferreira, PT

## 2020-03-18 DIAGNOSIS — M96.0 PSEUDARTHROSIS AFTER FUSION OR ARTHRODESIS: Primary | ICD-10-CM

## 2020-03-27 ENCOUNTER — CLINICAL SUPPORT (OUTPATIENT)
Dept: REHABILITATION | Facility: HOSPITAL | Age: 50
End: 2020-03-27
Payer: OTHER MISCELLANEOUS

## 2020-03-27 DIAGNOSIS — M25.671 ANKLE STIFFNESS, RIGHT: ICD-10-CM

## 2020-03-27 DIAGNOSIS — M96.0 PSEUDARTHROSIS AFTER FUSION OR ARTHRODESIS: ICD-10-CM

## 2020-03-27 PROCEDURE — 97116 GAIT TRAINING THERAPY: CPT | Mod: PN

## 2020-03-27 PROCEDURE — 97110 THERAPEUTIC EXERCISES: CPT | Mod: PN

## 2020-03-27 NOTE — PROGRESS NOTES
Physical Therapy Daily Treatment Note     Name: Herman GUERRERO [facility] Number: [id number]    Therapy Diagnosis:   No diagnosis found.  Physician: Kadie Grider MD    Visit Date: 3/27/2020    Physician Orders: PT Eval and Treat   Medical Diagnosis from Referral:   Z98.1 (ICD-10-CM) - Status post ankle fusion   M25.551,M25.552 (ICD-10-CM) - Bilateral hip pain   M25.561,M25.562 (ICD-10-CM) - Bilateral knee pain   Evaluation Date: 2/18/2020  Authorization Period Expiration: 6/30/2020  Plan of Care Expiration: 4/24/2020  Visit # / Visits authorized: 1/1 (visit #13)  FOTO: 3/10    Time In: 1058  Time Out: 1154  Total Billable Time: 56 minutes    Precautions: Standard; partial weightbearing with 50% with ambulation (per pt - f/u on 3/26/2020)      Subjective     Pt reports: visiting with his surgeon yesterday, X-Rays taken and look good. CT scan ordered to get a better look.  He was compliant with home exercise program.   Response to previous treatment: increased swelling  Functional change: increased pain 2/2 swelling at heel strike    Pain: 0/10  Location: R lower leg/ankle/foot     Objective     Herman received therapeutic exercises to develop strength, endurance and ROM for 6 minutes including:    UBE: 30 RPM x 3' forwards/3' backwards.     Herman participated in gait training to improve functional mobility and safety for 50 minutes, including:    Heel to toe rocking to improve knee flexion in terminal stance: 2x20 R; 2x20 in B semitandem  Medial/lateral alejandro clearing to increase hip and knee flexion in swing: x20 R  Level walking 140 feet x laps. Rest breaks after each trial. Cues to 'roll' over R foot and 'bend' R knee intermittently. Cues during first trial to stand upright throughout; after first trial to adjust wrist positioning to neutral and increase weightbearing throughout arms and legs to offset load in wrists.     Home Exercises Provided and Patient Education  Provided:    Education provided:   - Continue HEP  - Continue 50% WB on R LE with crutches; focus on 'roll' and 'bend' on R    Written Home Exercises Provided: Not today.   Exercises were reviewed and Herman was able to demonstrate them prior to the end of the session.  Herman demonstrated good  understanding of the education provided.     See EMR under Patient Instructions for exercises provided 2/20/2020.    Assessment     Considerable perspiration with UBE performance and gait training. Habit of maintaining heel strike/dorsiflexion throughout stance; possible that history of focus on improving ankle dorsiflexion contributes. Toe off and knee flexion at terminal stance/pre-swing improved over duration of training.     Herman is progressing well towards his goals.   Pt prognosis is Fair.   Pt will continue to benefit from skilled outpatient physical therapy to address the deficits listed in the problem list box on initial evaluation, provide pt/family education and to maximize pt's level of independence in the home and community environment.     Pt's spiritual, cultural and educational needs considered and pt agreeable to plan of care and goals.  Anticipated barriers to physical therapy: chronicity of current injury and significant SxHx with recent ankle fusion.     Short Term Goals: 4 weeks   1.) Pt will become compliant and independent with his initial HEP to promote decreased pain and improved mobility and strength. PROGRESSING, NOT MET 3/27/2020  - ex's evolving  2) Pt to report decrease in pain levels from 3/10 at worse to 1/10 at worse. PROGRESSING, NOT MET 3/27/2020   3.) Pt will improve R ankle MMT scores by 1 muscle grade to improve functional stability and strength. PROGRESSING, NOT MET 3/27/2020   4.) Pt will be able to tolerate 60 minute exercise session with 3 resting breaks or less to demonstrate improved activity tolerance. MET 3/16/2020  Long Term Goals: 8 weeks   1.) Pt will transition from NWB to  WBAT and able to ambulate 30ft with a RW. PROGRESSING, NOT MET 3/27/2020   2.) Pt will be able to ambulate 300ft with a RW with no resting breaks to demonstrate improved ambulatory tolerance (10 weeks). PROGRESSING, NOT MET 3/27/2020   3.) Pt will transition from ambulation with RW to ambulation with SPC to demonstrate improved independence. (12 weeks). PROGRESSING, NOT MET 3/27/2020   4.) Pt will transition from ambulation with SPC to ambulation with no AD to demonstrate further independence. (14 weeks). PROGRESSING, NOT MET 3/27/2020   5.) Pt will improve her FOTO score from 70% impairment to 48% improvement to indicate improvement in overall function. PROGRESSING, NOT MET 3/27/2020   *Additoinal LTG will be set when appropriate functional measures are able to be tested.*     Plan     Continue gait training with 50% PWB with boot and B axillary crutches.     Chiqui Wells, PT

## 2020-03-30 ENCOUNTER — TELEPHONE (OUTPATIENT)
Dept: REHABILITATION | Facility: HOSPITAL | Age: 50
End: 2020-03-30

## 2020-03-30 ENCOUNTER — CLINICAL SUPPORT (OUTPATIENT)
Dept: REHABILITATION | Facility: HOSPITAL | Age: 50
End: 2020-03-30
Payer: OTHER MISCELLANEOUS

## 2020-03-30 DIAGNOSIS — M25.671 ANKLE STIFFNESS, RIGHT: ICD-10-CM

## 2020-03-30 PROCEDURE — 97116 GAIT TRAINING THERAPY: CPT | Mod: PN

## 2020-03-30 PROCEDURE — 97110 THERAPEUTIC EXERCISES: CPT | Mod: PN

## 2020-03-30 NOTE — PROGRESS NOTES
Physical Therapy Daily Treatment Note     Name: Herman GUERRERO [facility] Number: [id number]    Therapy Diagnosis:   Encounter Diagnosis   Name Primary?    Ankle stiffness, right      Physician: No ref. provider found    Visit Date: 3/30/2020    Physician Orders: PT Eval and Treat   Medical Diagnosis from Referral:   Z98.1 (ICD-10-CM) - Status post ankle fusion   M25.551,M25.552 (ICD-10-CM) - Bilateral hip pain   M25.561,M25.562 (ICD-10-CM) - Bilateral knee pain   Evaluation Date: 2/18/2020  Authorization Period Expiration: 6/30/2020  Plan of Care Expiration: 4/24/2020  Visit # / Visits authorized: 14/12 - awaiting additional auth  FOTO: 4/10    Time In: 201pm  Time Out: 302pm  Total Billable Time: 61 minutes (1TE, 3 Gait)    Precautions: Standard; partial weightbearing with 50% with ambulation (per pt - f/u on 3/26/2020)      Subjective     Pt reports: Reports that he will need to f/u with CT scan soon before f/u with his MD. Reports otherwise his ankle is doing ok this morning. Reports that he does still have ongoing swelling, but did not really do any ex's or walking much at all over the weekend so it is better today.  He was compliant with home exercise program.   Response to previous treatment: increased swelling  Functional change: increased pain 2/2 swelling at heel strike  Pain: 0/10  Location: R lower leg/ankle/foot     Objective     Herman received therapeutic exercises to develop strength, endurance and ROM for 11 minutes including:    UBE: 30 RPM x 3' forwards/3' backwards.   Scifit Stepper 5' (seat 23)     Herman participated in gait training to improve functional mobility and safety for 50 minutes, including:    Heel to toe rocking to improve knee flexion in terminal stance: 2x20 R; 2x20 in B semitandem in // bars  Medial/lateral alejandro clearing to increase hip and knee flexion in swing: x20 R in // bars  Exaggerated L stepping to increase L stride length: 2x20 in // bars  Step  up to 1st step of staircase with L LE only keeping R LE on ground throughout 1x15  Level walking 140 feet x 2 laps. Rest breaks after each trial. Cues to 'roll' over R foot and 'bend' R knee intermittently. Cues during first trial to stand upright throughout; after first trial to adjust wrist positioning to neutral and increase weightbearing throughout arms and legs to offset load in wrists.     Home Exercises Provided and Patient Education Provided:    Education provided:   - Continue HEP  - Continue 50% WB on R LE with crutches; focus on 'roll' and 'bend' on R    Written Home Exercises Provided: Not today.   Exercises were reviewed and Herman was able to demonstrate them prior to the end of the session.  Herman demonstrated good  understanding of the education provided.     See EMR under Patient Instructions for exercises provided 2/20/2020.    Assessment     Considerable perspiration with treatment today. Ongoing focus on cardiovascular endurance, CKC/WBing tolerance, and improving gait mechanics. Ongoing habit of maintaining heel strike/dorsiflexion throughout stance; possible that history of focus on improving ankle dorsiflexion contributes. Toe off and knee flexion at terminal stance/pre-swing improved over duration of training though when marked fatigue towards end of session with limited carry over.     Herman is progressing well towards his goals.   Pt prognosis is Fair.   Pt will continue to benefit from skilled outpatient physical therapy to address the deficits listed in the problem list box on initial evaluation, provide pt/family education and to maximize pt's level of independence in the home and community environment.     Pt's spiritual, cultural and educational needs considered and pt agreeable to plan of care and goals.  Anticipated barriers to physical therapy: chronicity of current injury and significant SxHx with recent ankle fusion.     Short Term Goals: 4 weeks   1.) Pt will become compliant and  independent with his initial HEP to promote decreased pain and improved mobility and strength. PROGRESSING, NOT MET 3/30/2020  - ex's evolving  2) Pt to report decrease in pain levels from 3/10 at worse to 1/10 at worse. PROGRESSING, NOT MET 3/30/2020   3.) Pt will improve R ankle MMT scores by 1 muscle grade to improve functional stability and strength. PROGRESSING, NOT MET 3/30/2020   4.) Pt will be able to tolerate 60 minute exercise session with 3 resting breaks or less to demonstrate improved activity tolerance. MET 3/16/2020  Long Term Goals: 8 weeks   1.) Pt will transition from NWB to WBAT and able to ambulate 30ft with a RW. PROGRESSING, NOT MET 3/30/2020   2.) Pt will be able to ambulate 300ft with a RW with no resting breaks to demonstrate improved ambulatory tolerance (10 weeks). PROGRESSING, NOT MET 3/30/2020   3.) Pt will transition from ambulation with RW to ambulation with SPC to demonstrate improved independence. (12 weeks). PROGRESSING, NOT MET 3/30/2020   4.) Pt will transition from ambulation with SPC to ambulation with no AD to demonstrate further independence. (14 weeks). PROGRESSING, NOT MET 3/30/2020   5.) Pt will improve her FOTO score from 70% impairment to 48% improvement to indicate improvement in overall function. PROGRESSING, NOT MET 3/30/2020   *Additoinal LTG will be set when appropriate functional measures are able to be tested.*     Plan     Continue gait training with 50% PWB with boot and B axillary crutches.     Vinicius Ferreira, PT

## 2020-04-01 ENCOUNTER — CLINICAL SUPPORT (OUTPATIENT)
Dept: REHABILITATION | Facility: HOSPITAL | Age: 50
End: 2020-04-01
Payer: OTHER MISCELLANEOUS

## 2020-04-01 DIAGNOSIS — M25.671 ANKLE STIFFNESS, RIGHT: ICD-10-CM

## 2020-04-01 PROCEDURE — 97110 THERAPEUTIC EXERCISES: CPT | Mod: PN

## 2020-04-01 PROCEDURE — 97116 GAIT TRAINING THERAPY: CPT | Mod: PN

## 2020-04-01 NOTE — PROGRESS NOTES
Physical Therapy Daily Treatment Note     Name: Herman GUERRERO [facility] Number: [id number]    Therapy Diagnosis:   Encounter Diagnosis   Name Primary?    Ankle stiffness, right      Physician: Kadie Grider MD    Visit Date: 4/1/2020    Physician Orders: PT Eval and Treat   Medical Diagnosis from Referral:   Z98.1 (ICD-10-CM) - Status post ankle fusion   M25.551,M25.552 (ICD-10-CM) - Bilateral hip pain   M25.561,M25.562 (ICD-10-CM) - Bilateral knee pain   Evaluation Date: 2/18/2020  Authorization Period Expiration: 6/30/2020  Plan of Care Expiration: 4/24/2020  Visit # / Visits authorized: 15/12 - awaiting additional auth  FOTO: 5/10 (NEXT VISIT)     Time In: 1200pm  Time Out: 100pm  Total Billable Time: 60 minutes (1TE, 3 Gait)    Precautions: Standard; partial weightbearing with 50% with ambulation (per pt - f/u on 3/26/2020)      Subjective     Pt reports: Reports that he will need to f/u with CT scan soon before f/u with his MD. Reports otherwise his ankle is doing ok, though reports his muscles in his leg are a little tired this morning. Reports that he did not sleep well and thinks this may be the case. Reports that he does still have ongoing swelling in the foot/ankle.  He was compliant with home exercise program.   Response to previous treatment: increased swelling  Functional change: increased pain 2/2 swelling at heel strike  Pain: 0/10  Location: R lower leg/ankle/foot     Objective     Herman received therapeutic exercises to develop strength, endurance and ROM for 11 minutes including:    UBE: 30 RPM x 3' forwards/3' backwards.   Scifit Stepper 5' (seat 23)     Herman participated in gait training to improve functional mobility and safety for 49 minutes, including:    Heel to toe rocking to improve knee flexion in terminal stance: 2x20 R; 2x20 in B semitandem in // bars  Medial/lateral alejandro clearing to increase hip and knee flexion in swing: x20 R in //  bars  Exaggerated L stepping to increase L stride length: 2x20 in // bars  Step up to 1st step of staircase with L LE only keeping R LE on ground throughout 1x15  Level walking 140 feet x 2 laps. Rest breaks after each trial. Cues to 'roll' over R foot and 'bend' R knee intermittently. Cues during first trial to stand upright throughout; after first trial to adjust wrist positioning to neutral and increase weightbearing throughout arms and legs to offset load in wrists.     Home Exercises Provided and Patient Education Provided:    Education provided:   - Continue HEP  - Continue 50% WB on R LE with crutches; focus on 'roll' and 'bend' on R    Written Home Exercises Provided: Not today.   Exercises were reviewed and Herman was able to demonstrate them prior to the end of the session.  Herman demonstrated good  understanding of the education provided.     See EMR under Patient Instructions for exercises provided 2/20/2020.    Assessment     Considerable perspiration with treatment today as per typical for the pt. Ongoing focus on cardiovascular endurance, CKC/WBing tolerance, and improving gait mechanics. Due to changes at last visit, kept ex's relatively same today.   Ongoing habit of maintaining heel strike/dorsiflexion throughout stance; possible that history of focus on improving ankle dorsiflexion contributes. Toe off and knee flexion at terminal stance/pre-swing improved over duration of training though when marked fatigue towards end of session with limited carry over.     Herman is progressing well towards his goals.   Pt prognosis is Fair.   Pt will continue to benefit from skilled outpatient physical therapy to address the deficits listed in the problem list box on initial evaluation, provide pt/family education and to maximize pt's level of independence in the home and community environment.     Pt's spiritual, cultural and educational needs considered and pt agreeable to plan of care and goals.  Anticipated  barriers to physical therapy: chronicity of current injury and significant SxHx with recent ankle fusion.     Short Term Goals: 4 weeks   1.) Pt will become compliant and independent with his initial HEP to promote decreased pain and improved mobility and strength. PROGRESSING, NOT MET 4/1/2020  - ex's evolving  2) Pt to report decrease in pain levels from 3/10 at worse to 1/10 at worse. PROGRESSING, NOT MET 4/1/2020   3.) Pt will improve R ankle MMT scores by 1 muscle grade to improve functional stability and strength. PROGRESSING, NOT MET 4/1/2020   4.) Pt will be able to tolerate 60 minute exercise session with 3 resting breaks or less to demonstrate improved activity tolerance. MET 3/16/2020  Long Term Goals: 8 weeks   1.) Pt will transition from NWB to WBAT and able to ambulate 30ft with a RW. PROGRESSING, NOT MET 4/1/2020   2.) Pt will be able to ambulate 300ft with a RW with no resting breaks to demonstrate improved ambulatory tolerance (10 weeks). PROGRESSING, NOT MET 4/1/2020   3.) Pt will transition from ambulation with RW to ambulation with SPC to demonstrate improved independence. (12 weeks). PROGRESSING, NOT MET 4/1/2020   4.) Pt will transition from ambulation with SPC to ambulation with no AD to demonstrate further independence. (14 weeks). PROGRESSING, NOT MET 4/1/2020   5.) Pt will improve her FOTO score from 70% impairment to 48% improvement to indicate improvement in overall function. PROGRESSING, NOT MET 4/1/2020   *Additoinal LTG will be set when appropriate functional measures are able to be tested.*     Plan     Continue gait training with 50% PWB with boot and B axillary crutches.     Vinicius Ferreira, PT

## 2020-04-07 ENCOUNTER — CLINICAL SUPPORT (OUTPATIENT)
Dept: REHABILITATION | Facility: HOSPITAL | Age: 50
End: 2020-04-07
Payer: OTHER MISCELLANEOUS

## 2020-04-07 DIAGNOSIS — M25.671 ANKLE STIFFNESS, RIGHT: ICD-10-CM

## 2020-04-07 PROCEDURE — 97110 THERAPEUTIC EXERCISES: CPT | Mod: PN,CQ

## 2020-04-07 PROCEDURE — 97116 GAIT TRAINING THERAPY: CPT | Mod: PN,CQ

## 2020-04-07 NOTE — PROGRESS NOTES
Physical Therapy Daily Treatment Note     Name: Herman GUERRERO [facility] Number: [id number]    Therapy Diagnosis:   No diagnosis found.  Physician: Kadie Grider MD    Visit Date: 4/7/2020    Physician Orders: PT Eval and Treat   Medical Diagnosis from Referral:   Z98.1 (ICD-10-CM) - Status post ankle fusion   M25.551,M25.552 (ICD-10-CM) - Bilateral hip pain   M25.561,M25.562 (ICD-10-CM) - Bilateral knee pain   Evaluation Date: 2/18/2020  Authorization Period Expiration: 6/30/2020  Plan of Care Expiration: 4/24/2020  Visit # / Visits authorized: 16/12 pending  FOTO: 6/10 DOOOOOOOOOOOO!     Time In: ***  Time Out: ***  Total Billable Time: 60 minutes     Precautions: Standard; partial weightbearing with 50% with ambulation (per pt - f/u on 3/26/2020)      Subjective     Pt reports: Reports that he will need to f/u with CT scan soon before f/u with his MD. Reports otherwise his ankle is doing ok, though reports his muscles in his leg are a little tired this morning. Reports that he did not sleep well and thinks this may be the case. Reports that he does still have ongoing swelling in the foot/ankle.  He was compliant with home exercise program.   Response to previous treatment: increased swelling  Functional change: increased pain 2/2 swelling at heel strike    Pain: ***/10  Location: R lower leg/ankle/foot     Objective     Herman received therapeutic exercises to develop strength, endurance and ROM for *** minutes including:    Endurance training with reciprocal motion of upper and lower limbs on sci-fit x *** min at level *** at > or equal to 50 spm ***w/ or w/o rest; UBE x3' forwards/3' backwards at 30 RPM     Herman participated in gait training to improve functional mobility and safety for *** minutes, including:    Heel to toe rocking to improve knee flexion in terminal stance: 2x20 R; 2x20 in B semitandem in // bars  Medial/lateral alejandro clearing to increase hip and knee flexion  in swing: x20 R in // bars  Exaggerated L stepping to increase L stride length: 2x20 in // bars  Step up to 1st step of staircase with L LE only keeping R LE on ground throughout 1x15  Level walking 140 feet x 2 laps. Rest breaks after each trial. Cues to 'roll' over R foot and 'bend' R knee intermittently. Cues during first trial to stand upright throughout; after first trial to adjust wrist positioning to neutral and increase weightbearing throughout arms and legs to offset load in wrists.     Home Exercises Provided and Patient Education Provided:    Education provided:   - Continue HEP  - Continue 50% WB on R LE with crutches; focus on 'roll' and 'bend' on R    Written Home Exercises Provided: Not today.   Exercises were reviewed and Herman was able to demonstrate them prior to the end of the session.  Herman demonstrated good  understanding of the education provided.     See EMR under Patient Instructions for exercises provided 2/20/2020.    Assessment     ***.   Considerable perspiration with treatment today as per typical for the pt. Ongoing focus on cardiovascular endurance, CKC/WBing tolerance, and improving gait mechanics. Due to changes at last visit, kept ex's relatively same today.   Ongoing habit of maintaining heel strike/dorsiflexion throughout stance; possible that history of focus on improving ankle dorsiflexion contributes. Toe off and knee flexion at terminal stance/pre-swing improved over duration of training though when marked fatigue towards end of session with limited carry over.     Herman is progressing well towards his goals.   Pt prognosis is Fair.   Pt will continue to benefit from skilled outpatient physical therapy to address the deficits listed in the problem list box on initial evaluation, provide pt/family education and to maximize pt's level of independence in the home and community environment.     Pt's spiritual, cultural and educational needs considered and pt agreeable to plan of  care and goals.  Anticipated barriers to physical therapy: chronicity of current injury and significant SxHx with recent ankle fusion.     Short Term Goals: 4 weeks   1.) Pt will become compliant and independent with his initial HEP to promote decreased pain and improved mobility and strength. PROGRESSING, NOT MET 4/7/2020  - ex's evolving  2) Pt to report decrease in pain levels from 3/10 at worse to 1/10 at worse. PROGRESSING, NOT MET 4/7/2020   3.) Pt will improve R ankle MMT scores by 1 muscle grade to improve functional stability and strength. PROGRESSING, NOT MET 4/7/2020   4.) Pt will be able to tolerate 60 minute exercise session with 3 resting breaks or less to demonstrate improved activity tolerance. MET 3/16/2020  Long Term Goals: 8 weeks   1.) Pt will transition from NWB to WBAT and able to ambulate 30ft with a RW. PROGRESSING, NOT MET 4/7/2020   2.) Pt will be able to ambulate 300ft with a RW with no resting breaks to demonstrate improved ambulatory tolerance (10 weeks). PROGRESSING, NOT MET 4/7/2020   3.) Pt will transition from ambulation with RW to ambulation with SPC to demonstrate improved independence. (12 weeks). PROGRESSING, NOT MET 4/7/2020   4.) Pt will transition from ambulation with SPC to ambulation with no AD to demonstrate further independence. (14 weeks). PROGRESSING, NOT MET 4/7/2020   5.) Pt will improve her FOTO score from 70% impairment to 48% improvement to indicate improvement in overall function. PROGRESSING, NOT MET 4/7/2020   *Additoinal LTG will be set when appropriate functional measures are able to be tested.*     Plan     ***.   Continue gait training with 50% PWB with boot and B axillary crutches.     Chiqui Wells, PT

## 2020-04-07 NOTE — PROGRESS NOTES
Physical Therapy Daily Treatment Note     Name: Herman GUERRERO [facility] Number: [id number]    Therapy Diagnosis:   Encounter Diagnosis   Name Primary?    Ankle stiffness, right      Physician: Kadie Grider MD    Visit Date: 4/7/2020    Physician Orders: PT Eval and Treat   Medical Diagnosis from Referral:   Z98.1 (ICD-10-CM) - Status post ankle fusion   M25.551,M25.552 (ICD-10-CM) - Bilateral hip pain   M25.561,M25.562 (ICD-10-CM) - Bilateral knee pain   Evaluation Date: 2/18/2020  Authorization Period Expiration: 6/30/2020  Plan of Care Expiration: 4/24/2020  Visit # / Visits authorized: 16/12 - awaiting additional auth  FOTO: 6/10 DONE    Time In: 0900 am  Time Out:  0958 am  Total Billable Time: 58 minutes (1TE, 3 Gait)    Precautions: Standard; partial weightbearing with 50% with ambulation (per pt - f/u on 3/26/2020)      Subjective     Pt reports: Reports that he got the insurance approval for CT scan waiting to get it scheduled. Reports ongoing swelling in his foot/ankle but otherwise doing ok.  Tired due to being on it since 4 am this morning.    He was compliant with home exercise program.   Response to previous treatment: increased swelling  Functional change: increased pain 2/2 increased walking today  Pain: 2/10  Location: R lower leg/ankle/foot     Objective     Herman received therapeutic exercises to develop strength, endurance and ROM for 8 minutes including:    UBE: 30 RPM x 3' forwards/3' backwards. Not today  Scifit Stepper 8' Level 3 (seat 23)     Herman participated in gait training to improve functional mobility and safety for 50 minutes, including:    Heel to toe rocking to improve knee flexion in terminal stance: 2x20 R; 2x20 in B semitandem in // bars  Medial/lateral alejandro clearing to increase hip and knee flexion in swing: x20 R in // bars  Exaggerated L stepping to increase L stride length: 2x20 in // bars  Step up to 1st step of staircase with L LE only  "keeping R LE on ground throughout 1x20  Level walking 140 feet x 2 laps. Rest breaks after second trial. Cues to 'roll' over R foot and 'bend' R knee as well as postural awareness intermittently.     Home Exercises Provided and Patient Education Provided:    Education provided:   - Continue HEP  - Continue 50% WB on R LE with crutches; focus on 'roll' and 'bend' on R    Written Home Exercises Provided: Not today. Continue prior program  Exercises were reviewed and Herman was able to demonstrate them prior to the end of the session.  Herman demonstrated good  understanding of the education provided.     See EMR under Patient Instructions for exercises provided 2/20/2020.    Assessment     Considerable perspiration with treatment today as per typical for the pt. Demonstrated improving gait pattern including increased step length, heel strike, toe off and knee flexion  at terminal stance/pre-swing.  Continued focus on cardiovascular endurance, CKC/WBing tolerance, and improving gait mechanics and carry over. Required additional sitting rest break with standing activity, increased fatigue noted towards end of session. States "hurting a little more now"  Unable to be specific to scale.    Herman is progressing well towards his goals.   Pt prognosis is Fair.   Pt will continue to benefit from skilled outpatient physical therapy to address the deficits listed in the problem list box on initial evaluation, provide pt/family education and to maximize pt's level of independence in the home and community environment.     Pt's spiritual, cultural and educational needs considered and pt agreeable to plan of care and goals.  Anticipated barriers to physical therapy: chronicity of current injury and significant SxHx with recent ankle fusion.     Short Term Goals: 4 weeks   1.) Pt will become compliant and independent with his initial HEP to promote decreased pain and improved mobility and strength. PROGRESSING, NOT MET 4/7/2020  - " ex's evolving  2) Pt to report decrease in pain levels from 3/10 at worse to 1/10 at worse. PROGRESSING, NOT MET 4/7/2020   3.) Pt will improve R ankle MMT scores by 1 muscle grade to improve functional stability and strength. PROGRESSING, NOT MET 4/7/2020   4.) Pt will be able to tolerate 60 minute exercise session with 3 resting breaks or less to demonstrate improved activity tolerance. MET 3/16/2020  Long Term Goals: 8 weeks   1.) Pt will transition from NWB to WBAT and able to ambulate 30ft with a RW. PROGRESSING, NOT MET 4/7/2020   2.) Pt will be able to ambulate 300ft with a RW with no resting breaks to demonstrate improved ambulatory tolerance (10 weeks). PROGRESSING, NOT MET 4/7/2020   3.) Pt will transition from ambulation with RW to ambulation with SPC to demonstrate improved independence. (12 weeks). PROGRESSING, NOT MET 4/7/2020   4.) Pt will transition from ambulation with SPC to ambulation with no AD to demonstrate further independence. (14 weeks). PROGRESSING, NOT MET 4/7/2020   5.) Pt will improve her FOTO score from 70% impairment to 48% improvement to indicate improvement in overall function. PROGRESSING, NOT MET 4/7/2020   *Additoinal LTG will be set when appropriate functional measures are able to be tested.*     Plan     Continue gait training with 50% PWB with boot and B axillary crutches.  Also continue to work on cardiovascular endurance.    Stephanie Orosco, PTA

## 2020-04-09 ENCOUNTER — CLINICAL SUPPORT (OUTPATIENT)
Dept: REHABILITATION | Facility: HOSPITAL | Age: 50
End: 2020-04-09
Payer: OTHER MISCELLANEOUS

## 2020-04-09 DIAGNOSIS — M25.671 ANKLE STIFFNESS, RIGHT: ICD-10-CM

## 2020-04-09 PROCEDURE — 97116 GAIT TRAINING THERAPY: CPT | Mod: PN | Performed by: PHYSICAL THERAPIST

## 2020-04-09 PROCEDURE — 97110 THERAPEUTIC EXERCISES: CPT | Mod: PN | Performed by: PHYSICAL THERAPIST

## 2020-04-09 NOTE — PROGRESS NOTES
Physical Therapy Daily Treatment Note     Name: Herman GUERRERO [facility] Number: [id number]    Therapy Diagnosis:   Encounter Diagnosis   Name Primary?    Ankle stiffness, right      Physician: Kadie Grider MD    Visit Date: 4/9/2020    Physician Orders: PT Eval and Treat   Medical Diagnosis from Referral:   Z98.1 (ICD-10-CM) - Status post ankle fusion   M25.551,M25.552 (ICD-10-CM) - Bilateral hip pain   M25.561,M25.562 (ICD-10-CM) - Bilateral knee pain   Evaluation Date: 2/18/2020  Authorization Period Expiration: 6/30/2020  Plan of Care Expiration: 4/24/2020  Visit # / Visits authorized: 17/12 - awaiting additional auth  FOTO: 7/10    Time In: 1100 am  Time Out:  1155 am  Total Billable Time: 55 minutes (1TE, 3 Gait)    Precautions: Standard; partial weightbearing with 50% with ambulation (per pt - f/u on 3/26/2020)      Subjective     Pt reports: sweating a lot more due to weight gain since accident. Some pain and popping in ankle with foot positioning.   He was compliant with home exercise program.   Response to previous treatment: increased swelling  Functional change: more knee flexion during ambulation  Pain: 2/10  Location: R lower leg/ankle/foot     Objective     Herman received therapeutic exercises to develop strength, endurance and ROM for 14 minutes including:    UBE: 30 RPM x 3' forwards/3' backwards.   Scifit Stepper 8' Level 3 (seat 23)     Herman participated in gait training to improve functional mobility and safety for 41 minutes, including:    Heel to toe rocking to improve knee flexion in terminal stance: 2x20 R; 2x20 in B semitandem in // bars  Medial/lateral alejandro clearing to increase hip and knee flexion in swing: x20 R in // bars  Exaggerated L stepping to increase L stride length: 2x20 in // bars  Step up to 1st step of staircase with L LE only keeping R LE on ground throughout 1x20  Level walking 140 feet x 2 laps. Rest breaks after second trial. Cues to  'roll' over R foot and 'bend' R knee as well as postural awareness intermittently. With crutches.  Side step on L1 step with left foot, making single leg stance moment on right leg 20x    Home Exercises Provided and Patient Education Provided:    Education provided:   - Continue HEP  - Continue 50% WB on R LE with crutches; focus on 'roll' and 'bend' on R    Written Home Exercises Provided: Not today. Continue prior program  Exercises were reviewed and Herman was able to demonstrate them prior to the end of the session.  Herman demonstrated good  understanding of the education provided.     See EMR under Patient Instructions for exercises provided 2/20/2020.    Assessment     Pt performed gait training therex at better pace today with decreased rest breaks. Pt reports continued difficulty with weight bearing due to pain in foot but will get CT scan here soon for further weight bearing instructions. Pt had difficulty with foot positioning during scifit exercise, difficulty with single leg stance moments.     Herman is progressing well towards his goals.   Pt prognosis is Fair.   Pt will continue to benefit from skilled outpatient physical therapy to address the deficits listed in the problem list box on initial evaluation, provide pt/family education and to maximize pt's level of independence in the home and community environment.     Pt's spiritual, cultural and educational needs considered and pt agreeable to plan of care and goals.  Anticipated barriers to physical therapy: chronicity of current injury and significant SxHx with recent ankle fusion.     Short Term Goals: 4 weeks   1.) Pt will become compliant and independent with his initial HEP to promote decreased pain and improved mobility and strength. PROGRESSING, NOT MET 4/9/2020  - ex's evolving  2) Pt to report decrease in pain levels from 3/10 at worse to 1/10 at worse. PROGRESSING, NOT MET 4/9/2020   3.) Pt will improve R ankle MMT scores by 1 muscle grade  to improve functional stability and strength. PROGRESSING, NOT MET 4/9/2020   4.) Pt will be able to tolerate 60 minute exercise session with 3 resting breaks or less to demonstrate improved activity tolerance. MET 3/16/2020  Long Term Goals: 8 weeks   1.) Pt will transition from NWB to WBAT and able to ambulate 30ft with a RW. PROGRESSING, NOT MET 4/9/2020   2.) Pt will be able to ambulate 300ft with a RW with no resting breaks to demonstrate improved ambulatory tolerance (10 weeks). PROGRESSING, NOT MET 4/9/2020   3.) Pt will transition from ambulation with RW to ambulation with SPC to demonstrate improved independence. (12 weeks). PROGRESSING, NOT MET 4/9/2020   4.) Pt will transition from ambulation with SPC to ambulation with no AD to demonstrate further independence. (14 weeks). PROGRESSING, NOT MET 4/9/2020   5.) Pt will improve her FOTO score from 70% impairment to 48% improvement to indicate improvement in overall function. PROGRESSING, NOT MET 4/9/2020   *Additoinal LTG will be set when appropriate functional measures are able to be tested.*     Plan     Continue gait training with 50% PWB with boot and B axillary crutches.  Also continue to work on cardiovascular endurance.    Eitan Barr, PT

## 2020-04-13 ENCOUNTER — CLINICAL SUPPORT (OUTPATIENT)
Dept: REHABILITATION | Facility: HOSPITAL | Age: 50
End: 2020-04-13
Payer: OTHER MISCELLANEOUS

## 2020-04-13 DIAGNOSIS — M25.671 ANKLE STIFFNESS, RIGHT: ICD-10-CM

## 2020-04-13 PROCEDURE — 97116 GAIT TRAINING THERAPY: CPT | Mod: PN

## 2020-04-13 PROCEDURE — 97110 THERAPEUTIC EXERCISES: CPT | Mod: PN

## 2020-04-13 NOTE — PROGRESS NOTES
Physical Therapy Daily Treatment Note     Name: Herman GUERRERO [facility] Number: [id number]    Therapy Diagnosis:   Encounter Diagnosis   Name Primary?    Ankle stiffness, right      Physician: Kadie Grider MD    Visit Date: 4/13/2020    Physician Orders: PT Eval and Treat   Medical Diagnosis from Referral:   Z98.1 (ICD-10-CM) - Status post ankle fusion   M25.551,M25.552 (ICD-10-CM) - Bilateral hip pain   M25.561,M25.562 (ICD-10-CM) - Bilateral knee pain   Evaluation Date: 2/18/2020  Authorization Period Expiration: 6/30/2020  Plan of Care Expiration: 4/24/2020  Visit # / Visits authorized: 18/12 - awaiting additional auth  FOTO: 8/10    Time In: 0100 pm  Time Out:  0145 pm  Total Billable Time: 45 minutes (1TE, 2 Gait)    Precautions: Standard; partial weightbearing on RLE of 50% with ambulation (per pt - f/u on 3/26/2020)      Subjective     Pt reports: mild pain and has been compliant with using crutches.  He was compliant with home exercise program.   Response to previous treatment: increased swelling  Functional change: more knee flexion during ambulation  Pain: 2/10  Location: R lower leg/ankle/foot     Objective     Herman received therapeutic exercises to develop strength, endurance and ROM for 15 minutes including:    UBE: 30 RPM x 3' forwards/3' backwards.   Scifit Stepper 10' Level 3 (seat 23)     Herman participated in gait training to improve functional mobility and safety for 29 minutes, including:    Heel to toe rocking to improve knee flexion in terminal stance: 2x20 R; 2x20 in B semitandem in // bars. Cues to use UE to decrease RLE WB per precautions  Medial/lateral alejandro clearing to increase hip and knee flexion in swing: x20 R in // bars - not done today  Lateral stepping in // bars using UE to decreased RLE WB per precautions; 4 laps  Exaggerated L stepping to increase L stride length: 2x20 in // bars - not done today  Step up to 1st step of staircase with L LE  only keeping R LE on ground throughout 1x20 - not done today  Level walking 140 feet x 2 laps. Rest breaks after second trial. Cues to 'roll' over R foot and 'bend' R knee as well as postural awareness intermittently. With crutches pre precautions  Side step on L1 step with left foot, making single leg stance moment on right leg 20x. UE use to decrease RLE WB per precautions    Home Exercises Provided and Patient Education Provided:  Education provided:   - Continue HEP  - Continue 50% WB on R LE with crutches; focus on 'roll' and 'bend' on R    Written Home Exercises Provided: Not today. Continue prior program  Exercises were reviewed and Herman was able to demonstrate them prior to the end of the session.  Herman demonstrated good  understanding of the education provided.     See EMR under Patient Instructions for exercises provided 2/20/2020.    Assessment     Moderate difficulty present with RLE SLS moments in gait training. Lack of R ankle dorsiflexion evident even with boot on; however able to step through with fairly good right hip and knee flexion for gait cycle with AD. Current gait and standing progress limited by precautions. Consider ankle and RLE strengthening for next visit; continue cardiovascular endurance training.    Herman is progressing well towards his goals.   Pt prognosis is Fair.   Pt will continue to benefit from skilled outpatient physical therapy to address the deficits listed in the problem list box on initial evaluation, provide pt/family education and to maximize pt's level of independence in the home and community environment.     Pt's spiritual, cultural and educational needs considered and pt agreeable to plan of care and goals.  Anticipated barriers to physical therapy: chronicity of current injury and significant SxHx with recent ankle fusion.     Short Term Goals: 4 weeks   1.) Pt will become compliant and independent with his initial HEP to promote decreased pain and improved  mobility and strength. PROGRESSING, NOT MET 4/13/2020  - ex's evolving  2) Pt to report decrease in pain levels from 3/10 at worse to 1/10 at worse. PROGRESSING, NOT MET 4/13/2020   3.) Pt will improve R ankle MMT scores by 1 muscle grade to improve functional stability and strength. PROGRESSING, NOT MET 4/13/2020   4.) Pt will be able to tolerate 60 minute exercise session with 3 resting breaks or less to demonstrate improved activity tolerance. MET 3/16/2020  Long Term Goals: 8 weeks   1.) Pt will transition from NWB to WBAT and able to ambulate 30ft with a RW. PROGRESSING, NOT MET 4/13/2020   2.) Pt will be able to ambulate 300ft with a RW with no resting breaks to demonstrate improved ambulatory tolerance (10 weeks). PROGRESSING, NOT MET 4/13/2020   3.) Pt will transition from ambulation with RW to ambulation with SPC to demonstrate improved independence. (12 weeks). PROGRESSING, NOT MET 4/13/2020   4.) Pt will transition from ambulation with SPC to ambulation with no AD to demonstrate further independence. (14 weeks). PROGRESSING, NOT MET 4/13/2020   5.) Pt will improve her FOTO score from 70% impairment to 48% improvement to indicate improvement in overall function. PROGRESSING, NOT MET 4/13/2020   *Additoinal LTG will be set when appropriate functional measures are able to be tested.*     Plan     Continue gait training with 50% PWB with boot and B axillary crutches.  Also continue to work on cardiovascular endurance.    Nate Ruiz, PT

## 2020-04-15 ENCOUNTER — CLINICAL SUPPORT (OUTPATIENT)
Dept: REHABILITATION | Facility: HOSPITAL | Age: 50
End: 2020-04-15
Payer: OTHER MISCELLANEOUS

## 2020-04-15 DIAGNOSIS — M25.671 ANKLE STIFFNESS, RIGHT: ICD-10-CM

## 2020-04-15 PROCEDURE — 97110 THERAPEUTIC EXERCISES: CPT | Mod: PN

## 2020-04-15 PROCEDURE — 97116 GAIT TRAINING THERAPY: CPT | Mod: PN

## 2020-04-15 PROCEDURE — 97140 MANUAL THERAPY 1/> REGIONS: CPT | Mod: PN

## 2020-04-15 NOTE — PROGRESS NOTES
Physical Therapy Daily Treatment Note     Name: Herman GUERRERO [facility] [address] Number: [id number]    Therapy Diagnosis:   Encounter Diagnosis   Name Primary?    Ankle stiffness, right      Physician: Kadie Grider MD    Visit Date: 4/15/2020    Physician Orders: PT Eval and Treat   Medical Diagnosis from Referral:   Z98.1 (ICD-10-CM) - Status post ankle fusion   M25.551,M25.552 (ICD-10-CM) - Bilateral hip pain   M25.561,M25.562 (ICD-10-CM) - Bilateral knee pain   Evaluation Date: 2/18/2020  Authorization Period Expiration: 3/18/2021  Plan of Care Expiration: 4/24/2020  Visit # / Visits authorized: 19/24  FOTO: 9/10    Time In: 1100  Time Out:  1200  Total Billable Time: 60 minutes (1TE, 3 Gait, 1MT)    Precautions: Standard; partial weightbearing on RLE of 50% with ambulation (per pt - f/u on 3/26/2020)      Subjective     Pt reports: mild pain and has been compliant with using crutches.  He was compliant with home exercise program.   Response to previous treatment: increased swelling  Functional change: more knee flexion during ambulation  Pain: 2/10  Location: R lower leg/ankle/foot     Objective     Herman received therapeutic exercises to develop strength, endurance and ROM for 10 minutes including:    UBE: 30 RPM x 5' forwards backwards.   Scifit Stepper 10' Level 3 (seat 23)     Herman participated in gait training to improve functional mobility and safety for 40 minutes, including:    Heel to toe rocking to improve knee flexion in terminal stance: 2x20 R; 2x20 in B semitandem in // bars. Cues to use UE to decrease RLE WB per precautions  Medial/lateral alejandro clearing to increase hip and knee flexion in swing: x20 R in // bars - not done today  Lateral stepping in // bars using UE to decreased RLE WB per precautions; 4 laps  Exaggerated L stepping to increase L stride length: 2x20 in // bars  Step up to 1st step of staircase with L LE only keeping R LE on ground throughout 1x20 -  not done today  Level walking 140 feet x 2 laps. Rest breaks after second trial. Cues to 'roll' over R foot and 'bend' R knee as well as postural awareness intermittently. With crutches pre precautions  Side step on L1 step with left foot, making single leg stance moment on right leg 20x. UE use to decrease RLE WB per precautions   Standing hip flex and extension     Herman received the following manual therapy techniques: Joint mobilizations and Soft tissue Mobilization were applied to the: R ankle/foot for 10 minutes, includinst ray plantar JM 3/10 Gr4  1st ray distraction with MWM extension 10  Forefoot splaying 1/10  Passive MT 2-5 extension with manually resisted flexion  Passive DF to tolerance    Home Exercises Provided and Patient Education Provided:  Education provided:   - Continue HEP  - Continue 50% WB on R LE with crutches; focus on 'roll' and 'bend' on R    Written Home Exercises Provided: Not today. Continue prior program  Exercises were reviewed and Herman was able to demonstrate them prior to the end of the session.  Herman demonstrated good  understanding of the education provided.     See EMR under Patient Instructions for exercises provided 2020.    Assessment     Moderate difficulty present with RLE SLS moments in gait training. Lack of R ankle dorsiflexion evident even with boot on; however able to step through with fairly good right hip and knee flexion for gait cycle with AD. Current gait and standing progress limited by precautions; will reach out to MD office regarding precautions. Pt tolerated the addition of manual therapy well today.   Consider additional ankle and RLE strengthening for next visit; continue cardiovascular endurance training.     Herman is progressing well towards his goals.   Pt prognosis is Fair.   Pt will continue to benefit from skilled outpatient physical therapy to address the deficits listed in the problem list box on initial evaluation, provide pt/family  education and to maximize pt's level of independence in the home and community environment.     Pt's spiritual, cultural and educational needs considered and pt agreeable to plan of care and goals.  Anticipated barriers to physical therapy: chronicity of current injury and significant SxHx with recent ankle fusion.     Short Term Goals: 4 weeks   1.) Pt will become compliant and independent with his initial HEP to promote decreased pain and improved mobility and strength. PROGRESSING, NOT MET 4/15/2020  - ex's evolving  2) Pt to report decrease in pain levels from 3/10 at worse to 1/10 at worse. PROGRESSING, NOT MET 4/15/2020   3.) Pt will improve R ankle MMT scores by 1 muscle grade to improve functional stability and strength. PROGRESSING, NOT MET 4/15/2020   4.) Pt will be able to tolerate 60 minute exercise session with 3 resting breaks or less to demonstrate improved activity tolerance. MET 3/16/2020  Long Term Goals: 8 weeks   1.) Pt will transition from NWB to WBAT and able to ambulate 30ft with a RW. PROGRESSING, NOT MET 4/15/2020   2.) Pt will be able to ambulate 300ft with a RW with no resting breaks to demonstrate improved ambulatory tolerance (10 weeks). PROGRESSING, NOT MET 4/15/2020   3.) Pt will transition from ambulation with RW to ambulation with SPC to demonstrate improved independence. (12 weeks). PROGRESSING, NOT MET 4/15/2020   4.) Pt will transition from ambulation with SPC to ambulation with no AD to demonstrate further independence. (14 weeks). PROGRESSING, NOT MET 4/15/2020   5.) Pt will improve her FOTO score from 70% impairment to 48% improvement to indicate improvement in overall function. PROGRESSING, NOT MET 4/15/2020   *Additoinal LTG will be set when appropriate functional measures are able to be tested.*     Plan     Continue gait training with 50% PWB with boot and B axillary crutches.  Also continue to work on cardiovascular endurance.    Vinicius Ferreira, PT

## 2020-04-20 ENCOUNTER — CLINICAL SUPPORT (OUTPATIENT)
Dept: REHABILITATION | Facility: HOSPITAL | Age: 50
End: 2020-04-20
Payer: OTHER MISCELLANEOUS

## 2020-04-20 DIAGNOSIS — M25.671 ANKLE STIFFNESS, RIGHT: ICD-10-CM

## 2020-04-20 PROCEDURE — 97116 GAIT TRAINING THERAPY: CPT | Mod: PN

## 2020-04-20 PROCEDURE — 97140 MANUAL THERAPY 1/> REGIONS: CPT | Mod: PN

## 2020-04-20 PROCEDURE — 97110 THERAPEUTIC EXERCISES: CPT | Mod: PN

## 2020-04-20 NOTE — PROGRESS NOTES
Physical Therapy Daily Treatment Note     Name: Herman GUERRERO [facility] Number: [id number]    Therapy Diagnosis:   Encounter Diagnosis   Name Primary?    Ankle stiffness, right      Physician: Kadie Grider MD    Visit Date: 4/20/2020    Physician Orders: PT Eval and Treat   Medical Diagnosis from Referral:   Z98.1 (ICD-10-CM) - Status post ankle fusion   M25.551,M25.552 (ICD-10-CM) - Bilateral hip pain   M25.561,M25.562 (ICD-10-CM) - Bilateral knee pain   Evaluation Date: 2/18/2020  Authorization Period Expiration: 3/18/2021  Plan of Care Expiration: 4/24/2020  Visit # / Visits authorized: 20/24  FOTO: 10/10    Time In: 909am  Time Out:  1000am  Total Billable Time: 51 minutes (1TE, 2 Gait, 1MT)    Precautions: Standard; partial weightbearing on RLE of 50% with ambulation (per pt - f/u on 3/26/2020)      Subjective     Pt reports: min discomfort and ongoing swelling across the top of the foot. Reports that he will contact his MD regarding his CT scan - reports that he is approved through , but has not been scheduled yet.   He was compliant with home exercise program.   Response to previous treatment: increased swelling  Functional change: more knee flexion during ambulation  Pain: 1/10  Location: R lower leg/ankle/foot     Objective     Herman received therapeutic exercises to develop strength, endurance and ROM for 10 minutes including:    UBE: 30 RPM x 5' forwards backwards.   Scifit Stepper 10' Level 3 (seat 23)   Standing hip abduction 2x10  Standing hip extension 2x10  Standing hip flexion 2x10    Herman participated in gait training to improve functional mobility and safety for 31 minutes, including:    Heel to toe rocking to improve knee flexion in terminal stance: 2x20 R; 2x20 in B semitandem in // bars. Cues to use UE to decrease RLE WB per precautions  Medial/lateral alejandro clearing to increase hip and knee flexion in swing: x20 R in // bars - not done today  Lateral  stepping in // bars using UE to decreased RLE WB per precautions; 4 laps  Exaggerated L stepping to increase L stride length: 2x20 in // bars  Step up to 1st step of staircase with L LE only keeping R LE on ground throughout 1x20 - not done today  Level walking 140 feet x 2 laps. Rest breaks after second trial. Cues to 'roll' over R foot and 'bend' R knee as well as postural awareness intermittently. With crutches pre precautions  Side step on L1 step with left foot, making single leg stance moment on right leg 20x. UE use to decrease RLE WB per precautions   Standing hip flex and extension     Herman received the following manual therapy techniques: Joint mobilizations and Soft tissue Mobilization were applied to the: R ankle/foot for 10 minutes, includinst ray plantar JM 3/10 Gr4  1st ray distraction with MWM extension 2/10  Forefoot splaying 1/10  Passive MT 2-5 extension with manually resisted flexion  Passive DF to tolerance    Home Exercises Provided and Patient Education Provided:  Education provided:   - Continue HEP  - Continue 50% WB on R LE with crutches; focus on 'roll' and 'bend' on R    Written Home Exercises Provided: Not today. Continue prior program  Exercises were reviewed and Herman was able to demonstrate them prior to the end of the session.  Herman demonstrated good  understanding of the education provided.     See EMR under Patient Instructions for exercises provided 2020.    Assessment     Ongoing moderate difficulty present with RLE SLS moments in gait training. Lack of R ankle dorsiflexion with AROM/PROM; however able to step through with fairly good right hip and knee flexion for gait cycle with AD. Current gait and standing progress limited by precautions; will reach out to MD office regarding precautions. Pt tolerated the addition of manual therapy well today.   Consider additional ankle and RLE strengthening for next visit; continue cardiovascular endurance training.     Herman is  progressing well towards his goals.   Pt prognosis is Fair.   Pt will continue to benefit from skilled outpatient physical therapy to address the deficits listed in the problem list box on initial evaluation, provide pt/family education and to maximize pt's level of independence in the home and community environment.     Pt's spiritual, cultural and educational needs considered and pt agreeable to plan of care and goals.  Anticipated barriers to physical therapy: chronicity of current injury and significant SxHx with recent ankle fusion.     Short Term Goals: 4 weeks   1.) Pt will become compliant and independent with his initial HEP to promote decreased pain and improved mobility and strength. PROGRESSING, NOT MET 4/20/2020  - ex's evolving  2) Pt to report decrease in pain levels from 3/10 at worse to 1/10 at worse. PROGRESSING, NOT MET 4/20/2020   3.) Pt will improve R ankle MMT scores by 1 muscle grade to improve functional stability and strength. PROGRESSING, NOT MET 4/20/2020   4.) Pt will be able to tolerate 60 minute exercise session with 3 resting breaks or less to demonstrate improved activity tolerance. MET 3/16/2020  Long Term Goals: 8 weeks   1.) Pt will transition from NWB to WBAT and able to ambulate 30ft with a RW. PROGRESSING, NOT MET 4/20/2020   2.) Pt will be able to ambulate 300ft with a RW with no resting breaks to demonstrate improved ambulatory tolerance (10 weeks). PROGRESSING, NOT MET 4/20/2020   3.) Pt will transition from ambulation with RW to ambulation with SPC to demonstrate improved independence. (12 weeks). PROGRESSING, NOT MET 4/20/2020   4.) Pt will transition from ambulation with SPC to ambulation with no AD to demonstrate further independence. (14 weeks). PROGRESSING, NOT MET 4/20/2020   5.) Pt will improve her FOTO score from 70% impairment to 48% improvement to indicate improvement in overall function. PROGRESSING, NOT MET 4/20/2020   *Additoinal LTG will be set when appropriate  functional measures are able to be tested.*     Plan     Continue gait training with 50% PWB with boot and B axillary crutches.  Also continue to work on cardiovascular endurance.    Vinicius Ferreira, PT

## 2020-04-22 ENCOUNTER — CLINICAL SUPPORT (OUTPATIENT)
Dept: REHABILITATION | Facility: HOSPITAL | Age: 50
End: 2020-04-22
Payer: OTHER MISCELLANEOUS

## 2020-04-22 ENCOUNTER — TELEPHONE (OUTPATIENT)
Dept: REHABILITATION | Facility: HOSPITAL | Age: 50
End: 2020-04-22

## 2020-04-22 DIAGNOSIS — M25.671 ANKLE STIFFNESS, RIGHT: ICD-10-CM

## 2020-04-22 PROCEDURE — 97116 GAIT TRAINING THERAPY: CPT | Mod: PN

## 2020-04-22 PROCEDURE — 97110 THERAPEUTIC EXERCISES: CPT | Mod: PN

## 2020-04-22 NOTE — PROGRESS NOTES
Physical Therapy Daily Treatment Note     Name: Herman GUERRERO [facility] Number: [id number]    Therapy Diagnosis:   Encounter Diagnosis   Name Primary?    Ankle stiffness, right      Physician: Kadie Grider MD    Visit Date: 4/22/2020    Physician Orders: PT Eval and Treat   Medical Diagnosis from Referral:   Z98.1 (ICD-10-CM) - Status post ankle fusion   M25.551,M25.552 (ICD-10-CM) - Bilateral hip pain   M25.561,M25.562 (ICD-10-CM) - Bilateral knee pain   Evaluation Date: 2/18/2020  Authorization Period Expiration: 3/18/2021  Plan of Care Expiration: 4/24/2020  Visit # / Visits authorized: 21/24  FOTO: 1/10    Time In: 1005am  Time Out:  1100am  Total Billable Time: 55 minutes (2Gait, 2TE)    Precautions: Standard; partial weightbearing on RLE of 50% with ambulation (per pt - f/u on 3/26/2020)      Subjective     Pt reports: min discomfort and ongoing swelling across the top of the foot. Reports that he will contact his MD regarding his CT scan - reports that he is approved through , but has not been scheduled yet.   He was compliant with home exercise program.   Response to previous treatment: increased swelling  Functional change: more knee flexion during ambulation  Pain: 1/10  Location: R lower leg/ankle/foot     Objective     Herman received therapeutic exercises to develop strength, endurance and ROM for 25 minutes including:    UBE: 30 RPM x 3' forwards backwards each  Scifit Stepper 10' Level 3 (seat 23)   Standing hip abduction 2x10 with RTB  Standing hip extension 2x10 with RTB  Standing hip flexion 2x10 with RTB  Cybex hamstring curl 3x10 7 plates  Cybex knee extension 3x10 30#    Herman participated in gait training to improve functional mobility and safety for 35 minutes, including:    Heel to toe rocking to improve knee flexion in terminal stance: 2x20 R; 2x20 in B semitandem in // bars. Cues to use UE to decrease RLE WB per precautions  Medial/lateral alejandro  clearing to increase hip and knee flexion in swing: x20 R in // bars - not done today  Lateral stepping in // bars using UE to decreased RLE WB per precautions; 4 laps  Exaggerated L stepping to increase L stride length: 2x20 in // bars  Step up to 1st step of staircase with L LE only keeping R LE on ground throughout 1x20 - not done today  Level walking 140 feet x 2 laps. Rest breaks after second trial. Cues to 'roll' over R foot and 'bend' R knee as well as postural awareness intermittently. With crutches pre precautions  Side step on L1 step with left foot, making single leg stance moment on right leg 20x. UE use to decrease RLE WB per precautions   Standing hip flex and extension     Herman received the following manual therapy techniques: Joint mobilizations and Soft tissue Mobilization were applied to the: R ankle/foot for 0 minutes, including: (Out of Time)   1st ray plantar JM 3/10 Gr4   1st ray distraction with MWM extension 2/10  Forefoot splaying 1/10  Passive MT 2-5 extension with manually resisted flexion  Passive DF to tolerance    Home Exercises Provided and Patient Education Provided:  Education provided:   - Continue HEP  - Continue 50% WB on R LE with crutches; focus on 'roll' and 'bend' on R    Written Home Exercises Provided: Not today. Continue prior program  Exercises were reviewed and Herman was able to demonstrate them prior to the end of the session.  Herman demonstrated good  understanding of the education provided.     See EMR under Patient Instructions for exercises provided 2/20/2020.    Assessment     Ongoing moderate difficulty present with RLE SLS moments in gait training. Lack of R ankle dorsiflexion with AROM/PROM; however able to step through with fairly good right hip and knee flexion for gait cycle with AD. Current gait and standing progress limited by precautions and have reached out to MD office regarding precautions. Pt tolerated the addition of B LE strengthening activities  today with no c/o pain, only c/o muscle fatigue post session and muscle soreness.      Herman is progressing well towards his goals.   Pt prognosis is Fair.   Pt will continue to benefit from skilled outpatient physical therapy to address the deficits listed in the problem list box on initial evaluation, provide pt/family education and to maximize pt's level of independence in the home and community environment.     Pt's spiritual, cultural and educational needs considered and pt agreeable to plan of care and goals.  Anticipated barriers to physical therapy: chronicity of current injury and significant SxHx with recent ankle fusion.     Short Term Goals: 4 weeks   1.) Pt will become compliant and independent with his initial HEP to promote decreased pain and improved mobility and strength. PROGRESSING, NOT MET 4/22/2020  - ex's evolving  2) Pt to report decrease in pain levels from 3/10 at worse to 1/10 at worse. PROGRESSING, NOT MET 4/22/2020   3.) Pt will improve R ankle MMT scores by 1 muscle grade to improve functional stability and strength. PROGRESSING, NOT MET 4/22/2020   4.) Pt will be able to tolerate 60 minute exercise session with 3 resting breaks or less to demonstrate improved activity tolerance. MET 3/16/2020  Long Term Goals: 8 weeks   1.) Pt will transition from NWB to WBAT and able to ambulate 30ft with a RW. PROGRESSING, NOT MET 4/22/2020   2.) Pt will be able to ambulate 300ft with a RW with no resting breaks to demonstrate improved ambulatory tolerance (10 weeks). PROGRESSING, NOT MET 4/22/2020   3.) Pt will transition from ambulation with RW to ambulation with SPC to demonstrate improved independence. (12 weeks). PROGRESSING, NOT MET 4/22/2020   4.) Pt will transition from ambulation with SPC to ambulation with no AD to demonstrate further independence. (14 weeks). PROGRESSING, NOT MET 4/22/2020   5.) Pt will improve her FOTO score from 70% impairment to 48% improvement to indicate improvement in  overall function. PROGRESSING, NOT MET 4/22/2020   *Additoinal LTG will be set when appropriate functional measures are able to be tested.*     Plan     Continue gait training with 50% PWB with boot and B axillary crutches.  Also continue to work on cardiovascular endurance.    Vinicius Ferreira, PT

## 2020-04-22 NOTE — TELEPHONE ENCOUNTER
Left a voicemail with the patient's provider regarding clarifications of the patient's weight bearing restrictions as well as potentially another prescription for therapy as he is at visit 21 of 24.

## 2020-04-29 ENCOUNTER — CLINICAL SUPPORT (OUTPATIENT)
Dept: REHABILITATION | Facility: HOSPITAL | Age: 50
End: 2020-04-29
Payer: OTHER MISCELLANEOUS

## 2020-04-29 DIAGNOSIS — M25.671 ANKLE STIFFNESS, RIGHT: ICD-10-CM

## 2020-04-29 PROCEDURE — 97116 GAIT TRAINING THERAPY: CPT | Mod: PN

## 2020-04-29 PROCEDURE — 97110 THERAPEUTIC EXERCISES: CPT | Mod: PN

## 2020-04-30 ENCOUNTER — CLINICAL SUPPORT (OUTPATIENT)
Dept: REHABILITATION | Facility: HOSPITAL | Age: 50
End: 2020-04-30
Payer: OTHER MISCELLANEOUS

## 2020-04-30 DIAGNOSIS — M25.671 ANKLE STIFFNESS, RIGHT: ICD-10-CM

## 2020-04-30 PROCEDURE — 97110 THERAPEUTIC EXERCISES: CPT | Mod: PN

## 2020-04-30 PROCEDURE — 97116 GAIT TRAINING THERAPY: CPT | Mod: PN

## 2020-04-30 NOTE — PROGRESS NOTES
Physical Therapy Daily Treatment Note     Name: [facility] Number: [id number]    Therapy Diagnosis:   Encounter Diagnosis   Name Primary?    Ankle stiffness, right      Physician: Kadie Grider MD    Visit Date: 4/29/2020    Physician Orders: PT Eval and Treat   Medical Diagnosis from Referral:   Z98.1 (ICD-10-CM) - Status post ankle fusion   M25.551,M25.552 (ICD-10-CM) - Bilateral hip pain   M25.561,M25.562 (ICD-10-CM) - Bilateral knee pain   Evaluation Date: 2/18/2020  Authorization Period Expiration: 3/18/2021  Plan of Care Expiration: 4/24/2020  Visit # / Visits authorized: 22/24  FOTO: 2/10    Time In: 300pm  Time Out:  400pm  Total Billable Time: 60 minutes (2Gait, 2TE)    Precautions: Standard; partial weightbearing on RLE of 50% with ambulation    Subjective     Pt reports: min discomfort and ongoing swelling across the top of the foot. Reports that he tried to contact [facility] imaging department regarding his CT scan and he has not had his CT scan scheduled at this time. Reports that he is approved through , but has not been scheduled yet.   He was compliant with home exercise program.   Response to previous treatment: increased swelling  Functional change: more knee flexion during ambulation  Pain: 1/10  Location: R lower leg/ankle/foot     Objective     Herman received therapeutic exercises to develop strength, endurance and ROM for 25 minutes including:    UBE: 30 RPM x 3' forwards backwards each  Scifit Stepper 10' Level 4 (seat 23)   Standing hip abduction 2x10 with RTB  Standing hip extension 2x10 with RTB  Standing hip flexion 2x10 with RTB  Cybex hamstring curl 3x10 7 plates  Cybex knee extension 3x10 30#    Herman participated in gait training to improve functional mobility and safety for 35 minutes, including:    Heel to toe rocking to improve knee flexion in terminal stance: 2x20 R; 2x20 in B semitandem in // bars. Cues to use UE to decrease RLE WB  per precautions  Lateral walks in // bars 3 laps working on weight shifting  Exaggerated L stepping to increase L stride length: 2x20 in // bars (towel folded under heel of rocker boot)  L LE leg lift/ March in place to focus on weight shift in // bars (towel folded under heel of rocker boot)  Level walking 140 feet x 2 laps. Cues to 'roll' over R foot and 'bend' R knee as well as postural awareness intermittently. With crutches pre precautions    Herman received the following manual therapy techniques: Joint mobilizations and Soft tissue Mobilization were applied to the: R ankle/foot for 0 minutes, including: (Out of Time; therefore, not completed today)  1st ray plantar JM 3/10 Gr4   1st ray distraction with MWM extension 2/10  Forefoot splaying 1/10  Passive MT 2-5 extension with manually resisted flexion  Passive DF to tolerance    Home Exercises Provided and Patient Education Provided:  Education provided:   - Continue HEP  - Continue 50% WB on R LE with crutches; focus on 'roll' and 'bend' on R    Written Home Exercises Provided: Not today. Continue prior program  Exercises were reviewed and Herman was able to demonstrate them prior to the end of the session.  Herman demonstrated good  understanding of the education provided.     See EMR under Patient Instructions for exercises provided 2/20/2020.    Assessment     Reached out to the referring provider for WB clarification since the patient's last visits and the pt is still at 50% WB with boot and B axillary crutches. Impression is that a CT scan has been ordered, approved, and awaiting scheduling to determine healing prior to progressing WB status.     Ongoing moderate difficulty present with RLE SLS moments in gait training and limited to 50% per precautions. AROM/PROM with ongoing marked restrictions as expected for s/p fusion status. Pt is with ongoing marked deconditioning; however, has been improving as he is able to tolerate additional cardio and standing  activities within the clinic recently.     Pt tolerated the additional B LE strengthening activities at his last visit and activities were kept relatively the same today, only c/o muscle fatigue post session and muscle soreness.      Herman is progressing well towards his goals.   Pt prognosis is Fair.   Pt will continue to benefit from skilled outpatient physical therapy to address the deficits listed in the problem list box on initial evaluation, provide pt/family education and to maximize pt's level of independence in the home and community environment.     Pt's spiritual, cultural and educational needs considered and pt agreeable to plan of care and goals.  Anticipated barriers to physical therapy: chronicity of current injury and significant SxHx with recent ankle fusion.     Short Term Goals: 4 weeks   1.) Pt will become compliant and independent with his initial HEP to promote decreased pain and improved mobility and strength. PROGRESSING, NOT MET 4/29/2020  - ex's evolving  2) Pt to report decrease in pain levels from 3/10 at worse to 1/10 at worse. PROGRESSING, NOT MET 4/29/2020   3.) Pt will improve R ankle MMT scores by 1 muscle grade to improve functional stability and strength. PROGRESSING, NOT MET 4/29/2020   4.) Pt will be able to tolerate 60 minute exercise session with 3 resting breaks or less to demonstrate improved activity tolerance. MET 3/16/2020  Long Term Goals: 8 weeks   1.) Pt will transition from NWB to WBAT and able to ambulate 30ft with a RW. PROGRESSING, NOT MET 4/29/2020   2.) Pt will be able to ambulate 300ft with a RW with no resting breaks to demonstrate improved ambulatory tolerance (10 weeks). PROGRESSING, NOT MET 4/29/2020   3.) Pt will transition from ambulation with RW to ambulation with SPC to demonstrate improved independence. (12 weeks). PROGRESSING, NOT MET 4/29/2020   4.) Pt will transition from ambulation with SPC to ambulation with no AD to demonstrate further  independence. (14 weeks). PROGRESSING, NOT MET 4/29/2020   5.) Pt will improve her FOTO score from 70% impairment to 48% improvement to indicate improvement in overall function. PROGRESSING, NOT MET 4/29/2020   *Additoinal LTG will be set when appropriate functional measures are able to be tested.*     Plan     Continue gait training with 50% PWB with boot and B axillary crutches.  Also continue to work on cardiovascular endurance.    Vinicius Ferreira, PT

## 2020-04-30 NOTE — PROGRESS NOTES
Physical Therapy Daily Treatment Note     Name: Herman GUERRERO [facility] Number: [id number]    Therapy Diagnosis:   Encounter Diagnosis   Name Primary?    Ankle stiffness, right      Physician: Kadie Grider MD    Visit Date: 4/30/2020    Physician Orders: PT Eval and Treat   Medical Diagnosis from Referral:   Z98.1 (ICD-10-CM) - Status post ankle fusion   M25.551,M25.552 (ICD-10-CM) - Bilateral hip pain   M25.561,M25.562 (ICD-10-CM) - Bilateral knee pain   Evaluation Date: 2/18/2020  Authorization Period Expiration: 3/18/2021  Plan of Care Expiration: 4/24/2020  Visit # / Visits authorized: 23/24  FOTO:3/10    Time In:  0705 pm  Time Out:  0800 pm  Total Billable Time: 55 minutes (2Gait, 2TE)    Precautions: Standard; partial weightbearing on RLE of 50% with ambulation    Subjective     Pt reports:  ongoing swelling across the top of the foot. Reports his CT scan is still not scheduled at this time. Reports that he is approved through , but has not been scheduled yet.   He was compliant with home exercise program.   Response to previous treatment: increased swelling  Functional change: more knee flexion during ambulation  Pain: 0/10  Location: R lower leg/ankle/foot     Objective     Herman received therapeutic exercises to develop strength, endurance and ROM for 25 minutes including:    UBE: 30 RPM x 3' forwards backwards each  Scifit Stepper 10' Level 4 (seat 23)   Standing hip abduction 2x10 with RTB  (lifted on block or step)  Standing hip extension 2x10 with RTB (lifted on block or step)  Standing hip flexion 2x10 with RTB  Cybex hamstring curl 3x10 7 plates  Cybex knee extension 3x10 30#    Herman participated in gait training to improve functional mobility and safety for 30 minutes, including:    Heel to toe rocking to improve knee flexion in terminal stance: 2x20 R; 2x20 in B semitandem in // bars. Cues to use UE to decrease RLE WB per precautions  Lateral walks in //  bars 3 laps working on weight shifting  Exaggerated L stepping to increase L stride length: 2x20 in // bars (towel folded under heel of rocker boot)  L LE leg lift/ March in place to focus on 2 x20 weight shift in // bars (towel folded under heel of rocker boot)  Level walking 140 feet x 2 laps. Cues to 'roll' over R foot and 'bend' R knee as well as postural awareness intermittently. With crutches pre precautions    Pt had pt remove boot and sock and edema was reduced today per observation.    Herman received the following manual therapy techniques: Joint mobilizations and Soft tissue Mobilization were applied to the: R ankle/foot for 0 minutes, including: (not completed today)  1st ray plantar JM 3/10 Gr4   1st ray distraction with MWM extension 2/10  Forefoot splaying 1/10  Passive MT 2-5 extension with manually resisted flexion  Passive DF to tolerance    Home Exercises Provided and Patient Education Provided:  Education provided:   - Continue HEP  - Continue 50% WB on R LE with crutches; focus on 'roll' and 'bend' on R    Written Home Exercises Provided: Not today. Continue prior program  Exercises were reviewed and Herman was able to demonstrate them prior to the end of the session.  Herman demonstrated good  understanding of the education provided.     See EMR under Patient Instructions for exercises provided 2/20/2020.    Assessment     Pt edema and pain were minimal today. His knee flexion is improved in gait during R swing.  Pt is still at 50% WB with boot and B axillary crutches. Impression is that a CT scan has been ordered, approved, and awaiting scheduling to determine healing prior to progressing WB status.     Ongoing moderate difficulty present with RLE SLS moments in gait training and limited to 50% per precautions. AROM/PROM with ongoing marked restrictions as expected for s/p fusion status. Pt is with ongoing marked deconditioning; however, has been improving as he is able to tolerate additional  cardio and standing activities within the clinic recently.     Pt tolerated the additional B LE strengthening activities at his last visit and activities were kept relatively the same today, only c/o muscle fatigue post session and muscle soreness.      Herman is progressing well towards his goals.   Pt prognosis is Fair.   Pt will continue to benefit from skilled outpatient physical therapy to address the deficits listed in the problem list box on initial evaluation, provide pt/family education and to maximize pt's level of independence in the home and community environment.     Pt's spiritual, cultural and educational needs considered and pt agreeable to plan of care and goals.  Anticipated barriers to physical therapy: chronicity of current injury and significant SxHx with recent ankle fusion.     Short Term Goals: 4 weeks   1.) Pt will become compliant and independent with his initial HEP to promote decreased pain and improved mobility and strength. PROGRESSING, NOT MET 4/30/2020  - ex's evolving  2) Pt to report decrease in pain levels from 3/10 at worse to 1/10 at worse. PROGRESSING, NOT MET 4/30/2020   3.) Pt will improve R ankle MMT scores by 1 muscle grade to improve functional stability and strength. PROGRESSING, NOT MET 4/30/2020   4.) Pt will be able to tolerate 60 minute exercise session with 3 resting breaks or less to demonstrate improved activity tolerance. MET 3/16/2020  Long Term Goals: 8 weeks   1.) Pt will transition from NWB to WBAT and able to ambulate 30ft with a RW. PROGRESSING, NOT MET 4/30/2020   2.) Pt will be able to ambulate 300ft with a RW with no resting breaks to demonstrate improved ambulatory tolerance (10 weeks). PROGRESSING, NOT MET 4/30/2020   3.) Pt will transition from ambulation with RW to ambulation with SPC to demonstrate improved independence. (12 weeks). PROGRESSING, NOT MET 4/30/2020   4.) Pt will transition from ambulation with SPC to ambulation with no AD to demonstrate  further independence. (14 weeks). PROGRESSING, NOT MET 4/30/2020   5.) Pt will improve her FOTO score from 70% impairment to 48% improvement to indicate improvement in overall function. PROGRESSING, NOT MET 4/30/2020   *Additoinal LTG will be set when appropriate functional measures are able to be tested.*     Plan     Continue gait training with 50% PWB with boot and B axillary crutches.  Also continue to work on cardiovascular endurance.    Keeley Lopez, PT

## 2020-05-04 ENCOUNTER — CLINICAL SUPPORT (OUTPATIENT)
Dept: REHABILITATION | Facility: HOSPITAL | Age: 50
End: 2020-05-04
Payer: OTHER MISCELLANEOUS

## 2020-05-04 DIAGNOSIS — M25.671 ANKLE STIFFNESS, RIGHT: ICD-10-CM

## 2020-05-04 PROCEDURE — 97110 THERAPEUTIC EXERCISES: CPT | Mod: PN

## 2020-05-04 NOTE — PROGRESS NOTES
Physical Therapy Daily Treatment Note     Name: Herman GUERRERO [facility] Number: [id number]    Therapy Diagnosis:   Encounter Diagnosis   Name Primary?    Ankle stiffness, right      Physician: Kadie Grider MD    Visit Date: 5/4/2020    Physician Orders: PT Eval and Treat   Medical Diagnosis from Referral:   Z98.1 (ICD-10-CM) - Status post ankle fusion   M25.551,M25.552 (ICD-10-CM) - Bilateral hip pain   M25.561,M25.562 (ICD-10-CM) - Bilateral knee pain   Evaluation Date: 2/18/2020  Authorization Period Expiration: 3/18/2021  Plan of Care Expiration: 4/24/2020  Visit # / Visits authorized: 24/24  FOTO: Next    Time In:  120 pm  Time Out:  200 pm  Total Billable Time: 40 minutes (3TE)    Precautions: Standard; partial weightbearing on RLE of 50% with ambulation    Subjective     Pt reports:  ongoing swelling across the top of the foot. Reports his CT scan is still not scheduled at this time. Reports that he is approved through , but has not been scheduled yet.   He was compliant with home exercise program.   Response to previous treatment: increased swelling  Functional change: more knee flexion during ambulation  Pain: 0/10  Location: R lower leg/ankle/foot     Objective     Ankle AROM (R/L):   Ankle AROM Right (*) = in degrees Left (*) = in degrees Comments    Dorsiflexion -18 > -1 WNL Stiffness noted.    Plantarflexion 22 > 22 WNL Stiffness noted.    Inversion 2 > 9 WNL Stiffness and pain noted.    Eversion 2 > 8 WNL Stiffness and pain noted.    Great toe extension 21 > 36 WNL Stiffness and pain noted.    Ankle PROM Right (*) = in degrees Left (*) = in degrees Comments    Dorsiflexion -10 > 4 WNL Stiffness and pain noted.    Plantarflexion 26 > 27 WNL Stiffness and pain noted.    Inversion 10 > 12 WNL Stiffness and pain noted.    Eversion 6 > 13 WNL Stiffness and pain noted.    Great toe extension 43  > 45 WNL Stiffness noted.       Ankle MMT:   Ankle MMT scores Right: X/5  Left: x/5   Dorsiflexion 2+ /5 > 3- 4 /5   Plantarflexion  3- /5 > 3- 4 /5    Inversion 2+ /5 > 3- 4+ /5    Eversion 2+ /5 > 3- 4 /5    Great toe extension 2+ /5 > 3- 4 /5      Circumferential measures:   Circumferential measures Right in cm   Figure 8 68 > 70.6   At [place] 37 > 36   At Metatarsal heads 26 > 26.7      5 x sit to stand: 18 seconds  30 second chair rise: 9 (min<>mod use of UEs)  2 Minute walk test with B axillary crutches: 298ft    1RM knee extension/quad 60#  1RM knee flexion/hamstring curl 70#    Herman received therapeutic exercises to develop strength, endurance and ROM for 40 minutes including testing at the following:    UBE: 30 RPM x 3' forwards backwards each (Out of Time)   Scifit Stepper 10' Level 4 (seat 23)   Standing hip abduction 2x10 with RTB  (lifted on block or step)  Standing hip extension 2x10 with RTB (lifted on block or step)  Standing hip flexion 2x10 with RTB  Cybex hamstring curl 3x10 7 plates  Cybex knee extension 3x10 30#    Herman participated in gait training to improve functional mobility and safety for 0 minutes, including: (Not performed today due to testing, time constraints, pt running 20 minutes late to session).    Heel to toe rocking to improve knee flexion in terminal stance: 2x20 R; 2x20 in B semitandem in // bars. Cues to use UE to decrease RLE WB per precautions  Lateral walks in // bars 3 laps working on weight shifting  Exaggerated L stepping to increase L stride length: 2x20 in // bars (towel folded under heel of rocker boot)  L LE leg lift/ March in place to focus on 2 x20 weight shift in // bars (towel folded under heel of rocker boot)  Level walking 140 feet x 2 laps. Cues to 'roll' over R foot and 'bend' R knee as well as postural awareness intermittently. With crutches pre precautions    Pt had pt remove boot and sock and edema was reduced today per observation.    Herman received the following manual therapy techniques: Joint mobilizations and Soft  tissue Mobilization were applied to the: R ankle/foot for 0 minutes, including: (not completed today)  1st ray plantar JM 3/10 Gr4   1st ray distraction with MWM extension 2/10  Forefoot splaying 1/10  Passive MT 2-5 extension with manually resisted flexion  Passive DF to tolerance    Home Exercises Provided and Patient Education Provided:  Education provided:   - Continue HEP  - Continue 50% WB on R LE with crutches; focus on 'roll' and 'bend' on R    Written Home Exercises Provided: Not today. Continue prior program  Exercises were reviewed and Herman was able to demonstrate them prior to the end of the session.  Herman demonstrated good  understanding of the education provided.     See EMR under Patient Instructions for exercises provided 2/20/2020.    Assessment     Currently the patient has been seen for 24 of 24 authorized PT sessions. The pt most recently underwent an ankle fusion in January of 2020. At this time he is 50% WB with cam-boot and B axillary crutches. Impression from the patient is that a CT scan has been ordered, approved, and awaiting scheduling to determine healing prior to progressing WB status which appears to be hindering progress at this time.   Overall he has made fair progress with ROM and MMT as expected for complexity of his case, deconditioned status, numerous ankle Sx's. He has though made marked improvements in ambulatory tolerance at this time and is currently most limited due to deconditioning, ankle stiffness/weakness, and current s/p precautions at 50% WB on the surgical LE.     He will continue to benefit from skilled PT intervention at his time due to s/p status, ongoing ankle stiffness, weakness, swelling, and gait deviations/restrictions.      Herman is progressing well towards his goals.   Pt prognosis is Fair.   Pt will continue to benefit from skilled outpatient physical therapy to address the deficits listed in the problem list box on initial evaluation, provide pt/family  education and to maximize pt's level of independence in the home and community environment.     Pt's spiritual, cultural and educational needs considered and pt agreeable to plan of care and goals.  Anticipated barriers to physical therapy: chronicity of current injury and significant SxHx with recent ankle fusion.     Short Term Goals: 4 weeks   1.) Pt will become compliant and independent with his initial HEP to promote decreased pain and improved mobility and strength. Met 5/4/2020  2) Pt to report decrease in pain levels from 3/10 at worse to 1/10 at worse. Met 5/4/2020  3.) Pt will improve R ankle MMT scores by 1 muscle grade to improve functional stability and strength. Met 5/4/2020  4.) Pt will be able to tolerate 60 minute exercise session with 3 resting breaks or less to demonstrate improved activity tolerance. MET 3/16/2020  Long Term Goals: 8 weeks   1.) Pt will transition from NWB to WBAT and able to ambulate 30ft with a RW. PROGRESSING, NOT MET 5/4/2020   2.) Pt will be able to ambulate 300ft with a RW with no resting breaks to demonstrate improved ambulatory tolerance (10 weeks).  MET 5/4/2020  3.) Pt will transition from ambulation with RW to ambulation with SPC to demonstrate improved independence. (12 weeks). PROGRESSING, NOT MET 5/4/2020   4.) Pt will transition from ambulation with SPC to ambulation with no AD to demonstrate further independence. (14 weeks). PROGRESSING, NOT MET 5/4/2020   5.) Pt will improve her FOTO score from 70% impairment to 48% improvement to indicate improvement in overall function. PROGRESSING, NOT MET 5/4/2020   *Additoinal LTG will be set when appropriate functional measures are able to be tested.*     Plan     Continue gait training with 50% PWB with boot and B axillary crutches.  Also continue to work on cardiovascular endurance.    Vinicius Ferreira, PT

## 2020-05-04 NOTE — PLAN OF CARE
Outpatient Therapy Updated Plan of Care     Visit Date: 5/4/2020    Name: Herman GUERRERO [facility] Number: [id number]    Therapy Diagnosis:   Encounter Diagnosis   Name Primary?    Ankle stiffness, right      Physician: Kadie Grider MD    Physician Orders: PT Eval and Treat   Medical Diagnosis from Referral:   Z98.1 (ICD-10-CM) - Status post ankle fusion   M25.551,M25.552 (ICD-10-CM) - Bilateral hip pain   M25.561,M25.562 (ICD-10-CM) - Bilateral knee pain   Evaluation Date: 2/18/2020  Authorization Period Expiration: 3/18/2021  Plan of Care Expiration: 4/24/2020  Visit # / Visits authorized: 24/24  FOTO: Next    Time In:  120 pm  Time Out:  200 pm  Total Billable Time: 40 minutes (3TE)    Precautions: Standard; partial weightbearing on RLE of 50% with ambulation  Functional Level Prior to Evaluation:  Not working due to precautions/restrictions     Subjective     Update:   Pt reports:  ongoing swelling across the top of the foot. Reports his CT scan is still not scheduled at this time. Reports that he is approved through , but has not been scheduled yet.   He was compliant with home exercise program.   Response to previous treatment: increased swelling  Functional change: more knee flexion during ambulation  Pain: 0/10  Location: R lower leg/ankle/foot     Objective     Update:   Ankle AROM (R/L):   Ankle AROM Right (*) = in degrees Left (*) = in degrees Comments    Dorsiflexion -18 > -1 WNL Stiffness noted.    Plantarflexion 22 > 22 WNL Stiffness noted.    Inversion 2 > 9 WNL Stiffness and pain noted.    Eversion 2 > 8 WNL Stiffness and pain noted.    Great toe extension 21 > 36 WNL Stiffness and pain noted.    Ankle PROM Right (*) = in degrees Left (*) = in degrees Comments    Dorsiflexion -10 > 4 WNL Stiffness and pain noted.    Plantarflexion 26 > 27 WNL Stiffness and pain noted.    Inversion 10 > 12 WNL Stiffness and pain noted.    Eversion 6 > 13 WNL Stiffness and pain noted.    Great toe extension  43  > 45 WNL Stiffness noted.       Ankle MMT:   Ankle MMT scores Right: X/5 Left: x/5   Dorsiflexion 2+ /5 > 3- 4 /5   Plantarflexion  3- /5 > 3- 4 /5    Inversion 2+ /5 > 3- 4+ /5    Eversion 2+ /5 > 3- 4 /5    Great toe extension 2+ /5 > 3- 4 /5      Circumferential measures:   Circumferential measures Right in cm   Figure 8 68 > 70.6   At [place] 37 > 36   At Metatarsal heads 26 > 26.7      5 x sit to stand: 18 seconds  30 second chair rise: 9 (min<>mod use of UEs)  2 Minute walk test with B axillary crutches: 298ft    1RM knee extension/quad 60#  1RM knee flexion/hamstring curl 70#    Assessment     Update:     Currently the patient has been seen for 24 of 24 authorized PT sessions. The pt most recently underwent an ankle fusion in January of 2020. At this time he is 50% WB with cam-boot and B axillary crutches. Impression from the patient is that a CT scan has been ordered, approved, and awaiting scheduling to determine healing prior to progressing WB status which appears to be hindering progress at this time.   Overall he has made fair progress with ROM and MMT as expected for complexity of his case, deconditioned status, numerous ankle Sx's. He has though made marked improvements in ambulatory tolerance at this time and is currently most limited due to deconditioning, ankle stiffness/weakness, and current s/p precautions at 50% WB on the surgical LE.     He will continue to benefit from skilled PT intervention at his time due to s/p status, ongoing ankle stiffness, weakness, swelling, and gait deviations/restrictions.      Short Term Goals: 4 weeks   1.) Pt will become compliant and independent with his initial HEP to promote decreased pain and improved mobility and strength. Met 5/4/2020  2) Pt to report decrease in pain levels from 3/10 at worse to 1/10 at worse. Met 5/4/2020  3.) Pt will improve R ankle MMT scores by 1 muscle grade to improve functional stability and strength. Met 5/4/2020  4.) Pt will  be able to tolerate 60 minute exercise session with 3 resting breaks or less to demonstrate improved activity tolerance. MET 3/16/2020  Long Term Goals: 8 weeks   1.) Pt will transition from NWB to WBAT and able to ambulate 30ft with a RW. PROGRESSING, NOT MET 5/4/2020   2.) Pt will be able to ambulate 300ft with a RW with no resting breaks to demonstrate improved ambulatory tolerance (10 weeks).  MET 5/4/2020  3.) Pt will transition from ambulation with RW to ambulation with SPC to demonstrate improved independence. (12 weeks). PROGRESSING, NOT MET 5/4/2020   4.) Pt will transition from ambulation with SPC to ambulation with no AD to demonstrate further independence. (14 weeks). PROGRESSING, NOT MET 5/4/2020   5.) Pt will improve her FOTO score from 70% impairment to 48% improvement to indicate improvement in overall function. PROGRESSING, NOT MET 5/4/2020   *Additoinal LTG will be set when appropriate functional measures are able to be tested.*    Long Term Goal Status:   continue per initial plan of care.  Reasons for Recertification of Therapy:   Ongoing restrictions, functional limitations as described above    Plan     Updated Certification Period: 5/4/2020 to 6/12/2020  Recommended Treatment Plan: 2 times per week for 6 weeks: Gait Training, Manual Therapy, Moist Heat/ Ice, Neuromuscular Re-ed, Patient Education, Self Care, Therapeutic Activites and Therapeutic Exercise    Vinicius Ferreira, PT  5/4/2020      I CERTIFY THE NEED FOR THESE SERVICES FURNISHED UNDER THIS PLAN OF TREATMENT AND WHILE UNDER MY CARE    Physician's comments:        Physician's Signature: ___________________________________________________

## 2020-05-07 ENCOUNTER — CLINICAL SUPPORT (OUTPATIENT)
Dept: REHABILITATION | Facility: HOSPITAL | Age: 50
End: 2020-05-07
Payer: OTHER MISCELLANEOUS

## 2020-05-07 ENCOUNTER — TELEPHONE (OUTPATIENT)
Dept: CARDIOLOGY | Facility: CLINIC | Age: 50
End: 2020-05-07

## 2020-05-07 DIAGNOSIS — M25.671 ANKLE STIFFNESS, RIGHT: ICD-10-CM

## 2020-05-07 DIAGNOSIS — R07.9 CHEST PAIN, UNSPECIFIED TYPE: Primary | ICD-10-CM

## 2020-05-07 PROCEDURE — 97110 THERAPEUTIC EXERCISES: CPT | Mod: PN

## 2020-05-07 PROCEDURE — 97116 GAIT TRAINING THERAPY: CPT | Mod: PN

## 2020-05-07 NOTE — PROGRESS NOTES
Physical Therapy Daily Treatment Note     Name: Herman GUERRERO [facility] Number: [id number]    Therapy Diagnosis:   Encounter Diagnosis   Name Primary?    Ankle stiffness, right      Physician: Kadie Grider MD    Visit Date: 5/7/2020    Physician Orders: PT Eval and Treat   Medical Diagnosis from Referral:   Z98.1 (ICD-10-CM) - Status post ankle fusion   M25.551,M25.552 (ICD-10-CM) - Bilateral hip pain   M25.561,M25.562 (ICD-10-CM) - Bilateral knee pain   Evaluation Date: 2/18/2020  Authorization Period Expiration: 3/18/2021  Plan of Care Expiration: 4/24/2020  Visit # / Visits authorized: 25/24  FOTO: Next    Time In:  800am  Time Out:  900am  Total Billable Time: 60 minutes (2TE, 2Gait)    Precautions: Standard; partial weightbearing on RLE of 50% with ambulation    Subjective     Pt reports:  ongoing swelling across the top of the foot. Reports his CT scan is still not scheduled at this time. Reports that since his last visit his MD has called him regarding therapy and his CT scan. Awaiting to hear back. Reports that he is approved through  for his CT scan, but has not been scheduled yet.   He was compliant with home exercise program.   Response to previous treatment: very fatigued  Functional change: more knee flexion during ambulation, 50% WB with B axillary crutches  Pain: 0/10  Location: R lower leg/ankle/foot     Objective     Herman received therapeutic exercises to develop strength, endurance and ROM for 30 minutes including:    UBE: 30 RPM x 3' forwards backwards each  Scifit Stepper 10' Level 4 (seat 23)   Standing hip abduction 2x10 with RTB  (lifted on block or step)  Standing hip extension 2x10 with RTB (lifted on block or step)  Standing hip flexion 2x10 with RTB  Cybex hamstring curl 3x10 7 plates  Cybex knee extension 3x10 30#    Herman participated in gait training to improve functional mobility and safety for 30 minutes, including:    Heel to toe rocking to  improve knee flexion in terminal stance: 2x20 R; 2x20 in B semitandem in // bars. Cues to use UE to decrease RLE WB per precautions  Lateral walks in // bars 3 laps working on weight shifting  Exaggerated L stepping to increase L stride length: 2x20 in // bars (towel folded under heel of rocker boot)  L LE leg lift/ March in place to focus on 2 x20 weight shift in // bars (towel folded under heel of rocker boot)  Level walking 140 feet x 2 laps. Cues to 'roll' over R foot and 'bend' R knee as well as postural awareness intermittently. With crutches pre precautions    Pt had pt remove boot and sock and edema was reduced today per observation.    Herman received the following manual therapy techniques: Joint mobilizations and Soft tissue Mobilization were applied to the: R ankle/foot for 0 minutes, including: (not completed today)  1st ray plantar JM 3/10 Gr4   1st ray distraction with MWM extension 2/10  Forefoot splaying 1/10  Passive MT 2-5 extension with manually resisted flexion  Passive DF to tolerance    Home Exercises Provided and Patient Education Provided:  Education provided:   - Continue HEP  - Continue 50% WB on R LE with crutches; focus on 'roll' and 'bend' on R    Written Home Exercises Provided: Not today. Continue prior program  Exercises were reviewed and Herman was able to demonstrate them prior to the end of the session.  Herman demonstrated good  understanding of the education provided.     See EMR under Patient Instructions for exercises provided 2/20/2020.    Assessment     Currently the patient has been seen for 25 of 24 authorized PT sessions. The pt most recently underwent an ankle fusion in January of 2020. At this time he is 50% WB with cam-boot and B axillary crutches. Impression from the patient is that a CT scan has been ordered, approved, and awaiting scheduling to determine healing prior to progressing WB status which appears to be hindering progress at this time.   Overall he has made  fair progress with ROM and MMT as expected for complexity of his case, deconditioned status, numerous ankle Sx's. He has though made marked improvements in ambulatory tolerance at this time and is currently most limited due to deconditioning, ankle stiffness/weakness, and current s/p precautions at 50% WB on the surgical LE.     He will continue to benefit from skilled PT intervention at his time due to s/p status, ongoing ankle stiffness, weakness, swelling, and gait deviations/restrictions.      Herman is progressing well towards his goals.   Pt prognosis is Fair.   Pt will continue to benefit from skilled outpatient physical therapy to address the deficits listed in the problem list box on initial evaluation, provide pt/family education and to maximize pt's level of independence in the home and community environment.     Pt's spiritual, cultural and educational needs considered and pt agreeable to plan of care and goals.  Anticipated barriers to physical therapy: chronicity of current injury and significant SxHx with recent ankle fusion.     Short Term Goals: 4 weeks   1.) Pt will become compliant and independent with his initial HEP to promote decreased pain and improved mobility and strength. Met 5/4/2020  2) Pt to report decrease in pain levels from 3/10 at worse to 1/10 at worse. Met 5/4/2020  3.) Pt will improve R ankle MMT scores by 1 muscle grade to improve functional stability and strength. Met 5/4/2020  4.) Pt will be able to tolerate 60 minute exercise session with 3 resting breaks or less to demonstrate improved activity tolerance. MET 3/16/2020  Long Term Goals: 8 weeks   1.) Pt will transition from NWB to WBAT and able to ambulate 30ft with a RW. PROGRESSING, NOT MET 5/7/2020   2.) Pt will be able to ambulate 300ft with a RW with no resting breaks to demonstrate improved ambulatory tolerance (10 weeks).  MET 5/4/2020  3.) Pt will transition from ambulation with RW to ambulation with SPC to demonstrate  improved independence. (12 weeks). PROGRESSING, NOT MET 5/7/2020   4.) Pt will transition from ambulation with SPC to ambulation with no AD to demonstrate further independence. (14 weeks). PROGRESSING, NOT MET 5/7/2020   5.) Pt will improve her FOTO score from 70% impairment to 48% improvement to indicate improvement in overall function. PROGRESSING, NOT MET 5/7/2020   *Additoinal LTG will be set when appropriate functional measures are able to be tested.*     Plan     Continue gait training with 50% PWB with boot and B axillary crutches.  Also continue to work on cardiovascular endurance.    Vinicius Ferreira, PT

## 2020-05-11 DIAGNOSIS — M25.552 BILATERAL HIP PAIN: ICD-10-CM

## 2020-05-11 DIAGNOSIS — Z98.1 STATUS POST ANKLE FUSION: Primary | ICD-10-CM

## 2020-05-11 DIAGNOSIS — M25.561 BILATERAL KNEE PAIN: ICD-10-CM

## 2020-05-11 DIAGNOSIS — M25.562 BILATERAL KNEE PAIN: ICD-10-CM

## 2020-05-11 DIAGNOSIS — M25.551 BILATERAL HIP PAIN: ICD-10-CM

## 2020-05-12 ENCOUNTER — CLINICAL SUPPORT (OUTPATIENT)
Dept: REHABILITATION | Facility: HOSPITAL | Age: 50
End: 2020-05-12
Payer: OTHER MISCELLANEOUS

## 2020-05-12 DIAGNOSIS — M25.671 ANKLE STIFFNESS, RIGHT: ICD-10-CM

## 2020-05-12 PROCEDURE — 97110 THERAPEUTIC EXERCISES: CPT | Mod: PN

## 2020-05-12 NOTE — PROGRESS NOTES
Physical Therapy Daily Treatment Note     Name: Herman GUERRERO [facility] Number: [id number]    Therapy Diagnosis:   Encounter Diagnosis   Name Primary?    Ankle stiffness, right      Physician: Kadie Grider MD    Visit Date: 5/12/2020    Physician Orders: PT Eval and Treat   Medical Diagnosis from Referral:   Z98.1 (ICD-10-CM) - Status post ankle fusion   M25.551,M25.552 (ICD-10-CM) - Bilateral hip pain   M25.561,M25.562 (ICD-10-CM) - Bilateral knee pain   Evaluation Date: 2/18/2020  Authorization Period Expiration: 3/18/2021  Plan of Care Expiration: 5/4/2020 to 6/12/2020  Visit # / Visits authorized: 26/24  FOTO: Next    Time In:  3:30pm  Time Out:  4:05pm  Total Billable Time: 35 minutes (2TE)    Precautions: Standard; partial weightbearing on RLE of 50% with ambulation    Subjective     Pt reports:  Late today due to oversleeping. Reports he was up late last night and woke up to eat breakfast around 10:00am and fell back asleep. Reports otherwise things are about the same with no new complaints. Reports that he is anxious to follow up with his MD and have his CT scan to progress his WB status. Ongoing swelling across the top of the foot. Reports his CT scan is still not scheduled at this time. Reports that since his last visit his MD has called him regarding therapy and his CT scan. Awaiting to hear back. Reports that he is approved through  for his CT scan, but has not been scheduled yet.   He was compliant with home exercise program.   Response to previous treatment: very fatigued  Functional change: more knee flexion during ambulation, 50% WB with B axillary crutches  Pain: 0/10  Location: R lower leg/ankle/foot     Objective     Herman received therapeutic exercises to develop strength, endurance and ROM for 35 minutes including:    UBE: 30 RPM x 3' forwards backwards each  Scifit Stepper 10' Level 4 (seat 23)   Standing hip abduction 2x10 with RTB  (lifted on block or  step)  Standing hip extension 2x10 with RTB (lifted on block or step)  Standing hip flexion 2x10 with RTB  Cybex hamstring curl 3x10 7 plates  Cybex knee extension 3x10 30#  Stair trainin laps     Herman participated in gait training to improve functional mobility and safety for 0 minutes, including: (Out of Time))    Heel to toe rocking to improve knee flexion in terminal stance: 2x20 R; 2x20 in B semitandem in // bars. Cues to use UE to decrease RLE WB per precautions  Lateral walks in // bars 3 laps working on weight shifting  Exaggerated L stepping to increase L stride length: 2x20 in // bars (towel folded under heel of rocker boot)  L LE leg lift/ March in place to focus on 2 x20 weight shift in // bars (towel folded under heel of rocker boot)  Level walking 140 feet x 2 laps. Cues to 'roll' over R foot and 'bend' R knee as well as postural awareness intermittently. With crutches pre precautions    Pt had pt remove boot and sock and edema was reduced today per observation.    Herman received the following manual therapy techniques: Joint mobilizations and Soft tissue Mobilization were applied to the: R ankle/foot for 0 minutes, including: (not completed today)  1st ray plantar JM 3/10 Gr4   1st ray distraction with MWM extension 2/10  Forefoot splaying 1/10  Passive MT 2-5 extension with manually resisted flexion  Passive DF to tolerance    Home Exercises Provided and Patient Education Provided:  Education provided:   - Continue HEP  - Continue 50% WB on R LE with crutches; focus on 'roll' and 'bend' on R    Written Home Exercises Provided: Not today. Continue prior program  Exercises were reviewed and Herman was able to demonstrate them prior to the end of the session.  Herman demonstrated good  understanding of the education provided.     See EMR under Patient Instructions for exercises provided 2020.    Assessment     The pt most recently underwent an ankle fusion in 2020. At this time he is  50% WB with cam-boot and B axillary crutches. Impression from the patient is that a CT scan has been ordered, approved, and awaiting scheduling to determine healing prior to progressing WB status which appears to be hindering progress at this time.     Currently the patient has been seen for 26 of 24 authorized PT sessions. POC has been signed by the referring provider and awaiting authorization at this time. Cont PT at this time due to the severity and nature of the patient's current presentation as well as deconditioned status. Feel as though if there is a hold on therapy at this time due to authorization that this will be detrimental to the progress of the patient.     Overall he has made fair progress with ROM and MMT as expected for complexity of his case, deconditioned status, numerous ankle Sx's. He has though made marked improvements in ambulatory tolerance at this time and is currently most limited due to deconditioning, ankle stiffness/weakness, and current s/p precautions at 50% WB on the surgical LE.     Activities today reduced primarily due to pt being late to therapy today.     He will continue to benefit from skilled PT intervention at his time due to s/p status, ongoing ankle stiffness, weakness, swelling, and gait deviations/restrictions.      Herman is progressing well towards his goals.   Pt prognosis is Fair.   Pt will continue to benefit from skilled outpatient physical therapy to address the deficits listed in the problem list box on initial evaluation, provide pt/family education and to maximize pt's level of independence in the home and community environment.     Pt's spiritual, cultural and educational needs considered and pt agreeable to plan of care and goals.  Anticipated barriers to physical therapy: chronicity of current injury and significant SxHx with recent ankle fusion.     Short Term Goals: 4 weeks   1.) Pt will become compliant and independent with his initial HEP to promote  decreased pain and improved mobility and strength. Met 5/4/2020  2) Pt to report decrease in pain levels from 3/10 at worse to 1/10 at worse. Met 5/4/2020  3.) Pt will improve R ankle MMT scores by 1 muscle grade to improve functional stability and strength. Met 5/4/2020  4.) Pt will be able to tolerate 60 minute exercise session with 3 resting breaks or less to demonstrate improved activity tolerance. MET 3/16/2020  Long Term Goals: 8 weeks   1.) Pt will transition from NWB to WBAT and able to ambulate 30ft with a RW. PROGRESSING, NOT MET 5/12/2020   2.) Pt will be able to ambulate 300ft with a RW with no resting breaks to demonstrate improved ambulatory tolerance (10 weeks).  MET 5/4/2020  3.) Pt will transition from ambulation with RW to ambulation with SPC to demonstrate improved independence. (12 weeks). PROGRESSING, NOT MET 5/12/2020   4.) Pt will transition from ambulation with SPC to ambulation with no AD to demonstrate further independence. (14 weeks). PROGRESSING, NOT MET 5/12/2020   5.) Pt will improve her FOTO score from 70% impairment to 48% improvement to indicate improvement in overall function. PROGRESSING, NOT MET 5/12/2020   *Additoinal LTG will be set when appropriate functional measures are able to be tested.*     Plan     Continue gait training with 50% PWB with boot and B axillary crutches.  Also continue to work on cardiovascular endurance.    Vinicius Ferreira, PT

## 2020-05-14 ENCOUNTER — HOSPITAL ENCOUNTER (OUTPATIENT)
Dept: RADIOLOGY | Facility: HOSPITAL | Age: 50
Discharge: HOME | End: 2020-05-14
Attending: ORTHOPAEDIC SURGERY

## 2020-05-14 DIAGNOSIS — M96.0 FAILED CERVICAL FUSION: ICD-10-CM

## 2020-05-14 DIAGNOSIS — M19.171 POST-TRAUMATIC ARTHRITIS OF ANKLE, RIGHT: ICD-10-CM

## 2020-05-14 PROCEDURE — 73700 CT LOWER EXTREMITY W/O DYE: CPT | Mod: 26,RT,, | Performed by: RADIOLOGY

## 2020-05-14 PROCEDURE — 73700 CT ANKLE (INCLUDING HINDFOOT) WITHOUT CONTRAST RIGHT: ICD-10-PCS | Mod: 26,RT,, | Performed by: RADIOLOGY

## 2020-05-14 PROCEDURE — 73700 CT LOWER EXTREMITY W/O DYE: CPT | Mod: TC,RT

## 2020-05-20 ENCOUNTER — CLINICAL SUPPORT (OUTPATIENT)
Dept: REHABILITATION | Facility: HOSPITAL | Age: 50
End: 2020-05-20
Payer: OTHER MISCELLANEOUS

## 2020-05-20 DIAGNOSIS — M25.671 ANKLE STIFFNESS, RIGHT: ICD-10-CM

## 2020-05-20 PROCEDURE — 97116 GAIT TRAINING THERAPY: CPT | Mod: PN

## 2020-05-20 PROCEDURE — 97110 THERAPEUTIC EXERCISES: CPT | Mod: PN

## 2020-05-20 NOTE — PROGRESS NOTES
Physical Therapy Daily Treatment Note     Name: Hemran GUERRERO [facility] Number: [id number]    Therapy Diagnosis:   Encounter Diagnosis   Name Primary?    Ankle stiffness, right      Physician: Kadie Grider MD    Visit Date: 2020    Physician Orders: PT Eval and Treat   Medical Diagnosis from Referral:   Z98.1 (ICD-10-CM) - Status post ankle fusion   M25.551,M25.552 (ICD-10-CM) - Bilateral hip pain   M25.561,M25.562 (ICD-10-CM) - Bilateral knee pain   Evaluation Date: 2020  Authorization Period Expiration: 3/18/2021  Plan of Care Expiration: 2020 to 2020  Visit # / Visits authorized:   FOTO: Next    Time In: 1050am  Time Out:  1145am  Total Billable Time: 55 minutes (3TE, 1Gait)    Precautions: Standard; partial weightbearing on RLE of 50% with ambulation    Subjective     Pt reports:  Pt reports since his last visit he has had his CT scan and is scheduled to f/u with his MD tomorrow. Reports otherwise things are about the same with no new complaints. Ongoing swelling across the top of the foot.   He was compliant with home exercise program.   Response to previous treatment: very fatigued  Functional change: more knee flexion during ambulation, 50% WB with B axillary crutches  Pain: 0/10  Location: R lower leg/ankle/foot     Objective     Herman received therapeutic exercises to develop strength, endurance and ROM for 45 minutes including:    UBE: 30 RPM x 3' forwards backwards each  Scifit Stepper 10' Level 4 (seat 23)   Standing hip abduction 2x10 with GTB  (lifted on block or step)  Standing hip extension 2x10 with GTB (lifted on block or step)  Standing hip flexion 2x10 with GTB  Cybex hamstring curl 3x10 7 plates  Cybex knee extension 3x10 30#  Stair trainin laps     Herman participated in gait training to improve functional mobility and safety for 10 minutes, including:    Heel to toe rocking to improve knee flexion in terminal stance: 2x20 R; 2x20  in B semitandem in // bars. Cues to use UE to decrease RLE WB per precautions  L LE leg lift/ March in place to focus on 2 x20 weight shift in // bars (towel folded under heel of rocker boot)  Level walking 140 feet x 2 laps. Cues to 'roll' over R foot and 'bend' R knee as well as postural awareness intermittently. With crutches pre precautions    Herman received the following manual therapy techniques: Joint mobilizations and Soft tissue Mobilization were applied to the: R ankle/foot for 0 minutes, including: (not completed today)  1st ray plantar JM 3/10 Gr4   1st ray distraction with MWM extension 2/10  Forefoot splaying 1/10  Passive MT 2-5 extension with manually resisted flexion  Passive DF to tolerance    Home Exercises Provided and Patient Education Provided:  Education provided:   - Continue HEP  - Continue 50% WB on R LE with crutches; focus on 'roll' and 'bend' on R    Written Home Exercises Provided: Not today. Continue prior program  Exercises were reviewed and Herman was able to demonstrate them prior to the end of the session.  Herman demonstrated good  understanding of the education provided.     See EMR under Patient Instructions for exercises provided 2/20/2020.    Assessment     The pt most recently underwent an ankle fusion in January of 2020. At this time he is 50% WB with cam-boot and B axillary crutches. Impression from the patient is that a CT was needed prior to seeing his MD for any WB status changes were made and the pt has not completed his CT scan. He is to follow up with his MD tomorrow for further instructions. Long discussion regarding needing new orders and authorization from his MD and insurance provider regarding continuation of care.     Currently the patient has been seen for 27 of 24 authorized PT sessions. POC has been signed by the referring provider and awaiting authorization at this time. Cont PT at this time due to the severity and nature of the patient's current presentation  as well as deconditioned status. Feel as though if there is a hold on therapy at this time due to authorization that this will be detrimental to the progress of the patient.     Overall he has made fair progress with ROM and MMT as expected for complexity of his case, deconditioned status, numerous ankle Sx's. He has though made marked improvements in ambulatory tolerance at this time and is currently most limited due to deconditioning, ankle stiffness/weakness, and current s/p precautions at 50% WB on the surgical LE.     Activities today reduced primarily due to pt being late to therapy today.     He will continue to benefit from skilled PT intervention at his time due to s/p status, ongoing ankle stiffness, weakness, swelling, and gait deviations/restrictions.      Herman is progressing well towards his goals.   Pt prognosis is Fair.   Pt will continue to benefit from skilled outpatient physical therapy to address the deficits listed in the problem list box on initial evaluation, provide pt/family education and to maximize pt's level of independence in the home and community environment.     Pt's spiritual, cultural and educational needs considered and pt agreeable to plan of care and goals.  Anticipated barriers to physical therapy: chronicity of current injury and significant SxHx with recent ankle fusion.     Short Term Goals: 4 weeks   1.) Pt will become compliant and independent with his initial HEP to promote decreased pain and improved mobility and strength. Met 5/4/2020  2) Pt to report decrease in pain levels from 3/10 at worse to 1/10 at worse. Met 5/4/2020  3.) Pt will improve R ankle MMT scores by 1 muscle grade to improve functional stability and strength. Met 5/4/2020  4.) Pt will be able to tolerate 60 minute exercise session with 3 resting breaks or less to demonstrate improved activity tolerance. MET 3/16/2020  Long Term Goals: 8 weeks   1.) Pt will transition from NWB to WBAT and able to ambulate  30ft with a RW. PROGRESSING, NOT MET 5/20/2020   2.) Pt will be able to ambulate 300ft with a RW with no resting breaks to demonstrate improved ambulatory tolerance (10 weeks).  MET 5/4/2020  3.) Pt will transition from ambulation with RW to ambulation with SPC to demonstrate improved independence. (12 weeks). PROGRESSING, NOT MET 5/20/2020   4.) Pt will transition from ambulation with SPC to ambulation with no AD to demonstrate further independence. (14 weeks). PROGRESSING, NOT MET 5/20/2020   5.) Pt will improve her FOTO score from 70% impairment to 48% improvement to indicate improvement in overall function. PROGRESSING, NOT MET 5/20/2020   *Additoinal LTG will be set when appropriate functional measures are able to be tested.*     Plan     Continue gait training with 50% PWB with boot and B axillary crutches.  Also continue to work on cardiovascular endurance.    Vinicius Ferreira, PT

## 2020-05-26 DIAGNOSIS — Z98.1 STATUS POST ANKLE FUSION: Primary | ICD-10-CM

## 2020-05-26 DIAGNOSIS — M25.551 BILATERAL HIP PAIN: ICD-10-CM

## 2020-05-26 DIAGNOSIS — M25.552 BILATERAL HIP PAIN: ICD-10-CM

## 2020-05-26 DIAGNOSIS — M25.561 BILATERAL KNEE PAIN: ICD-10-CM

## 2020-05-26 DIAGNOSIS — M25.562 BILATERAL KNEE PAIN: ICD-10-CM

## 2020-06-11 ENCOUNTER — LAB VISIT (OUTPATIENT)
Dept: LAB | Facility: HOSPITAL | Age: 50
End: 2020-06-11
Attending: INTERNAL MEDICINE

## 2020-06-11 DIAGNOSIS — R07.9 CHEST PAIN, UNSPECIFIED TYPE: ICD-10-CM

## 2020-06-11 LAB
ALBUMIN SERPL BCP-MCNC: 4 G/DL (ref 3.5–5.2)
ALP SERPL-CCNC: 70 U/L (ref 55–135)
ALT SERPL W/O P-5'-P-CCNC: 34 U/L (ref 10–44)
ANION GAP SERPL CALC-SCNC: 7 MMOL/L (ref 8–16)
AST SERPL-CCNC: 25 U/L (ref 10–40)
BASOPHILS # BLD AUTO: 0.03 K/UL (ref 0–0.2)
BASOPHILS NFR BLD: 0.6 % (ref 0–1.9)
BILIRUB SERPL-MCNC: 0.7 MG/DL (ref 0.1–1)
BUN SERPL-MCNC: 9 MG/DL (ref 6–20)
CALCIUM SERPL-MCNC: 9.1 MG/DL (ref 8.7–10.5)
CHLORIDE SERPL-SCNC: 104 MMOL/L (ref 95–110)
CHOLEST SERPL-MCNC: 192 MG/DL (ref 120–199)
CHOLEST/HDLC SERPL: 5.5 {RATIO} (ref 2–5)
CO2 SERPL-SCNC: 27 MMOL/L (ref 23–29)
CREAT SERPL-MCNC: 1 MG/DL (ref 0.5–1.4)
DIFFERENTIAL METHOD: NORMAL
EOSINOPHIL # BLD AUTO: 0.2 K/UL (ref 0–0.5)
EOSINOPHIL NFR BLD: 3.3 % (ref 0–8)
ERYTHROCYTE [DISTWIDTH] IN BLOOD BY AUTOMATED COUNT: 11.8 % (ref 11.5–14.5)
EST. GFR  (AFRICAN AMERICAN): >60 ML/MIN/1.73 M^2
EST. GFR  (NON AFRICAN AMERICAN): >60 ML/MIN/1.73 M^2
GLUCOSE SERPL-MCNC: 95 MG/DL (ref 70–110)
HCT VFR BLD AUTO: 43.5 % (ref 40–54)
HDLC SERPL-MCNC: 35 MG/DL (ref 40–75)
HDLC SERPL: 18.2 % (ref 20–50)
HGB BLD-MCNC: 14.7 G/DL (ref 14–18)
IMM GRANULOCYTES # BLD AUTO: 0.01 K/UL (ref 0–0.04)
IMM GRANULOCYTES NFR BLD AUTO: 0.2 % (ref 0–0.5)
LDLC SERPL CALC-MCNC: 126.6 MG/DL (ref 63–159)
LYMPHOCYTES # BLD AUTO: 2.2 K/UL (ref 1–4.8)
LYMPHOCYTES NFR BLD: 42.6 % (ref 18–48)
MCH RBC QN AUTO: 30.1 PG (ref 27–31)
MCHC RBC AUTO-ENTMCNC: 33.8 G/DL (ref 32–36)
MCV RBC AUTO: 89 FL (ref 82–98)
MONOCYTES # BLD AUTO: 0.6 K/UL (ref 0.3–1)
MONOCYTES NFR BLD: 12.6 % (ref 4–15)
NEUTROPHILS # BLD AUTO: 2.1 K/UL (ref 1.8–7.7)
NEUTROPHILS NFR BLD: 40.7 % (ref 38–73)
NONHDLC SERPL-MCNC: 157 MG/DL
NRBC BLD-RTO: 0 /100 WBC
PLATELET # BLD AUTO: 165 K/UL (ref 150–350)
PMV BLD AUTO: 9.4 FL (ref 9.2–12.9)
POTASSIUM SERPL-SCNC: 4.3 MMOL/L (ref 3.5–5.1)
PROT SERPL-MCNC: 8.1 G/DL (ref 6–8.4)
RBC # BLD AUTO: 4.88 M/UL (ref 4.6–6.2)
SODIUM SERPL-SCNC: 138 MMOL/L (ref 136–145)
TRIGL SERPL-MCNC: 152 MG/DL (ref 30–150)
WBC # BLD AUTO: 5.09 K/UL (ref 3.9–12.7)

## 2020-06-11 PROCEDURE — 80053 COMPREHEN METABOLIC PANEL: CPT

## 2020-06-11 PROCEDURE — 36415 COLL VENOUS BLD VENIPUNCTURE: CPT

## 2020-06-11 PROCEDURE — 85025 COMPLETE CBC W/AUTO DIFF WBC: CPT

## 2020-06-11 PROCEDURE — 80061 LIPID PANEL: CPT

## 2020-06-16 DIAGNOSIS — Z98.1 S/P ANKLE FUSION: Primary | ICD-10-CM

## 2020-06-29 ENCOUNTER — TELEPHONE (OUTPATIENT)
Dept: REHABILITATION | Facility: HOSPITAL | Age: 50
End: 2020-06-29

## 2020-06-29 NOTE — TELEPHONE ENCOUNTER
Called and left a message with pt's  to verify if he had received the patient's 1010 therapy submission on Friday. Left a voice mail and will call again tomorrow to verify.     Called the pt's wife to follow up as she contacted the clinic earlier today. Informed her that we are currently waiting on the 1010 authorization to be approved as this was submitted on Friday. Once approved we will be able to schedule Herman back in therapy.

## 2020-06-30 ENCOUNTER — TELEPHONE (OUTPATIENT)
Dept: REHABILITATION | Facility: HOSPITAL | Age: 50
End: 2020-06-30

## 2020-06-30 NOTE — TELEPHONE ENCOUNTER
Called the patient's  again today to verify if the carrier has in fact received the 1010 authorization for that was submitted on Friday of last week. No answer and left a voicemail regarding asking to contact me back with verification.

## 2020-07-02 ENCOUNTER — CLINICAL SUPPORT (OUTPATIENT)
Dept: REHABILITATION | Facility: HOSPITAL | Age: 50
End: 2020-07-02
Payer: OTHER MISCELLANEOUS

## 2020-07-02 DIAGNOSIS — M25.671 ANKLE STIFFNESS, RIGHT: ICD-10-CM

## 2020-07-02 PROCEDURE — 97110 THERAPEUTIC EXERCISES: CPT | Mod: PN

## 2020-07-02 PROCEDURE — 97140 MANUAL THERAPY 1/> REGIONS: CPT | Mod: PN

## 2020-07-02 NOTE — PLAN OF CARE
Outpatient Therapy Updated Plan of Care     Visit Date: 7/2/2020    Name: Herman GUERRERO [facility] Number: [id number]    Therapy Diagnosis:   Encounter Diagnosis   Name Primary?    Ankle stiffness, right      Physician: Kadie Grider MD    Physician Orders: PT Eval and Treat   Medical Diagnosis from Referral:   Z98.1 (ICD-10-CM) - Status post ankle fusion   M25.551,M25.552 (ICD-10-CM) - Bilateral hip pain   M25.561,M25.562 (ICD-10-CM) - Bilateral knee pain   Evaluation Date: 2/18/2020  Authorization Period Expiration: 3/18/2021  Plan of Care Expiration: 5/4/2020 to 6/12/2020  Visit # / Visits authorized: 1/24 Current authorization (previously seen for 27 treatment sessions for this case)  FOTO: Today    Time In: 445pm  Time Out:  600pm  Total Billable Time: 75 minutes (3TE, 1Gait)    Precautions: Standard; WBAT with B axillary crutches and boot    Precautions: Standard and elevated BMI, significant past SxHx  Functional Level Prior to Evaluation:  50% WB with B axillary crutches     Subjective     Update:   Pt reports:  Pt reports that since last being seen in therapy he has f/u with his referring MD on 2 occasions. Reports he really has not done much at all since last being here and has been relatively lazy. Reports he thinks he has gained about 10 lbs in the past 5 weeks.   Still has not really had any pain.   July 14th at 9:00am is his next follow up with his MD. He is under the impression that he will be able to transition to a walking boot/shoe and an ankle brace at that time.   He was compliant with home exercise program.   Response to previous treatment: very fatigued  Functional change: more knee flexion during ambulation and getting around much easier with his crutches  Pain: 0/10  Location: R lower leg/ankle/foot     Objective     Update:   Update:   Ankle AROM (R/L):   Ankle AROM Right (*) = in degrees Left (*) = in degrees Comments    Dorsiflexion -18 > -1 > 0 WNL Stiffness noted.     Plantarflexion 22 > 22 > 17 WNL Stiffness noted.    Inversion 2 > 9 > 14 WNL Stiffness and pain noted.    Eversion 2 > 8 > 8 WNL Stiffness and pain noted.    Great toe extension 21 > 36 > 33 WNL Stiffness and pain noted.    Ankle PROM Right (*) = in degrees Left (*) = in degrees Comments    Dorsiflexion -10 > 4 > 3 WNL Stiffness and pain noted.    Plantarflexion 26 > 27 > 22 WNL Stiffness and pain noted.    Inversion 10 > 12> 15 WNL Stiffness and pain noted.    Eversion 6 > 13 > 13 WNL Stiffness and pain noted.    Great toe extension 43  > 45 > 38 WNL Stiffness noted.       Ankle MMT:   Ankle MMT scores Right: X/5 Left: x/5   Dorsiflexion 2+ /5 > 3- 4 /5   Plantarflexion  3- /5 > 3- 4 /5    Inversion 2+ /5 > 3- 4+ /5    Eversion 2+ /5 > 3- 4 /5    Great toe extension 2+ /5 > 3- 4 /5      Circumferential measures:   Circumferential measures Right in cm   Figure 8 68 > 70.6 > 70.9   At [place] 37 > 36 > 34.8   At Metatarsal heads 26 > 26.7 > 26.4      5 x sit to stand: 18 seconds  30 second chair rise: 9 (min<>mod use of UEs)  2 Minute walk test with B axillary crutches: 298ft >> 274ft  1RM knee extension/quad 60#  1RM knee flexion/hamstring curl 70#    Assessment     Update: The pt most recently underwent an ankle fusion in January of 2020. At this time he is WBAT with cam-boot and B axillary crutches with suspected transition to brace and walking shoe mid July.     Pt was last seen in therapy on 5/20/2020 and has not been seen in the clinic since then due to not having  authorization for additional PT visits. At this time he has been authorized for 24 additional PT visits.     Since last in PT the pt reports gaining 10 lbs and remaining relatively sedentary. His conditioning today was consistent with his subjective reports as he required frequent brief rest breaks throughout his treatment session today.     Overall he has made fair progress with ROM and MMT as expected for complexity of his case, deconditioned  status, numerous ankle Sx's. He has though made marked improvements in ambulatory tolerance at this time and is currently most limited due to deconditioning, ankle stiffness/weakness, and current s/p precautions.    He will continue to benefit from skilled PT intervention at his time due to s/p status, ongoing ankle stiffness, weakness, swelling, and gait deviations/restrictions.     Short Term Goals: 4 weeks   1.) Pt will become compliant and independent with his initial HEP to promote decreased pain and improved mobility and strength. Met 5/4/2020  2) Pt to report decrease in pain levels from 3/10 at worse to 1/10 at worse. Met 5/4/2020  3.) Pt will improve R ankle MMT scores by 1 muscle grade to improve functional stability and strength. Met 5/4/2020  4.) Pt will be able to tolerate 60 minute exercise session with 3 resting breaks or less to demonstrate improved activity tolerance. MET 3/16/2020  Long Term Goals: 8 weeks   1.) Pt will transition from NWB to WBAT and able to ambulate 30ft with a RW. PROGRESSING, NOT MET 7/2/2020   2.) Pt will be able to ambulate 300ft with a RW with no resting breaks to demonstrate improved ambulatory tolerance (10 weeks).  MET 5/4/2020  3.) Pt will transition from ambulation with RW to ambulation with SPC to demonstrate improved independence. (12 weeks). PROGRESSING, NOT MET 7/2/2020   4.) Pt will transition from ambulation with SPC to ambulation with no AD to demonstrate further independence. (14 weeks). PROGRESSING, NOT MET 7/2/2020   5.) Pt will improve her FOTO score from 70% impairment to 48% improvement to indicate improvement in overall function. PROGRESSING, NOT MET 7/2/2020   *Additoinal LTG will be set when appropriate functional measures are able to be tested.*    Long Term Goal Status:   continue per initial plan of care.  Reasons for Recertification of Therapy:   Ongoing functional deficits, marked restrictions per Sx, marked deconditioning    Plan     Updated  Certification Period: 7/2/2020 to 9/11/2020  Recommended Treatment Plan: 3 times per week for 8 weeks: Gait Training, Manual Therapy, Moist Heat/ Ice, Neuromuscular Re-ed, Patient Education, Self Care, Therapeutic Activites and Therapeutic Exercise    Vinicius Ferreira, PT  7/2/2020      I CERTIFY THE NEED FOR THESE SERVICES FURNISHED UNDER THIS PLAN OF TREATMENT AND WHILE UNDER MY CARE    Physician's comments:        Physician's Signature: ___________________________________________________

## 2020-07-02 NOTE — PROGRESS NOTES
Physical Therapy Daily Treatment Note     Name: Herman GUERRERO [facility] Number: [id number]    Therapy Diagnosis:   Encounter Diagnosis   Name Primary?    Ankle stiffness, right      Physician: Kadie Grider MD    Visit Date: 7/2/2020    Physician Orders: PT Eval and Treat   Medical Diagnosis from Referral:   Z98.1 (ICD-10-CM) - Status post ankle fusion   M25.551,M25.552 (ICD-10-CM) - Bilateral hip pain   M25.561,M25.562 (ICD-10-CM) - Bilateral knee pain   Evaluation Date: 2/18/2020  Authorization Period Expiration: 3/18/2021  Plan of Care Expiration: 5/4/2020 to 6/12/2020  Visit # / Visits authorized: 1/24 Current authorization (previously seen for 27 treatment sessions for this case)  FOTO: Today    Time In: 445pm  Time Out:  600pm  Total Billable Time: 75 minutes (3TE, 1Gait)    Precautions: Standard; WBAT with B axillary crutches and boot    Subjective     Pt reports:  Pt reports that since last being seen in therapy he has f/u with his referring MD on 2 occasions. Reports he really has not done much at all since last being here and has been relatively lazy. Reports he thinks he has gained about 10 lbs in the past 5 weeks.   Still has not really had any pain.   July 14th at 9:00am is his next follow up with his MD. He is under the impression that he will be able to transition to a walking boot/shoe and an ankle brace at that time.   He was compliant with home exercise program.   Response to previous treatment: very fatigued  Functional change: more knee flexion during ambulation and getting around much easier with his crutches  Pain: 0/10  Location: R lower leg/ankle/foot     Objective     Update:   Ankle AROM (R/L):   Ankle AROM Right (*) = in degrees Left (*) = in degrees Comments    Dorsiflexion -18 > -1 > 0 WNL Stiffness noted.    Plantarflexion 22 > 22 > 17 WNL Stiffness noted.    Inversion 2 > 9 > 14 WNL Stiffness and pain noted.    Eversion 2 > 8 > 8 WNL Stiffness and  "pain noted.    Great toe extension 21 > 36 > 33 WNL Stiffness and pain noted.    Ankle PROM Right (*) = in degrees Left (*) = in degrees Comments    Dorsiflexion -10 > 4 > 3 WNL Stiffness and pain noted.    Plantarflexion 26 > 27 > 22 WNL Stiffness and pain noted.    Inversion 10 > 12> 15 WNL Stiffness and pain noted.    Eversion 6 > 13 > 13 WNL Stiffness and pain noted.    Great toe extension 43  > 45 > 38 WNL Stiffness noted.       Ankle MMT:   Ankle MMT scores Right: X/5 Left: x/5   Dorsiflexion 2+ /5 > 3- 4 /5   Plantarflexion  3- /5 > 3- 4 /5    Inversion 2+ /5 > 3- 4+ /5    Eversion 2+ /5 > 3- 4 /5    Great toe extension 2+ /5 > 3- 4 /5      Circumferential measures:   Circumferential measures Right in cm   Figure 8 68 > 70.6 > 70.9   At [place] 37 > 36 > 34.8   At Metatarsal heads 26 > 26.7 > 26.4      5 x sit to stand: 18 seconds  30 second chair rise: 9 (min<>mod use of UEs)  2 Minute walk test with B axillary crutches: 298ft >> 274ft  1RM knee extension/quad 60#  1RM knee flexion/hamstring curl 70#      Herman received therapeutic exercises to develop strength, endurance and ROM for 60 minutes including testing above and the following:    Scifit Stepper 10' Level 4 (seat 23)     Supine Ankle pumps x30  Supine A-Z x30  Supine calf stretch with strap 3x30"    Standing hip abduction 2x10 with GTB  (lifted on block or step)  Standing hip extension 2x10 with GTB (lifted on block or step)  Standing hip flexion 2x10 with GTB    Cybex hamstring curl 3x10 6 plates  Cybex knee extension 3x10 30#  Stair trainin laps   Walking 2 laps around clinic with B axillary crutches and boot in place  Standing fwd lunches to R LE x50 with foot on blue level 1 step  Standing weight shift x30 R to L     Herman received the following manual therapy techniques: Joint mobilizations and Soft tissue Mobilization were applied to the: R ankle/foot for 10 minutes, including: (not completed today)  1st ray plantar JM 3/10 Gr4   1st ray " distraction with MWM extension 2/10  Forefoot splaying 1/10  Passive MT 2-5 extension with manually resisted flexion  Passive DF to tolerance    Home Exercises Provided and Patient Education Provided:  Education provided:   - Continue HEP!!! Importance of maintaining HEP with therapy.     Written Home Exercises Provided: Not today. Continue prior program  Exercises were reviewed and Herman was able to demonstrate them prior to the end of the session.  Herman demonstrated good  understanding of the education provided.     See EMR under Patient Instructions for exercises provided 2/20/2020.    Assessment     The pt most recently underwent an ankle fusion in January of 2020. At this time he is WBAT with cam-boot and B axillary crutches with suspected transition to brace and walking shoe mid July.     Pt was last seen in therapy on 5/20/2020 and has not been seen in the clinic since then due to not having  authorization for additional PT visits. At this time he has been authorized for 24 additional PT visits.     Since last in PT the pt reports gaining 10 lbs and remaining relatively sedentary. His conditioning today was consistent with his subjective reports as he required frequent brief rest breaks throughout his treatment session today.     Overall he has made fair progress with ROM and MMT as expected for complexity of his case, deconditioned status, numerous ankle Sx's. He has though made marked improvements in ambulatory tolerance at this time and is currently most limited due to deconditioning, ankle stiffness/weakness, and current s/p precautions.    He will continue to benefit from skilled PT intervention at his time due to s/p status, ongoing ankle stiffness, weakness, swelling, and gait deviations/restrictions.      Herman is progressing well towards his goals.   Pt prognosis is Fair.   Pt will continue to benefit from skilled outpatient physical therapy to address the deficits listed in the problem list box  on initial evaluation, provide pt/family education and to maximize pt's level of independence in the home and community environment.     Pt's spiritual, cultural and educational needs considered and pt agreeable to plan of care and goals.  Anticipated barriers to physical therapy: chronicity of current injury and significant SxHx with recent ankle fusion.     Short Term Goals: 4 weeks   1.) Pt will become compliant and independent with his initial HEP to promote decreased pain and improved mobility and strength. Met 5/4/2020  2) Pt to report decrease in pain levels from 3/10 at worse to 1/10 at worse. Met 5/4/2020  3.) Pt will improve R ankle MMT scores by 1 muscle grade to improve functional stability and strength. Met 5/4/2020  4.) Pt will be able to tolerate 60 minute exercise session with 3 resting breaks or less to demonstrate improved activity tolerance. MET 3/16/2020  Long Term Goals: 8 weeks   1.) Pt will transition from NWB to WBAT and able to ambulate 30ft with a RW. PROGRESSING, NOT MET 7/2/2020   2.) Pt will be able to ambulate 300ft with a RW with no resting breaks to demonstrate improved ambulatory tolerance (10 weeks).  MET 5/4/2020  3.) Pt will transition from ambulation with RW to ambulation with SPC to demonstrate improved independence. (12 weeks). PROGRESSING, NOT MET 7/2/2020   4.) Pt will transition from ambulation with SPC to ambulation with no AD to demonstrate further independence. (14 weeks). PROGRESSING, NOT MET 7/2/2020   5.) Pt will improve her FOTO score from 70% impairment to 48% improvement to indicate improvement in overall function. PROGRESSING, NOT MET 7/2/2020   *Additoinal LTG will be set when appropriate functional measures are able to be tested.*     Plan     See updated POC.    Vinicius Ferreira, PT

## 2020-07-07 ENCOUNTER — CLINICAL SUPPORT (OUTPATIENT)
Dept: REHABILITATION | Facility: HOSPITAL | Age: 50
End: 2020-07-07
Payer: OTHER MISCELLANEOUS

## 2020-07-07 DIAGNOSIS — M25.671 ANKLE STIFFNESS, RIGHT: ICD-10-CM

## 2020-07-07 PROCEDURE — 97110 THERAPEUTIC EXERCISES: CPT | Mod: PN

## 2020-07-07 NOTE — PROGRESS NOTES
Physical Therapy Daily Treatment Note     Name: Herman GUERRERO [facility] Number: [id number]    Therapy Diagnosis:   Encounter Diagnosis   Name Primary?    Ankle stiffness, right      Physician: Kadie Grider MD    Visit Date: 7/7/2020    Physician Orders: PT Eval and Treat   Medical Diagnosis from Referral:   Z98.1 (ICD-10-CM) - Status post ankle fusion   M25.551,M25.552 (ICD-10-CM) - Bilateral hip pain   M25.561,M25.562 (ICD-10-CM) - Bilateral knee pain   Evaluation Date: 2/18/2020  Authorization Period Expiration: 3/18/2021  Plan of Care Expiration: 9/11/2020  Visit # / Visits authorized: 2/24 Current authorization (previously seen for 27 treatment sessions for this case)  FOTO: 2/10     Time In: 9:18am  Time Out:  10:05am  Total Billable Time: 43 minutes minutes (3TE)    Precautions: Standard; WBAT with B axillary crutches and boot  *Pt also has difficulty with hearing and typically reads lips. This has been a challenge for the patient as masks are currently worn at all times within the clinic due to COVID19 crisis. Pt requires marked tactile and visual cuing throughout his treatment session.*    Subjective     Pt reports:  Pt reports that he was sore in his quads, hamstrings and the opposite foot after his last visit for about a little less than 24 hours. He reports that his R foot swelling is continuing on and off - mostly dependent on how long he is on his feet.   Reports he thinks he has gained about 10 lbs in the past 5 weeks since being out of therapy.   Denies any current pain in the R LE.     Still has not really had any pain.   July 14th at 9:00am is his next follow up with his MD. He is under the impression that he will be able to transition to a walking boot/shoe and an ankle brace at that time.   He was compliant with home exercise program.   Response to previous treatment: very fatigued  Functional change: more knee flexion during ambulation and getting around much  "easier with his crutches  Pain: 0/10  Location: R lower leg/ankle/foot     Objective   Herman received therapeutic exercises to develop strength, endurance and ROM for 60 minutes including testing above and the following:    Scifit Stepper 10' Level 4 (seat 23)     Supine Ankle pumps x30 (Out of Time)   Supine A-Z x30 (Out of Time)   Supine calf stretch with strap 3x30" (Out of Time)     Standing hip abduction 2x10 with RTB (B) From step in // bars - focus on full WB  Standing hip extension 2x10 with RTB (B) From step in // bars - focus on full WB  Standing hip flexion 2x10 with RTB (B) From step in // bars - focus on full WB    Cybex hamstring curl 3x10 6 plates  Cybex knee extension 3x10 30#  Stair trainin laps focusing on proper sequencing (first visit today with proper sequencing throughout!!)   Walking 2 laps around clinic with B axillary crutches and boot in place WBAT  Standing fwd lunches to R LE x50 with foot on blue level 1 step  Standing weight shift x30 R to L     Herman received the following manual therapy techniques: Joint mobilizations and Soft tissue Mobilization were applied to the: R ankle/foot for 0 minutes, including: (not completed today; out of time)  1st ray plantar JM 3/10 Gr4   1st ray distraction with MWM extension 2/10  Forefoot splaying 1/10  Passive MT 2-5 extension with manually resisted flexion  Passive DF to tolerance    Home Exercises Provided and Patient Education Provided:  Education provided:   - Continue HEP!!! Importance of maintaining HEP with therapy.     Written Home Exercises Provided: Not today. Continue prior program  Exercises were reviewed and Herman was able to demonstrate them prior to the end of the session.  Herman demonstrated good  understanding of the education provided.     See EMR under Patient Instructions for exercises provided 2020.    Assessment     The pt most recently underwent an ankle fusion in 2020. At this time he is WBAT with cam-boot " and B axillary crutches with suspected transition to brace and walking shoe mid July (heather DICKEY on 7/14/2020).     Pt has returned to therapy last week after being out of the clinic since 5/20/2020 due to not having  authorization for additional PT visits. At this time he has been authorized for 24 additional PT visits.     Since last in PT the pt reports gaining 10 lbs and remaining relatively sedentary. His conditioning is consistent with his subjective reports as he required frequent brief rest breaks throughout his treatment session again today. Suspect will continue to focus on conditioning efforts throughout his PT POC.     Overall he has made fair progress with ROM and MMT as expected for complexity of his case, deconditioned status, numerous ankle Sx's. He has though made marked improvements in ambulatory tolerance at this time and is currently most limited due to deconditioning, ankle stiffness/weakness, and current s/p precautions.    He will continue to benefit from skilled PT intervention at his time due to s/p status, ongoing ankle stiffness, weakness, swelling, and gait deviations/restrictions.      Herman is progressing well towards his goals.   Pt prognosis is Fair.   Pt will continue to benefit from skilled outpatient physical therapy to address the deficits listed in the problem list box on initial evaluation, provide pt/family education and to maximize pt's level of independence in the home and community environment.     Pt's spiritual, cultural and educational needs considered and pt agreeable to plan of care and goals.  Anticipated barriers to physical therapy: chronicity of current injury and significant SxHx with recent ankle fusion.     Short Term Goals: 4 weeks   1.) Pt will become compliant and independent with his initial HEP to promote decreased pain and improved mobility and strength. Met 5/4/2020  2) Pt to report decrease in pain levels from 3/10 at worse to 1/10 at worse. Met  5/4/2020  3.) Pt will improve R ankle MMT scores by 1 muscle grade to improve functional stability and strength. Met 5/4/2020  4.) Pt will be able to tolerate 60 minute exercise session with 3 resting breaks or less to demonstrate improved activity tolerance. MET 3/16/2020  Long Term Goals: 8 weeks   1.) Pt will transition from NWB to WBAT and able to ambulate 30ft with a RW. PROGRESSING, NOT MET 7/7/2020   2.) Pt will be able to ambulate 300ft with a RW with no resting breaks to demonstrate improved ambulatory tolerance (10 weeks).  MET 5/4/2020  3.) Pt will transition from ambulation with RW to ambulation with SPC to demonstrate improved independence. (12 weeks). PROGRESSING, NOT MET 7/7/2020   4.) Pt will transition from ambulation with SPC to ambulation with no AD to demonstrate further independence. (14 weeks). PROGRESSING, NOT MET 7/7/2020   5.) Pt will improve her FOTO score from 70% impairment to 48% improvement to indicate improvement in overall function. PROGRESSING, NOT MET 7/7/2020   *Additoinal LTG will be set when appropriate functional measures are able to be tested.*     Plan     Plan to continue with conditioning to improve cardiovascular endurance, progress WBAT CKC activities, cont to improve ankle/foot AROM.   Potential progression to brace and Rx shoe from MD next week!    Vinicius Ferreira, PT

## 2020-07-08 ENCOUNTER — CLINICAL SUPPORT (OUTPATIENT)
Dept: REHABILITATION | Facility: HOSPITAL | Age: 50
End: 2020-07-08
Payer: OTHER MISCELLANEOUS

## 2020-07-08 DIAGNOSIS — M25.671 ANKLE STIFFNESS, RIGHT: ICD-10-CM

## 2020-07-08 PROCEDURE — 97110 THERAPEUTIC EXERCISES: CPT | Mod: PN

## 2020-07-08 NOTE — PROGRESS NOTES
Physical Therapy Daily Treatment Note     Name: Herman GUERRERO [facility] Number: [id number]    Therapy Diagnosis:   No diagnosis found.  Physician: Kadie Grider MD    Visit Date: 2020    Physician Orders: PT Eval and Treat   Medical Diagnosis from Referral:   Z98.1 (ICD-10-CM) - Status post ankle fusion   M25.551,M25.552 (ICD-10-CM) - Bilateral hip pain   M25.561,M25.562 (ICD-10-CM) - Bilateral knee pain   Evaluation Date: 2020  Authorization Period Expiration: 3/18/2021  Plan of Care Expiration: 2020  Visit # / Visits authorized: 3/24 for current authorization (previously seen for 27 treatment sessions for this case)  FOTO: 3/10     Time In: 1443  Time Out: 1515  Total Billable Time: 32 minutes minutes     Precautions: Standard; WBAT with B axillary crutches and boot  *Pt also has difficulty with hearing and typically reads lips. This has been a challenge for the patient as masks are currently worn at all times within the clinic due to COVID19 crisis. Pt requires marked tactile and visual cuing throughout his treatment session.*    Subjective     Pt reports: late to session. No pain currently. Patient sees his surgeon next Tuesday.  He was compliant with home exercise program.   Response to previous treatment: very fatigued  Functional change: more knee flexion during ambulation and getting around much easier with his crutches    Pain: 0/10  Location: R lower leg/ankle/foot     Objective     Herman received therapeutic exercises to develop strength and endurance for 32 minutes including:    Endurance training with reciprocal motion of upper and lower limbs on sci-fit x 10 minutes at level 4.5 at > or equal to 60 spm w/o rest. (seat at position 23)     Steamboats, 3-way: GTB 2x10 B, B UE support. Focus on full WB on L LE  Stair trainin six-inch steps with step to pattern challenging L LE, B UE support. 5 rounds    Additional 3 rounds challenging R LE, B UE  "support  Standing fwd lunches to R LE: x50 onto 6" step. B UE support    Home Exercises Provided and Patient Education Provided:  Education provided:   - Continue HEP. Importance of maintaining HEP with therapy to maximize outcomes    Written Home Exercises Provided: Not today.   Exercises were reviewed and Herman was able to demonstrate them prior to the end of the session.  Herman demonstrated good  understanding of the education provided.     See EMR under Patient Instructions for exercises provided 2/20/2020.    Assessment     Able to fully weight bear on R LE with UE support during steamboats and stair navigation challenging R LE. Increased perspiration and shortness of breath noted with stair navigation challenging R LE. Session slightly limited to late arrival.      Herman is progressing well towards his goals.   Pt prognosis is Fair.     Pt's spiritual, cultural and educational needs considered and pt agreeable to plan of care and goals.  Anticipated barriers to physical therapy: chronicity of current injury and significant SxHx with recent ankle fusion.     Short Term Goals: 4 weeks   1.) Pt will become compliant and independent with his initial HEP to promote decreased pain and improved mobility and strength. MET 5/4/2020  2) Pt to report decrease in pain levels from 3/10 at worse to 1/10 at worse. MET 5/4/2020  3.) Pt will improve R ankle MMT scores by 1 muscle grade to improve functional stability and strength. MET 5/4/2020  4.) Pt will be able to tolerate 60 minute exercise session with 3 resting breaks or less to demonstrate improved activity tolerance. MET 3/16/2020  Long Term Goals: 8 weeks   1.) Pt will transition from NWB to WBAT and able to ambulate 30ft with a RW. PROGRESSING, NOT MET 7/8/2020   2.) Pt will be able to ambulate 300ft with a RW with no resting breaks to demonstrate improved ambulatory tolerance (10 weeks).  MET 5/4/2020  3.) Pt will transition from ambulation with RW to ambulation " with SPC to demonstrate improved independence. (12 weeks). PROGRESSING, NOT MET 7/8/2020   4.) Pt will transition from ambulation with SPC to ambulation with no AD to demonstrate further independence. (14 weeks). PROGRESSING, NOT MET 7/8/2020   5.) Pt will improve her FOTO score from 70% impairment to 48% improvement to indicate improvement in overall function. PROGRESSING, NOT MET 7/8/2020   *Additoinal LTG will be set when appropriate functional measures are able to be tested.*     Plan     Continue with conditioning to improve cardiovascular endurance; progress closed kinetic chain activities in FWB. Follow-up with surgeon on 7/14/2020    Chiqui Wells, PT

## 2020-07-10 ENCOUNTER — CLINICAL SUPPORT (OUTPATIENT)
Dept: REHABILITATION | Facility: HOSPITAL | Age: 50
End: 2020-07-10
Payer: OTHER MISCELLANEOUS

## 2020-07-10 DIAGNOSIS — M25.671 ANKLE STIFFNESS, RIGHT: ICD-10-CM

## 2020-07-10 PROCEDURE — 97110 THERAPEUTIC EXERCISES: CPT | Mod: PN

## 2020-07-13 ENCOUNTER — CLINICAL SUPPORT (OUTPATIENT)
Dept: REHABILITATION | Facility: HOSPITAL | Age: 50
End: 2020-07-13
Payer: OTHER MISCELLANEOUS

## 2020-07-13 DIAGNOSIS — M25.671 ANKLE STIFFNESS, RIGHT: ICD-10-CM

## 2020-07-13 PROCEDURE — 97110 THERAPEUTIC EXERCISES: CPT | Mod: PN,CQ

## 2020-07-13 NOTE — PROGRESS NOTES
Physical Therapy Daily Treatment Note     Name: Herman GUERRERO [facility] Number: [id number]    Therapy Diagnosis:   Encounter Diagnosis   Name Primary?    Ankle stiffness, right      Physician: Kadie Grider MD    Visit Date: 7/10/2020    Physician Orders: PT Eval and Treat   Medical Diagnosis from Referral:   Z98.1 (ICD-10-CM) - Status post ankle fusion   M25.551,M25.552 (ICD-10-CM) - Bilateral hip pain   M25.561,M25.562 (ICD-10-CM) - Bilateral knee pain   Evaluation Date: 2020  Authorization Period Expiration: 3/18/2021  Plan of Care Expiration: 2020  Visit # / Visits authorized:  for current authorization (previously seen for 27 treatment sessions for this case)  FOTO: 4/10     Time In: 1515  Time Out: 1555  Total Billable Time: 40 minutes minutes     Precautions: Standard; WBAT with B axillary crutches and boot  *Pt also has difficulty with hearing and typically reads lips. This has been a challenge for the patient as masks are currently worn at all times within the clinic due to COVID19 crisis. Pt requires marked tactile and visual cuing throughout his treatment session.*    Subjective     Pt reports: seeing his surgeon on 2020  He was compliant with home exercise program.   Response to previous treatment: very fatigued  Functional change: more knee flexion during ambulation and getting around much easier with his crutches    Pain: 0/10  Location: R lower leg/ankle/foot     Objective     Herman received therapeutic exercises to develop strength and endurance for 40 minutes including:    Endurance training with reciprocal motion of upper and lower limbs on sci-fit x 10 minutes at level 4.5 at > or equal to 60 spm w/o rest. (seat at position 23)     Steamboats, 3-way: GTB 2x10 B, B UE support. Focus on full WB on L LE  Stair trainin six-inch steps with step to pattern challenging L LE, B UE support. 5 rounds    Additional 3 rounds challenging R LE, B UE  "support  Standing fwd lunches to R LE: x50 onto 6" step. B UE support    Home Exercises Provided and Patient Education Provided:  Education provided:   - Continue HEP. Importance of maintaining HEP with therapy to maximize outcomes    Written Home Exercises Provided: Not today.   Exercises were reviewed and Herman was able to demonstrate them prior to the end of the session.  Herman demonstrated good  understanding of the education provided.     See EMR under Patient Instructions for exercises provided 2/20/2020.    Assessment   A:  Limited communication due to [place] but overall demonstrated good tolerance to standing and stair activities today with bootwalker donned.  Patient hoping to see MD on 07/14/2020 for the okay to dc the bootwalker.      Herman is progressing well towards his goals.   Pt prognosis is Fair.     Pt's spiritual, cultural and educational needs considered and pt agreeable to plan of care and goals.  Anticipated barriers to physical therapy: chronicity of current injury and significant SxHx with recent ankle fusion.     Short Term Goals: 4 weeks   1.) Pt will become compliant and independent with his initial HEP to promote decreased pain and improved mobility and strength. MET 5/4/2020  2) Pt to report decrease in pain levels from 3/10 at worse to 1/10 at worse. MET 5/4/2020  3.) Pt will improve R ankle MMT scores by 1 muscle grade to improve functional stability and strength. MET 5/4/2020  4.) Pt will be able to tolerate 60 minute exercise session with 3 resting breaks or less to demonstrate improved activity tolerance. MET 3/16/2020  Long Term Goals: 8 weeks   1.) Pt will transition from NWB to WBAT and able to ambulate 30ft with a RW. PROGRESSING, NOT MET 7/10/2020   2.) Pt will be able to ambulate 300ft with a RW with no resting breaks to demonstrate improved ambulatory tolerance (10 weeks).  MET 5/4/2020  3.) Pt will transition from ambulation with RW to ambulation with SPC to demonstrate " improved independence. (12 weeks). PROGRESSING, NOT MET 7/10/2020   4.) Pt will transition from ambulation with SPC to ambulation with no AD to demonstrate further independence. (14 weeks). PROGRESSING, NOT MET 7/10/2020   5.) Pt will improve her FOTO score from 70% impairment to 48% improvement to indicate improvement in overall function. PROGRESSING, NOT MET 7/10/2020   *Additoinal LTG will be set when appropriate functional measures are able to be tested.*     Plan     Continue with conditioning to improve cardiovascular endurance; progress closed kinetic chain activities in FWB. Follow-up with surgeon on 7/14/2020    Leo Kahn, PT

## 2020-07-13 NOTE — PROGRESS NOTES
Physical Therapy Daily Treatment Note     Name: Herman GUERRERO [facility] Number: [id number]    Therapy Diagnosis:   Encounter Diagnosis   Name Primary?    Ankle stiffness, right      Physician: Kadie Grider MD    Visit Date: 2020    Physician Orders: PT Eval and Treat   Medical Diagnosis from Referral:   Z98.1 (ICD-10-CM) - Status post ankle fusion   M25.551,M25.552 (ICD-10-CM) - Bilateral hip pain   M25.561,M25.562 (ICD-10-CM) - Bilateral knee pain   Evaluation Date: 2020  Authorization Period Expiration: 3/18/2021  Plan of Care Expiration: 2020  Visit # / Visits authorized:  for current authorization (previously seen for 27 treatment sessions for this case)  FOTO: 5/10     Time In: 3:07 pm  Time Out: 3:47 pm  Total Billable Time: 40 minutes TE-3    Precautions: Standard; WBAT with B axillary crutches and boot  *Pt also has difficulty with hearing and typically reads lips. This has been a challenge for the patient as masks are currently worn at all times within the clinic due to COVID19 crisis. Pt requires marked tactile and visual cuing throughout his treatment session.*    Subjective     Pt reports: seeing his surgeon on 2020.  Patient reports having no pain.  He was compliant with home exercise program.   Response to previous treatment: very fatigued  Functional change: more knee flexion during ambulation and getting around much easier with his crutches    Pain: 0/10  Location: R lower leg/ankle/foot     Objective     Herman received therapeutic exercises to develop strength and endurance for 40 minutes including:    Endurance training with reciprocal motion of upper and lower limbs on sci-fit x 10 minutes at level 4.5 at > or equal to 60 spm w/o rest. (seat at position 23)     Steamboats, 3-way: GTB 2x10 B, B UE support. Focus on full WB on L LE  Stair trainin six-inch steps with step to pattern challenging L LE, B UE support. 5 rounds  "   Additional 3 rounds challenging R LE, B UE support  Standing fwd lunches to R LE: x50 onto 6" step. B UE support    Cybex hamstring curl 3x10 8 plates  Cybex knee extension 3x10 30#    Home Exercises Provided and Patient Education Provided:  Education provided:   - Continue HEP. Importance of maintaining HEP with therapy to maximize outcomes    Written Home Exercises Provided: Not today.   Exercises were reviewed and Herman was able to demonstrate them prior to the end of the session.  Herman demonstrated good  understanding of the education provided.     See EMR under Patient Instructions for exercises provided 2/20/2020.    Assessment   A:  Limited communication due to [place] but overall demonstrated good tolerance to standing and stair activities today with bootwalker donned.  Patient hoping to see MD on 07/14/2020 for the okay to dc the bootwalker.  Patient had no difficulty with increase in weights on hamstring curls today.     Herman is progressing well towards his goals.   Pt prognosis is Fair.     Pt's spiritual, cultural and educational needs considered and pt agreeable to plan of care and goals.  Anticipated barriers to physical therapy: chronicity of current injury and significant SxHx with recent ankle fusion.     Short Term Goals: 4 weeks   1.) Pt will become compliant and independent with his initial HEP to promote decreased pain and improved mobility and strength. MET 5/4/2020  2) Pt to report decrease in pain levels from 3/10 at worse to 1/10 at worse. MET 5/4/2020  3.) Pt will improve R ankle MMT scores by 1 muscle grade to improve functional stability and strength. MET 5/4/2020  4.) Pt will be able to tolerate 60 minute exercise session with 3 resting breaks or less to demonstrate improved activity tolerance. MET 3/16/2020  Long Term Goals: 8 weeks   1.) Pt will transition from NWB to WBAT and able to ambulate 30ft with a RW. PROGRESSING, NOT MET 7/13/2020   2.) Pt will be able to ambulate 300ft with " a RW with no resting breaks to demonstrate improved ambulatory tolerance (10 weeks).  MET 5/4/2020  3.) Pt will transition from ambulation with RW to ambulation with SPC to demonstrate improved independence. (12 weeks). PROGRESSING, NOT MET 7/13/2020   4.) Pt will transition from ambulation with SPC to ambulation with no AD to demonstrate further independence. (14 weeks). PROGRESSING, NOT MET 7/13/2020   5.) Pt will improve her FOTO score from 70% impairment to 48% improvement to indicate improvement in overall function. PROGRESSING, NOT MET 7/13/2020   *Additoinal LTG will be set when appropriate functional measures are able to be tested.*     Plan     Continue with conditioning to improve cardiovascular endurance; progress closed kinetic chain activities in FWB. Follow-up with surgeon on 7/14/2020    Wesley Easley, PTA

## 2020-07-15 ENCOUNTER — CLINICAL SUPPORT (OUTPATIENT)
Dept: REHABILITATION | Facility: HOSPITAL | Age: 50
End: 2020-07-15
Payer: OTHER MISCELLANEOUS

## 2020-07-15 DIAGNOSIS — M25.671 ANKLE STIFFNESS, RIGHT: Primary | ICD-10-CM

## 2020-07-15 PROCEDURE — 97110 THERAPEUTIC EXERCISES: CPT | Mod: PN,CQ

## 2020-07-15 NOTE — PROGRESS NOTES
Physical Therapy Daily Treatment Note     Name: Herman GUERRERO [facility] Number: [id number]    Therapy Diagnosis:   Encounter Diagnosis   Name Primary?    Ankle stiffness, right Yes     Physician: Kadie Grider MD    Visit Date: 7/15/2020    Physician Orders: PT Eval and Treat   Medical Diagnosis from Referral:   Z98.1 (ICD-10-CM) - Status post ankle fusion   M25.551,M25.552 (ICD-10-CM) - Bilateral hip pain   M25.561,M25.562 (ICD-10-CM) - Bilateral knee pain   Evaluation Date: 2020  Authorization Period Expiration: 3/18/2021  Plan of Care Expiration: 2020  Visit # / Visits authorized:  for current authorization (previously seen for 28 treatment sessions for this case)  FOTO: 6/10     Time In: 3:20 pm  Time Out: 4:05 pm  Total Billable Time: 45 minutes TE-3    Precautions: Standard; WBAT with B axillary crutches and boot  *Pt also has difficulty with hearing and typically reads lips. This has been a challenge for the patient as masks are currently worn at all times within the clinic due to COVID19 crisis. Pt requires marked tactile and visual cuing throughout his treatment session.*    Subjective     Pt reports: saw surgeon yesterday, has to stay in boot until ankle brace comes in.    Back to [place] on 2020.  He was compliant with home exercise program.   Response to previous treatment:  fatigued  Functional change: more knee flexion during ambulation and getting around much easier with his crutches    Pain: 0/10  Location: R lower leg/ankle/foot     Objective     Herman received therapeutic exercises to develop strength and endurance for 40 minutes including:    Endurance training with reciprocal motion of upper and lower limbs on sci-fit x 10 minutes at level 5.0 at > or equal to 70 spm w/o rest. (seat at position 23)     Steamboats, 3-way: GTB 20x B, B UE support. Focus on full WB on L LE  Stair trainin six-inch steps with step to pattern challenging L LE, B UE  "support. 4 rounds    Additional 4 rounds challenging R LE, B UE support  Standing fwd lunges to R LE: x50 onto 6" step. B UE support    Cybex hamstring curl 30x 8 plates  Cybex knee extension 2x15 30#    Home Exercises Provided and Patient Education Provided:  Education provided:   - Continue HEP. Importance of maintaining HEP with therapy to maximize outcomes    Written Home Exercises Provided: continue previously given HEP   Exercises were reviewed and Herman was able to demonstrate them prior to the end of the session.  Herman demonstrated good  understanding of the education provided.     See EMR under Patient Instructions for exercises provided 2/20/2020.    Assessment   A:  Limited communication due to [place] but overall demonstrated good tolerance to standing and stair activities today with bootwalker donned.   Patient had no difficulty completion of therex, 2 short rest breaks, but no complaints of pain after session.     Herman is progressing well towards his goals.   Pt prognosis is Fair.     Pt's spiritual, cultural and educational needs considered and pt agreeable to plan of care and goals.  Anticipated barriers to physical therapy: chronicity of current injury and significant SxHx with recent ankle fusion.     Short Term Goals: 4 weeks   1.) Pt will become compliant and independent with his initial HEP to promote decreased pain and improved mobility and strength. MET 5/4/2020  2) Pt to report decrease in pain levels from 3/10 at worse to 1/10 at worse. MET 5/4/2020  3.) Pt will improve R ankle MMT scores by 1 muscle grade to improve functional stability and strength. MET 5/4/2020  4.) Pt will be able to tolerate 60 minute exercise session with 3 resting breaks or less to demonstrate improved activity tolerance. MET 3/16/2020  Long Term Goals: 8 weeks   1.) Pt will transition from NWB to WBAT and able to ambulate 30ft with a RW. PROGRESSING, NOT MET 7/15/2020   2.) Pt will be able to ambulate 300ft with a RW " with no resting breaks to demonstrate improved ambulatory tolerance (10 weeks).  MET 5/4/2020  3.) Pt will transition from ambulation with RW to ambulation with SPC to demonstrate improved independence. (12 weeks). PROGRESSING, NOT MET 7/15/2020   4.) Pt will transition from ambulation with SPC to ambulation with no AD to demonstrate further independence. (14 weeks). PROGRESSING, NOT MET 7/15/2020   5.) Pt will improve her FOTO score from 70% impairment to 48% improvement to indicate improvement in overall function. PROGRESSING, NOT MET 7/15/2020   *Additoinal LTG will be set when appropriate functional measures are able to be tested.*     Plan     Continue with conditioning to improve cardiovascular endurance; progress closed kinetic chain activities in FWB. Waiting on ankle brace before boot walker use can be discontinued.     Stephanie Orosco, PTA

## 2020-07-16 ENCOUNTER — CLINICAL SUPPORT (OUTPATIENT)
Dept: REHABILITATION | Facility: HOSPITAL | Age: 50
End: 2020-07-16
Payer: OTHER MISCELLANEOUS

## 2020-07-16 DIAGNOSIS — M25.671 ANKLE STIFFNESS, RIGHT: ICD-10-CM

## 2020-07-16 PROCEDURE — 97110 THERAPEUTIC EXERCISES: CPT | Mod: PN

## 2020-07-16 NOTE — PROGRESS NOTES
Physical Therapy Daily Treatment Note     Name: Herman GUERRERO [facility] Number: [id number]    Therapy Diagnosis:   Encounter Diagnosis   Name Primary?    Ankle stiffness, right      Physician: Kadie Grider MD    Visit Date: 2020    Physician Orders: PT Eval and Treat   Medical Diagnosis from Referral:   Z98.1 (ICD-10-CM) - Status post ankle fusion   M25.551,M25.552 (ICD-10-CM) - Bilateral hip pain   M25.561,M25.562 (ICD-10-CM) - Bilateral knee pain   Evaluation Date: 2020  Authorization Period Expiration: 3/18/2021  Plan of Care Expiration: 2020  Visit # / Visits authorized:  for current authorization (previously seen for 28 treatment sessions for this case)  FOTO: 7/10     Time In: 2:50 pm  Time Out: 3:30 pm  Total Billable Time: 40 minutes TE-3    Precautions: Standard; WBAT with B axillary crutches and boot  *Pt also has difficulty with hearing and typically reads lips. This has been a challenge for the patient as masks are currently worn at all times within the clinic due to COVID19 crisis. Pt requires marked tactile and visual cuing throughout his treatment session.*    Subjective     Pt reports: his ankle is doing about the same. Still waiting for ankle brace orders  He was compliant with home exercise program.   Response to previous treatment:  fatigued  Functional change: more knee flexion during ambulation and getting around much easier with his crutches    Pain: 0/10  Location: R lower leg/ankle/foot     Objective     Herman received therapeutic exercises to develop strength and endurance for 40 minutes including:    Endurance training with reciprocal motion of upper and lower limbs on sci-fit x 10 minutes at level 5.0 at > or equal to 70 spm w/o rest. (seat at position 23)     Steamboats, 3-way: GTB 20x B, B UE support. Focus on full WB on L LE  Stair trainin six-inch steps with step to pattern challenging L LE, B UE support. 4 rounds  "   Additional 4 rounds challenging R LE, B UE support  Standing fwd lunges to R LE: x50 onto 6" step. B UE support    Cybex hamstring curl 30x 8 plates  Cybex knee extension 2x15 30#    Home Exercises Provided and Patient Education Provided:  Education provided:   - Continue HEP. Importance of maintaining HEP with therapy to maximize outcomes    Written Home Exercises Provided: continue previously given HEP   Exercises were reviewed and Herman was able to demonstrate them prior to the end of the session.  Herman demonstrated good  understanding of the education provided.     See EMR under Patient Instructions for exercises provided 2/20/2020.    Assessment   A:  Limited communication due to [place] but overall demonstrated good tolerance to standing and stair activities today with bootwalker donned.   Patient had no difficulty completion of therex, 2 short rest breaks, but no complaints of pain after session.     Herman is progressing well towards his goals.   Pt prognosis is Fair.     Pt's spiritual, cultural and educational needs considered and pt agreeable to plan of care and goals.  Anticipated barriers to physical therapy: chronicity of current injury and significant SxHx with recent ankle fusion.     Short Term Goals: 4 weeks   1.) Pt will become compliant and independent with his initial HEP to promote decreased pain and improved mobility and strength. MET 5/4/2020  2) Pt to report decrease in pain levels from 3/10 at worse to 1/10 at worse. MET 5/4/2020  3.) Pt will improve R ankle MMT scores by 1 muscle grade to improve functional stability and strength. MET 5/4/2020  4.) Pt will be able to tolerate 60 minute exercise session with 3 resting breaks or less to demonstrate improved activity tolerance. MET 3/16/2020  Long Term Goals: 8 weeks   1.) Pt will transition from NWB to WBAT and able to ambulate 30ft with a RW. PROGRESSING, NOT MET 7/16/2020   2.) Pt will be able to ambulate 300ft with a RW with no resting " breaks to demonstrate improved ambulatory tolerance (10 weeks).  MET 5/4/2020  3.) Pt will transition from ambulation with RW to ambulation with SPC to demonstrate improved independence. (12 weeks). PROGRESSING, NOT MET 7/16/2020   4.) Pt will transition from ambulation with SPC to ambulation with no AD to demonstrate further independence. (14 weeks). PROGRESSING, NOT MET 7/16/2020   5.) Pt will improve her FOTO score from 70% impairment to 48% improvement to indicate improvement in overall function. PROGRESSING, NOT MET 7/16/2020   *Additoinal LTG will be set when appropriate functional measures are able to be tested.*     Plan     Continue with conditioning to improve cardiovascular endurance; progress closed kinetic chain activities in FWB. Waiting on ankle brace before boot walker use can be discontinued.     Jere Crook, PT

## 2020-07-20 ENCOUNTER — CLINICAL SUPPORT (OUTPATIENT)
Dept: REHABILITATION | Facility: HOSPITAL | Age: 50
End: 2020-07-20
Payer: OTHER MISCELLANEOUS

## 2020-07-20 DIAGNOSIS — M25.671 ANKLE STIFFNESS, RIGHT: ICD-10-CM

## 2020-07-20 PROCEDURE — 97110 THERAPEUTIC EXERCISES: CPT | Mod: PN,CQ

## 2020-07-20 NOTE — PROGRESS NOTES
Physical Therapy Daily Treatment Note     Name: Herman GUERRERO [facility] Number: [id number]    Therapy Diagnosis:   Encounter Diagnosis   Name Primary?    Ankle stiffness, right      Physician: Kadie Grider MD    Visit Date: 2020    Physician Orders: PT Eval and Treat   Medical Diagnosis from Referral:   Z98.1 (ICD-10-CM) - Status post ankle fusion   M25.551,M25.552 (ICD-10-CM) - Bilateral hip pain   M25.561,M25.562 (ICD-10-CM) - Bilateral knee pain   Evaluation Date: 2020  Authorization Period Expiration: 3/18/2021  Plan of Care Expiration: 2020  Visit # / Visits authorized:  for current authorization (previously seen for 28 treatment sessions for this case)  FOTO: 8/10     Time In: 1:27 pm  Time Out: 1:58 pm  Total Billable Time: 32 minutes TE-2    Precautions: Standard; WBAT with B axillary crutches and boot  *Pt also has difficulty with hearing and typically reads lips. This has been a challenge for the patient as masks are currently worn at all times within the clinic due to COVID19 crisis. Pt requires marked tactile and visual cuing throughout his treatment session.*    Subjective     Pt reports: his ankle is hurting a little more today but doesn't recall doing anything different to cause the increased pain.   He was compliant with home exercise program.   Response to previous treatment:  fatigued  Functional change: more knee flexion during ambulation and getting around much easier with his crutches    Pain: 2/10  Location: R lower leg/ankle/foot     Objective     Herman received therapeutic exercises to develop strength and endurance for 32 minutes including:    Endurance training with reciprocal motion of upper and lower limbs on sci-fit x 10 minutes at level 5.0 at > or equal to 70 spm w/o rest. (seat at position 23)     Steamboats, 3-way: GTB 20x B, B UE support. Focus on full WB on L LE NOT TODAY  Stair trainin six-inch steps with step to pattern  "challenging L LE, B UE support. 4 rounds    Additional 4 rounds challenging R LE, B UE support  Standing fwd lunges to R LE: x50 onto 6" step. B UE support    Cybex hamstring curl 30x 8 plates   Cybex knee extension 2x15 30#    Home Exercises Provided and Patient Education Provided:  Education provided:   - Continue HEP. Importance of maintaining HEP with therapy to maximize outcomes    Written Home Exercises Provided: continue previously given HEP   Exercises were reviewed and Herman was able to demonstrate them prior to the end of the session.  Herman demonstrated good  understanding of the education provided.     See EMR under Patient Instructions for exercises provided 2/20/2020.    Assessment   A:  Limited communication due to [place] but overall demonstrated good tolerance to standing and stair activities today with [facility] [place].   Patient did not complete all of his exercises due to arriving late to his appointment.      Herman is progressing well towards his goals.   Pt prognosis is Fair.     Pt's spiritual, cultural and educational needs considered and pt agreeable to plan of care and goals.  Anticipated barriers to physical therapy: chronicity of current injury and significant SxHx with recent ankle fusion.     Short Term Goals: 4 weeks   1.) Pt will become compliant and independent with his initial HEP to promote decreased pain and improved mobility and strength. MET 5/4/2020  2) Pt to report decrease in pain levels from 3/10 at worse to 1/10 at worse. MET 5/4/2020  3.) Pt will improve R ankle MMT scores by 1 muscle grade to improve functional stability and strength. MET 5/4/2020  4.) Pt will be able to tolerate 60 minute exercise session with 3 resting breaks or less to demonstrate improved activity tolerance. MET 3/16/2020  Long Term Goals: 8 weeks   1.) Pt will transition from NWB to WBAT and able to ambulate 30ft with a RW. PROGRESSING, NOT MET 7/20/2020   2.) Pt will be able to ambulate 300ft with a RW " with no resting breaks to demonstrate improved ambulatory tolerance (10 weeks).  MET 5/4/2020  3.) Pt will transition from ambulation with RW to ambulation with SPC to demonstrate improved independence. (12 weeks). PROGRESSING, NOT MET 7/20/2020   4.) Pt will transition from ambulation with SPC to ambulation with no AD to demonstrate further independence. (14 weeks). PROGRESSING, NOT MET 7/20/2020   5.) Pt will improve her FOTO score from 70% impairment to 48% improvement to indicate improvement in overall function. PROGRESSING, NOT MET 7/20/2020   *Additoinal LTG will be set when appropriate functional measures are able to be tested.*     Plan     Continue with conditioning to improve cardiovascular endurance; progress closed kinetic chain activities in FWB. Waiting on ankle brace before boot walker use can be discontinued.     Wesley Easley, PTA

## 2020-07-22 ENCOUNTER — CLINICAL SUPPORT (OUTPATIENT)
Dept: REHABILITATION | Facility: HOSPITAL | Age: 50
End: 2020-07-22
Payer: OTHER MISCELLANEOUS

## 2020-07-22 DIAGNOSIS — M25.671 ANKLE STIFFNESS, RIGHT: ICD-10-CM

## 2020-07-22 PROCEDURE — 97110 THERAPEUTIC EXERCISES: CPT | Mod: PN,CQ

## 2020-07-22 NOTE — PROGRESS NOTES
Physical Therapy Daily Treatment Note     Name: Herman GUERRERO [facility] Number: [id number]    Therapy Diagnosis:   Encounter Diagnosis   Name Primary?    Ankle stiffness, right      Physician: Kadie Grider MD    Visit Date: 7/22/2020    Physician Orders: PT Eval and Treat   Medical Diagnosis from Referral:   Z98.1 (ICD-10-CM) - Status post ankle fusion   M25.551,M25.552 (ICD-10-CM) - Bilateral hip pain   M25.561,M25.562 (ICD-10-CM) - Bilateral knee pain   Evaluation Date: 2/18/2020  Authorization Period Expiration: 3/18/2021  Plan of Care Expiration: 9/11/2020  Visit # / Visits authorized: 9/24 for current authorization (previously seen for 28 treatment sessions for this case)  FOTO: 9/10     Time In: 12:50 pm  Time Out: 1:28 pm  Total Billable Time: 38 minutes TE-3    Precautions: Standard; WBAT with B axillary crutches and boot  *Pt also has difficulty with hearing and typically reads lips. This has been a challenge for the patient as masks are currently worn at all times within the clinic due to COVID19 crisis. Pt requires marked tactile and visual cuing throughout his treatment session.*    Subjective     Pt reports: he has been up since 4 this morning and feels that he has a little swelling in his ankle.  Patient reports he usually stretches first thing in the morning but today he did not and is feeling the difference.  He was compliant with home exercise program.   Response to previous treatment:  fatigued  Functional change: more knee flexion during ambulation and getting around much easier with his crutches    Pain: 2/10  Location: R lower leg/ankle/foot     Objective     Herman received therapeutic exercises to develop strength and endurance for 38 minutes including:    Endurance training with reciprocal motion of upper and lower limbs on sci-fit x 10 minutes at level 5.0 at > or equal to 70 spm w/o rest. (seat at position 23)     Steamboats, 3-way: GTB 20x B, B UE  "support. Focus on full WB on L LE   Stair trainin six-inch steps with step to pattern challenging L LE, B UE support. 4 rounds    Additional 4 rounds challenging R LE, B UE support  Standing fwd lunges to R LE: x50 onto 6" step. B UE support    Cybex hamstring curl 30x 8 plates   Cybex knee extension 2x15 40#    Home Exercises Provided and Patient Education Provided:  Education provided:   - Continue HEP. Importance of maintaining HEP with therapy to maximize outcomes    Written Home Exercises Provided: continue previously given HEP   Exercises were reviewed and Herman was able to demonstrate them prior to the end of the session.  Herman demonstrated good  understanding of the education provided.     See EMR under Patient Instructions for exercises provided 2020.    Assessment   A:  Limited communication due to [place] but overall demonstrated good tolerance to standing and stair activities today with bootwalker donned.   Patient had no difficulty with today's progressions with no increase in symptoms prior to leaving the clinic.      Herman is progressing well towards his goals.   Pt prognosis is Fair.     Pt's spiritual, cultural and educational needs considered and pt agreeable to plan of care and goals.  Anticipated barriers to physical therapy: chronicity of current injury and significant SxHx with recent ankle fusion.     Short Term Goals: 4 weeks   1.) Pt will become compliant and independent with his initial HEP to promote decreased pain and improved mobility and strength. MET 2020  2) Pt to report decrease in pain levels from 3/10 at worse to 1/10 at worse. MET 2020  3.) Pt will improve R ankle MMT scores by 1 muscle grade to improve functional stability and strength. MET 2020  4.) Pt will be able to tolerate 60 minute exercise session with 3 resting breaks or less to demonstrate improved activity tolerance. MET 3/16/2020  Long Term Goals: 8 weeks   1.) Pt will transition from NWB to WBAT and " able to ambulate 30ft with a RW. PROGRESSING, NOT MET 7/22/2020   2.) Pt will be able to ambulate 300ft with a RW with no resting breaks to demonstrate improved ambulatory tolerance (10 weeks).  MET 5/4/2020  3.) Pt will transition from ambulation with RW to ambulation with SPC to demonstrate improved independence. (12 weeks). PROGRESSING, NOT MET 7/22/2020   4.) Pt will transition from ambulation with SPC to ambulation with no AD to demonstrate further independence. (14 weeks). PROGRESSING, NOT MET 7/22/2020   5.) Pt will improve her FOTO score from 70% impairment to 48% improvement to indicate improvement in overall function. PROGRESSING, NOT MET 7/22/2020   *Additoinal LTG will be set when appropriate functional measures are able to be tested.*     Plan     Continue with conditioning to improve cardiovascular endurance; progress closed kinetic chain activities in FWB. Waiting on ankle brace before boot walker use can be discontinued.     Wesley Easley, PTA

## 2020-07-24 ENCOUNTER — CLINICAL SUPPORT (OUTPATIENT)
Dept: REHABILITATION | Facility: HOSPITAL | Age: 50
End: 2020-07-24
Payer: OTHER MISCELLANEOUS

## 2020-07-24 ENCOUNTER — TELEPHONE (OUTPATIENT)
Dept: CARDIOLOGY | Facility: CLINIC | Age: 50
End: 2020-07-24

## 2020-07-24 DIAGNOSIS — M25.671 ANKLE STIFFNESS, RIGHT: ICD-10-CM

## 2020-07-24 PROCEDURE — 97110 THERAPEUTIC EXERCISES: CPT | Mod: PN

## 2020-07-24 NOTE — TELEPHONE ENCOUNTER
Reached out to pt and spouse regarding message.    Spouse continuously loudly expressed frustration that her  was sent to [place] [facility] for and Echocardiogram that had not been approved.     Attempted to explain to her that without testing completed the appt set for Monday would need to be reschedule and Dr Savage's schedule is very tight over the next 4 weeks.     Continuously expressed dissatisfaction that he was sent for a test that had not been authorized and he is covered with workman's comp and all his test have been approved in the past. Stated why would someone in this office send a wheelchair pt for a test without prior approval.    Attempted to explain that attempt was made to complete test to prevent rescheduling appt set for Monday.  She spoke over me loudly stating she did not want to hear any excuses, that this was unacceptable.     Apologized for this inconvenience.     Rescheduled ECHO and appt with Dr Savage.    ECHO scheduled for 8/3 at 11:00 to give ample time for approval.  Pt verbalized that he would have Echo completed prior to going to PT at [facility].    Appt with Dr Savage for 9/21 at 2 pm.    Requested a call from this office once Echo has been approved.

## 2020-07-24 NOTE — PROGRESS NOTES
Physical Therapy Daily Treatment Note     Name: Herman GUERRERO [facility] Number: [id number]    Therapy Diagnosis:   Encounter Diagnosis   Name Primary?    Ankle stiffness, right      Physician: Kadie Grider MD    Visit Date: 2020    Physician Orders: PT Eval and Treat   Medical Diagnosis from Referral:   Z98.1 (ICD-10-CM) - Status post ankle fusion   M25.551,M25.552 (ICD-10-CM) - Bilateral hip pain   M25.561,M25.562 (ICD-10-CM) - Bilateral knee pain   Evaluation Date: 2020  Authorization Period Expiration: 3/18/2021  Plan of Care Expiration: 2020  Visit # / Visits authorized: 10/24 for current authorization (previously seen for 28 treatment sessions for this case)  FOTO: 10/10  DONE    Time In: 4:35 PM  Time Out: 5:20 PM  Total Billable Time: 45 minutes TE-3    Precautions: Standard; WBAT with B axillary crutches and boot  *Pt also has difficulty with hearing and typically reads lips. This has been a challenge for the patient as masks are currently worn at all times within the clinic due to COVID19 crisis. Pt requires marked tactile and visual cuing throughout his treatment session.*    Subjective     Pt reports: he's been running a lot of errands today so he feels a little fatigued  He was compliant with home exercise program.   Response to previous treatment:  Fatigue  Functional change: more knee flexion during ambulation and getting around much easier with his crutches    Pain: 2/10  Location: R lower leg/ankle/foot     Objective     Herman received therapeutic exercises to develop strength and endurance for 45 minutes including:    Endurance training with reciprocal motion of upper and lower limbs on sci-fit x 10 minutes at level 5.0 at > or equal to 70 spm w/o rest. (seat at position 23) - 793 total steps    Steamboats, 3-way: BlueTB 20x B, B UE support. Focus on full WB on L LE   Fwd over hurdles (5): 3 laps, // bars, BUE support   Stair trainin six-inch  "steps with step to pattern challenging L LE, B UE support. 4 rounds    Additional 4 rounds challenging R LE, B UE support  Standing fwd lunges to R LE: x50 onto 6" step. B UE support    Cybex hamstring curl 30x 8 plates (7 plates today)  Cybex knee extension 2x15 40#      Home Exercises Provided and Patient Education Provided:  Education provided:   - Continue HEP. Importance of maintaining HEP with therapy to maximize outcomes    Written Home Exercises Provided: continue previously given HEP   Exercises were reviewed and Herman was able to demonstrate them prior to the end of the session.  Herman demonstrated good  understanding of the education provided.     See EMR under Patient Instructions for exercises provided 2/20/2020.    Assessment   Presented to therapy with complaints of fatigue, but minimal pain. Able to tolerate treatment session without adverse reactions. Added forward walks over hurdles with BUE support in // bars for increased weight bearing. Some fatigue noted due to patient being on his feet all day, required decrease in resistance for cybex hamstring curls.      Herman is progressing well towards his goals.   Pt prognosis is Fair.     Pt's spiritual, cultural and educational needs considered and pt agreeable to plan of care and goals.  Anticipated barriers to physical therapy: chronicity of current injury and significant SxHx with recent ankle fusion.     Short Term Goals: 4 weeks   1.) Pt will become compliant and independent with his initial HEP to promote decreased pain and improved mobility and strength. MET 5/4/2020  2) Pt to report decrease in pain levels from 3/10 at worse to 1/10 at worse. MET 5/4/2020  3.) Pt will improve R ankle MMT scores by 1 muscle grade to improve functional stability and strength. MET 5/4/2020  4.) Pt will be able to tolerate 60 minute exercise session with 3 resting breaks or less to demonstrate improved activity tolerance. MET 3/16/2020  Long Term Goals: 8 weeks "   1.) Pt will transition from NWB to WBAT and able to ambulate 30ft with a RW. PROGRESSING, NOT MET 7/24/2020   2.) Pt will be able to ambulate 300ft with a RW with no resting breaks to demonstrate improved ambulatory tolerance (10 weeks).  MET 5/4/2020  3.) Pt will transition from ambulation with RW to ambulation with SPC to demonstrate improved independence. (12 weeks). PROGRESSING, NOT MET 7/24/2020   4.) Pt will transition from ambulation with SPC to ambulation with no AD to demonstrate further independence. (14 weeks). PROGRESSING, NOT MET 7/24/2020   5.) Pt will improve her FOTO score from 70% impairment to 48% improvement to indicate improvement in overall function. PROGRESSING, NOT MET 7/24/2020   *Additoinal LTG will be set when appropriate functional measures are able to be tested.*     Plan     Continue with conditioning to improve cardiovascular endurance; progress closed kinetic chain activities in FWB. Waiting on ankle brace before boot walker use can be discontinued.     Namita Stearns, PT

## 2020-07-24 NOTE — TELEPHONE ENCOUNTER
I called pt at home , No answer, I left him V/Message    Due to when we spoke in May 2020.    We scheduled him an echo and lab.    Unfortunately  Patient didn't do his Echo, but he did do     His Lab work..    Mr Floyd  also rescheduled his appointment he had     with Dr.Ndandu flores .    I called different line and I spoke to ,    She stated go ahead and schedule the Echo for Today    At the [place] [facility].    She will call her  now to wake him up .      NW

## 2020-07-24 NOTE — TELEPHONE ENCOUNTER
----- Message from Lila Givens MA sent at 7/24/2020  3:35 PM CDT -----  Contact: wife/ Grace  [phone]    ----- Message -----  From: Kate Kim  Sent: 7/24/2020   3:00 PM CDT  To: [facility]LL[facility] Staff    Patient wife calling to speak with the nurse (personal) patient did not care to elaborate   PATIENT WIFE STATES SHE DO NOT WANT TO SPEAK WITH NELY     Please call back to assist at [phone]

## 2020-07-28 ENCOUNTER — CLINICAL SUPPORT (OUTPATIENT)
Dept: REHABILITATION | Facility: HOSPITAL | Age: 50
End: 2020-07-28
Payer: OTHER MISCELLANEOUS

## 2020-07-28 DIAGNOSIS — M25.671 ANKLE STIFFNESS, RIGHT: ICD-10-CM

## 2020-07-28 PROCEDURE — 97110 THERAPEUTIC EXERCISES: CPT | Mod: PN

## 2020-07-28 NOTE — PROGRESS NOTES
Physical Therapy Daily Treatment Note     Name: Herman GUERRERO [facility] Number: [id number]    Therapy Diagnosis:   Encounter Diagnosis   Name Primary?    Ankle stiffness, right      Physician: Kadie Grider MD    Visit Date: 7/28/2020    Physician Orders: PT Eval and Treat   Medical Diagnosis from Referral:   Z98.1 (ICD-10-CM) - Status post ankle fusion   M25.551,M25.552 (ICD-10-CM) - Bilateral hip pain   M25.561,M25.562 (ICD-10-CM) - Bilateral knee pain   Evaluation Date: 2/18/2020  Authorization Period Expiration: 3/18/2021  Plan of Care Expiration: 9/11/2020  Visit # / Visits authorized: 11/24 for current authorization (previously seen for 28 treatment sessions for this case)  FOTO: 10/10  DONE    Time In: 145pm  Time Out: 240pm  Total Billable Time: 55 minutes TE-4    Precautions: Standard; WBAT with boot  *Pt also has difficulty with hearing and typically reads lips. This has been a challenge for the patient as masks are currently worn at all times within the clinic due to COVID19 crisis. Pt requires marked tactile and visual cuing throughout his treatment session.*    Subjective     Pt reports: he's been running a lot of errands today so he feels a little fatigued  He was compliant with home exercise program.   Response to previous treatment:  Fatigue  Functional change: more knee flexion during ambulation and getting around much easier with his crutches    Pain: 2/10  Location: R lower leg/ankle/foot     Objective     Herman received therapeutic exercises to develop strength and endurance for 45 minutes including:    Endurance/ROM training with reciprocal motion of upper and lower limbs on sci-fit x 10 minutes at level 5.0 at > or equal to 70 spm w/o rest. (seat at position 23) - 771 total steps  2 laps of clinic with unilateral axillary crutch and boot in place     Steamboats, 3-way: BlueTB 20x2 B, B UE support. Focus on full WB on L LE   Fwd over hurdles (5): 3 laps, //  "bars, BUE support   Stair trainin six-inch steps with step to pattern challenging L LE, B UE support. 5 rounds    + 4 rounds challenging R LE, B UE support  Standing fwd lunges to R LE: x50 onto 6" step. B UE support    Cybex hamstring curl 30x 7 plates  Cybex knee extension 2x15 40#      Home Exercises Provided and Patient Education Provided:  Education provided:   - Continue HEP. Importance of maintaining HEP with therapy to maximize outcomes    Written Home Exercises Provided: continue previously given HEP   Exercises were reviewed and Herman was able to demonstrate them prior to the end of the session.  Herman demonstrated good  understanding of the education provided.     See EMR under Patient Instructions for exercises provided 2020.    Assessment   Presented to therapy with complaints of fatigue, but minimal pain. Able to tolerate treatment session without adverse reactions. Added forward walks over hurdles with BUE support in // bars for increased weight bearing. Some fatigue noted due to patient being on his feet all day, required decrease in resistance for cybex hamstring curls.      Herman is progressing well towards his goals.   Pt prognosis is Fair.     Pt's spiritual, cultural and educational needs considered and pt agreeable to plan of care and goals.  Anticipated barriers to physical therapy: chronicity of current injury and significant SxHx with recent ankle fusion.     Short Term Goals: 4 weeks   1.) Pt will become compliant and independent with his initial HEP to promote decreased pain and improved mobility and strength. MET 2020  2) Pt to report decrease in pain levels from 3/10 at worse to 1/10 at worse. MET 2020  3.) Pt will improve R ankle MMT scores by 1 muscle grade to improve functional stability and strength. MET 2020  4.) Pt will be able to tolerate 60 minute exercise session with 3 resting breaks or less to demonstrate improved activity tolerance. MET 3/16/2020  Long " Term Goals: 8 weeks   1.) Pt will transition from NWB to WBAT and able to ambulate 30ft with a RW. PROGRESSING, NOT MET 7/28/2020   2.) Pt will be able to ambulate 300ft with a RW with no resting breaks to demonstrate improved ambulatory tolerance (10 weeks).  MET 5/4/2020  3.) Pt will transition from ambulation with RW to ambulation with SPC to demonstrate improved independence. (12 weeks). PROGRESSING, NOT MET 7/28/2020   4.) Pt will transition from ambulation with SPC to ambulation with no AD to demonstrate further independence. (14 weeks). PROGRESSING, NOT MET 7/28/2020   5.) Pt will improve her FOTO score from 70% impairment to 48% improvement to indicate improvement in overall function. PROGRESSING, NOT MET 7/28/2020   *Additoinal LTG will be set when appropriate functional measures are able to be tested.*     Plan     Continue with conditioning to improve cardiovascular endurance; progress closed kinetic chain activities in FWB. Waiting on ankle brace before boot walker use can be discontinued.     Vinicius Ferreira, PT

## 2020-07-30 ENCOUNTER — CLINICAL SUPPORT (OUTPATIENT)
Dept: REHABILITATION | Facility: HOSPITAL | Age: 50
End: 2020-07-30
Payer: OTHER MISCELLANEOUS

## 2020-07-30 DIAGNOSIS — M25.671 ANKLE STIFFNESS, RIGHT: ICD-10-CM

## 2020-07-30 PROCEDURE — 97110 THERAPEUTIC EXERCISES: CPT | Mod: PN,CQ

## 2020-07-30 NOTE — PROGRESS NOTES
Physical Therapy Daily Treatment Note     Name: Herman GUERRERO [facility] Number: [id number]    Therapy Diagnosis:   Encounter Diagnosis   Name Primary?    Ankle stiffness, right      Physician: Kadie Grider MD    Visit Date: 7/30/2020    Physician Orders: PT Eval and Treat   Medical Diagnosis from Referral:   Z98.1 (ICD-10-CM) - Status post ankle fusion   M25.551,M25.552 (ICD-10-CM) - Bilateral hip pain   M25.561,M25.562 (ICD-10-CM) - Bilateral knee pain   Evaluation Date: 2/18/2020  Authorization Period Expiration: 3/18/2021  Plan of Care Expiration: 9/11/2020  Visit # / Visits authorized: 11/24 for current authorization (previously seen for 28 treatment sessions for this case)  FOTO: 11/20      Time In: 1535  Time Out: 1615  Total Billable Time: 40 minutes TE-3    Precautions: Standard; WBAT with boot  *Pt also has difficulty with hearing and typically reads lips. This has been a challenge for the patient as masks are currently worn at all times within the clinic due to COVID19 crisis. Pt requires marked tactile and visual cuing throughout his treatment session.*    Subjective     Pt reports: he's doing okay today.  He was compliant with home exercise program.   Response to previous treatment:  Fatigue  Functional change: more knee flexion during ambulation and getting around much easier with his crutches    Pain: 2/10  Location: R lower leg/ankle/foot     Objective     Herman received therapeutic exercises to develop strength and endurance for 45 minutes including:    Cardio vascular endurance/ROM and tissue extensibility, training with reciprocal motion of upper and lower limbs on sci-fit x 10 minutes at level 5.5 at > or equal to 70 spm w/o rest. (seat at position 23) - 778 total steps  2 laps of clinic with unilateral axillary crutch and boot in place     Steamboats, 3-way: BlueTB 20x2 B, B UE  support. Focus on full WB on L LE   Fwd over hurdles (5): 4 laps, // bars, BUE  "support   Stair trainin six-inch steps with step to pattern challenging L LE, B UE support. 5 rounds    + 4 rounds challenging R LE, B UE support  Standing fwd lunges to R LE: x50 onto 6" step. B UE support     Cybex hamstring curl 30x 7 plates  Cybex knee extension 2x15 40#      Home Exercises Provided and Patient Education Provided:  Education provided:   - Continue HEP. Importance of maintaining HEP with therapy to maximize outcomes    Written Home Exercises Provided: continue previously given HEP   Exercises were reviewed and Herman was able to demonstrate them prior to the end of the session.  Herman demonstrated good  understanding of the education provided.     See EMR under Patient Instructions for exercises provided 2020.    Assessment   Presented w/o pain.  Able to tolerate treatment session without adverse reactions. Continued forward walks over hurdles with BUE support in // bars for increased weight bearing. Some fatigue noted due to patient being on his feet all day, required decrease in resistance for cybex hamstring curls.      Herman is progressing well towards his goals.   Pt prognosis is Fair.     Pt's spiritual, cultural and educational needs considered and pt agreeable to plan of care and goals.  Anticipated barriers to physical therapy: chronicity of current injury and significant SxHx with recent ankle fusion.     Short Term Goals: 4 weeks   1.) Pt will become compliant and independent with his initial HEP to promote decreased pain and improved mobility and strength. MET 2020  2) Pt to report decrease in pain levels from 3/10 at worse to 1/10 at worse. MET 2020  3.) Pt will improve R ankle MMT scores by 1 muscle grade to improve functional stability and strength. MET 2020  4.) Pt will be able to tolerate 60 minute exercise session with 3 resting breaks or less to demonstrate improved activity tolerance. MET 3/16/2020  Long Term Goals: 8 weeks   1.) Pt will transition from NWB " to WBAT and able to ambulate 30ft with a RW. PROGRESSING, NOT MET 7/30/2020   2.) Pt will be able to ambulate 300ft with a RW with no resting breaks to demonstrate improved ambulatory tolerance (10 weeks).  MET 5/4/2020  3.) Pt will transition from ambulation with RW to ambulation with SPC to demonstrate improved independence. (12 weeks). PROGRESSING, NOT MET 7/30/2020   4.) Pt will transition from ambulation with SPC to ambulation with no AD to demonstrate further independence. (14 weeks). PROGRESSING, NOT MET 7/30/2020   5.) Pt will improve her FOTO score from 70% impairment to 48% improvement to indicate improvement in overall function. PROGRESSING, NOT MET 7/30/2020   *Additoinal LTG will be set when appropriate functional measures are able to be tested.*     Plan     Continue with conditioning to improve cardiovascular endurance; progress closed kinetic chain activities in FWB. Waiting on ankle brace before boot walker use can be discontinued.     Jc Sol, PTA

## 2020-07-31 ENCOUNTER — CLINICAL SUPPORT (OUTPATIENT)
Dept: REHABILITATION | Facility: HOSPITAL | Age: 50
End: 2020-07-31
Payer: OTHER MISCELLANEOUS

## 2020-07-31 DIAGNOSIS — M25.671 ANKLE STIFFNESS, RIGHT: ICD-10-CM

## 2020-07-31 PROCEDURE — 97110 THERAPEUTIC EXERCISES: CPT | Mod: PN,CQ

## 2020-07-31 NOTE — PROGRESS NOTES
Physical Therapy Daily Treatment Note     Name: Herman GUERRERO [facility] Number: [id number]    Therapy Diagnosis:   Encounter Diagnosis   Name Primary?    Ankle stiffness, right      Physician: Kadie Grider MD    Visit Date: 7/31/2020    Physician Orders: PT Eval and Treat   Medical Diagnosis from Referral:   Z98.1 (ICD-10-CM) - Status post ankle fusion   M25.551,M25.552 (ICD-10-CM) - Bilateral hip pain   M25.561,M25.562 (ICD-10-CM) - Bilateral knee pain   Evaluation Date: 2/18/2020  Authorization Period Expiration: 3/18/2021  Plan of Care Expiration: 9/11/2020  Visit # / Visits authorized: 12/24 for current authorization (previously seen for 28 treatment sessions for this case)  FOTO: 12/20      Time In: 12:30 pm  Time Out: 1:28 pm  Total Billable Time: 58 minutes TE-4    Precautions: Standard; WBAT with boot  *Pt also has difficulty with hearing and typically reads lips. This has been a challenge for the patient as masks are currently worn at all times within the clinic due to COVID19 crisis. Pt requires marked tactile and visual cuing throughout his treatment session.*    Subjective     Pt reports: he really only has pain when his ankle swells.  Patient reports he ate a big lunch before coming for therapy so he feels a little tired but is ready for therapy.  He was compliant with home exercise program.   Response to previous treatment:  Fatigue  Functional change: more knee flexion during ambulation and getting around much easier with his crutches    Pain: 0/10  Location: R lower leg/ankle/foot     Objective     Herman received therapeutic exercises to develop strength and endurance for 58 minutes including:    Cardio vascular endurance/ROM and tissue extensibility, training with reciprocal motion of upper and lower limbs on sci-fit x 10 minutes at level 5.5 at > or equal to 70 spm w/o rest. (seat at position 23) - 718 total steps  2 laps of clinic with unilateral axillary  "crutch and boot in place    Steamboats, 3-way: BlueTB 20x2 B, B UE  support. Focus on full WB on L LE   Fwd over hurdles (5): 4 laps, // bars, BUE support   Stair trainin six-inch steps with step to pattern challenging L LE, B UE support. 5 rounds    + 4 rounds challenging R LE, B UE support  Standing fwd lunges to R LE: x50 onto 6" step. B UE support    Cybex hamstring curl 30x 7 plates  Cybex knee extension 2x15 40#      Home Exercises Provided and Patient Education Provided:  Education provided:   - Continue HEP. Importance of maintaining HEP with therapy to maximize outcomes    Written Home Exercises Provided: continue previously given HEP   Exercises were reviewed and Herman was able to demonstrate them prior to the end of the session.  Herman demonstrated good  understanding of the education provided.     See EMR under Patient Instructions for exercises provided 2020.    Assessment   Presented w/o pain.  Patient completed today's therapy with no increase in symptoms prior to leaving the clinic.  Continued forward walks over hurdles with BUE support in // bars for increased weight bearing. Patient was moving through his exercises a little slower than normal but had no problems with finishing all exercises.      Herman is progressing well towards his goals.   Pt prognosis is Fair.     Pt's spiritual, cultural and educational needs considered and pt agreeable to plan of care and goals.  Anticipated barriers to physical therapy: chronicity of current injury and significant SxHx with recent ankle fusion.     Short Term Goals: 4 weeks   1.) Pt will become compliant and independent with his initial HEP to promote decreased pain and improved mobility and strength. MET 2020  2) Pt to report decrease in pain levels from 3/10 at worse to 1/10 at worse. MET 2020  3.) Pt will improve R ankle MMT scores by 1 muscle grade to improve functional stability and strength. MET 2020  4.) Pt will be able to " tolerate 60 minute exercise session with 3 resting breaks or less to demonstrate improved activity tolerance. MET 3/16/2020  Long Term Goals: 8 weeks   1.) Pt will transition from NWB to WBAT and able to ambulate 30ft with a RW. PROGRESSING, NOT MET 7/31/2020   2.) Pt will be able to ambulate 300ft with a RW with no resting breaks to demonstrate improved ambulatory tolerance (10 weeks).  MET 5/4/2020  3.) Pt will transition from ambulation with RW to ambulation with SPC to demonstrate improved independence. (12 weeks). PROGRESSING, NOT MET 7/31/2020   4.) Pt will transition from ambulation with SPC to ambulation with no AD to demonstrate further independence. (14 weeks). PROGRESSING, NOT MET 7/31/2020   5.) Pt will improve her FOTO score from 70% impairment to 48% improvement to indicate improvement in overall function. PROGRESSING, NOT MET 7/31/2020   *Additoinal LTG will be set when appropriate functional measures are able to be tested.*     Plan     Continue with conditioning to improve cardiovascular endurance; progress closed kinetic chain activities in FWB. Waiting on ankle brace before boot walker use can be discontinued.     Wesley Easley, PTA

## 2020-08-03 ENCOUNTER — CLINICAL SUPPORT (OUTPATIENT)
Dept: REHABILITATION | Facility: HOSPITAL | Age: 50
End: 2020-08-03
Payer: OTHER MISCELLANEOUS

## 2020-08-03 DIAGNOSIS — M25.671 ANKLE STIFFNESS, RIGHT: ICD-10-CM

## 2020-08-03 PROCEDURE — 97110 THERAPEUTIC EXERCISES: CPT | Mod: PN,CQ

## 2020-08-03 NOTE — PROGRESS NOTES
Physical Therapy Daily Treatment Note     Name: Herman GUERRERO [facility] Number: [id number]    Therapy Diagnosis:   Encounter Diagnosis   Name Primary?    Ankle stiffness, right      Physician: Kadie Grider MD    Visit Date: 8/3/2020    Physician Orders: PT Eval and Treat   Medical Diagnosis from Referral:   Z98.1 (ICD-10-CM) - Status post ankle fusion   M25.551,M25.552 (ICD-10-CM) - Bilateral hip pain   M25.561,M25.562 (ICD-10-CM) - Bilateral knee pain   Evaluation Date: 2/18/2020  Authorization Period Expiration: 3/18/2021  Plan of Care Expiration: 9/11/2020  Visit # / Visits authorized: 13/24 for current authorization (previously seen for 28 treatment sessions for this case)  FOTO: 13/20      Time In: 12:30 pm  Time Out: 1:12 pm  Total Billable Time: 42 minutes TE-3    Precautions: Standard; WBAT with boot  *Pt also has difficulty with hearing and typically reads lips. This has been a challenge for the patient as masks are currently worn at all times within the clinic due to COVID19 crisis. Pt requires marked tactile and visual cuing throughout his treatment session.*    Subjective     Pt reports: having no pain.  Patient reports he has been using his quad cane more at home to become more comfortable with it and decided to use it to come in for therapy today.  He was compliant with home exercise program.   Response to previous treatment:  Fatigue  Functional change: more knee flexion during ambulation and getting around much easier with his crutches    Pain: 0/10  Location: R lower leg/ankle/foot     Objective     Herman received therapeutic exercises to develop strength and endurance for 42 minutes including:    Cardio vascular endurance/ROM and tissue extensibility, training with reciprocal motion of upper and lower limbs on sci-fit x 10 minutes at level 5.5 at > or equal to 70 spm w/o rest. (seat at position 23) - 752 total steps    Steamboats, 3-way: BlueTB 20x2 B, B UE   "support. Focus on full WB on L LE   Fwd over hurdles (5): 4 laps, // bars, BUE support   Stair trainin six-inch steps with step to pattern challenging L LE, B UE support. 5 rounds    + 4 rounds challenging R LE, B UE support  Standing fwd lunges to R LE: x50 onto 6" step. B UE support    Cybex hamstring curl 30x 7 plates  Cybex knee extension 2x15 40#      Home Exercises Provided and Patient Education Provided:  Education provided:   - Continue HEP. Importance of maintaining HEP with therapy to maximize outcomes    Written Home Exercises Provided: continue previously given HEP   Exercises were reviewed and Herman was able to demonstrate them prior to the end of the session.  Herman demonstrated good  understanding of the education provided.     See EMR under Patient Instructions for exercises provided 2020.    Assessment   Presented w/o pain.  Patient ambulated into the clinic with a WBQC.  Attempted to increase weight on knee extension but stopped and returned to previous weight at patient's request.  Patient completed today's therapy with no increase in symptoms prior to leaving the clinic.         Herman is progressing well towards his goals.   Pt prognosis is Fair.     Pt's spiritual, cultural and educational needs considered and pt agreeable to plan of care and goals.  Anticipated barriers to physical therapy: chronicity of current injury and significant SxHx with recent ankle fusion.     Short Term Goals: 4 weeks   1.) Pt will become compliant and independent with his initial HEP to promote decreased pain and improved mobility and strength. MET 2020  2) Pt to report decrease in pain levels from 3/10 at worse to 1/10 at worse. MET 2020  3.) Pt will improve R ankle MMT scores by 1 muscle grade to improve functional stability and strength. MET 2020  4.) Pt will be able to tolerate 60 minute exercise session with 3 resting breaks or less to demonstrate improved activity tolerance. MET " 3/16/2020  Long Term Goals: 8 weeks   1.) Pt will transition from NWB to WBAT and able to ambulate 30ft with a RW. PROGRESSING, NOT MET 8/3/2020   2.) Pt will be able to ambulate 300ft with a RW with no resting breaks to demonstrate improved ambulatory tolerance (10 weeks).  MET 5/4/2020  3.) Pt will transition from ambulation with RW to ambulation with SPC to demonstrate improved independence. (12 weeks). PROGRESSING, NOT MET 8/3/2020   4.) Pt will transition from ambulation with SPC to ambulation with no AD to demonstrate further independence. (14 weeks). PROGRESSING, NOT MET 8/3/2020   5.) Pt will improve her FOTO score from 70% impairment to 48% improvement to indicate improvement in overall function. PROGRESSING, NOT MET 8/3/2020   *Additoinal LTG will be set when appropriate functional measures are able to be tested.*     Plan     Continue with conditioning to improve cardiovascular endurance; progress closed kinetic chain activities in FWB. Waiting on ankle brace before boot walker use can be discontinued.     Wesley Easley, PTA

## 2020-08-04 ENCOUNTER — CLINICAL SUPPORT (OUTPATIENT)
Dept: REHABILITATION | Facility: HOSPITAL | Age: 50
End: 2020-08-04
Payer: OTHER MISCELLANEOUS

## 2020-08-04 DIAGNOSIS — M25.671 ANKLE STIFFNESS, RIGHT: ICD-10-CM

## 2020-08-04 PROCEDURE — 97110 THERAPEUTIC EXERCISES: CPT | Mod: PN

## 2020-08-04 NOTE — PROGRESS NOTES
Physical Therapy Daily Treatment Note     Name: Herman GUERRERO [facility] Number: [id number]    Therapy Diagnosis:   Encounter Diagnosis   Name Primary?    Ankle stiffness, right      Physician: Kadie Grider MD    Visit Date: 8/4/2020    Physician Orders: PT Eval and Treat   Medical Diagnosis from Referral:   Z98.1 (ICD-10-CM) - Status post ankle fusion   M25.551,M25.552 (ICD-10-CM) - Bilateral hip pain   M25.561,M25.562 (ICD-10-CM) - Bilateral knee pain   Evaluation Date: 2/18/2020  Authorization Period Expiration: 3/18/2021  Plan of Care Expiration: 9/11/2020  Visit # / Visits authorized: 15/24 for current authorization (previously seen for 28 treatment sessions for this case)  FOTO: 15/20      Time In: 145pm  Time Out: 230pm  Total Billable Time: 45 minutes TE-3    Precautions: Standard; WBAT with boot  *Pt also has difficulty with hearing and typically reads lips. This has been a challenge for the patient as masks are currently worn at all times within the clinic due to COVID19 crisis. Pt requires marked tactile and visual cuing throughout his treatment session.*    Subjective     Pt reports: having no pain. Reports that he has been trying to ambulate more around the home with his quad cane and boot in place. Reports that he received a phone call from his MD and reports that his brace came in and they are only waiting on the shoe now. Reports he was told they think that the shoe will be in at his next MD appointment next week.   He was compliant with home exercise program.   Response to previous treatment:  Fatigue  Functional change: has been able to go up and down the stairs at his home with less difficulty, has transitioned himself to amb with quad cane > B axillar crutches    Pain: 0/10  Location: R lower leg/ankle/foot     Objective     Herman received therapeutic exercises to develop strength and endurance for 45 minutes including:    Cardio vascular endurance/ROM and  "tissue extensibility, training with reciprocal motion of upper and lower limbs on sci-fit x 10 minutes at level 5.5 at > or equal to 70 spm w/o rest. (seat at position 23) - 797 total steps    Steamboats, 3-way: BlueTB 20x2 B, B UE  support. Focus on full WB on L LE (Out of Time)   Fwd over hurdles (5): 4 laps, // bars, BUE support (Out of Time)   Stair trainin six-inch steps with step to pattern challenging L LE, B UE support. 5 rounds    + 4 rounds challenging R LE, B UE support  Standing fwd lunges to R LE: x50 onto 6" step. B UE support  Cybex hamstring curl 30x 7 plates  Cybex knee extension 2x15 40#    Home Exercises Provided and Patient Education Provided:  Education provided:   - Continue HEP. Importance of maintaining HEP with therapy to maximize outcomes    Written Home Exercises Provided: continue previously given HEP   Exercises were reviewed and Herman was able to demonstrate them prior to the end of the session.  Herman demonstrated good  understanding of the education provided.     See EMR under Patient Instructions for exercises provided 2020.    Assessment   Presented w/o pain.  Patient ambulated into the clinic with Cam boot and WBQC per current precautions. Suspected progression next week to brace and custom shoe from MD.   Patient completed today's therapy with no increase in symptoms prior to leaving the clinic and less fatigue per reports. Pt pleased with increase in # steps completed with stepper in 10 minuts. Pt's goal is 800.      Herman is progressing well towards his goals.   Pt prognosis is Fair.     Pt's spiritual, cultural and educational needs considered and pt agreeable to plan of care and goals.  Anticipated barriers to physical therapy: chronicity of current injury and significant SxHx with recent ankle fusion.     Short Term Goals: 4 weeks   1.) Pt will become compliant and independent with his initial HEP to promote decreased pain and improved mobility and strength. MET " 5/4/2020  2) Pt to report decrease in pain levels from 3/10 at worse to 1/10 at worse. MET 5/4/2020  3.) Pt will improve R ankle MMT scores by 1 muscle grade to improve functional stability and strength. MET 5/4/2020  4.) Pt will be able to tolerate 60 minute exercise session with 3 resting breaks or less to demonstrate improved activity tolerance. MET 3/16/2020  Long Term Goals: 8 weeks   1.) Pt will transition from NWB to WBAT and able to ambulate 30ft with a RW. PROGRESSING, NOT MET 8/4/2020   2.) Pt will be able to ambulate 300ft with a RW with no resting breaks to demonstrate improved ambulatory tolerance (10 weeks).  MET 5/4/2020  3.) Pt will transition from ambulation with RW to ambulation with SPC to demonstrate improved independence. (12 weeks). PROGRESSING, NOT MET 8/4/2020   4.) Pt will transition from ambulation with SPC to ambulation with no AD to demonstrate further independence. (14 weeks). PROGRESSING, NOT MET 8/4/2020   5.) Pt will improve her FOTO score from 70% impairment to 48% improvement to indicate improvement in overall function. PROGRESSING, NOT MET 8/4/2020   *Additoinal LTG will be set when appropriate functional measures are able to be tested.*     Plan     Continue with conditioning to improve cardiovascular endurance; progress closed kinetic chain activities in FWB. Waiting on ankle brace before boot walker use can be discontinued.     Vinicius Ferreira, PT

## 2020-08-06 ENCOUNTER — CLINICAL SUPPORT (OUTPATIENT)
Dept: REHABILITATION | Facility: HOSPITAL | Age: 50
End: 2020-08-06
Payer: OTHER MISCELLANEOUS

## 2020-08-06 DIAGNOSIS — M25.671 ANKLE STIFFNESS, RIGHT: ICD-10-CM

## 2020-08-06 PROCEDURE — 97110 THERAPEUTIC EXERCISES: CPT | Mod: PN

## 2020-08-06 NOTE — PROGRESS NOTES
Physical Therapy Daily Treatment Note     Name: Herman GUERRERO [facility] Number: [id number]    Therapy Diagnosis:   Encounter Diagnosis   Name Primary?    Ankle stiffness, right      Physician: Kadie Grider MD    Visit Date: 8/6/2020    Physician Orders: PT Eval and Treat   Medical Diagnosis from Referral:   Z98.1 (ICD-10-CM) - Status post ankle fusion   M25.551,M25.552 (ICD-10-CM) - Bilateral hip pain   M25.561,M25.562 (ICD-10-CM) - Bilateral knee pain   Evaluation Date: 2/18/2020  Authorization Period Expiration: 3/18/2021  Plan of Care Expiration: 9/11/2020  Visit # / Visits authorized: 16/24 for current authorization (previously seen for 28 treatment sessions for this case)  FOTO: 16/20      Time In: 145pm  Time Out: 230pm  Total Billable Time: 45 minutes TE-3    Precautions: Standard; WBAT with boot  *Pt also has difficulty with hearing and typically reads lips. This has been a challenge for the patient as masks are currently worn at all times within the clinic due to COVID19 crisis. Pt requires marked tactile and visual cuing throughout his treatment session.*    Subjective     Pt reports: having no pain. Reports that he has been trying to ambulate more around the home with his quad cane and boot in place. Reports that he received a phone call from his MD and reports that his brace came in and they are only waiting on the shoe now. Reports he was told they think that the shoe will be in at his next MD appointment next week.   He was compliant with home exercise program.   Response to previous treatment:  Fatigue  Functional change: has been able to go up and down the stairs at his home with less difficulty, has transitioned himself to amb with quad cane > B axillar crutches    Pain: 0/10  Location: R lower leg/ankle/foot     Objective     Herman received therapeutic exercises to develop strength and endurance for 45 minutes including:    Cardio vascular endurance/ROM and  "tissue extensibility, training with reciprocal motion of upper and lower limbs on sci-fit x 10 minutes at level 5.5 at > or equal to 70 spm w/o rest. (seat at position 23) - 825! total steps  Stair trainin six-inch steps with step through pattern ascending with step to descending B UE support. 10 rounds   Steamboats, 3-way: BlueTB 20x2 B, B UE  support. Focus on full WB on L LE   Standing fwd lunges to R LE: x50 onto 6" step. B UE support  Cybex hamstring curl 30x 7 plates  Cybex knee extension 2x15 40#    Home Exercises Provided and Patient Education Provided:  Education provided:   - Continue HEP. Importance of maintaining HEP with therapy to maximize outcomes    Written Home Exercises Provided: continue previously given HEP   Exercises were reviewed and Herman was able to demonstrate them prior to the end of the session.  Herman demonstrated good  understanding of the education provided.     See EMR under Patient Instructions for exercises provided 2020.    Assessment   Presented w/o pain.  Patient ambulated into the clinic with Cam boot and WBQC per current precautions. Suspected progression next week to brace and custom shoe from MD.   Patient completed today's therapy with no increase in symptoms prior to leaving the clinic and less fatigue per reports. Pt pleased with increase in # steps completed with stepper in 10 minuts. Pt's goal was 800 and he was able to complete 825 today; he reported being very proud of himself post!      Herman is progressing well towards his goals.   Pt prognosis is Fair.     Pt's spiritual, cultural and educational needs considered and pt agreeable to plan of care and goals.  Anticipated barriers to physical therapy: chronicity of current injury and significant SxHx with recent ankle fusion.     Short Term Goals: 4 weeks   1.) Pt will become compliant and independent with his initial HEP to promote decreased pain and improved mobility and strength. MET 2020  2) Pt to " report decrease in pain levels from 3/10 at worse to 1/10 at worse. MET 5/4/2020  3.) Pt will improve R ankle MMT scores by 1 muscle grade to improve functional stability and strength. MET 5/4/2020  4.) Pt will be able to tolerate 60 minute exercise session with 3 resting breaks or less to demonstrate improved activity tolerance. MET 3/16/2020  Long Term Goals: 8 weeks   1.) Pt will transition from NWB to WBAT and able to ambulate 30ft with a RW. PROGRESSING, NOT MET 8/6/2020   2.) Pt will be able to ambulate 300ft with a RW with no resting breaks to demonstrate improved ambulatory tolerance (10 weeks).  MET 5/4/2020  3.) Pt will transition from ambulation with RW to ambulation with SPC to demonstrate improved independence. (12 weeks). PROGRESSING, NOT MET 8/6/2020   4.) Pt will transition from ambulation with SPC to ambulation with no AD to demonstrate further independence. (14 weeks). PROGRESSING, NOT MET 8/6/2020   5.) Pt will improve her FOTO score from 70% impairment to 48% improvement to indicate improvement in overall function. PROGRESSING, NOT MET 8/6/2020   *Additoinal LTG will be set when appropriate functional measures are able to be tested.*     Plan     Continue with conditioning to improve cardiovascular endurance; progress closed kinetic chain activities in FWB. Waiting on ankle brace before boot walker use can be discontinued.     Vinicius Ferreira, PT

## 2020-08-10 ENCOUNTER — TELEPHONE (OUTPATIENT)
Dept: CARDIOLOGY | Facility: CLINIC | Age: 50
End: 2020-08-10

## 2020-08-10 ENCOUNTER — CLINICAL SUPPORT (OUTPATIENT)
Dept: REHABILITATION | Facility: HOSPITAL | Age: 50
End: 2020-08-10
Payer: OTHER MISCELLANEOUS

## 2020-08-10 DIAGNOSIS — M25.671 ANKLE STIFFNESS, RIGHT: ICD-10-CM

## 2020-08-10 PROCEDURE — 97110 THERAPEUTIC EXERCISES: CPT | Mod: PN

## 2020-08-10 NOTE — TELEPHONE ENCOUNTER
I spoke to the patients wife to let her know that according to the referral order the echo isn't related to the patients workers comp case on file and he doesn't have any other insurance on file.    She expressed understanding and stated that she would call the workers comp company and call us back.    ----- Message from Kate Kim sent at 8/10/2020  3:25 PM CDT -----  Contact: wife/Grace  [phone]  Patient wife calling to speak with you regarding the patient MRI approval  Please advise

## 2020-08-10 NOTE — PROGRESS NOTES
Physical Therapy Daily Treatment Note     Name: Herman GUERRERO [facility] Number: [id number]    Therapy Diagnosis:   Encounter Diagnosis   Name Primary?    Ankle stiffness, right      Physician: Kadie Grider MD    Visit Date: 8/10/2020    Physician Orders: PT Eval and Treat   Medical Diagnosis from Referral:   Z98.1 (ICD-10-CM) - Status post ankle fusion   M25.551,M25.552 (ICD-10-CM) - Bilateral hip pain   M25.561,M25.562 (ICD-10-CM) - Bilateral knee pain   Evaluation Date: 2/18/2020  Authorization Period Expiration: 3/18/2021  Plan of Care Expiration: 9/11/2020  Visit # / Visits authorized: 17/24 for current authorization (previously seen for 28 treatment sessions for this case)  FOTO: 17/20      Time In: 1:32 pm  Time Out: 2:15 pm  Total Billable Time: 43 minutes TE-3    Precautions: Standard; WBAT with boot  *Pt also has difficulty with hearing and typically reads lips. This has been a challenge for the patient as masks are currently worn at all times within the clinic due to COVID19 crisis. Pt requires marked tactile and visual cuing throughout his treatment session.*    Subjective     Pt reports: having no pain. Reports that he sees MD tomorrow and hopes to be getting his boot off. Also reports that his brace came in and they are only waiting on the shoe now.     He was compliant with home exercise program.   Response to previous treatment:  Fatigue  Functional change: has been able to go up and down the stairs at his home with less difficulty, has transitioned himself to amb with quad cane > B axillar crutches    Pain: 0/10  Location: R lower leg/ankle/foot     Objective     Herman received therapeutic exercises to develop strength and endurance for 45 minutes including:    Cardio vascular endurance/ROM and tissue extensibility, training with reciprocal motion of upper and lower limbs on sci-fit x 10 minutes at level 5.5 at > or equal to 70 spm w/o rest. (seat at position 23)  "- 890! total steps  Stair trainin six-inch steps with step through pattern ascending with step to descending B UE support. 10 rounds   Steamboats, 3-way: BlueTB 20x2 B, B UE  support. Focus on full WB on L LE   Standing fwd lunges to R LE: x50 onto 6" step. B UE support  Cybex hamstring curl 30x 7 plates  Cybex knee extension 2x15 40#    Home Exercises Provided and Patient Education Provided:  Education provided:   - Continue HEP. Importance of maintaining HEP with therapy to maximize outcomes    Written Home Exercises Provided: continue previously given HEP   Exercises were reviewed and Herman was able to demonstrate them prior to the end of the session.  Herman demonstrated good  understanding of the education provided.     See EMR under Patient Instructions for exercises provided 2020.    Assessment   Presented w/o pain.  Patient ambulated into the clinic with Cam boot and WBQC per current precautions. Patient completed today's therapy with no increase in symptoms prior to leaving the clinic and less fatigue per reports. Pt's goal was 800 and he was able to complete 890 today. Pt reports having a MD visit tomorrow and hopes to be getting his boot off. Pt's brace came in, but they were still waiting on the shoe to come in.     Herman is progressing well towards his goals.   Pt prognosis is Fair.     Pt's spiritual, cultural and educational needs considered and pt agreeable to plan of care and goals.  Anticipated barriers to physical therapy: chronicity of current injury and significant SxHx with recent ankle fusion.     Short Term Goals: 4 weeks   1.) Pt will become compliant and independent with his initial HEP to promote decreased pain and improved mobility and strength. MET 2020  2) Pt to report decrease in pain levels from 3/10 at worse to 1/10 at worse. MET 2020  3.) Pt will improve R ankle MMT scores by 1 muscle grade to improve functional stability and strength. MET 2020  4.) Pt will be " able to tolerate 60 minute exercise session with 3 resting breaks or less to demonstrate improved activity tolerance. MET 3/16/2020  Long Term Goals: 8 weeks   1.) Pt will transition from NWB to WBAT and able to ambulate 30ft with a RW. PROGRESSING, NOT MET 8/10/2020   2.) Pt will be able to ambulate 300ft with a RW with no resting breaks to demonstrate improved ambulatory tolerance (10 weeks).  MET 5/4/2020  3.) Pt will transition from ambulation with RW to ambulation with SPC to demonstrate improved independence. (12 weeks). PROGRESSING, NOT MET 8/10/2020   4.) Pt will transition from ambulation with SPC to ambulation with no AD to demonstrate further independence. (14 weeks). PROGRESSING, NOT MET 8/10/2020   5.) Pt will improve her FOTO score from 70% impairment to 48% improvement to indicate improvement in overall function. PROGRESSING, NOT MET 8/10/2020   *Additoinal LTG will be set when appropriate functional measures are able to be tested.*     Plan     Continue with conditioning to improve cardiovascular endurance; progress closed kinetic chain activities in FWB. Waiting on ankle brace before boot walker use can be discontinued.     Jere Crook, PT

## 2020-08-11 ENCOUNTER — CLINICAL SUPPORT (OUTPATIENT)
Dept: REHABILITATION | Facility: HOSPITAL | Age: 50
End: 2020-08-11
Payer: OTHER MISCELLANEOUS

## 2020-08-11 DIAGNOSIS — M25.671 ANKLE STIFFNESS, RIGHT: ICD-10-CM

## 2020-08-11 PROCEDURE — 97110 THERAPEUTIC EXERCISES: CPT | Mod: PN,CQ

## 2020-08-11 NOTE — PROGRESS NOTES
Physical Therapy Daily Treatment Note     Name: Herman GUERRERO [facility] Number: [id number]    Therapy Diagnosis:   Encounter Diagnosis   Name Primary?    Ankle stiffness, right      Physician: Kadie Grider MD    Visit Date: 8/11/2020    Physician Orders: PT Eval and Treat   Medical Diagnosis from Referral:   Z98.1 (ICD-10-CM) - Status post ankle fusion   M25.551,M25.552 (ICD-10-CM) - Bilateral hip pain   M25.561,M25.562 (ICD-10-CM) - Bilateral knee pain   Evaluation Date: 2/18/2020  Authorization Period Expiration: 3/18/2021  Plan of Care Expiration: 9/11/2020  Visit # / Visits authorized: 18/24 for current authorization (previously seen for 28 treatment sessions for this case)  FOTO: 18/20      Time In: 1:35 pm  Time Out: 2:15 pm  Total Billable Time: 40 minutes TE-3    Precautions: Standard; WBAT with ankle brace  *Pt also has difficulty with hearing and typically reads lips. This has been a challenge for the patient as masks are currently worn at all times within the clinic due to COVID19 crisis. Pt requires marked tactile and visual cuing throughout his treatment session.*    Subjective     Pt reports: having no pain.  Patient reports his ankle brace does not feel comfortable but was told that when his shoe comes in, it will help because it is wider, to accommodate the ankle brace.  Patient reports he went to his MD, took an x-ray to make sure the anterior screw wasn't coming out, it is not.       He was compliant with home exercise program.   Response to previous treatment:  Fatigue  Functional change: has been able to go up and down the stairs at his home with less difficulty, has transitioned himself to amb with quad cane > B axillar crutches    Pain: 0/10  Location: R lower leg/ankle/foot     Objective     Herman received therapeutic exercises to develop strength and endurance for 40 minutes including:    Cardio vascular endurance/ROM and tissue extensibility, training  "with reciprocal motion of upper and lower limbs on sci-fit x 10 minutes at level 5.5 at > or equal to 70 spm w/o rest. (seat at position 23) - 711 total steps  Stair trainin six-inch steps with step through pattern ascending with step to descending B UE support.10 rounds - not today  Steamboats, 3-way: BlueTB 20x2 B, B UE  support. Focus on full WB on L LE   Standing fwd lunges to R LE: x50 onto 6" step. B UE support - not today  Cybex hamstring curl 30x 7 plates  Cybex knee extension 2x15 40#    Home Exercises Provided and Patient Education Provided:  Education provided:   - Continue HEP. Importance of maintaining HEP with therapy to maximize outcomes    Written Home Exercises Provided: continue previously given HEP   Exercises were reviewed and Herman was able to demonstrate them prior to the end of the session.  Herman demonstrated good  understanding of the education provided.     See EMR under Patient Instructions for exercises provided 2020.    Assessment   Presented w/o pain.  Patient ambulated into the clinic with ankle brace and WBQC per current precautions.  Patient ambulates at a much slower pace with ankle brace on.  Patient did not complete all of his exercises due to ambulating very slowly in the clinic.       Herman is progressing well towards his goals.   Pt prognosis is Fair.     Pt's spiritual, cultural and educational needs considered and pt agreeable to plan of care and goals.  Anticipated barriers to physical therapy: chronicity of current injury and significant SxHx with recent ankle fusion.     Short Term Goals: 4 weeks   1.) Pt will become compliant and independent with his initial HEP to promote decreased pain and improved mobility and strength. MET 2020  2) Pt to report decrease in pain levels from 3/10 at worse to 1/10 at worse. MET 2020  3.) Pt will improve R ankle MMT scores by 1 muscle grade to improve functional stability and strength. MET 2020  4.) Pt will be able to " tolerate 60 minute exercise session with 3 resting breaks or less to demonstrate improved activity tolerance. MET 3/16/2020  Long Term Goals: 8 weeks   1.) Pt will transition from NWB to WBAT and able to ambulate 30ft with a RW. PROGRESSING, NOT MET 8/11/2020   2.) Pt will be able to ambulate 300ft with a RW with no resting breaks to demonstrate improved ambulatory tolerance (10 weeks).  MET 5/4/2020  3.) Pt will transition from ambulation with RW to ambulation with SPC to demonstrate improved independence. (12 weeks). PROGRESSING, NOT MET 8/11/2020   4.) Pt will transition from ambulation with SPC to ambulation with no AD to demonstrate further independence. (14 weeks). PROGRESSING, NOT MET 8/11/2020   5.) Pt will improve her FOTO score from 70% impairment to 48% improvement to indicate improvement in overall function. PROGRESSING, NOT MET 8/11/2020   *Additoinal LTG will be set when appropriate functional measures are able to be tested.*     Plan     Continue with conditioning to improve cardiovascular endurance; progress closed kinetic chain activities in FWB.      Wesley Easley, PTA

## 2020-08-13 ENCOUNTER — CLINICAL SUPPORT (OUTPATIENT)
Dept: REHABILITATION | Facility: HOSPITAL | Age: 50
End: 2020-08-13
Payer: OTHER MISCELLANEOUS

## 2020-08-13 DIAGNOSIS — M25.671 ANKLE STIFFNESS, RIGHT: ICD-10-CM

## 2020-08-13 PROCEDURE — 97110 THERAPEUTIC EXERCISES: CPT | Mod: PN

## 2020-08-13 NOTE — PROGRESS NOTES
Physical Therapy Daily Treatment Note     Name: Herman GUERRERO [facility] Number: [id number]    Therapy Diagnosis:   Encounter Diagnosis   Name Primary?    Ankle stiffness, right      Physician: Kadie Grider MD    Visit Date: 8/13/2020    Physician Orders: PT Eval and Treat   Medical Diagnosis from Referral:   Z98.1 (ICD-10-CM) - Status post ankle fusion   M25.551,M25.552 (ICD-10-CM) - Bilateral hip pain   M25.561,M25.562 (ICD-10-CM) - Bilateral knee pain   Evaluation Date: 2/18/2020  Authorization Period Expiration: 3/18/2021  Plan of Care Expiration: 9/11/2020  Visit # / Visits authorized: 19/24 for current authorization (previously seen for 28 treatment sessions for this case)  FOTO: 19/20    Time In: 2:12 pm  Time Out: 2:45 pm  Total Billable Time: 33 minutes TE-2    Precautions: Standard; WBAT with ankle brace  *Pt also has difficulty with hearing and typically reads lips. This has been a challenge for the patient as masks are currently worn at all times within the clinic due to COVID19 crisis. Pt requires marked tactile and visual cuing throughout his treatment session.*    Subjective     Pt reports: having increased right ankle pain laterally since donning his new leather boot plus new shoe last Tuesday. He was fitted for the boot and wore it the whole day and was more active than normal. He has been very sore since, and has not put it back on. Pt reports boot does have metal support strips on the sides of the leather brace.    He was compliant with home exercise program.   Response to previous treatment:  Fatigue  Functional change: has been able to go up and down the stairs at his home with less difficulty, has transitioned himself to amb with quad cane > B axillar crutches    Pain: 4/10  Location: R lateral ankle    Objective     Herman received therapeutic exercises to develop strength and endurance for 33 minutes including:    Cardio vascular endurance/ROM and tissue  "extensibility, training with reciprocal motion of upper and lower limbs on sci-fit x 10 minutes at level 5.5 at > or equal to 70 spm w/o rest. (seat at position 23) - 711 total steps - Not done today    Stair trainin six-inch steps with step through pattern ascending with step to descending B UE support.10 rounds - not today  Steamboats, 3-way: BlueTB 20x2 B, B UE  support. Focus on full WB on L LE   Standing fwd lunges to R LE: x50 onto 6" step. 1-2 UE use intermittently  Lateral step ups: next  Cybex hamstring curl 30x 7 plates  Cybex knee extension 2x15 40#    Home Exercises Provided and Patient Education Provided:  Education provided:   - Continue HEP. Importance of maintaining HEP with therapy to maximize outcomes    Written Home Exercises Provided: continue previously given HEP   Exercises were reviewed and Herman was able to demonstrate them prior to the end of the session.  Herman demonstrated good  understanding of the education provided.     See EMR under Patient Instructions for exercises provided 2020.    Assessment   Decreased exercises today due to lingering soreness from last Tuesday, and he was having trouble weightbearing slightly due to right lateral ankle pain. It appears that pt may of wore boot for too long that day and there may need to be some fitting adjustments as well. Pt was told to bring in leather brace next visit, and to try to wear leather brace on and off for about 1-2 hours at a time intially and build his tolerance over time. Resume normal TE next visit and assess boot status if he brings it in.     Herman is progressing well towards his goals.   Pt prognosis is Fair.     Pt's spiritual, cultural and educational needs considered and pt agreeable to plan of care and goals.  Anticipated barriers to physical therapy: chronicity of current injury and significant SxHx with recent ankle fusion.     Short Term Goals: 4 weeks   1.) Pt will become compliant and independent with his " initial HEP to promote decreased pain and improved mobility and strength. MET 5/4/2020  2) Pt to report decrease in pain levels from 3/10 at worse to 1/10 at worse. MET 5/4/2020  3.) Pt will improve R ankle MMT scores by 1 muscle grade to improve functional stability and strength. MET 5/4/2020  4.) Pt will be able to tolerate 60 minute exercise session with 3 resting breaks or less to demonstrate improved activity tolerance. MET 3/16/2020  Long Term Goals: 8 weeks   1.) Pt will transition from NWB to WBAT and able to ambulate 30ft with a RW. PROGRESSING, NOT MET 8/13/2020   2.) Pt will be able to ambulate 300ft with a RW with no resting breaks to demonstrate improved ambulatory tolerance (10 weeks).  MET 5/4/2020  3.) Pt will transition from ambulation with RW to ambulation with SPC to demonstrate improved independence. (12 weeks). PROGRESSING, NOT MET 8/13/2020   4.) Pt will transition from ambulation with SPC to ambulation with no AD to demonstrate further independence. (14 weeks). PROGRESSING, NOT MET 8/13/2020   5.) Pt will improve her FOTO score from 70% impairment to 48% improvement to indicate improvement in overall function. PROGRESSING, NOT MET 8/13/2020   *Additoinal LTG will be set when appropriate functional measures are able to be tested.*     Plan     Continue with conditioning to improve cardiovascular endurance; progress closed kinetic chain activities in FWB.      Nate Ruiz, PT

## 2020-08-18 ENCOUNTER — CLINICAL SUPPORT (OUTPATIENT)
Dept: REHABILITATION | Facility: HOSPITAL | Age: 50
End: 2020-08-18
Payer: OTHER MISCELLANEOUS

## 2020-08-18 DIAGNOSIS — M25.671 ANKLE STIFFNESS, RIGHT: ICD-10-CM

## 2020-08-18 PROCEDURE — 97110 THERAPEUTIC EXERCISES: CPT | Mod: PN

## 2020-08-18 NOTE — PROGRESS NOTES
Physical Therapy Daily Treatment Note     Name: Herman GUERRERO [facility] Number: [id number]    Therapy Diagnosis:   Encounter Diagnosis   Name Primary?    Ankle stiffness, right      Physician: Kadie Grider MD    Visit Date: 8/18/2020    Physician Orders: PT Eval and Treat   Medical Diagnosis from Referral:   Z98.1 (ICD-10-CM) - Status post ankle fusion   M25.551,M25.552 (ICD-10-CM) - Bilateral hip pain   M25.561,M25.562 (ICD-10-CM) - Bilateral knee pain   Evaluation Date: 2/18/2020  Authorization Period Expiration: 3/18/2021  Plan of Care Expiration: 9/11/2020  Visit # / Visits authorized: 21/24 for current authorization (previously seen for 28 treatment sessions for this case)  FOTO: 20/20 Next    Time In: 2:12 pm  Time Out: 2:45 pm  Total Billable Time: 33 minutes TE-2    Precautions: Standard; WBAT with ankle brace  *Pt also has difficulty with hearing and typically reads lips. This has been a challenge for the patient as masks are currently worn at all times within the clinic due to COVID19 crisis. Pt requires marked tactile and visual cuing throughout his treatment session.*    Subjective     Pt reports: having increased right ankle pain laterally since donning his new leather boot plus new shoe last Tuesday. He was fitted for the boot and wore it the whole day and was more active than normal. He has been very sore since, and has not put it back on. Pt reports boot does have metal support strips on the sides of the leather brace.    He was compliant with home exercise program.   Response to previous treatment:  Fatigue  Functional change: has been able to go up and down the stairs at his home with less difficulty, has transitioned himself to amb with quad cane > B axillar crutches    Pain: 4/10  Location: R lateral ankle    Objective     Herman received therapeutic exercises to develop strength and endurance for 33 minutes including:    Cardio vascular endurance/ROM and  "tissue extensibility, training with reciprocal motion of upper and lower limbs on sci-fit x 10 minutes at level 5.5 at > or equal to 70 spm w/o rest. (seat at position 23) - 711 total steps - Not done today    Stair trainin six-inch steps with step through pattern ascending with step to descending B UE support.10 rounds - not today  Steamboats, 3-way: BlueTB 20x2 B, B UE  support. Focus on full WB on L LE - not today  Standing fwd lunges to R LE: x50 onto 6" step. 1-2 UE use intermittently  Lateral step ups: 2x8 L1 R  Foot taps: next on blue box  Forward step ups: next  Cybex hamstring curl 30x 7 plates  Cybex knee extension 2x15 40#      Update: 2020  Ankle AROM (R/L):   Ankle AROM Right (*) = in degrees Left (*) = in degrees Comments    Dorsiflexion -18 > -1 > 0 > -8 WNL Stiffness noted.    Plantarflexion 22 > 22 > 17 > 15 WNL Stiffness noted.    Inversion 2 > 9 > 14 > 15 WNL Stiffness and pain noted.    Eversion 2 > 8 > 8 > 11 WNL Stiffness and pain noted.    Great toe extension 21 > 36 > 33 > 34 WNL Stiffness and pain noted.    Ankle PROM Right (*) = in degrees Left (*) = in degrees Comments    Dorsiflexion -10 > 4 > 3 > -2 WNL Stiffness and pain noted.    Plantarflexion 26 > 27 > 22 > 19 WNL Stiffness and pain noted.    Inversion 10 > 12> 15 > 15 WNL Stiffness and pain noted.    Eversion 6 > 13 > 13 > 12 WNL Stiffness and pain noted.    Great toe extension 43 > 45 > 38 > 37 WNL Stiffness noted.       Ankle MMT:   Ankle MMT scores Right: X/5 Left: x/5   Dorsiflexion 2+ /5 > 3- > 3- 4 /5   Plantarflexion  3- /5 > 3- > 3- 4 /5    Inversion 2+ /5 > 3- > 3- 4+ /5    Eversion 2+ /5 > 3- > 3- 4 /5    Great toe extension 2+ /5 > 3-> 3- 4 /5      Circumferential measures:   Circumferential measures Right in cm   Figure 8 68 > 70.6 > 70.9 > 71.0   At [place] 37 > 36 > 34.8 > 37   At Metatarsal heads 26 > 26.7 > 26.4 > 27.0     Home Exercises Provided and Patient Education Provided:  Education provided:   - " Continue HEP. Importance of maintaining HEP with therapy to maximize outcomes    Written Home Exercises Provided: continue previously given HEP   Exercises were reviewed and Herman was able to demonstrate them prior to the end of the session.  Herman demonstrated good  understanding of the education provided.     See EMR under Patient Instructions for exercises provided 2/20/2020.    Assessment   Re-assessment done today. Advanced weight bearing activities and moderate cueing given for full RLE WB in standing exercises. Decreased pain after session that he thinks is from the re-assessment portion with loosening ankle up. Pt told again to bring in leather boot brace next visit; it was misunderstood in the past session that his new shoe did not come in yet and is waiting on it still.     Herman is progressing well towards his goals.   Pt prognosis is Fair.     Pt's spiritual, cultural and educational needs considered and pt agreeable to plan of care and goals.  Anticipated barriers to physical therapy: chronicity of current injury and significant SxHx with recent ankle fusion.     Short Term Goals: 4 weeks   1.) Pt will become compliant and independent with his initial HEP to promote decreased pain and improved mobility and strength. MET 5/4/2020  2) Pt to report decrease in pain levels from 3/10 at worse to 1/10 at worse. MET 5/4/2020  3.) Pt will improve R ankle MMT scores by 1 muscle grade to improve functional stability and strength. MET 5/4/2020  4.) Pt will be able to tolerate 60 minute exercise session with 3 resting breaks or less to demonstrate improved activity tolerance. MET 3/16/2020  Long Term Goals: 8 weeks   1.) Pt will transition from NWB to WBAT and able to ambulate 30ft with a RW. PROGRESSING, NOT MET 8/18/2020   2.) Pt will be able to ambulate 300ft with a RW with no resting breaks to demonstrate improved ambulatory tolerance (10 weeks).  MET 5/4/2020  3.) Pt will transition from ambulation with RW to  ambulation with SPC to demonstrate improved independence. (12 weeks). PROGRESSING, NOT MET 8/18/2020   4.) Pt will transition from ambulation with SPC to ambulation with no AD to demonstrate further independence. (14 weeks). PROGRESSING, NOT MET 8/18/2020   5.) Pt will improve her FOTO score from 70% impairment to 48% improvement to indicate improvement in overall function. PROGRESSING, NOT MET 8/18/2020   *Additoinal LTG will be set when appropriate functional measures are able to be tested.*     Plan     Continue with conditioning to improve cardiovascular endurance; progress closed kinetic chain activities in FWB.      Nate Ruiz, PT

## 2020-08-20 ENCOUNTER — CLINICAL SUPPORT (OUTPATIENT)
Dept: REHABILITATION | Facility: HOSPITAL | Age: 50
End: 2020-08-20
Payer: OTHER MISCELLANEOUS

## 2020-08-20 DIAGNOSIS — M25.671 ANKLE STIFFNESS, RIGHT: ICD-10-CM

## 2020-08-20 PROCEDURE — 97110 THERAPEUTIC EXERCISES: CPT | Mod: PN,CQ

## 2020-08-20 NOTE — PROGRESS NOTES
Physical Therapy Daily Treatment Note     Name: Herman GUERRERO [facility] Number: [id number]    Therapy Diagnosis:   Encounter Diagnosis   Name Primary?    Ankle stiffness, right      Physician: Kadie Grider MD    Visit Date: 8/20/2020    Physician Orders: PT Eval and Treat   Medical Diagnosis from Referral:   Z98.1 (ICD-10-CM) - Status post ankle fusion   M25.551,M25.552 (ICD-10-CM) - Bilateral hip pain   M25.561,M25.562 (ICD-10-CM) - Bilateral knee pain   Evaluation Date: 2/18/2020  Authorization Period Expiration: 3/18/2021  Plan of Care Expiration: 9/11/2020  Visit # / Visits authorized: 20/24 for current authorization (previously seen for 28 treatment sessions for this case)  FOTO: 20/20    Time In: 1:35 pm  Time Out: 2:25 pm  Total Billable Time: 50 minutes TE-3    Precautions: Standard; WBAT with ankle brace  *Pt also has difficulty with hearing and typically reads lips. This has been a challenge for the patient as masks are currently worn at all times within the clinic due to COVID19 crisis. Pt requires marked tactile and visual cuing throughout his treatment session.*    Subjective     Pt reports: when he first walked out of his house coming to therapy, he experienced a sharp pain along the medial malleolus.  Patient reports the pain is not as sharp now but is still there.  Patient reports he is worried that he may have a blood clot since his calf swells up so much.  Patient reports he has not worn the ankle brace since Saturday as he has difficulty trying to get it on by himself.    He was compliant with home exercise program.   Response to previous treatment:  Fatigue  Functional change: has been able to go up and down the stairs at his home with less difficulty, has transitioned himself to amb with quad cane only     Pain: 6/10  Location: R lateral ankle    Objective     Herman received therapeutic exercises to develop strength and endurance for 50 minutes  "including:    Cardio vascular endurance/ROM and tissue extensibility, training with reciprocal motion of upper and lower limbs on sci-fit x 10 minutes at level 5.5 at > or equal to 70 spm w/o rest. (seat at position 23) - 711 total steps - Not done today    Stair trainin six-inch steps with step through pattern ascending with step to descending B UE support.10 rounds - not today  Steamboats, 3-way: BlueTB 20x2 B, B UE  support. Focus on full WB on L LE - not today  - not today  Standing fwd lunges to R LE: x50 onto 6" step. 1-2 UE use intermittently - not today  Lateral step ups: next  Cybex hamstring curl 30x 7 plates - not today  Cybex knee extension 2x15 40# - not today      Home Exercises Provided and Patient Education Provided:  Education provided:   - Continue HEP. Importance of maintaining HEP with therapy to maximize outcomes    Written Home Exercises Provided: continue previously given HEP   Exercises were reviewed and Herman was able to demonstrate them prior to the end of the session.  Herman demonstrated good  understanding of the education provided.     See EMR under Patient Instructions for exercises provided 2020.    Assessment     Patient did not complete his exercises today due to helping patient don his ankle brace and walking around in clinic with brace on.  Discussed with patient the signs and symptoms of blood clots in his calf muscle but if he had any doubts to go to the ED.  Patient is not able to lace it up by himself at this time, requiring assistance.  After donning brace, I checked the fit of the brace and it felt very tight around the lateral malleolus before lacing up.  After lacing up brace and donning shoe, patient ambulated 60 feet x 2 with a very slow florinda and short stride length with LBQC.  Patient stated he felt a lot of pressure on his foot/ankle with weight bearing but not when he lifts his foot to take a step.  Discussed with patient to only wear ankle brace for 3 " hours a day which starts as soon as he puts it on.  Patient appears to understand instructions. Pt was told to bring in leather brace on his visit on 8/25. Resume normal TE next visit.     Herman is progressing well towards his goals.   Pt prognosis is Fair.     Pt's spiritual, cultural and educational needs considered and pt agreeable to plan of care and goals.  Anticipated barriers to physical therapy: chronicity of current injury and significant SxHx with recent ankle fusion.     Short Term Goals: 4 weeks   1.) Pt will become compliant and independent with his initial HEP to promote decreased pain and improved mobility and strength. MET 5/4/2020  2) Pt to report decrease in pain levels from 3/10 at worse to 1/10 at worse. MET 5/4/2020  3.) Pt will improve R ankle MMT scores by 1 muscle grade to improve functional stability and strength. MET 5/4/2020  4.) Pt will be able to tolerate 60 minute exercise session with 3 resting breaks or less to demonstrate improved activity tolerance. MET 3/16/2020  Long Term Goals: 8 weeks   1.) Pt will transition from NWB to WBAT and able to ambulate 30ft with a RW. PROGRESSING, NOT MET 8/20/2020   2.) Pt will be able to ambulate 300ft with a RW with no resting breaks to demonstrate improved ambulatory tolerance (10 weeks).  MET 5/4/2020  3.) Pt will transition from ambulation with RW to ambulation with SPC to demonstrate improved independence. (12 weeks). PROGRESSING, NOT MET 8/20/2020   4.) Pt will transition from ambulation with SPC to ambulation with no AD to demonstrate further independence. (14 weeks). PROGRESSING, NOT MET 8/20/2020   5.) Pt will improve her FOTO score from 70% impairment to 48% improvement to indicate improvement in overall function. PROGRESSING, NOT MET 8/20/2020   *Additoinal LTG will be set when appropriate functional measures are able to be tested.*     Plan     Continue with conditioning to improve cardiovascular endurance; progress closed kinetic chain  activities in FWB.      Wesley Easley, PTA

## 2020-08-24 ENCOUNTER — CLINICAL SUPPORT (OUTPATIENT)
Dept: REHABILITATION | Facility: HOSPITAL | Age: 50
End: 2020-08-24
Payer: OTHER MISCELLANEOUS

## 2020-08-24 DIAGNOSIS — M25.671 ANKLE STIFFNESS, RIGHT: ICD-10-CM

## 2020-08-24 PROCEDURE — 97110 THERAPEUTIC EXERCISES: CPT | Mod: PN,CQ

## 2020-08-24 NOTE — PROGRESS NOTES
Physical Therapy Daily Treatment Note     Name: Herman GUERRERO [facility] Number: [id number]    Therapy Diagnosis:   Encounter Diagnosis   Name Primary?    Ankle stiffness, right      Physician: Kadie Grider MD    Visit Date: 2020    Physician Orders: PT Eval and Treat   Medical Diagnosis from Referral:   Z98.1 (ICD-10-CM) - Status post ankle fusion   M25.551,M25.552 (ICD-10-CM) - Bilateral hip pain   M25.561,M25.562 (ICD-10-CM) - Bilateral knee pain   Evaluation Date: 2020  Authorization Period Expiration: 3/18/2021  Plan of Care Expiration: 2020  Visit # / Visits authorized:  for current authorization (previously seen for 28 treatment sessions for this case)  FOTO:     Time In: 1:23 pm  Time Out: 2:02 pm  Total Billable Time: 40 minutes TE-3    Precautions: Standard; WBAT with ankle brace  *Pt also has difficulty with hearing and typically reads lips. This has been a challenge for the patient as masks are currently worn at all times within the clinic due to COVID19 crisis. Pt requires marked tactile and visual cuing throughout his treatment session.*    Subjective     Pt reports: he feels as much sore and he does pain today as he did a lot of walking over the weekend.     He was compliant with home exercise program.   Response to previous treatment:  Fatigue  Functional change: has been able to go up and down the stairs at his home with less difficulty, has transitioned himself to amb with quad cane only     Pain: 5/10  Location: R lateral ankle    Objective     Herman received therapeutic exercises to develop strength and endurance for 40 minutes including:    Cardio vascular endurance/ROM and tissue extensibility, training with reciprocal motion of upper and lower limbs on sci-fit x 10 minutes at level 5.5 at > or equal to 70 spm w/o rest. (seat at position 23) - 711 total steps     Stair trainin six-inch steps with step through pattern ascending  "with step to descending B UE support.10 rounds    Steamboats, 3-way: BlueTB 20x2 B, B UE  support. Focus on full WB on L LE   Standing fwd lunges to R LE: x50 onto 6" step. 1-2 UE use intermittently   Lateral step ups: next  Cybex hamstring curl 30x 7 plates - not today  Cybex knee extension 2x15 40# - not today      Home Exercises Provided and Patient Education Provided:  Education provided:   - Continue HEP. Importance of maintaining HEP with therapy to maximize outcomes    Written Home Exercises Provided: continue previously given HEP   Exercises were reviewed and Herman was able to demonstrate them prior to the end of the session.  Herman demonstrated good  understanding of the education provided.     See EMR under Patient Instructions for exercises provided 2/20/2020.    Assessment     Patient did not complete his exercises today due to arriving 10 minutes late for his appointment.  Pt was told to bring in leather brace on his next visit.     Herman is progressing well towards his goals.   Pt prognosis is Fair.     Pt's spiritual, cultural and educational needs considered and pt agreeable to plan of care and goals.  Anticipated barriers to physical therapy: chronicity of current injury and significant SxHx with recent ankle fusion.     Short Term Goals: 4 weeks   1.) Pt will become compliant and independent with his initial HEP to promote decreased pain and improved mobility and strength. MET 5/4/2020  2) Pt to report decrease in pain levels from 3/10 at worse to 1/10 at worse. MET 5/4/2020  3.) Pt will improve R ankle MMT scores by 1 muscle grade to improve functional stability and strength. MET 5/4/2020  4.) Pt will be able to tolerate 60 minute exercise session with 3 resting breaks or less to demonstrate improved activity tolerance. MET 3/16/2020  Long Term Goals: 8 weeks   1.) Pt will transition from NWB to WBAT and able to ambulate 30ft with a RW. PROGRESSING, NOT MET 8/24/2020   2.) Pt will be able to " ambulate 300ft with a RW with no resting breaks to demonstrate improved ambulatory tolerance (10 weeks).  MET 5/4/2020  3.) Pt will transition from ambulation with RW to ambulation with SPC to demonstrate improved independence. (12 weeks). PROGRESSING, NOT MET 8/24/2020   4.) Pt will transition from ambulation with SPC to ambulation with no AD to demonstrate further independence. (14 weeks). PROGRESSING, NOT MET 8/24/2020   5.) Pt will improve her FOTO score from 70% impairment to 48% improvement to indicate improvement in overall function. PROGRESSING, NOT MET 8/24/2020   *Additoinal LTG will be set when appropriate functional measures are able to be tested.*     Plan     Continue with conditioning to improve cardiovascular endurance; progress closed kinetic chain activities in FWB.      Wesley Easley, PTA

## 2020-08-25 ENCOUNTER — CLINICAL SUPPORT (OUTPATIENT)
Dept: REHABILITATION | Facility: HOSPITAL | Age: 50
End: 2020-08-25
Payer: OTHER MISCELLANEOUS

## 2020-08-25 ENCOUNTER — TELEPHONE (OUTPATIENT)
Dept: REHABILITATION | Facility: HOSPITAL | Age: 50
End: 2020-08-25

## 2020-08-25 DIAGNOSIS — M25.671 ANKLE STIFFNESS, RIGHT: ICD-10-CM

## 2020-08-25 PROCEDURE — 97110 THERAPEUTIC EXERCISES: CPT | Mod: PN

## 2020-08-25 NOTE — PROGRESS NOTES
Physical Therapy Daily Treatment Note     Name: Herman GUERRERO [facility] Number: [id number]    Therapy Diagnosis:   Encounter Diagnosis   Name Primary?    Ankle stiffness, right      Physician: Kadie Grider MD    Visit Date: 8/25/2020    Physician Orders: PT Eval and Treat   Medical Diagnosis from Referral:   Z98.1 (ICD-10-CM) - Status post ankle fusion   M25.551,M25.552 (ICD-10-CM) - Bilateral hip pain   M25.561,M25.562 (ICD-10-CM) - Bilateral knee pain   Evaluation Date: 2/18/2020  Authorization Period Expiration: 3/18/2021  Plan of Care Expiration: 9/11/2020  Visit # / Visits authorized: 23/24 for current authorization (previously seen for 28 treatment sessions for this case)  FOTO: 3/10    Time In: 10:05 pm  Time Out: 10:45 pm  Total Billable Time: 40 minutes TE-3    Precautions: Standard; WBAT with ankle brace  *Pt also has difficulty with hearing and typically reads lips. This has been a challenge for the patient as masks are currently worn at all times within the clinic due to COVID19 crisis. Pt requires marked tactile and visual cuing throughout his treatment session.*    Subjective     Pt reports: no pain today, used new brace last Saturday and less pain than normal. Has not worn it since. Reports he is unable to put on new brace himself. He is alsleep usually when his wife and son leaves in the morning so they are unable to help him johanna his new brace. Pt does not go to bed until 3-4 in the morning typically because he does not do anything during the day for the most part.   He was compliant with home exercise program.   Response to previous treatment:  Fatigue  Functional change: has been able to go up and down the stairs at his home with less difficulty     Pain: 0/10  Location: R lateral ankle    Objective     Herman received therapeutic exercises to develop strength and endurance for 40 minutes including:    Cardio vascular endurance/ROM and tissue extensibility,  "training with reciprocal motion of upper and lower limbs on sci-fit x 0 minutes at level 5.5 at > or equal to 70 spm w/o rest. (seat at position 23) - not done today    Steamboats, 3-way: BlueTB 20x2 B, B UE  support. Focus on full WB on L LE - not done today  Standing fwd lunges to R LE: x50 onto 6" step. 1-2 UE use intermittently - not done today, next with min UE use  Lateral step ups: 2x10, Blue step, 1 UE use  Foot taps: 20x B, blue step, 1 UE use; heavy cues for increased RLE WB  Forward step ups: 2x8 R with 1-2 UE use; heavy cueing and min A for increased RLE WB (decrease to L1 next)  Lateral step ups: 2x10 R, L1, 1 UE use; moderate cueing for only RLE WB  Cybex hamstring curl 30x 7 plates - not today, next  Cybex knee extension 2x15 40# - not today, next    Home Exercises Provided and Patient Education Provided:  Education provided:   - Continue HEP. Importance of maintaining HEP with therapy to maximize outcomes    Written Home Exercises Provided: continue previously given HEP   Exercises were reviewed and Herman was able to demonstrate them prior to the end of the session.  Herman demonstrated good  understanding of the education provided.     See EMR under Patient Instructions for exercises provided 2/20/2020.    Assessment     Pt noted some posterior-lateral right ankle pain when forced to full RLE WB at times during stepping activities. Pt limited by restricted R ankle dorsiflexion due to surgery fixation; however pt still appears to have moderate fear and avoidance with full RLE WB. Moderate fatigue after session, and single rest break plus water given to patient. Pt encouraged to adjust sleeping times to correlate with family so they can help him don brace in the morning and he can take it off when he needs to since he is independent with doffing brace. Pt states the rocking motion of the boot interferes with his balance though gait pattern and step up/down pattern does not correlate. DF restriction and " fear/avoidance impacting current mobility. Will transition to leather brace next visit for increased weight bearing next visit.     Herman is progressing well towards his goals.   Pt prognosis is Fair.     Pt's spiritual, cultural and educational needs considered and pt agreeable to plan of care and goals.  Anticipated barriers to physical therapy: chronicity of current injury and significant SxHx with recent ankle fusion.     Short Term Goals: 4 weeks   1.) Pt will become compliant and independent with his initial HEP to promote decreased pain and improved mobility and strength. MET 5/4/2020  2) Pt to report decrease in pain levels from 3/10 at worse to 1/10 at worse. MET 5/4/2020  3.) Pt will improve R ankle MMT scores by 1 muscle grade to improve functional stability and strength. MET 5/4/2020  4.) Pt will be able to tolerate 60 minute exercise session with 3 resting breaks or less to demonstrate improved activity tolerance. MET 3/16/2020  Long Term Goals: 8 weeks   1.) Pt will transition from NWB to WBAT and able to ambulate 30ft with a RW. PROGRESSING, NOT MET 8/25/2020   2.) Pt will be able to ambulate 300ft with a RW with no resting breaks to demonstrate improved ambulatory tolerance (10 weeks).  MET 5/4/2020  3.) Pt will transition from ambulation with RW to ambulation with SPC to demonstrate improved independence. (12 weeks). PROGRESSING, NOT MET 8/25/2020   4.) Pt will transition from ambulation with SPC to ambulation with no AD to demonstrate further independence. (14 weeks). PROGRESSING, NOT MET 8/25/2020   5.) Pt will improve her FOTO score from 70% impairment to 48% improvement to indicate improvement in overall function. PROGRESSING, NOT MET 8/25/2020   *Additoinal LTG will be set when appropriate functional measures are able to be tested.*     Plan     Continue with conditioning to improve cardiovascular endurance; progress closed kinetic chain activities in FWB.      Nate Ruiz, PT

## 2020-08-25 NOTE — TELEPHONE ENCOUNTER
Left a message with the providers medical assistant requesting additional therapy orders to be faxed to our [place] location at [phone].

## 2020-08-26 DIAGNOSIS — Z98.1 S/P ANKLE FUSION: Primary | ICD-10-CM

## 2020-08-27 ENCOUNTER — CLINICAL SUPPORT (OUTPATIENT)
Dept: REHABILITATION | Facility: HOSPITAL | Age: 50
End: 2020-08-27
Payer: OTHER MISCELLANEOUS

## 2020-08-27 DIAGNOSIS — M25.671 ANKLE STIFFNESS, RIGHT: ICD-10-CM

## 2020-08-27 PROCEDURE — 97110 THERAPEUTIC EXERCISES: CPT | Mod: PN

## 2020-08-27 PROCEDURE — 97535 SELF CARE MNGMENT TRAINING: CPT | Mod: PN

## 2020-08-27 NOTE — PROGRESS NOTES
Physical Therapy Daily Treatment Note     Name: Herman GUERRERO [facility] Number: [id number]    Therapy Diagnosis:   Encounter Diagnosis   Name Primary?    Ankle stiffness, right      Physician: Kadie Grider MD    Visit Date: 8/27/2020    Physician Orders: PT Eval and Treat   Medical Diagnosis from Referral:   Z98.1 (ICD-10-CM) - Status post ankle fusion   M25.551,M25.552 (ICD-10-CM) - Bilateral hip pain   M25.561,M25.562 (ICD-10-CM) - Bilateral knee pain   Evaluation Date: 2/18/2020  Authorization Period Expiration: 3/18/2021  Plan of Care Expiration: 9/11/2020  Visit # / Visits authorized: 24/24 for current authorization (previously seen for 28 treatment sessions for this case)  FOTO: 4/10    Time In: 12:52 pm  Time Out: 1:30 pm  Total Billable Time: 38 minutes (TE-1, Selfcare-1)    Precautions: Standard; WBAT with ankle brace  *Pt also has difficulty with hearing and typically reads lips. This has been a challenge for the patient as masks are currently worn at all times within the clinic due to COVID19 crisis. Pt requires marked tactile and visual cuing throughout his treatment session.*    Subjective     Pt reports: no new complaints and minimal soreness after last visit.   He was compliant with home exercise program.   Response to previous treatment:  Fatigue  Functional change: has been able to go up and down the stairs at his home with less difficulty     Pain: 0/10  Location: R lateral ankle    Objective     Herman performed self care activities for 8 minutes consisting of:  Donning/doffing/lacing R leather brace, Min A for lacing up lower laces  Donning/diffing R shoe indepenedently    Herman received therapeutic exercises to develop strength and endurance for 30 minutes including: donning/doffing R leather brace     Lateral step ups: 2x10, Blue step, 1 UE use  Foot taps: 20x B, blue step, 1 UE use; heavy cues for increased RLE WB  Forward step ups: 2x8 R with 1-2 UE use;  heavy cueing and min A for increased RLE WB  Lateral step downs: 2x10 R, L1, 1 UE use; moderate cueing for only RLE WB  Cybex hamstring curl 30x 7 plates - not today, next  Cybex knee extension 2x15 40# - not today, next    Home Exercises Provided and Patient Education Provided:  Education provided:   - Continue HEP. Importance of maintaining HEP with therapy to maximize outcomes    Written Home Exercises Provided: continue previously given HEP   Exercises were reviewed and Herman was able to demonstrate them prior to the end of the session.  Herman demonstrated good  understanding of the education provided.     See EMR under Patient Instructions for exercises provided 2/20/2020.    Assessment     Minimal to no complaints of increased R ankle pain today, and did fairly well with exercises with leather ankle brace and shoe donned. His deviations appeared to be less overall compared to his CAM boot possibly from the height difference and slight rocking instability from the CAM boot. Pt experiences his soreness after wear typically, and will re-assess response next visit. Currently waiting on re-authorization and 1010 form from MD office. Moderate fatigue after session and pt does still demonstrate mild weight bearing difficulties that is complicated by his inability to dorsiflex his right ankle due to the fusion. Time dedicated for self care for patient to johanna and doff leather brace that patient was able to; however some abdominal pain from compression due to obesity and increased time needed because of pain/fatigue. Assistance given can be taken away next time as pt can very likely do this independently that was strongly expressed to patient in order for more weight bearing activities in leather brace at home. Pt told to wear brace for half the day and then rest outside of the leather brace (back into CAM boot).     Herman is progressing well towards his goals.   Pt prognosis is Fair.     Pt's spiritual, cultural and  educational needs considered and pt agreeable to plan of care and goals.  Anticipated barriers to physical therapy: chronicity of current injury and significant SxHx with recent ankle fusion.     Short Term Goals: 4 weeks   1.) Pt will become compliant and independent with his initial HEP to promote decreased pain and improved mobility and strength. MET 5/4/2020  2) Pt to report decrease in pain levels from 3/10 at worse to 1/10 at worse. MET 5/4/2020  3.) Pt will improve R ankle MMT scores by 1 muscle grade to improve functional stability and strength. MET 5/4/2020  4.) Pt will be able to tolerate 60 minute exercise session with 3 resting breaks or less to demonstrate improved activity tolerance. MET 3/16/2020  Long Term Goals: 8 weeks   1.) Pt will transition from NWB to WBAT and able to ambulate 30ft with a RW. PROGRESSING, NOT MET 8/27/2020   2.) Pt will be able to ambulate 300ft with a RW with no resting breaks to demonstrate improved ambulatory tolerance (10 weeks).  MET 5/4/2020  3.) Pt will transition from ambulation with RW to ambulation with SPC to demonstrate improved independence. (12 weeks). PROGRESSING, NOT MET 8/27/2020   4.) Pt will transition from ambulation with SPC to ambulation with no AD to demonstrate further independence. (14 weeks). PROGRESSING, NOT MET 8/27/2020   5.) Pt will improve her FOTO score from 70% impairment to 48% improvement to indicate improvement in overall function. PROGRESSING, NOT MET 8/27/2020   *Additoinal LTG will be set when appropriate functional measures are able to be tested.*     Plan     Continue with conditioning to improve cardiovascular endurance; progress closed kinetic chain activities in FWB.      Nate Ruiz, PT

## 2020-09-01 ENCOUNTER — CLINICAL SUPPORT (OUTPATIENT)
Dept: REHABILITATION | Facility: HOSPITAL | Age: 50
End: 2020-09-01
Payer: OTHER MISCELLANEOUS

## 2020-09-01 DIAGNOSIS — M25.671 ANKLE STIFFNESS, RIGHT: ICD-10-CM

## 2020-09-01 PROCEDURE — 97110 THERAPEUTIC EXERCISES: CPT | Mod: PN

## 2020-09-01 NOTE — PROGRESS NOTES
Physical Therapy Daily Treatment Note     Name: Herman GUERRERO [facility] Number: [id number]    Therapy Diagnosis:   Encounter Diagnosis   Name Primary?    Ankle stiffness, right      Physician: Kadie Grider MD    Visit Date: 9/1/2020    Physician Orders: PT Eval and Treat   Medical Diagnosis from Referral:   Z98.1 (ICD-10-CM) - Status post ankle fusion   M25.551,M25.552 (ICD-10-CM) - Bilateral hip pain   M25.561,M25.562 (ICD-10-CM) - Bilateral knee pain   Evaluation Date: 2/18/2020  Authorization Period Expiration: 3/18/2021  Plan of Care Expiration: 9/11/2020  Visit # / Visits authorized: 1/8; seen for 24 visits  FOTO: at d/c or [facility]    Time In: 10:05 am  Time Out: 10:45 am  Total Billable Time: 40 minutes (TE-3)    Precautions: Standard; WBAT with ankle brace  *Pt also has difficulty with hearing and typically reads lips. This has been a challenge for the patient as masks are currently worn at all times within the clinic due to COVID19 crisis. Pt requires marked tactile and visual cuing throughout his treatment session.*    Subjective     Pt reports: has a MD appointment this Friday that was moved up; thinks he might get his new shoe then. Pt wore leather brace last Friday and Saturday and yesterday, pt had to help his son move so he wore his old CAM boot out of safety.  He was compliant with home exercise program.   Response to previous treatment:  Fatigue  Functional change: wearing his leather brace more and walking     Pain: 0/10  Location: R lateral ankle    Objective     Herman received therapeutic exercises to develop strength and endurance for 40 minutes including: donning/doffing R leather brace for fitting check    Lateral step ups: 2x10, Blue step, 1 UE use - not done today  Foot taps: 2x10 B, blue step, 1 UE use on quad cane; heavy cues for decreased LLE foot tap speed for increased RLE WB  Forward step ups: 2x8 R with 1-2 UE use; heavy cueing and min A for increased  RLE WB - not done today  Lateral step downs: 2x10 R, L1, 1 UE use; moderate cueing for only RLE WB - not done today  Cybex leg press: 3x8 DL, 6 plates  Cybex hamstring curl: 3x8, 7 plates  Cybex knee extension: 3x8 40#    Home Exercises Provided and Patient Education Provided:  Education provided:   - Continue HEP. Importance of maintaining HEP with therapy to maximize outcomes    Written Home Exercises Provided: continue previously given HEP   Exercises were reviewed and Herman was able to demonstrate them prior to the end of the session.  Herman demonstrated good  understanding of the education provided.     See EMR under Patient Instructions for exercises provided 2/20/2020.    Assessment     Mobility and balance improving out of the CAM boot, and pt appears to be donning leather brace more frequently outside of therapy with no adverse affects. Mild weight bearing difficulties that is complicated by his inability to dorsiflex his right ankle due to the fusion. Pt reports that his wife helped him don his brace this morning despite him doing it independently lats visit. PT emphasized need to wear leather brace everyday for at least 3-5 hours increasing wear and mobility gradually. Session was focused on pure RLE weight bearing in stepping and gait, moderate improvement after heavy cueing. Resume WB'ing and standing strengthening exercises next visit.  Authorization approved for additional 8 visits.    Herman is progressing well towards his goals.   Pt prognosis is Fair.     Pt's spiritual, cultural and educational needs considered and pt agreeable to plan of care and goals.  Anticipated barriers to physical therapy: chronicity of current injury and significant SxHx with recent ankle fusion.     Short Term Goals: 4 weeks   1.) Pt will become compliant and independent with his initial HEP to promote decreased pain and improved mobility and strength. MET 5/4/2020  2) Pt to report decrease in pain levels from 3/10 at  worse to 1/10 at worse. MET 5/4/2020  3.) Pt will improve R ankle MMT scores by 1 muscle grade to improve functional stability and strength. MET 5/4/2020  4.) Pt will be able to tolerate 60 minute exercise session with 3 resting breaks or less to demonstrate improved activity tolerance. MET 3/16/2020  Long Term Goals: 8 weeks   1.) Pt will transition from NWB to WBAT and able to ambulate 30ft with a RW. PROGRESSING, NOT MET 9/1/2020   2.) Pt will be able to ambulate 300ft with a RW with no resting breaks to demonstrate improved ambulatory tolerance (10 weeks).  MET 5/4/2020  3.) Pt will transition from ambulation with RW to ambulation with SPC to demonstrate improved independence. (12 weeks). PROGRESSING, NOT MET 9/1/2020   4.) Pt will transition from ambulation with SPC to ambulation with no AD to demonstrate further independence. (14 weeks). PROGRESSING, NOT MET 9/1/2020   5.) Pt will improve her FOTO score from 70% impairment to 48% improvement to indicate improvement in overall function. PROGRESSING, NOT MET 9/1/2020   *Additoinal LTG will be set when appropriate functional measures are able to be tested.*     Plan     Continue with conditioning to improve cardiovascular endurance; progress closed kinetic chain activities in FWB.      Nate Ruiz, PT

## 2020-09-03 ENCOUNTER — CLINICAL SUPPORT (OUTPATIENT)
Dept: REHABILITATION | Facility: HOSPITAL | Age: 50
End: 2020-09-03
Payer: OTHER MISCELLANEOUS

## 2020-09-03 DIAGNOSIS — M25.671 ANKLE STIFFNESS, RIGHT: ICD-10-CM

## 2020-09-03 PROCEDURE — 97110 THERAPEUTIC EXERCISES: CPT | Mod: PN

## 2020-09-03 NOTE — PROGRESS NOTES
Physical Therapy Daily Treatment Note     Name: Herman GUERRERO [facility] Number: [id number]    Therapy Diagnosis:   Encounter Diagnosis   Name Primary?    Ankle stiffness, right      Physician: Kadie Grider MD    Visit Date: 9/3/2020    Physician Orders: PT Eval and Treat   Medical Diagnosis from Referral:   Z98.1 (ICD-10-CM) - Status post ankle fusion   M25.551,M25.552 (ICD-10-CM) - Bilateral hip pain   M25.561,M25.562 (ICD-10-CM) - Bilateral knee pain   Evaluation Date: 2/18/2020  Authorization Period Expiration: 3/18/2021  Plan of Care Expiration: 9/11/2020  Visit # / Visits authorized: 2/8; seen for 25 visits  FOTO: at d/c or [facility]    Time In: 09:55 am  Time Out: 10:30 am  Total Billable Time: 35 minutes (2 TE)    Precautions: Standard; WBAT with leather ankle brace  *Pt also has difficulty with hearing and typically reads lips. This has been a challenge for the patient as masks are currently worn at all times within the clinic due to COVID19 crisis. Pt requires marked tactile and visual cuing throughout his treatment session.*    Subjective     Pt reports: he's feeling okay today, but is concerned about level of swelling in R calf. Stated that he will ask MD about it at appt soon this week.  Response to previous treatment:  Fatigue  Functional change: wearing his leather brace more and walking     Pain: 0/10  Location: R lateral ankle    Objective     Herman received therapeutic exercises to develop strength and endurance for 35 minutes including: donning/doffing R leather brace for fitting check    Lateral step ups: 2x10, Blue step, 1 UE use   Foot taps: 2x10 B, blue step, 1 UE use on quad cane (progress to no UE use next)  Forward step ups: 2x10 R Blue step with 1 UE use; heavy cueing and min A for increased RLE WB  Lateral step downs: 2x10 R, Blue step, finger tips support only; moderate cueing for only RLE WB  Cybex leg press: 3x8 DL, 6 plates; 3 x 10 R LE, 3 plates heavy  cues to avoid L LE use  Cybex hamstring curl: 10x, 7 plates  Cybex knee extension:3 x8 40# - not done today    Home Exercises Provided and Patient Education Provided:  Education provided:   - Continue HEP. Importance of maintaining HEP with therapy to maximize outcomes    Written Home Exercises Provided: continue previously given HEP   Exercises were reviewed and Herman was able to demonstrate them prior to the end of the session.  Herman demonstrated good  understanding of the education provided.     See EMR under Patient Instructions for exercises provided 2/20/2020.    Assessment   Pt displaying improved gait with less weightbearing through QC. There are still expected compensations in weight bearing activities due to his limited ability to dorsiflex his R ankle. Pt was able to tolerate some cueing for decreasing these compensations, but was moderately resistant to most due to fear/avoidance. PT also discussed timeliness with pt due to his hx of arriving late limiting each session. Will continue to work on strengthening R LE and addressing compensations in gait and steps up. Heavy cueing needed for decreased UE loading in step ups.   Authorization approved for additional 8 visits.    Herman is progressing well towards his goals.   Pt prognosis is Fair.     Pt's spiritual, cultural and educational needs considered and pt agreeable to plan of care and goals.  Anticipated barriers to physical therapy: chronicity of current injury and significant SxHx with recent ankle fusion.     Short Term Goals: 4 weeks   1.) Pt will become compliant and independent with his initial HEP to promote decreased pain and improved mobility and strength. MET 5/4/2020  2) Pt to report decrease in pain levels from 3/10 at worse to 1/10 at worse. MET 5/4/2020  3.) Pt will improve R ankle MMT scores by 1 muscle grade to improve functional stability and strength. MET 5/4/2020  4.) Pt will be able to tolerate 60 minute exercise session with 3  resting breaks or less to demonstrate improved activity tolerance. MET 3/16/2020  Long Term Goals: 8 weeks   1.) Pt will transition from NWB to WBAT and able to ambulate 30ft with a RW. PROGRESSING, NOT MET 9/3/2020   2.) Pt will be able to ambulate 300ft with a RW with no resting breaks to demonstrate improved ambulatory tolerance (10 weeks).  MET 5/4/2020  3.) Pt will transition from ambulation with RW to ambulation with SPC to demonstrate improved independence. (12 weeks). PROGRESSING, NOT MET 9/3/2020   4.) Pt will transition from ambulation with SPC to ambulation with no AD to demonstrate further independence. (14 weeks). PROGRESSING, NOT MET 9/3/2020   5.) Pt will improve her FOTO score from 70% impairment to 48% improvement to indicate improvement in overall function. PROGRESSING, NOT MET 9/3/2020   *Additoinal LTG will be set when appropriate functional measures are able to be tested.*     Plan     Continue with conditioning to improve cardiovascular endurance; progress closed kinetic chain activities in FWB, increase focus on decreasing UE use.     Afia Mccabe, SPT    I certify that I was present in the room directing the student in service delivery and guiding them using my skilled judgment. As the co-signing therapist I have reviewed the students documentation and am responsible for the treatment, assessment, and plan.     Nate Ruiz, PT

## 2020-09-09 ENCOUNTER — CLINICAL SUPPORT (OUTPATIENT)
Dept: REHABILITATION | Facility: HOSPITAL | Age: 50
End: 2020-09-09
Payer: OTHER MISCELLANEOUS

## 2020-09-09 DIAGNOSIS — M25.671 ANKLE STIFFNESS, RIGHT: ICD-10-CM

## 2020-09-09 PROCEDURE — 97110 THERAPEUTIC EXERCISES: CPT | Mod: PN

## 2020-09-10 ENCOUNTER — CLINICAL SUPPORT (OUTPATIENT)
Dept: REHABILITATION | Facility: HOSPITAL | Age: 50
End: 2020-09-10
Payer: OTHER MISCELLANEOUS

## 2020-09-10 DIAGNOSIS — M25.671 ANKLE STIFFNESS, RIGHT: ICD-10-CM

## 2020-09-10 PROCEDURE — 97110 THERAPEUTIC EXERCISES: CPT | Mod: PN | Performed by: PHYSICAL THERAPIST

## 2020-09-10 NOTE — PROGRESS NOTES
Physical Therapy Daily Treatment Note     Name: Herman GUERRERO [facility] Number: [id number]    Therapy Diagnosis:   Encounter Diagnosis   Name Primary?    Ankle stiffness, right      Physician: Kadie Grider MD    Visit Date: 9/9/2020    Physician Orders: PT Eval and Treat   Medical Diagnosis from Referral:   Z98.1 (ICD-10-CM) - Status post ankle fusion   M25.551,M25.552 (ICD-10-CM) - Bilateral hip pain   M25.561,M25.562 (ICD-10-CM) - Bilateral knee pain   Evaluation Date: 2/18/2020  Authorization Period Expiration: 3/18/2021  Plan of Care Expiration: 9/11/2020  Visit # / Visits authorized: 3/8; seen for 26 visits  FOTO: at d/c or [facility]    Time In: 1800  Time Out: 1845  Total Billable Time: 45 minutes (3 TE)    Precautions: Standard; WBAT with leather ankle brace  *Pt also has difficulty with hearing and typically reads lips. This has been a challenge for the patient as masks are currently worn at all times within the clinic due to COVID19 crisis. Pt requires marked tactile and visual cuing throughout his treatment session.*    Subjective     Pt reports: that he has noticed more calf swelling in his R leg since transitioning to his new ankle lace orthosis.  He was concerned regarding a potential blood clot.  Underwent ultrasound R calf at [facility]; (-) for DVT.  Response to previous treatment:  Fatigue  Functional change: wearing his leather brace more and walking  Pain: 3/10  Location: R mid-gastrocnemius muscle belly.     Objective     Herman received therapeutic exercises to develop strength and endurance for 40 minutes including: donning/doffing R leather brace for fitting check  Lateral step ups: 2x10, Blue step, 1 UE use   Foot taps: 2x10 B, blue step, 1 UE use on quad cane ( no UE use)  Backward walking 5 rounds // bars  Forward step ups: 2x10 R Blue step with 1 UE use; heavy cueing and min A for increased RLE WB  Lateral step downs: 2x10 R, Blue step, finger tips support only;  moderate cueing for only RLE WB  Cybex leg press: 3x8 DL, 6 plates; 3 x 10 R LE, 3 plates heavy cues to avoid L LE use  Cybex hamstring curl: 2x10, 7 plates  Cybex knee extension:3 x8 40# -     Home Exercises Provided and Patient Education Provided:  Education provided:   - Continue HEP. Importance of maintaining HEP with therapy to maximize outcomes    Written Home Exercises Provided: continue previously given HEP   Exercises were reviewed and Herman was able to demonstrate them prior to the end of the session.  Herman demonstrated good  understanding of the education provided.     See EMR under Patient Instructions for exercises provided 2/20/2020.    Assessment   Emphasis of WBAT through RLE especially during step activities.     Herman is progressing well towards his goals.   Pt prognosis is Fair.     Pt's spiritual, cultural and educational needs considered and pt agreeable to plan of care and goals.  Anticipated barriers to physical therapy: chronicity of current injury and significant SxHx with recent ankle fusion.     Short Term Goals: 4 weeks   1.) Pt will become compliant and independent with his initial HEP to promote decreased pain and improved mobility and strength. MET 5/4/2020  2) Pt to report decrease in pain levels from 3/10 at worse to 1/10 at worse. MET 5/4/2020  3.) Pt will improve R ankle MMT scores by 1 muscle grade to improve functional stability and strength. MET 5/4/2020  4.) Pt will be able to tolerate 60 minute exercise session with 3 resting breaks or less to demonstrate improved activity tolerance. MET 3/16/2020  Long Term Goals: 8 weeks   1.) Pt will transition from NWB to WBAT and able to ambulate 30ft with a RW. PROGRESSING, NOT MET 9/9/2020   2.) Pt will be able to ambulate 300ft with a RW with no resting breaks to demonstrate improved ambulatory tolerance (10 weeks).  MET 5/4/2020  3.) Pt will transition from ambulation with RW to ambulation with SPC to demonstrate improved  independence. (12 weeks). PROGRESSING, NOT MET 9/9/2020   4.) Pt will transition from ambulation with SPC to ambulation with no AD to demonstrate further independence. (14 weeks). PROGRESSING, NOT MET 9/9/2020   5.) Pt will improve her FOTO score from 70% impairment to 48% improvement to indicate improvement in overall function. PROGRESSING, NOT MET 9/9/2020   *Additoinal LTG will be set when appropriate functional measures are able to be tested.*     Plan     Continue with conditioning to improve cardiovascular endurance; progress closed kinetic chain activities in FWB, increase focus on decreasing UE use.     Leo Kahn, PT

## 2020-09-10 NOTE — PROGRESS NOTES
Physical Therapy Daily Treatment Note     Name: Herman GUERRERO [facility] Number: [id number]    Therapy Diagnosis:   Encounter Diagnosis   Name Primary?    Ankle stiffness, right      Physician: Kadie Grider MD    Visit Date: 9/10/2020    Physician Orders: PT Eval and Treat   Medical Diagnosis from Referral:   Z98.1 (ICD-10-CM) - Status post ankle fusion   M25.551,M25.552 (ICD-10-CM) - Bilateral hip pain   M25.561,M25.562 (ICD-10-CM) - Bilateral knee pain   Evaluation Date: 2/18/2020  Authorization Period Expiration: 3/18/2021  Plan of Care Expiration: 9/11/2020  Visit # / Visits authorized: 4/8; seen for 27 visits  FOTO: at d/c or [facility]    Time In: 605  Time Out: 650  Total Billable Time: 45 minutes (3 TE)    Precautions: Standard; WBAT with leather ankle brace  *Pt also has difficulty with hearing and typically reads lips. This has been a challenge for the patient as masks are currently worn at all times within the clinic due to COVID19 crisis. Pt requires marked tactile and visual cuing throughout his treatment session.*    Subjective     Pt reports: doing good today. Been running a lot of errands but still feels OK.  Response to previous treatment:  Fatigue  Functional change: wearing his leather brace more and walking  Pain: 3/10  Location: R mid-gastrocnemius muscle belly.     Objective     Herman received therapeutic exercises to develop strength and endurance for 40 minutes including: donning/doffing R leather brace for fitting check    Lateral step ups: 2x10, Blue step, 1 UE use   Foot taps: 2x10 B, blue step, 1 UE use on quad cane ( no UE use)  Backward walking 5 rounds // bars  Forward step ups: 2x10 R Blue step with 1 UE use; heavy cueing and min A for increased RLE WB  Lateral step downs: 2x10 R, Blue step, finger tips support only; moderate cueing for only RLE WB  Cybex leg press: 3x12 DL, 6 plates; 3 x 10 R LE, 3 plates heavy cues to avoid L LE use  Cybex hamstring  curl: 3x8, 7 plates  Cybex knee extension:3 x8 40#   Side steps 3 laps, no support  Stair training 2 flights of 4 up and down    Home Exercises Provided and Patient Education Provided:  Education provided:   - Continue HEP. Importance of maintaining HEP with therapy to maximize outcomes    Written Home Exercises Provided: continue previously given HEP   Exercises were reviewed and Herman was able to demonstrate them prior to the end of the session.  Herman demonstrated good  understanding of the education provided.     See EMR under Patient Instructions for exercises provided 2/20/2020.    Assessment   Emphasis of WBAT through RLE especially during step activities. Pt reports main fear is coming down stairs, more about left knee buckling over right ankle pain. Pt would like to practice more with activity    Herman is progressing well towards his goals.   Pt prognosis is Fair.     Pt's spiritual, cultural and educational needs considered and pt agreeable to plan of care and goals.  Anticipated barriers to physical therapy: chronicity of current injury and significant SxHx with recent ankle fusion.     Short Term Goals: 4 weeks   1.) Pt will become compliant and independent with his initial HEP to promote decreased pain and improved mobility and strength. MET 5/4/2020  2) Pt to report decrease in pain levels from 3/10 at worse to 1/10 at worse. MET 5/4/2020  3.) Pt will improve R ankle MMT scores by 1 muscle grade to improve functional stability and strength. MET 5/4/2020  4.) Pt will be able to tolerate 60 minute exercise session with 3 resting breaks or less to demonstrate improved activity tolerance. MET 3/16/2020  Long Term Goals: 8 weeks   1.) Pt will transition from NWB to WBAT and able to ambulate 30ft with a RW. PROGRESSING, NOT MET 9/10/2020   2.) Pt will be able to ambulate 300ft with a RW with no resting breaks to demonstrate improved ambulatory tolerance (10 weeks).  MET 5/4/2020  3.) Pt will transition from  ambulation with RW to ambulation with SPC to demonstrate improved independence. (12 weeks). PROGRESSING, NOT MET 9/10/2020   4.) Pt will transition from ambulation with SPC to ambulation with no AD to demonstrate further independence. (14 weeks). PROGRESSING, NOT MET 9/10/2020   5.) Pt will improve her FOTO score from 70% impairment to 48% improvement to indicate improvement in overall function. PROGRESSING, NOT MET 9/10/2020   *Additoinal LTG will be set when appropriate functional measures are able to be tested.*     Plan     Continue with conditioning to improve cardiovascular endurance; progress closed kinetic chain activities in FWB, increase focus on decreasing UE use.     Eitan Barr, PT

## 2020-09-14 ENCOUNTER — CLINICAL SUPPORT (OUTPATIENT)
Dept: REHABILITATION | Facility: HOSPITAL | Age: 50
End: 2020-09-14
Payer: OTHER MISCELLANEOUS

## 2020-09-14 DIAGNOSIS — M25.671 ANKLE STIFFNESS, RIGHT: ICD-10-CM

## 2020-09-14 PROCEDURE — 97110 THERAPEUTIC EXERCISES: CPT | Mod: PN,CQ

## 2020-09-14 NOTE — PROGRESS NOTES
Physical Therapy Daily Treatment Note     Name: Herman GUERRERO [facility] Number: [id number]    Therapy Diagnosis:   Encounter Diagnosis   Name Primary?    Ankle stiffness, right      Physician: Kadie Grider MD    Visit Date: 9/14/2020    Physician Orders: PT Eval and Treat   Medical Diagnosis from Referral:   Z98.1 (ICD-10-CM) - Status post ankle fusion   M25.551,M25.552 (ICD-10-CM) - Bilateral hip pain   M25.561,M25.562 (ICD-10-CM) - Bilateral knee pain   Evaluation Date: 2/18/2020  Authorization Period Expiration: 3/18/2021  Plan of Care Expiration: 9/11/2020  Visit # / Visits authorized: 5/8; seen for 27 visits  FOTO: at d/c or [facility]    Time In: 4:05 pm  Time Out: 4:45 pm  Total Billable Time: 40 minutes (3 TE)    Precautions: Standard; WBAT with leather ankle brace  *Pt also has difficulty with hearing and typically reads lips. This has been a challenge for the patient as masks are currently worn at all times within the clinic due to COVID19 crisis. Pt requires marked tactile and visual cuing throughout his treatment session.*    Subjective     Pt reports: having no pain today.  Response to previous treatment:  Fatigue  Functional change: wearing his leather brace more and walking    Pain: 0/10  Location: R mid-gastrocnemius muscle belly.     Objective     Herman received therapeutic exercises to develop strength and endurance for 40 minutes including:     Lateral step ups: 2x10, Blue step, 1 UE use   Foot taps: 2x10 B, blue step, 1 UE use on quad cane ( no UE use)  Backward walking 5 rounds // bars  Forward step ups: 2x10 R Blue step with 1 UE use; heavy cueing and min A for increased RLE WB  Lateral step downs: 2x10 R, Blue step, finger tips support only; moderate cueing for only RLE WB  Cybex leg press: 3x10 DL, 6 plates; 3 x 10 R LE, 3 plates heavy cues to avoid L LE use  Cybex hamstring curl: 3x10, 7 plates  Cybex knee extension:3 x10 40#   Side steps 3 laps, no  support  Stair training 5 flights of 4 up and down    Home Exercises Provided and Patient Education Provided:  Education provided:   - Continue HEP. Importance of maintaining HEP with therapy to maximize outcomes    Written Home Exercises Provided: continue previously given HEP   Exercises were reviewed and Herman was able to demonstrate them prior to the end of the session.  Herman demonstrated good  understanding of the education provided.     See EMR under Patient Instructions for exercises provided 2/20/2020.    Assessment   Emphasis of WBAT through RLE especially during step activities.  Patient experienced pain in his right ankle when performing his last reps of step downs but no enough for him to stop.  Patient had no difficulty with today's progressions, noted in bold above.     Herman is progressing well towards his goals.   Pt prognosis is Fair.     Pt's spiritual, cultural and educational needs considered and pt agreeable to plan of care and goals.  Anticipated barriers to physical therapy: chronicity of current injury and significant SxHx with recent ankle fusion.     Short Term Goals: 4 weeks   1.) Pt will become compliant and independent with his initial HEP to promote decreased pain and improved mobility and strength. MET 5/4/2020  2) Pt to report decrease in pain levels from 3/10 at worse to 1/10 at worse. MET 5/4/2020  3.) Pt will improve R ankle MMT scores by 1 muscle grade to improve functional stability and strength. MET 5/4/2020  4.) Pt will be able to tolerate 60 minute exercise session with 3 resting breaks or less to demonstrate improved activity tolerance. MET 3/16/2020  Long Term Goals: 8 weeks   1.) Pt will transition from NWB to WBAT and able to ambulate 30ft with a RW. PROGRESSING, NOT MET 9/14/2020   2.) Pt will be able to ambulate 300ft with a RW with no resting breaks to demonstrate improved ambulatory tolerance (10 weeks).  MET 5/4/2020  3.) Pt will transition from ambulation with RW to  ambulation with SPC to demonstrate improved independence. (12 weeks). PROGRESSING, NOT MET 9/14/2020   4.) Pt will transition from ambulation with SPC to ambulation with no AD to demonstrate further independence. (14 weeks). PROGRESSING, NOT MET 9/14/2020   5.) Pt will improve her FOTO score from 70% impairment to 48% improvement to indicate improvement in overall function. PROGRESSING, NOT MET 9/14/2020   *Additoinal LTG will be set when appropriate functional measures are able to be tested.*     Plan     Continue with conditioning to improve cardiovascular endurance; progress closed kinetic chain activities in FWB, increase focus on decreasing UE use.     Wesley Easley, PTA

## 2020-09-17 ENCOUNTER — IMMUNIZATION (OUTPATIENT)
Dept: PHARMACY | Facility: CLINIC | Age: 50
End: 2020-09-17
Payer: OTHER MISCELLANEOUS

## 2020-09-18 ENCOUNTER — CLINICAL SUPPORT (OUTPATIENT)
Dept: REHABILITATION | Facility: HOSPITAL | Age: 50
End: 2020-09-18
Payer: OTHER MISCELLANEOUS

## 2020-09-18 DIAGNOSIS — M25.671 ANKLE STIFFNESS, RIGHT: ICD-10-CM

## 2020-09-18 PROCEDURE — 97110 THERAPEUTIC EXERCISES: CPT | Mod: PN,CQ

## 2020-09-18 NOTE — PROGRESS NOTES
Physical Therapy Daily Treatment Note     Name: Herman GUERRERO [facility] Number: [id number]    Therapy Diagnosis:   Encounter Diagnosis   Name Primary?    Ankle stiffness, right      Physician: Kadie Grider MD    Visit Date: 9/18/2020    Physician Orders: PT Eval and Treat   Medical Diagnosis from Referral:   Z98.1 (ICD-10-CM) - Status post ankle fusion   M25.551,M25.552 (ICD-10-CM) - Bilateral hip pain   M25.561,M25.562 (ICD-10-CM) - Bilateral knee pain   Evaluation Date: 2/18/2020  Authorization Period Expiration: 3/18/2021  Plan of Care Expiration: 9/11/2020  Visit # / Visits authorized: 6/8; seen for 28 visits  FOTO: at d/c or [facility]    Time In: 3:20 pm  Time Out: 3:58 pm  Total Billable Time: 38 minutes (3 TE)    Precautions: Standard; WBAT with leather ankle brace  *Pt also has difficulty with hearing and typically reads lips. This has been a challenge for the patient as masks are currently worn at all times within the clinic due to COVID19 crisis. Pt requires marked tactile and visual cuing throughout his treatment session.*    Subjective     Pt reports: having no pain today but is feeling a little off, moving slow.  Patient reports that yesterday after sitting for about an hour or so he started having pain in his heel, as much of a pressure feeling as pain.  After he got up and took a few steps the sensation went away.  Response to previous treatment:  Fatigue  Functional change: wearing his leather brace more and walking    Pain: 0/10  Location: R mid-gastrocnemius muscle belly.     Objective     Herman received therapeutic exercises to develop strength and endurance for 38 minutes including:     Lateral step ups: 2x10, Blue step, 1 UE use   Foot taps: 2x10 B, blue step, 1 UE use on quad cane ( no UE use)  Backward walking 5 rounds // bars - OOT  Forward step ups: 2x10 R Blue step with 1 UE use; heavy cueing and min A for increased RLE WB  Lateral step downs: 2x10 R, Blue  step, finger tips support only; moderate cueing for only RLE WB - OOT  Cybex leg press: 3x10 DL, 6 plates; 3 x 10 R LE, 3 plates heavy cues to avoid L LE use  Cybex hamstring curl: 3x10, 7.5 plates  Cybex knee extension:3 x10 40#   Side steps 3 laps, no support  Stair training 5 flights of 4 up and down    Home Exercises Provided and Patient Education Provided:  Education provided:   - Continue HEP. Importance of maintaining HEP with therapy to maximize outcomes    Written Home Exercises Provided: continue previously given HEP   Exercises were reviewed and Herman was able to demonstrate them prior to the end of the session.  Herman demonstrated good  understanding of the education provided.     See EMR under Patient Instructions for exercises provided 2/20/2020.    Assessment   Emphasis of WBAT through RLE especially during step activities.  Patient did not complete all of his exercises today due to moving slower than usual.    Herman is progressing well towards his goals.   Pt prognosis is Fair.     Pt's spiritual, cultural and educational needs considered and pt agreeable to plan of care and goals.  Anticipated barriers to physical therapy: chronicity of current injury and significant SxHx with recent ankle fusion.     Short Term Goals: 4 weeks   1.) Pt will become compliant and independent with his initial HEP to promote decreased pain and improved mobility and strength. MET 5/4/2020  2) Pt to report decrease in pain levels from 3/10 at worse to 1/10 at worse. MET 5/4/2020  3.) Pt will improve R ankle MMT scores by 1 muscle grade to improve functional stability and strength. MET 5/4/2020  4.) Pt will be able to tolerate 60 minute exercise session with 3 resting breaks or less to demonstrate improved activity tolerance. MET 3/16/2020  Long Term Goals: 8 weeks   1.) Pt will transition from NWB to WBAT and able to ambulate 30ft with a RW. PROGRESSING, NOT MET 9/18/2020   2.) Pt will be able to ambulate 300ft with a RW  with no resting breaks to demonstrate improved ambulatory tolerance (10 weeks).  MET 5/4/2020  3.) Pt will transition from ambulation with RW to ambulation with SPC to demonstrate improved independence. (12 weeks). PROGRESSING, NOT MET 9/18/2020   4.) Pt will transition from ambulation with SPC to ambulation with no AD to demonstrate further independence. (14 weeks). PROGRESSING, NOT MET 9/18/2020   5.) Pt will improve her FOTO score from 70% impairment to 48% improvement to indicate improvement in overall function. PROGRESSING, NOT MET 9/18/2020   *Additoinal LTG will be set when appropriate functional measures are able to be tested.*     Plan     Continue with conditioning to improve cardiovascular endurance; progress closed kinetic chain activities in FWB, increase focus on decreasing UE use.     Wesley Easley, PTA

## 2020-09-22 ENCOUNTER — CLINICAL SUPPORT (OUTPATIENT)
Dept: REHABILITATION | Facility: HOSPITAL | Age: 50
End: 2020-09-22
Payer: OTHER MISCELLANEOUS

## 2020-09-22 DIAGNOSIS — M25.671 ANKLE STIFFNESS, RIGHT: ICD-10-CM

## 2020-09-22 PROCEDURE — 97110 THERAPEUTIC EXERCISES: CPT | Mod: PN,CQ

## 2020-09-22 NOTE — PROGRESS NOTES
Physical Therapy Daily Treatment Note     Name: Herman GUERRERO [facility] Number: [id number]    Therapy Diagnosis:   Encounter Diagnosis   Name Primary?    Ankle stiffness, right      Physician: Kadie Grider MD    Visit Date: 9/22/2020    Physician Orders: PT Eval and Treat   Medical Diagnosis from Referral:   Z98.1 (ICD-10-CM) - Status post ankle fusion   M25.551,M25.552 (ICD-10-CM) - Bilateral hip pain   M25.561,M25.562 (ICD-10-CM) - Bilateral knee pain   Evaluation Date: 2/18/2020  Authorization Period Expiration: 3/18/2021  Plan of Care Expiration: 9/11/2020  Visit # / Visits authorized: 7/8; seen for 29 visits  FOTO: at d/c or [facility]    Time In: 3:08 pm  Time Out: 3:31 pm  Total Billable Time: 23 minutes (2 TE)    Precautions: Standard; WBAT with leather ankle brace  *Pt also has difficulty with hearing and typically reads lips. This has been a challenge for the patient as masks are currently worn at all times within the clinic due to COVID19 crisis. Pt requires marked tactile and visual cuing throughout his treatment session.*    Subjective     Pt reports: having no pain.  Patient reports he is late for today's therapy because he fell asleep after lunch.  Response to previous treatment:  Fatigue  Functional change: wearing his leather brace more and walking    Pain: 0/10  Location: R mid-gastrocnemius muscle belly.     Objective     Herman received therapeutic exercises to develop strength and endurance for 23 minutes including:     Lateral step ups: 2x10, Blue step, 1 UE use - OOT  Foot taps: 2x10 B, blue step, 1 UE use on quad cane ( no UE use)- OOT  Backward walking 5 rounds // bars - OOT  Forward step ups: 2x10 R Blue step with 1 UE use; heavy cueing and min A for increased RLE WB  Lateral step downs: 2x10 R, Blue step, finger tips support only; moderate cueing for only RLE WB - OOT  Cybex leg press: 3x10 DL, 6 plates; 3 x 10 R LE, 3 plates heavy cues to avoid L LE  use  Cybex hamstring curl: 3x10, 7.5 plates  Cybex knee extension:3 x10 40#   Side steps 3 laps, no support- OOT  Stair training 5 flights of 4 up and down    Home Exercises Provided and Patient Education Provided:  Education provided:   - Continue HEP. Importance of maintaining HEP with therapy to maximize outcomes    Written Home Exercises Provided: continue previously given HEP   Exercises were reviewed and Herman was able to demonstrate them prior to the end of the session.  Herman demonstrated good  understanding of the education provided.     See EMR under Patient Instructions for exercises provided 2/20/2020.    Assessment   Emphasis of WBAT through RLE especially during step activities.  Patient did not complete all of his exercises today due to arriving 23 minutes late to therapy.    Herman is progressing well towards his goals.   Pt prognosis is Fair.     Pt's spiritual, cultural and educational needs considered and pt agreeable to plan of care and goals.  Anticipated barriers to physical therapy: chronicity of current injury and significant SxHx with recent ankle fusion.     Short Term Goals: 4 weeks   1.) Pt will become compliant and independent with his initial HEP to promote decreased pain and improved mobility and strength. MET 5/4/2020  2) Pt to report decrease in pain levels from 3/10 at worse to 1/10 at worse. MET 5/4/2020  3.) Pt will improve R ankle MMT scores by 1 muscle grade to improve functional stability and strength. MET 5/4/2020  4.) Pt will be able to tolerate 60 minute exercise session with 3 resting breaks or less to demonstrate improved activity tolerance. MET 3/16/2020  Long Term Goals: 8 weeks   1.) Pt will transition from NWB to WBAT and able to ambulate 30ft with a RW. PROGRESSING, NOT MET 9/22/2020   2.) Pt will be able to ambulate 300ft with a RW with no resting breaks to demonstrate improved ambulatory tolerance (10 weeks).  MET 5/4/2020  3.) Pt will transition from ambulation with  RW to ambulation with SPC to demonstrate improved independence. (12 weeks). PROGRESSING, NOT MET 9/22/2020   4.) Pt will transition from ambulation with SPC to ambulation with no AD to demonstrate further independence. (14 weeks). PROGRESSING, NOT MET 9/22/2020   5.) Pt will improve her FOTO score from 70% impairment to 48% improvement to indicate improvement in overall function. PROGRESSING, NOT MET 9/22/2020   *Additoinal LTG will be set when appropriate functional measures are able to be tested.*     Plan     Continue with conditioning to improve cardiovascular endurance; progress closed kinetic chain activities in FWB, increase focus on decreasing UE use.     Wesley Easley, PTA

## 2020-09-24 ENCOUNTER — CLINICAL SUPPORT (OUTPATIENT)
Dept: REHABILITATION | Facility: HOSPITAL | Age: 50
End: 2020-09-24
Payer: OTHER MISCELLANEOUS

## 2020-09-24 DIAGNOSIS — M25.671 ANKLE STIFFNESS, RIGHT: ICD-10-CM

## 2020-09-24 PROCEDURE — 97110 THERAPEUTIC EXERCISES: CPT | Mod: PN

## 2020-09-24 NOTE — PLAN OF CARE
Outpatient Therapy Updated Plan of Care     Visit Date: 9/24/2020  Name: Herman GUERRERO [facility] Number: [id number]    Therapy Diagnosis:   Encounter Diagnosis   Name Primary?    Ankle stiffness, right      Physician: Kadie Grider MD    Physician Orders: PT Eval and Treat   Medical Diagnosis from Referral:   Z98.1 (ICD-10-CM) - Status post ankle fusion   M25.551,M25.552 (ICD-10-CM) - Bilateral hip pain   M25.561,M25.562 (ICD-10-CM) - Bilateral knee pain   Evaluation Date: 2/18/2020  Authorization Period Expiration: 3/18/2021  Visit # / Visits authorized: 8/8  Total Visits Received: 59    Current Certification Period:  7/2/2020 to 9/11/2020  Precautions: Standard and elevated BMI, significant past SxHx  Functional Level Prior to Evaluation:  50% WB with B axillary crutches     Subjective     Update: Pt reported to therapy 30 minutes late due to oversleeping.    Objective     Update: Update:   Ankle AROM (R/L):   Ankle AROM Right (*) = in degrees Left (*) = in degrees Comments    Dorsiflexion -18 > -1 > 0 > -1 WNL Stiffness noted.    Plantarflexion 22 > 22 > 17 > 16 WNL Stiffness noted.    Inversion 2 > 9 > 14 > 9 WNL Stiffness and pain noted.    Eversion 2 > 8 > 8 > 8 WNL Stiffness and pain noted.    Great toe extension 21 > 36 > 33 >30 WNL Stiffness and pain noted.    Ankle PROM Right (*) = in degrees Left (*) = in degrees Comments    Dorsiflexion -10 > 4 > 3 >0 WNL Stiffness and pain noted.    Plantarflexion 26 > 27 > 22 > 17 WNL Stiffness and pain noted.    Inversion 10 > 12> 15 >10 WNL Stiffness and pain noted.    Eversion 6 > 13 > 13 > 9 WNL Stiffness and pain noted.    Great toe extension 43  > 45 > 38 >32 WNL Stiffness noted.       Ankle MMT:   Ankle MMT scores Right: X/5 Left: x/5   Dorsiflexion 2+ > 3- > 3-/5 4 /5   Plantarflexion  3- > 3- > 3-/5 4 /5    Inversion 2+ > 3- > 3-/5 4+ /5    Eversion 2+ > 3- > 3-/5 4 /5    Great toe extension 2+> 3- > 3-/5 4 /5      Circumferential measures:    Circumferential measures Right in cm   Figure 8 68 > 70.6 > 70.9 > 65   At [place] 37 > 36 > 34.8 > 34   At Metatarsal heads 26 > 26.7 > 26.4 > 26.2      5 x sit to stand: 18 seconds w/ QC  30 second chair rise: 9 (min<>mod use of UEs) in CAM boot >> 9 reps w/ QC and leather ankle brace  2 Minute walk test: 298ft w/ axillary crutches >> 274ft w/ axillary crutches >> 214' w/ QC and leather ankle brace  1RM knee extension/quad 60# >> 70#  1RM knee flexion/hamstring curl 70# >> 100#      Assessment     Update: Pt strength about the same that is mainly limited due to ROM restrictions; strength is a 4/5 not considering ROM restriction. Swelling in R foot has greatly improved even after regular leather brace use without CAM boot. 2 minute walk test distance decreased though he is out of the CAM rocker boot and there is some mild fear and avoidance tendencies in gait and high level stepping. 30 seconds STS test reached the same 9 reps though without CAM boot use and improved RLE weight bearing. He is also able now to perform a STS without UE use though took some encouraging due to fear, and his 1 rep max quad and hamstring strength improved as well. Pt progressing well overall with improved functional mobility, can don and doff his shoe and brace independently now, and improved gait with QC as pt is likely SBA without AD at this point. ROM max is likely reached due to the nature of his surgery (ankle fusion) though would benefit from continued skilled PT to improve gait, cardiovascular endurance, BLE strength, R single leg activities,  and fear/avoidance behaviors.     Previous Short/Long Term Goals Status:    Short Term Goals: 4 weeks   1.) Pt will become compliant and independent with his initial HEP to promote decreased pain and improved mobility and strength. MET 5/4/2020  2) Pt to report decrease in pain levels from 3/10 at worse to 1/10 at worse. MET 5/4/2020  3.) Pt will improve R ankle MMT scores by 1 muscle  grade to improve functional stability and strength. MET 5/4/2020  4.) Pt will be able to tolerate 60 minute exercise session with 3 resting breaks or less to demonstrate improved activity tolerance. MET 3/16/2020    Long Term Goals: 8 weeks   1.) Pt will be able to ambulate 300ft with a RW with no resting breaks to demonstrate improved ambulatory tolerance (10 weeks).  MET 5/4/2020    New Short/Long Term Goals Status:    1.) Pt will transition from NWB to WBAT and able to ambulate 30ft with a RW. PROGRESSING, NOT MET 9/22/2020   2.) Pt will transition from ambulation with RW to ambulation with SPC to demonstrate improved independence. (12 weeks). PROGRESSING, NOT MET 9/22/2020   3.) Pt will transition from ambulation with SPC to ambulation with no AD to demonstrate further independence. (14 weeks). PROGRESSING, NOT MET 9/22/2020   4.) Pt will improve her FOTO score from 70% impairment to 48% improvement to indicate improvement in overall function. PROGRESSING, NOT MET 9/22/2020   *Additoinal LTG will be set when appropriate functional measures are able to be tested.*    Long Term Goal Status:   modified:  See above  Reasons for Recertification of Therapy:   Updated POC    Plan     Updated Certification Period: 9/24/2020 to 10/15/2020  Recommended Treatment Plan: 3 times per week for 3 weeks: Gait Training, Manual Therapy, Moist Heat/ Ice, Neuromuscular Re-ed, Orthotic Management and Training, Patient Education, Therapeutic Activites and Therapeutic Exercise  Other Recommendations: None    Afia Mccabe, SPT  9/24/2020     I certify that I was present in the room directing the student in service delivery and guiding them using my skilled judgment. As the co-signing therapist I have reviewed the students documentation and am responsible for the treatment, assessment, and plan.   Nate Ruiz, PT, DPT        I CERTIFY THE NEED FOR THESE SERVICES FURNISHED UNDER THIS PLAN OF TREATMENT AND WHILE UNDER MY  CARE    Physician's comments:        Physician's Signature: ___________________________________________________

## 2020-09-24 NOTE — PROGRESS NOTES
Physical Therapy Daily Treatment Note     Name: Herman GUERRERO [facility] Number: [id number]    Therapy Diagnosis:   Encounter Diagnosis   Name Primary?    Ankle stiffness, right      Physician: Kadie Grider MD    Visit Date: 9/24/2020    Physician Orders: PT Eval and Treat   Medical Diagnosis from Referral:   Z98.1 (ICD-10-CM) - Status post ankle fusion   M25.551,M25.552 (ICD-10-CM) - Bilateral hip pain   M25.561,M25.562 (ICD-10-CM) - Bilateral knee pain   Evaluation Date: 2/18/2020  Authorization Period Expiration: 3/18/2021  Plan of Care Expiration: 9/11/2020  Visit # / Visits authorized: 8/8; seen for 30 visits  FOTO: at d/c or [facility]    Time In: 12:30 pm  Time Out: 01:30 pm  Total Billable Time: 60 minutes (4 TE)    Precautions: Standard; WBAT with leather ankle brace  *Pt also has difficulty with hearing and typically reads lips. This has been a challenge for the patient as masks are currently worn at all times within the clinic due to COVID19 crisis. Pt requires marked tactile and visual cuing throughout his treatment session.*    Subjective     Pt reports: he is late because he overslepy  Response to previous treatment:  Fatigue  Functional change: wearing his leather brace more and walking    Pain: 0/10  Location: R mid-gastrocnemius muscle belly.     Objective     Herman received therapeutic exercises to develop strength and endurance for 60 minutes including:     Update: Update:   Ankle AROM (R/L):   Ankle AROM Right (*) = in degrees Left (*) = in degrees Comments    Dorsiflexion -18 > -1 > 0 > -1 WNL Stiffness noted.    Plantarflexion 22 > 22 > 17 > 16 WNL Stiffness noted.    Inversion 2 > 9 > 14 > 9 WNL Stiffness and pain noted.    Eversion 2 > 8 > 8 > 8 WNL Stiffness and pain noted.    Great toe extension 21 > 36 > 33 >30 WNL Stiffness and pain noted.    Ankle PROM Right (*) = in degrees Left (*) = in degrees Comments    Dorsiflexion -10 > 4 > 3 >0 WNL Stiffness and  pain noted.    Plantarflexion 26 > 27 > 22 > 17 WNL Stiffness and pain noted.    Inversion 10 > 12> 15 >10 WNL Stiffness and pain noted.    Eversion 6 > 13 > 13 > 9 WNL Stiffness and pain noted.    Great toe extension 43  > 45 > 38 >32 WNL Stiffness noted.       Ankle MMT:   Ankle MMT scores Right: X/5 Left: x/5   Dorsiflexion 2+ > 3- > 3-/5 4 /5   Plantarflexion  3- > 3- > 3-/5 4 /5    Inversion 2+ > 3- > 3-/5 4+ /5    Eversion 2+ > 3- > 3-/5 4 /5    Great toe extension 2+> 3- > 3-/5 4 /5      Circumferential measures:   Circumferential measures Right in cm   Figure 8 68 > 70.6 > 70.9 > 65   At [place] 37 > 36 > 34.8 > 34   At Metatarsal heads 26 > 26.7 > 26.4 > 26.2      5 x sit to stand: 18 seconds w/ QC  30 second chair rise: 9 (min<>mod use of UEs) in CAM boot > 9 reps w/ QC and leather ankle brace  2 Minute walk test: 298ft w/ axillary crutches > 274ft w/ axillary crutches > 214' w/ QC and leather ankle brace  1RM knee extension/quad: 60# > 70#  1RM knee flexion/hamstring curl: 70# > 100#      Home Exercises Provided and Patient Education Provided:  Education provided:   - Continue HEP. Importance of maintaining HEP with therapy to maximize outcomes    Written Home Exercises Provided: continue previously given HEP   Exercises were reviewed and Herman was able to demonstrate them prior to the end of the session.  Herman demonstrated good  understanding of the education provided.     See EMR under Patient Instructions for exercises provided 2/20/2020.    Assessment   Pt overslept and was 30 minutes late today, pt was able to stay for UPOC measurements. Pt strength about the same that is mainly limited due to ROM restrictions; strength is a 4/5 not considering ROM restriction. Swelling in R foot has greatly improved even after regular leather brace use without CAM boot. 2 minute walk test distance decreased though he is out of the CAM rocker boot and there is some mild fear and avoidance tendencies in gait and  high level stepping. 30 seconds STS test reached the same 9 reps though without CAM boot use and improved RLE weight bearing. He is also able now to perform a STS without UE use though took some encouraging due to fear, and his 1 rep max quad and hamstring strength improved as well. Pt progressing well overall with improved functional mobility, can don and doff his shoe and brace independently now, and improved gait with QC as pt is likely SBA without AD at this point. ROM max is likely reached due to the nature of his surgery (ankle fusion) though would benefit from continued skilled PT to improve gait and fear/avoidance behaviors.     Herman is progressing well towards his goals.   Pt prognosis is Fair.     Pt's spiritual, cultural and educational needs considered and pt agreeable to plan of care and goals.  Anticipated barriers to physical therapy: chronicity of current injury and significant SxHx with recent ankle fusion.     Short Term Goals: 4 weeks   1.) Pt will become compliant and independent with his initial HEP to promote decreased pain and improved mobility and strength. MET 5/4/2020  2) Pt to report decrease in pain levels from 3/10 at worse to 1/10 at worse. MET 5/4/2020  3.) Pt will improve R ankle MMT scores by 1 muscle grade to improve functional stability and strength. MET 5/4/2020  4.) Pt will be able to tolerate 60 minute exercise session with 3 resting breaks or less to demonstrate improved activity tolerance. MET 3/16/2020  Long Term Goals: 8 weeks   1.) Pt will transition from NWB to WBAT and able to ambulate 30ft with a RW. PROGRESSING, NOT MET 9/24/2020   2.) Pt will be able to ambulate 300ft with a RW with no resting breaks to demonstrate improved ambulatory tolerance (10 weeks).  MET 5/4/2020  3.) Pt will transition from ambulation with RW to ambulation with SPC to demonstrate improved independence. (12 weeks). PROGRESSING, NOT MET 9/24/2020   4.) Pt will transition from ambulation with SPC  to ambulation with no AD to demonstrate further independence. (14 weeks). PROGRESSING, NOT MET 9/24/2020   5.) Pt will improve her FOTO score from 70% impairment to 48% improvement to indicate improvement in overall function. PROGRESSING, NOT MET 9/24/2020   *Additoinal LTG will be set when appropriate functional measures are able to be tested.*     Plan     Working on cardiovascular improvement, BLE strength, and RLE single leg activities in mobility including gait.    Afia Mccabe, SPT     I certify that I was present in the room directing the student in service delivery and guiding them using my skilled judgment. As the co-signing therapist I have reviewed the students documentation and am responsible for the treatment, assessment, and plan.   Nate Ruiz, PT, DPT

## 2020-10-07 ENCOUNTER — TELEPHONE (OUTPATIENT)
Dept: REHABILITATION | Facility: HOSPITAL | Age: 50
End: 2020-10-07

## 2020-10-07 NOTE — TELEPHONE ENCOUNTER
Pt's wife called the clinic for an update on Herman's PT authorization.   I reached out to the pt's , Ellis, for an update. No answer. Left a voicemail and asked to contact us back with an update at his earliest convenience.   Reached out to the pt's wife - poor phone connection on multiple attempts, ultimately left her a voicemail updating her on the above and informed her if she were to have any f/u questions to feel free to call back any time.

## 2020-10-16 PROBLEM — M25.671 ANKLE STIFFNESS, RIGHT: Status: RESOLVED | Noted: 2020-02-19 | Resolved: 2020-10-16

## 2020-11-05 ENCOUNTER — TELEPHONE (OUTPATIENT)
Dept: REHABILITATION | Facility: HOSPITAL | Age: 50
End: 2020-11-05

## 2020-11-05 NOTE — TELEPHONE ENCOUNTER
Called the pt'  Ellis Galicia again for clarification on whether we do or do not have authorization for the pt. It was mentioned that the case has been settled and that he is not authorized for additional care through [facility].

## 2020-11-05 NOTE — TELEPHONE ENCOUNTER
LVM for Herman's wife explaining outcome of call with pt's . Informed her that she could call back if she were to have any questions or concerns.

## 2020-11-20 ENCOUNTER — CLINICAL SUPPORT (OUTPATIENT)
Dept: REHABILITATION | Facility: HOSPITAL | Age: 50
End: 2020-11-20
Payer: OTHER MISCELLANEOUS

## 2020-11-20 DIAGNOSIS — S82.873A PILON FRACTURE: ICD-10-CM

## 2020-11-20 DIAGNOSIS — M25.671 ANKLE STIFFNESS, RIGHT: Primary | ICD-10-CM

## 2020-11-20 DIAGNOSIS — Z98.1 S/P ANKLE FUSION: Primary | ICD-10-CM

## 2020-11-20 PROCEDURE — 97116 GAIT TRAINING THERAPY: CPT | Mod: PN

## 2020-11-20 PROCEDURE — 97140 MANUAL THERAPY 1/> REGIONS: CPT | Mod: PN

## 2020-11-20 PROCEDURE — 97110 THERAPEUTIC EXERCISES: CPT | Mod: PN

## 2020-11-23 NOTE — PROGRESS NOTES
Physical Therapy Daily Treatment Note     Name: Herman GUERRERO [facility] Number: [id number]    Therapy Diagnosis:   Encounter Diagnosis   Name Primary?    Ankle stiffness, right Yes     Physician: Kadie Grider MD    Visit Date: 11/20/2020    Physician Orders: PT Eval and Treat   Medical Diagnosis from Referral:   Z98.1 (ICD-10-CM) - Status post ankle fusion   M25.551,M25.552 (ICD-10-CM) - Bilateral hip pain   M25.561,M25.562 (ICD-10-CM) - Bilateral knee pain   Evaluation Date: 2/18/2020    Authorization Period Expiration: 3/18/2021  Plan of Care Expiration: 9/24/2020 to 10/15/2020  Visit # / Visits authorized: 1/12; (seen for 30 visits previously)  FOTO: at d/c     Time In: 1630  Time Out: 1715  Total Billable Time: 40 minutes (1 TE, 1 NM, 1 GT)    Precautions: Standard; WBAT with leather ankle brace  *Pt also has difficulty with hearing and typically reads lips. This has been a challenge for the patient as masks are currently worn at all times within the clinic due to COVID19 crisis. Pt requires marked tactile and visual cuing throughout his treatment session.*    Subjective     Pt reports: that he has been patiently waiting on insurance to approve him more visits.  While waiting, he signed up for a local gym membership.  Today, prior to coming to PT, he went to the gym for the first time and used the leg weight machines primarily.  Also voices now transitioning to a soft ankle lace up brace that better fits into his shoe.  Continues to walk primarily with a cane.    Response to previous treatment:  Fatigue  Functional change: wearing his leather brace more and walking    Pain: 2/10  Location: R anterior ankle joint line    Objective     Herman received therapeutic exercises to develop strength and endurance for 15 minutes including:     Update: Update:   Ankle AROM (R/L):   Ankle AROM Right (*) = in degrees Left (*) = in degrees Comments    Dorsiflexion -18 > -1 > 0 > -1 WNL  "Stiffness noted.    Plantarflexion 22 > 22 > 17 > 16 WNL Stiffness noted.    Inversion 2 > 9 > 14 > 9 WNL Stiffness and pain noted.    Eversion 2 > 8 > 8 > 8 WNL Stiffness and pain noted.    Great toe extension 21 > 36 > 33 >30 WNL Stiffness and pain noted.    Ankle PROM Right (*) = in degrees Left (*) = in degrees Comments    Dorsiflexion -10 > 4 > 3 >0 WNL Stiffness and pain noted.    Plantarflexion 26 > 27 > 22 > 17 WNL Stiffness and pain noted.    Inversion 10 > 12> 15 >10 WNL Stiffness and pain noted.    Eversion 6 > 13 > 13 > 9 WNL Stiffness and pain noted.    Great toe extension 43  > 45 > 38 >32 WNL Stiffness noted.       Ankle MMT:   Ankle MMT scores Right: X/5 Left: x/5   Dorsiflexion 2+ > 3- > 3-/5 4 /5   Plantarflexion  3- > 3- > 3-/5 4 /5    Inversion 2+ > 3- > 3-/5 4+ /5    Eversion 2+ > 3- > 3-/5 4 /5    Great toe extension 2+> 3- > 3-/5 4 /5     5 x sit to stand: 18 seconds w/ QC  30 second chair rise: 9 (min<>mod use of UEs) in CAM boot > 9 reps w/ QC and leather ankle brace  2 Minute walk test: 298ft w/ axillary crutches > 274ft w/ axillary crutches > 214' w/ QC and leather ankle brace  1RM knee extension/quad: 60# > 70#  1RM knee flexion/hamstring curl: 70# > 100#      Herman received therapeutic exercises to develop strength and endurance for 15 minutes including:   - heel raises    2x10  - SL calf stretch   5x30" gastroc bias and 5x15" soleus bias    Herman participated in the following neuromuscular re-education activities for balance and proprioceptive training for 15 minutes:  - standing marching with emphasis on RLE SLS 3x10  - SLS trials on the L 10 reps  - ankle rocking on foam pad 3x10    Herman participated in the following gait training activities for a total of 10 minutes:  - alternating forward/backward stepping on RLE stance leg; emphasis on forefoot rocker activation  - tandem stance weight shift forward/backward 5 rounds    Home Exercises Provided and Patient Education " Provided:  Education provided:   - Continue HEP. Importance of maintaining HEP with therapy to maximize outcomes    Written Home Exercises Provided: continue previously given HEP   Exercises were reviewed and Herman was able to demonstrate them prior to the end of the session.  Herman demonstrated good  understanding of the education provided.     See EMR under Patient Instructions for exercises provided 2/20/2020.    Assessment   Due to last POC expiring prior to being seen, will update again today.   Patient strength and ROM unchanged since last updated POC.  Gait demonstrates marked limitations including decreased weight acceptance onto RLE, lack of ankle DF ROM with subsequent knee hyperextension thrust, and need for assistive device.      Herman is progressing well towards his goals.   Pt prognosis is Fair.     Pt's spiritual, cultural and educational needs considered and pt agreeable to plan of care and goals.  Anticipated barriers to physical therapy: chronicity of current injury and significant SxHx with recent ankle fusion.     Short Term Goals: 4 weeks   1.) Pt will become compliant and independent with his initial HEP to promote decreased pain and improved mobility and strength. MET 5/4/2020  2) Pt to report decrease in pain levels from 3/10 at worse to 1/10 at worse. MET 5/4/2020  3.) Pt will improve R ankle MMT scores by 1 muscle grade to improve functional stability and strength. MET 5/4/2020  4.) Pt will be able to tolerate 60 minute exercise session with 3 resting breaks or less to demonstrate improved activity tolerance. MET 3/16/2020  Long Term Goals: 8 weeks   1.) Pt will transition from NWB to WBAT and able to ambulate 30ft with a RW. PROGRESSING, NOT MET 11/20/2020   2.) Pt will be able to ambulate 300ft with a RW with no resting breaks to demonstrate improved ambulatory tolerance (10 weeks).  MET 5/4/2020  3.) Pt will transition from ambulation with RW to ambulation with SPC to demonstrate  improved independence. (12 weeks). PROGRESSING, NOT MET 11/20/2020   4.) Pt will transition from ambulation with SPC to ambulation with no AD to demonstrate further independence. (14 weeks). PROGRESSING, NOT MET 11/20/2020   5.) Pt will improve her FOTO score from 70% impairment to 48% improvement to indicate improvement in overall function. PROGRESSING, NOT MET 11/20/2020   *Additoinal LTG will be set when appropriate functional measures are able to be tested.*     Plan   Update POC.     Updated Certification Period: 9/24/2020 to 10/15/2020  Recommended Treatment Plan: 3 times per week for 3 weeks: Gait Training, Manual Therapy, Moist Heat/ Ice, Neuromuscular Re-ed, Orthotic Management and Training, Patient Education, Therapeutic Activites and Therapeutic Exercise  Other Recommendations: None    Leo Kahn, PT

## 2020-11-24 ENCOUNTER — CLINICAL SUPPORT (OUTPATIENT)
Dept: REHABILITATION | Facility: HOSPITAL | Age: 50
End: 2020-11-24
Payer: OTHER MISCELLANEOUS

## 2020-11-24 DIAGNOSIS — M25.671 ANKLE STIFFNESS, RIGHT: Primary | ICD-10-CM

## 2020-11-24 PROCEDURE — 97112 NEUROMUSCULAR REEDUCATION: CPT | Mod: PN

## 2020-11-24 PROCEDURE — 97110 THERAPEUTIC EXERCISES: CPT | Mod: PN

## 2020-11-24 NOTE — PROGRESS NOTES
Physical Therapy Daily Treatment Note     Name: Herman GUERRERO [facility] Number: [id number]    Therapy Diagnosis:   Encounter Diagnosis   Name Primary?    Ankle stiffness, right Yes     Physician: Kadie Grider MD    Visit Date: 11/24/2020    Physician Orders: PT Eval and Treat   Medical Diagnosis from Referral:   Z98.1 (ICD-10-CM) - Status post ankle fusion   M25.551,M25.552 (ICD-10-CM) - Bilateral hip pain   M25.561,M25.562 (ICD-10-CM) - Bilateral knee pain   Evaluation Date: 2/18/2020    Authorization Period Expiration: 3/18/2021  Plan of Care Expiration: 9/24/2020 to 10/15/2020  Visit # / Visits authorized: 2/12; (seen for 30 visits previously)  FOTO: at next    Time In: 9:20 AM  Time Out: 10:00 AM  Total Billable Time: 40 minutes (2 TE, 1 NMR)    Precautions: Standard; WBAT with leather ankle brace  *Pt also has difficulty with hearing and typically reads lips. This has been a challenge for the patient as masks are currently worn at all times within the clinic due to COVID19 crisis. Pt requires marked tactile and visual cuing throughout his treatment session.*    Subjective     Pt reports: wearing right soft ankle brace and quad cane, going to the gym starting last week going twice doing machine weights to keep up strength, and going to the park walking for about a mile to keep up endurance.  He has a hard time keeping his right leg straight when walking, placing the appropriate weight on his RLE, stair negotiation, and improving his R ankle ROM.  Response to previous treatment:  Fatigue and fear  Functional change: wearing a soft R ankle brace that fits and feels better for R foot    Pain: 2/10  Location: R anterior ankle joint line    Objective   Update:   Ankle AROM (R/L): 11/24/2020  Ankle AROM Right (*) = in degrees Left (*) = in degrees Comments    Dorsiflexion -18 > -1 > 0 > -1>0 WNL Stiffness noted.    Plantarflexion 22 > 22 > 17 > 16>22 WNL Stiffness noted.   "  Inversion 2 > 9 > 14 > 9>15 WNL Stiffness noted.    Eversion 2 > 8 > 8 > 8>11 WNL Stiffness noted   Great toe extension 21 > 36 > 33 >30>31 WNL Stiffness noted     Ankle MMT:   Ankle MMT scores Right: x/5 Left: x/5   Dorsiflexion 2+ > 3- > 3-/5 4/5   Plantarflexion  3- > 3- > 3-/5 4/5    Inversion 2+ > 3- > 3-/5 4+/5    Eversion 2+ > 3- > 3-/5 4/5    Great toe extension 2+> 3- > 3-/5 4/5     Herman received therapeutic exercises to develop strength and endurance for 30 minutes including: measurements of R ankle  - heel raises      2x10  - SL calf stretch     5x30" gastroc bias and 5x15" soleus bias    Herman participated in the following neuromuscular re-education activities for balance and proprioceptive training for 15 minutes:  - standing marching with emphasis on RLE SLS  3x10  - SLS trials on the L     10 reps   - ankle rocking on foam pad    3x10 - not today    Herman participated in the following gait training activities for a total of 0 minutes:  - alternating forward/backward stepping on RLE stance leg; emphasis on forefoot rocker activation  - tandem stance weight shift forward/backward 5 rounds    Home Exercises Provided and Patient Education Provided:  Education provided:   - Continue HEP. Importance of maintaining HEP with therapy to maximize outcomes    Written Home Exercises Provided: continue previously given HEP   Exercises were reviewed and Herman was able to demonstrate them prior to the end of the session.  Herman demonstrated good  understanding of the education provided.     See EMR under Patient Instructions for exercises provided 2/20/2020.    Assessment   R ankle measurements taken today that did demonstrate improvements in AROM. Goal is improving pt gait pattern and RLE weight acceptance in mobility. R ankle fusion main barrier that will naturally lead to mild compensations to improve patient functional mobility. Pt did well today and heavy cueing given to pt for weight bearing exercises and " education on appropriate pain and compensations. Progress standing exercises and RLE single leg strengthening/activities.     Herman is progressing well towards his goals.   Pt prognosis is Fair.     Pt's spiritual, cultural and educational needs considered and pt agreeable to plan of care and goals.  Anticipated barriers to physical therapy: chronicity of current injury and significant SxHx with recent ankle fusion.     Short Term Goals: 4 weeks   1.) Pt will become compliant and independent with his initial HEP to promote decreased pain and improved mobility and strength. MET 5/4/2020  2) Pt to report decrease in pain levels from 3/10 at worse to 1/10 at worse. MET 5/4/2020  3.) Pt will improve R ankle MMT scores by 1 muscle grade to improve functional stability and strength. MET 5/4/2020  4.) Pt will be able to tolerate 60 minute exercise session with 3 resting breaks or less to demonstrate improved activity tolerance. MET 3/16/2020  Long Term Goals: 8 weeks   1.) Pt will transition from NWB to WBAT and able to ambulate 30ft with a RW. PROGRESSING, NOT MET 11/24/2020   2.) Pt will be able to ambulate 300ft with a RW with no resting breaks to demonstrate improved ambulatory tolerance (10 weeks).  MET 5/4/2020  3.) Pt will transition from ambulation with RW to ambulation with SPC to demonstrate improved independence. (12 weeks). PROGRESSING, NOT MET 11/24/2020   4.) Pt will transition from ambulation with SPC to ambulation with no AD to demonstrate further independence. (14 weeks). PROGRESSING, NOT MET 11/24/2020   5.) Pt will improve her FOTO score from 70% impairment to 48% improvement to indicate improvement in overall function. PROGRESSING, NOT MET 11/24/2020   *Additoinal LTG will be set when appropriate functional measures are able to be tested.*     Plan   Update POC.     Updated Certification Period: 9/24/2020 to 10/15/2020  Recommended Treatment Plan: 3 times per week for 3 weeks: Gait Training, Manual  Therapy, Moist Heat/ Ice, Neuromuscular Re-ed, Orthotic Management and Training, Patient Education, Therapeutic Activites and Therapeutic Exercise  Other Recommendations: None    Nate Ruiz, PT

## 2020-11-24 NOTE — PLAN OF CARE
Outpatient Therapy Updated Plan of Care     Visit Date: 11/20/2020  Name: Herman GUERRERO [facility] Number: [id number]    Therapy Diagnosis:   Encounter Diagnosis   Name Primary?    Ankle stiffness, right Yes     Physician: Kadie Grider MD    Physician Orders: PT Eval and Treat   Medical Diagnosis from Referral:   Z98.1 (ICD-10-CM) - Status post ankle fusion   M25.551,M25.552 (ICD-10-CM) - Bilateral hip pain   M25.561,M25.562 (ICD-10-CM) - Bilateral knee pain   Evaluation Date: 2/18/2020  Authorization Period Expiration: 3/18/2021  Visit # / Visits authorized: 1/12  Total Visits Received: 60    Current Certification Period:   9/24/2020 to 10/15/2020  Precautions: Standard and elevated BMI, significant past SxHx  Functional Level Prior to Evaluation:  50% WB with B axillary crutches     Subjective     Update: Pt reports: that he has been patiently waiting on insurance to approve him more visits.  While waiting, he signed up for a local gym membership.  Today, prior to coming to PT, he went to the gym for the first time and used the leg weight machines primarily.  Also voices now transitioning to a soft ankle lace up brace that better fits into his shoe.  Continues to walk primarily with a cane.    Response to previous treatment:  Fatigue  Functional change: wearing his leather brace more and walking     Pain: 2/10  Location: R anterior ankle joint line    Objective     Update: Update:   Ankle AROM (R/L):   Ankle AROM Right (*) = in degrees Left (*) = in degrees Comments    Dorsiflexion -18 > -1 > 0 > -1 WNL Stiffness noted.    Plantarflexion 22 > 22 > 17 > 16 WNL Stiffness noted.    Inversion 2 > 9 > 14 > 9 WNL Stiffness and pain noted.    Eversion 2 > 8 > 8 > 8 WNL Stiffness and pain noted.    Great toe extension 21 > 36 > 33 >30 WNL Stiffness and pain noted.    Ankle PROM Right (*) = in degrees Left (*) = in degrees Comments    Dorsiflexion -10 > 4 > 3 >0 WNL Stiffness and pain noted.    Plantarflexion 26 >  27 > 22 > 17 WNL Stiffness and pain noted.    Inversion 10 > 12> 15 >10 WNL Stiffness and pain noted.    Eversion 6 > 13 > 13 > 9 WNL Stiffness and pain noted.    Great toe extension 43  > 45 > 38 >32 WNL Stiffness noted.       Ankle MMT:   Ankle MMT scores Right: X/5 Left: x/5   Dorsiflexion 2+ > 3- > 3-/5 4 /5   Plantarflexion  3- > 3- > 3-/5 4 /5    Inversion 2+ > 3- > 3-/5 4+ /5    Eversion 2+ > 3- > 3-/5 4 /5    Great toe extension 2+> 3- > 3-/5 4 /5        5 x sit to stand: 18 seconds w/ QC  30 second chair rise: 9 (min<>mod use of UEs) in CAM boot >> 9 reps w/ QC and leather ankle brace  2 Minute walk test: 298ft w/ axillary crutches >> 274ft w/ axillary crutches >> 214' w/ QC and leather ankle brace  1RM knee extension/quad 60# >> 70#  1RM knee flexion/hamstring curl 70# >> 100#      Assessment     Update: Due to last POC expiring prior to being seen, will update again today.   Patient strength and ROM unchanged since last updated POC.  Gait demonstrates marked limitations including decreased weight acceptance onto RLE, lack of ankle DF ROM with subsequent knee hyperextension thrust, and need for assistive device.       Herman is progressing well towards his goals.   Pt prognosis is Fair.        Previous Short/Long Term Goals Status:    Short Term Goals: 4 weeks   1.) Pt will become compliant and independent with his initial HEP to promote decreased pain and improved mobility and strength. MET 5/4/2020  2) Pt to report decrease in pain levels from 3/10 at worse to 1/10 at worse. MET 5/4/2020  3.) Pt will improve R ankle MMT scores by 1 muscle grade to improve functional stability and strength. MET 5/4/2020  4.) Pt will be able to tolerate 60 minute exercise session with 3 resting breaks or less to demonstrate improved activity tolerance. MET 3/16/2020    Long Term Goals: 8 weeks   1.) Pt will be able to ambulate 300ft with a RW with no resting breaks to demonstrate improved ambulatory tolerance (10 weeks).   MET 5/4/2020    New Short/Long Term Goals Status:    1.) Pt will transition from NWB to WBAT and able to ambulate 30ft with a RW. PROGRESSING, NOT MET 9/22/2020   2.) Pt will transition from ambulation with RW to ambulation with SPC to demonstrate improved independence. (12 weeks). PROGRESSING, NOT MET 9/22/2020   3.) Pt will transition from ambulation with SPC to ambulation with no AD to demonstrate further independence. (14 weeks). PROGRESSING, NOT MET 9/22/2020   4.) Pt will improve her FOTO score from 70% impairment to 48% improvement to indicate improvement in overall function. PROGRESSING, NOT MET 9/22/2020   *Additoinal LTG will be set when appropriate functional measures are able to be tested.*    Long Term Goal Status:   modified:  See above  Reasons for Recertification of Therapy:   Updated POC    Plan     Updated Certification Period: 11/20/2020 to 01/08/2021  Recommended Treatment Plan: 1-2 times per week for 6 weeks: Gait Training, Manual Therapy, Moist Heat/ Ice, Neuromuscular Re-ed, Orthotic Management and Training, Patient Education, Therapeutic Activites and Therapeutic Exercise  Other Recommendations: None    Leo Kahn, PT  11/20/2020     I certify that I was present in the room directing the student in service delivery and guiding them using my skilled judgment. As the co-signing therapist I have reviewed the students documentation and am responsible for the treatment, assessment, and plan.   Nate Ruiz, PT, DPT        I CERTIFY THE NEED FOR THESE SERVICES FURNISHED UNDER THIS PLAN OF TREATMENT AND WHILE UNDER MY CARE    Physician's comments:        Physician's Signature: ___________________________________________________

## 2020-11-24 NOTE — PLAN OF CARE
Outpatient Therapy Updated Plan of Care     Visit Date: 11/24/2020  [facility] [place] [id number]    Therapy Diagnosis: No diagnosis found.  Physician: Kadie Grider MD    Physician Orders: ***  Medical Diagnosis: ***  Evaluation Date: ***    Total Visits Received: ***  Cancelled Visits: ***  No Show Visits: ***    Current Certification Period:  *** to ***  Precautions:  ***  Visits from Evaluation Date:  ***  Functional Level Prior to Evaluation:  ***    Subjective     Update: ***    Objective     Update:   Ankle AROM (R/L):   Ankle AROM Right (*) = in degrees Left (*) = in degrees Comments    Dorsiflexion -18 > -1 > 0 > -1>0 WNL Stiffness noted.    Plantarflexion 22 > 22 > 17 > 16>22 WNL Stiffness noted.    Inversion 2 > 9 > 14 > 9>15 WNL Stiffness and pain noted.    Eversion 2 > 8 > 8 > 8>11 WNL Stiffness and pain noted.    Great toe extension 21 > 36 > 33 >30>31 WNL Stiffness and pain noted.      Ankle MMT:   Ankle MMT scores Right: X/5 Left: x/5   Dorsiflexion 2+ > 3- > 3-/5 4 /5   Plantarflexion  3- > 3- > 3-/5 4 /5    Inversion 2+ > 3- > 3-/5 4+ /5    Eversion 2+ > 3- > 3-/5 4 /5    Great toe extension 2+> 3- > 3-/5 4 /5     5 x sit to stand: 18 seconds w/ QC  30 second chair rise: 9 (min<>mod use of UEs) in CAM boot > 9 reps w/ QC and leather ankle brace  2 Minute walk test: 298ft w/ axillary crutches > 274ft w/ axillary crutches > 214' w/ QC and leather ankle brace  1RM knee extension/quad: 60# > 70#  1RM knee flexion/hamstring curl: 70# > 100#    Assessment     Update: ***    Previous Short Term Goals Status:   ***  New Short Term Goals Status:   ***  Long Term Goal Status:   {DESC LONG TERM GOAL:53058}  Reasons for Recertification of Therapy:   ***    Plan     Updated Certification Period: 11/24/2020 to ***  Recommended Treatment Plan: *** times per week for *** weeks: {TX PLAN:88618}  Other Recommendations: ***    Nate Ruiz, PT  11/24/2020      I CERTIFY THE NEED FOR  THESE SERVICES FURNISHED UNDER THIS PLAN OF TREATMENT AND WHILE UNDER MY CARE    Physician's comments:        Physician's Signature: ___________________________________________________

## 2020-11-27 ENCOUNTER — TELEPHONE (OUTPATIENT)
Dept: REHABILITATION | Facility: HOSPITAL | Age: 50
End: 2020-11-27

## 2020-12-03 ENCOUNTER — CLINICAL SUPPORT (OUTPATIENT)
Dept: REHABILITATION | Facility: HOSPITAL | Age: 50
End: 2020-12-03
Payer: OTHER MISCELLANEOUS

## 2020-12-03 DIAGNOSIS — M25.671 ANKLE STIFFNESS, RIGHT: ICD-10-CM

## 2020-12-03 PROCEDURE — 97116 GAIT TRAINING THERAPY: CPT | Mod: PN

## 2020-12-03 PROCEDURE — 97110 THERAPEUTIC EXERCISES: CPT | Mod: PN

## 2020-12-03 NOTE — PROGRESS NOTES
"                            Physical Therapy Daily Treatment Note     Name: Herman GUERRERO [facility] Number: [id number]    Therapy Diagnosis:   Encounter Diagnosis   Name Primary?    Ankle stiffness, right      Physician: Kadie Grider MD    Visit Date: 12/3/2020    Physician Orders: PT Eval and Treat   Medical Diagnosis from Referral:   Z98.1 (ICD-10-CM) - Status post ankle fusion   M25.551,M25.552 (ICD-10-CM) - Bilateral hip pain   M25.561,M25.562 (ICD-10-CM) - Bilateral knee pain   Evaluation Date: 2/18/2020    Authorization Period Expiration: 3/18/2021  Plan of Care Expiration: 9/24/2020 to 10/15/2020  Visit # / Visits authorized: 3/6; (seen for 30 visits previously)  FOTO: at next    Time In: 2:15 PM  Time Out: 3:00 PM  Total Billable Time: 45 minutes (1 TE, 2 GT)    Precautions: Standard; WBAT with leather ankle brace  *Pt also has difficulty with hearing and typically reads lips. This has been a challenge for the patient as masks are currently worn at all times within the clinic due to COVID19 crisis. Pt requires marked tactile and visual cuing throughout his treatment session.*    Subjective     Pt reports: wearing soft ankle brace, going to gym doing machines, walking with S quad cane, and has been doing exercises at home as well.  Response to previous treatment:  Fatigue and fear  Functional change: wearing a soft R ankle brace that fits and feels better for R foot    Pain: 2/10  Location: R anterior ankle joint line    Objective     Herman received therapeutic exercises to develop strength and endurance for 20 minutes including: measurements of R ankle  - Heel raises      2x20 DL  - SL calf stretch     5x30" gastroc bias and 5x15" soleus bias  - Lateral step downs     2x15 R, L1; heavy cueing for knee flexion  - Lunges on stair step     20x R  - Air squats      2x10 DL; cues for R knee flexion, decrease R hip rotation    Herman participated in the following neuromuscular re-education activities for " balance and proprioceptive training for 0 minutes:  - standing marching with emphasis on RLE SLS  3x10  - SLS trials on the L     10 reps   - ankle rocking on foam pad    3x10 - not today    Herman participated in the following gait training activities for a total of 25 minutes:  - alternating forward/backward stepping on RLE stance leg; emphasis on forefoot rocker activation  - tandem stance weight shift forward/backward 5 rounds  - lateral walking in // bars: 4 laps  - forward walking with mirror use in // bars: 6 laps with cueing for decreased R trunk lean in R stance, increased L stride length, decreased R stride length, and increased R stance time  - treadmill next visit  - stair and step training next visit    Home Exercises Provided and Patient Education Provided:  Education provided:   - Continue HEP. Importance of maintaining HEP with therapy to maximize outcomes    Written Home Exercises Provided: continue previously given HEP   Exercises were reviewed and Herman was able to demonstrate them prior to the end of the session.  Herman demonstrated good  understanding of the education provided.     See EMR under Patient Instructions for exercises provided 2/20/2020.    Assessment   Good response to cueing and pt able to recognize eng range of L ankle dorsiflexion for squatting and stepping activities after exercises today. Goal is to achieve max functional potential in L ankle mobility to decrease gait deviations as much as possible. 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 Start treamill training next visit for train patient on gait mechanics for him to work on independently after therapy for further improvement independently. Pt did well today overall, and avoid any L talar and subtalar joint crepitus to maintain stability of ankle fusion. Feeling of calf and achilles  stretching is appropriate.    Herman is progressing well towards his goals.   Pt prognosis is Fair.     Pt's spiritual, cultural and educational needs considered and pt agreeable to plan of care and goals.  Anticipated barriers to physical therapy: chronicity of current injury and significant SxHx with recent ankle fusion.     Short Term Goals: 4 weeks   1.) Pt will become compliant and independent with his initial HEP to promote decreased pain and improved mobility and strength. MET 5/4/2020  2) Pt to report decrease in pain levels from 3/10 at worse to 1/10 at worse. MET 5/4/2020  3.) Pt will improve R ankle MMT scores by 1 muscle grade to improve functional stability and strength. MET 5/4/2020  4.) Pt will be able to tolerate 60 minute exercise session with 3 resting breaks or less to demonstrate improved activity tolerance. MET 3/16/2020  Long Term Goals: 8 weeks   1.) Pt will transition from NWB to WBAT and able to ambulate 30ft with a RW. PROGRESSING, NOT MET 12/3/2020   2.) Pt will be able to ambulate 300ft with a RW with no resting breaks to demonstrate improved ambulatory tolerance (10 weeks).  MET 5/4/2020  3.) Pt will transition from ambulation with RW to ambulation with SPC to demonstrate improved independence. (12 weeks). PROGRESSING, NOT MET 12/3/2020   4.) Pt will transition from ambulation with SPC to ambulation with no AD to demonstrate further independence. (14 weeks). PROGRESSING, NOT MET 12/3/2020   5.) Pt will improve her FOTO score from 70% impairment to 48% improvement to indicate improvement in overall function. PROGRESSING, NOT MET 12/3/2020   *Additoinal LTG will be set when appropriate functional measures are able to be tested.*     Plan   Updated POC, 6 visits approved. Focusing on RLE WB in gait and decreased gait deviations.     Updated Certification Period: 9/24/2020 to 10/15/2020  Recommended Treatment Plan: 2 times per week for 6 weeks: Gait Training, Manual Therapy, Moist Heat/ Ice,  Neuromuscular Re-ed, Orthotic Management and Training, Patient Education, Therapeutic Activites and Therapeutic Exercise  Other Recommendations: None    Nate Ruiz, PT

## 2020-12-08 ENCOUNTER — CLINICAL SUPPORT (OUTPATIENT)
Dept: REHABILITATION | Facility: HOSPITAL | Age: 50
End: 2020-12-08
Payer: OTHER MISCELLANEOUS

## 2020-12-08 DIAGNOSIS — M25.671 ANKLE STIFFNESS, RIGHT: ICD-10-CM

## 2020-12-08 PROCEDURE — 97116 GAIT TRAINING THERAPY: CPT | Mod: PN,CQ

## 2020-12-08 PROCEDURE — 97110 THERAPEUTIC EXERCISES: CPT | Mod: PN,CQ

## 2020-12-08 NOTE — PROGRESS NOTES
"                            Physical Therapy Daily Treatment Note     Name: Herman GUERRERO [facility] Number: [id number]    Therapy Diagnosis:   Encounter Diagnosis   Name Primary?    Ankle stiffness, right      Physician: Kadie Grider MD    Visit Date: 12/8/2020    Physician Orders: PT Eval and Treat   Medical Diagnosis from Referral:   Z98.1 (ICD-10-CM) - Status post ankle fusion   M25.551,M25.552 (ICD-10-CM) - Bilateral hip pain   M25.561,M25.562 (ICD-10-CM) - Bilateral knee pain   Evaluation Date: 2/18/2020    Authorization Period Expiration: 3/18/2021  Plan of Care Expiration: 11/20/2020 to 01/08/2021  Visit # / Visits authorized: 4/6; (seen for 30 visits previously)  FOTO: at next    Time In: 9:48 AM  Time Out: 10:35 AM  Total Billable Time: 47 minutes (1 TE, 2 GT)    Precautions: Standard; WBAT with leather ankle brace  *Pt also has difficulty with hearing and typically reads lips. This has been a challenge for the patient as masks are currently worn at all times within the clinic due to COVID19 crisis. Pt requires marked tactile and visual cuing throughout his treatment session.*    Subjective     Pt reports: having no pain today.  Patient reports he continues to go to the gym for the treadmill, bike and leg press.  Response to previous treatment:  Fatigue and fear  Functional change: wearing a soft R ankle brace that fits and feels better for R foot    Pain: 0/10  Location: R anterior ankle joint line    Objective     Herman received therapeutic exercises to develop strength and endurance for 20 minutes including: measurements of R ankle  - Heel raises      2x20 DL  - SL calf stretch     5x30" gastroc bias and 5x15" soleus bias  - Lateral step downs     2x15 R, L1; heavy cueing for knee flexion  - Lunges on stair step     20x R  - Air squats      2x10 DL; cues for R knee flexion, decrease R hip rotation    Herman participated in the following neuromuscular re-education activities for balance and " "proprioceptive training for 0 minutes:  - standing marching with emphasis on RLE SLS  3x10  - SLS trials on the L     10 reps   - ankle rocking on foam pad    3x10 - not today    Herman participated in the following gait training activities for a total of 37 minutes:  - alternating forward/backward stepping on RLE stance leg; emphasis on forefoot rocker activation  - tandem stance weight shift forward/backward 5 rounds  - lateral walking in // bars: 4 laps  - forward walking with mirror use in // bars: 6 laps with cueing for decreased R trunk lean in R stance, increased L stride length, decreased R stride length, and increased R stance time  - treadmill 5' @ 1.3 mph   - stair and step training on 2 6" steps stacked x 10 reps    Home Exercises Provided and Patient Education Provided:  Education provided:   - Continue HEP. Importance of maintaining HEP with therapy to maximize outcomes    Written Home Exercises Provided: continue previously given HEP   Exercises were reviewed and Herman was able to demonstrate them prior to the end of the session.  Herman demonstrated good  understanding of the education provided.     See EMR under Patient Instructions for exercises provided 2/20/2020.    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  Started treamill training with emphasis on gait mechanics as patient ambulated with a slightly longer stride length on right than left.    Herman is progressing well towards his goals.   Pt prognosis is Fair.     Pt's spiritual, cultural and educational needs considered and pt agreeable to plan of care and goals.  Anticipated barriers to physical therapy: chronicity of current injury and significant SxHx with recent ankle fusion.     Short Term Goals: 4 weeks   1.) Pt will become compliant and independent with his initial HEP to " promote decreased pain and improved mobility and strength. MET 5/4/2020  2) Pt to report decrease in pain levels from 3/10 at worse to 1/10 at worse. MET 5/4/2020  3.) Pt will improve R ankle MMT scores by 1 muscle grade to improve functional stability and strength. MET 5/4/2020  4.) Pt will be able to tolerate 60 minute exercise session with 3 resting breaks or less to demonstrate improved activity tolerance. MET 3/16/2020  Long Term Goals: 8 weeks   1.) Pt will transition from NWB to WBAT and able to ambulate 30ft with a RW. PROGRESSING, NOT MET 12/8/2020   2.) Pt will be able to ambulate 300ft with a RW with no resting breaks to demonstrate improved ambulatory tolerance (10 weeks).  MET 5/4/2020  3.) Pt will transition from ambulation with RW to ambulation with SPC to demonstrate improved independence. (12 weeks). PROGRESSING, NOT MET 12/8/2020   4.) Pt will transition from ambulation with SPC to ambulation with no AD to demonstrate further independence. (14 weeks). PROGRESSING, NOT MET 12/8/2020   5.) Pt will improve her FOTO score from 70% impairment to 48% improvement to indicate improvement in overall function. PROGRESSING, NOT MET 12/8/2020   *Additoinal LTG will be set when appropriate functional measures are able to be tested.*     Plan   Updated POC, 6 visits approved. Focusing on RLE WB in gait and decreased gait deviations.     Updated Certification Period: 11/20/2020 to 01/08/2021  Recommended Treatment Plan: 2 times per week for 6 weeks: Gait Training, Manual Therapy, Moist Heat/ Ice, Neuromuscular Re-ed, Orthotic Management and Training, Patient Education, Therapeutic Activites and Therapeutic Exercise  Other Recommendations: None    Wesley Easley, PTA

## 2020-12-16 ENCOUNTER — CLINICAL SUPPORT (OUTPATIENT)
Dept: REHABILITATION | Facility: HOSPITAL | Age: 50
End: 2020-12-16
Payer: OTHER MISCELLANEOUS

## 2020-12-16 DIAGNOSIS — M25.671 ANKLE STIFFNESS, RIGHT: ICD-10-CM

## 2020-12-16 PROCEDURE — 97112 NEUROMUSCULAR REEDUCATION: CPT | Mod: PN

## 2020-12-16 PROCEDURE — 97116 GAIT TRAINING THERAPY: CPT | Mod: PN

## 2020-12-16 PROCEDURE — 97110 THERAPEUTIC EXERCISES: CPT | Mod: PN

## 2020-12-16 NOTE — PROGRESS NOTES
"                            Physical Therapy Daily Treatment Note     Name: Herman GUERRERO [facility] Number: [id number]    Therapy Diagnosis:   Encounter Diagnosis   Name Primary?    Ankle stiffness, right      Physician: Kadie Grider MD    Visit Date: 12/16/2020    Physician Orders: PT Eval and Treat   Medical Diagnosis from Referral:   Z98.1 (ICD-10-CM) - Status post ankle fusion   M25.551,M25.552 (ICD-10-CM) - Bilateral hip pain   M25.561,M25.562 (ICD-10-CM) - Bilateral knee pain   Evaluation Date: 2/18/2020    Authorization Period Expiration: 3/18/2021  Plan of Care Expiration: 11/20/2020 to 01/08/2021  Visit # / Visits authorized: 5/6; (seen for 30 visits previously)  FOTO: Next visit    Time In: 8:40 AM  Time Out: 9:30 AM  Total Billable Time: 50 minutes (1 NMR, 1 TE, 1 GT)    Precautions: Standard; WBAT with leather ankle brace  *Pt also has difficulty with hearing and typically reads lips. This has been a challenge for the patient as masks are currently worn at all times within the clinic due to COVID19 crisis. Pt requires marked tactile and visual cuing throughout his treatment session.*    Subjective     Pt reports: no pain and has been working on what was reviewed and practiced last session  Response to previous treatment:  Fatigue  Functional change: wearing a soft R ankle brace that fits and feels better for R foot    Pain: 0/10  Location: R anterior ankle joint line    Objective     Herman received therapeutic exercises to develop strength and endurance for 20 minutes including: measurements of R ankle  - Heel raises      2x20 DL  - SL calf stretch     5x30" gastroc bias and 5x15" soleus bias  - Lateral step downs     2x15 R, L1; heavy cueing for knee flexion  - Lunges on 2nd  stair step    30x R  - Air squats      HOLD  - Step ups      3x10 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    - bending with stooping under raises parallel bar to simulate 18-frankel inspection check    Herman " "participated in the following neuromuscular re-education activities for balance and proprioceptive training for 15 minutes:  - standing marching with emphasis on RLE SLS  3x10 B  - SLS trials      10 reps, 10'' holds on each foot    Herman participated in the following gait training activities for a total of 15 minutes:  - alternating forward/backward stepping on RLE stance leg; emphasis on forefoot rocker activation  - tandem stance weight shift forward/backward 5 rounds  - lateral walking in // bars: 4 laps   - treadmill 5' @ 1.3 mph   - stair training, 2 rounds of 8 steps each, 1 UE use    Home Exercises Provided and Patient Education Provided:  Education provided:   - Continue HEP. Importance of maintaining HEP with therapy to maximize outcomes    Written Home Exercises Provided: continue previously given HEP   Exercises were reviewed and Herman was able to demonstrate them prior to the end of the session.  Herman demonstrated good  understanding of the education provided.     See EMR under Patient Instructions for exercises provided 2/20/2020.    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 Pt did well today and it was suggested that a hand held extendable mirror to check under his trailer load may be more appropriate. Clearance will have to come from MD. No squats today due to MD modifications, and cues to avoid aggressive knee flexion that will cause aggressive indirect dorsiflexion for lunges. Next visit is last approved visit and will re-assess functional status and take measurements. Pt progressing well overall and pt understands right ankle restrictions, " avoiding aggressive R ankle dorsiflexion, returning to a gait pattern with minimal quad cane reliance, and working on RLE full weight acceptance. Balance on RLE is fair (-) that can be improved with repetition.     Herman is progressing well towards his goals.   Pt prognosis is Fair.     Pt's spiritual, cultural and educational needs considered and pt agreeable to plan of care and goals.  Anticipated barriers to physical therapy: chronicity of current injury and significant SxHx with recent ankle fusion.     Short Term Goals: 4 weeks   1.) Pt will become compliant and independent with his initial HEP to promote decreased pain and improved mobility and strength. MET 5/4/2020  2) Pt to report decrease in pain levels from 3/10 at worse to 1/10 at worse. MET 5/4/2020  3.) Pt will improve R ankle MMT scores by 1 muscle grade to improve functional stability and strength. MET 5/4/2020  4.) Pt will be able to tolerate 60 minute exercise session with 3 resting breaks or less to demonstrate improved activity tolerance. MET 3/16/2020  Long Term Goals: 8 weeks   1.) Pt will transition from NWB to WBAT and able to ambulate 30ft with a RW. PROGRESSING, NOT MET 12/16/2020   2.) Pt will be able to ambulate 300ft with a RW with no resting breaks to demonstrate improved ambulatory tolerance (10 weeks).  MET 5/4/2020  3.) Pt will transition from ambulation with RW to ambulation with SPC to demonstrate improved independence. (12 weeks). PROGRESSING, NOT MET 12/16/2020   4.) Pt will transition from ambulation with SPC to ambulation with no AD to demonstrate further independence. (14 weeks). PROGRESSING, NOT MET 12/16/2020   5.) Pt will improve her FOTO score from 70% impairment to 48% improvement to indicate improvement in overall function. PROGRESSING, NOT MET 12/16/2020   *Additoinal LTG will be set when appropriate functional measures are able to be tested.*     Plan   Updated POC, 6 visits approved. Focusing on RLE WB in gait and  decreased gait deviations.     Updated Certification Period: 11/20/2020 to 01/08/2021  Recommended Treatment Plan: 2 times per week for 6 weeks: Gait Training, Manual Therapy, Moist Heat/ Ice, Neuromuscular Re-ed, Orthotic Management and Training, Patient Education, Therapeutic Activites and Therapeutic Exercise  Other Recommendations: None    Nate Ruiz, PT

## 2020-12-22 ENCOUNTER — CLINICAL SUPPORT (OUTPATIENT)
Dept: REHABILITATION | Facility: HOSPITAL | Age: 50
End: 2020-12-22
Payer: OTHER MISCELLANEOUS

## 2020-12-22 DIAGNOSIS — M25.671 ANKLE STIFFNESS, RIGHT: ICD-10-CM

## 2020-12-22 PROCEDURE — 97110 THERAPEUTIC EXERCISES: CPT | Mod: PN

## 2020-12-22 PROCEDURE — 97116 GAIT TRAINING THERAPY: CPT | Mod: PN

## 2020-12-22 NOTE — PROGRESS NOTES
"                            Physical Therapy Daily Treatment Note     Name: Herman GUERRERO [facility] Number: [id number]    Therapy Diagnosis:   Encounter Diagnosis   Name Primary?    Ankle stiffness, right      Physician: Kadie Grider MD    Visit Date: 12/22/2020    Physician Orders: PT Eval and Treat   Medical Diagnosis from Referral:   Z98.1 (ICD-10-CM) - Status post ankle fusion   M25.551,M25.552 (ICD-10-CM) - Bilateral hip pain   M25.561,M25.562 (ICD-10-CM) - Bilateral knee pain   Evaluation Date: 2/18/2020    Authorization Period Expiration: 3/18/2021  Plan of Care Expiration: 11/20/2020 to 01/08/2021  Visit # / Visits authorized: 6/6; (seen for 30 visits previously)  FOTO: DONE    Time In: 11:30 AM  Time Out: 12:15 PM  Total Billable Time: 45 minutes (2 TE, 1 GT)    Precautions: Standard; WBAT with leather ankle brace  *Pt also has difficulty with hearing and typically reads lips. This has been a challenge for the patient as masks are currently worn at all times within the clinic due to COVID19 crisis. 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  Response to previous treatment:  Fatigue  Functional change: wearing a soft R ankle brace that fits and feels better for R foot    Pain: 0/10  Location: R anterior ankle joint line    Objective     Herman received therapeutic exercises to develop strength and endurance for 30 minutes including: measurements of R ankle  - Heel raises      2x20 DL  - SL calf stretch     5x30" gastroc bias and 5x15" soleus bias  - Lateral step downs     2x15 R, L1; heavy cueing for knee flexion  - Lunges on 2nd  stair step    30x R  - Air squats      HOLD  - Step ups      3x10 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    - bending " with stooping under raises parallel bar to simulate 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 inspection check    Herman participated in the following neuromuscular re-education activities for balance and proprioceptive training for 0 minutes:  - standing marching with emphasis on RLE SLS  3x10 B  - SLS trials      10 reps, 10'' holds on each foot    Herman participated in the following gait training activities for a total of 15 minutes:  - alternating forward/backward stepping on RLE stance leg; emphasis on forefoot rocker activation  - tandem stance weight shift forward/backward 5 rounds  - lateral walking in // bars: 4 laps   - treadmill 5' @ 1.3 mph   - stair training, 2 rounds of 8 steps each, 1 UE use    Ankle AROM (R/L): 2/18/2020  Ankle AROM Right (*) = in degrees Left (*) = in degrees Comments    Dorsiflexion -18 > -1 > 0 > -1>0 WNL    Plantarflexion 22 > 22 > 17 > 16>18 WNL    Inversion 2 > 9 > 14 > 9>11 WNL    Eversion 2 > 8 > 8 > 8>11 WNL    Great toe extension 21 > 36 > 33 >30>31 WNL    Ankle PROM Right (*) = in degrees Left (*) = in degrees Comments    Dorsiflexion -10 > 4 > 3 >0>2 WNL    Plantarflexion 26 > 27 > 22 > 17>19 WNL    Inversion 10 > 12> 15 >10>13 WNL    Eversion 6 > 13 > 13 > 9>12 WNL    Great toe extension 43  > 45 > 38 >32>33 WNL       Ankle MMT: R ankle strength score limited by decreased ROM  Ankle MMT scores Right: X/5 Left: x/5   Dorsiflexion 2+ > 3- > 3- > 3- 5/5   Plantarflexion  3- > 3- > 3- > 3- 5/5    Inversion 2+ > 3- > 3- > 3- 5/5    Eversion 2+ > 3- > 3- > 3- 5/5    Great toe extension 2+> 3- > 3- > 3- 5/5      5 x sit to stand: 18 seconds w/ QC and leather brace > 15' with no UE use and soft brace and no AD  30'' STS Test: 9 with leather brace donned with UE use > 9 without UE use and soft ankle brace donned  2 Minute walk test: 298ft w/ axillary crutches >> 274ft w/ axillary crutches >> 214' w/ QC and leather ankle brace donned > 298' with QC and soft ankle brace donned and then 280' without QC and soft  ankle brace donned    Home Exercises Provided and Patient Education Provided:  Education provided:   - Continue HEP. Importance of maintaining HEP with therapy to maximize outcomes    Written Home Exercises Provided: continue previously given HEP   Exercises were reviewed and Herman was able to demonstrate them prior to the end of the session.  Herman demonstrated good  understanding of the education provided.     See EMR under Patient Instructions for exercises provided 2/20/2020.    Assessment     Pt improved in all active and passive R ankle mobility, strength has improved though unable to rate strength higher than 3-/5 due to lack of full ankle ROM, improved time for 5x STS Test with decreased time with soft brace donned and no UE assistance, same 30'' STS Test score though able to do it with less stability in soft ankle brace, greatly improved 2 Minute Walk Test score to 298 feet from 214 feet each with QC and no rest breaks, scored similar 2 Minute Walk Test Score without his quad cane and only soft ankle brace donned that was only 18 feet less that with quad cane, accepting fair weight on RLE in all mobility, fair right single leg balance. 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 Pt would benefit from continued skilled PT in order to progress cardiovascular tolerance, right single leg balance in stance and for stair climbing, and for full discharge of quad cane. Pt will continue HEP and going to the gym for treadmill use in the meantime. New goals for said deficits above mentioned to be made once authorization approved.    Herman is progressing well towards his goals.   Pt prognosis is Fair.     Pt's spiritual, cultural and educational needs considered and pt agreeable to plan of  care and goals.  Anticipated barriers to physical therapy: chronicity of current injury and significant SxHx with recent ankle fusion.     Short Term Goals: 4 weeks   1.) Pt will become compliant and independent with his initial HEP to promote decreased pain and improved mobility and strength. Met  2) Pt to report decrease in pain levels from 3/10 at worse to 1/10 at worse. Met  3.) Pt will improve R ankle MMT scores by 1 muscle grade to improve functional stability and strength. Met  4.) Pt will be able to tolerate 60 minute exercise session with 3 resting breaks or less to demonstrate improved activity tolerance. Met  Long Term Goals: 8 weeks   1.) Pt will transition from NWB to WBAT and able to ambulate 30ft with a RW. Met  2.) Pt will be able to ambulate 300ft with a RW with no resting breaks to demonstrate improved ambulatory tolerance (10 weeks).  Met  3.) Pt will transition from ambulation with RW to ambulation with SPC to demonstrate improved independence. (12 weeks). Met  4.) Pt will transition from ambulation with SPC to ambulation with no AD to demonstrate further independence. (14 weeks). Met  5.) Pt will improve her FOTO score from 70% impairment to 48% improvement to indicate improvement in overall function. Met  *Additoinal LTG will be set when appropriate functional measures are able to be tested.*     Plan   Updated POC, 6 visits approved. Focusing on RLE WB in gait and decreased gait deviations.     Updated Certification Period: 11/20/2020 to 01/08/2021  Recommended Treatment Plan: 2 times per week for 6 weeks: Gait Training, Manual Therapy, Moist Heat/ Ice, Neuromuscular Re-ed, Orthotic Management and Training, Patient Education, Therapeutic Activites and Therapeutic Exercise  Other Recommendations: regular single point cane    Nate Ruiz, PT

## 2021-01-19 DIAGNOSIS — S82.201A: ICD-10-CM

## 2021-01-19 DIAGNOSIS — S39.012A LUMBAR STRAIN: ICD-10-CM

## 2021-01-19 DIAGNOSIS — S86.911A STRAIN OF RIGHT KNEE: ICD-10-CM

## 2021-02-05 ENCOUNTER — CLINICAL SUPPORT (OUTPATIENT)
Dept: REHABILITATION | Facility: HOSPITAL | Age: 51
End: 2021-02-05
Payer: OTHER MISCELLANEOUS

## 2021-02-05 DIAGNOSIS — S86.911A STRAIN OF RIGHT KNEE: ICD-10-CM

## 2021-02-05 DIAGNOSIS — S82.871G CLOSED DISPLACED PILON FRACTURE OF RIGHT TIBIA WITH DELAYED HEALING, SUBSEQUENT ENCOUNTER: ICD-10-CM

## 2021-02-05 DIAGNOSIS — S86.911S STRAIN OF RIGHT KNEE, SEQUELA: ICD-10-CM

## 2021-02-05 DIAGNOSIS — S39.012S STRAIN OF LUMBAR REGION, SEQUELA: ICD-10-CM

## 2021-02-05 DIAGNOSIS — S39.012D STRAIN OF LUMBAR REGION, SUBSEQUENT ENCOUNTER: ICD-10-CM

## 2021-02-05 DIAGNOSIS — M25.671 ANKLE STIFFNESS, RIGHT: ICD-10-CM

## 2021-02-05 DIAGNOSIS — S39.012A LUMBAR STRAIN: ICD-10-CM

## 2021-02-05 DIAGNOSIS — S82.201A: ICD-10-CM

## 2021-02-05 PROCEDURE — 97112 NEUROMUSCULAR REEDUCATION: CPT | Mod: PN

## 2021-02-05 PROCEDURE — 97110 THERAPEUTIC EXERCISES: CPT | Mod: PN

## 2021-02-17 ENCOUNTER — CLINICAL SUPPORT (OUTPATIENT)
Dept: REHABILITATION | Facility: HOSPITAL | Age: 51
End: 2021-02-17
Payer: OTHER MISCELLANEOUS

## 2021-02-17 DIAGNOSIS — M25.671 ANKLE STIFFNESS, RIGHT: ICD-10-CM

## 2021-02-17 PROCEDURE — 97110 THERAPEUTIC EXERCISES: CPT | Mod: PN,CQ

## 2021-02-19 ENCOUNTER — CLINICAL SUPPORT (OUTPATIENT)
Dept: REHABILITATION | Facility: HOSPITAL | Age: 51
End: 2021-02-19
Payer: OTHER MISCELLANEOUS

## 2021-02-19 DIAGNOSIS — M25.671 ANKLE STIFFNESS, RIGHT: ICD-10-CM

## 2021-02-19 PROCEDURE — 97110 THERAPEUTIC EXERCISES: CPT | Mod: PN,CQ

## 2021-02-24 ENCOUNTER — CLINICAL SUPPORT (OUTPATIENT)
Dept: REHABILITATION | Facility: HOSPITAL | Age: 51
End: 2021-02-24
Payer: OTHER MISCELLANEOUS

## 2021-02-24 DIAGNOSIS — M25.671 ANKLE STIFFNESS, RIGHT: ICD-10-CM

## 2021-02-24 PROCEDURE — 97110 THERAPEUTIC EXERCISES: CPT | Mod: PN,CQ

## 2021-03-01 ENCOUNTER — CLINICAL SUPPORT (OUTPATIENT)
Dept: REHABILITATION | Facility: HOSPITAL | Age: 51
End: 2021-03-01
Payer: OTHER MISCELLANEOUS

## 2021-03-01 DIAGNOSIS — M25.671 ANKLE STIFFNESS, RIGHT: ICD-10-CM

## 2021-03-01 PROCEDURE — 97110 THERAPEUTIC EXERCISES: CPT | Mod: PN,CQ

## 2021-03-05 ENCOUNTER — CLINICAL SUPPORT (OUTPATIENT)
Dept: REHABILITATION | Facility: HOSPITAL | Age: 51
End: 2021-03-05
Payer: OTHER MISCELLANEOUS

## 2021-03-05 DIAGNOSIS — M25.671 ANKLE STIFFNESS, RIGHT: ICD-10-CM

## 2021-03-05 PROCEDURE — 97110 THERAPEUTIC EXERCISES: CPT | Mod: PN,CQ

## 2021-03-08 ENCOUNTER — TELEPHONE (OUTPATIENT)
Dept: REHABILITATION | Facility: HOSPITAL | Age: 51
End: 2021-03-08

## 2021-03-09 ENCOUNTER — CLINICAL SUPPORT (OUTPATIENT)
Dept: REHABILITATION | Facility: HOSPITAL | Age: 51
End: 2021-03-09
Payer: OTHER MISCELLANEOUS

## 2021-03-09 DIAGNOSIS — R53.1 DECREASED STRENGTH: ICD-10-CM

## 2021-03-09 DIAGNOSIS — M25.671 ANKLE STIFFNESS, RIGHT: ICD-10-CM

## 2021-03-09 DIAGNOSIS — Z74.09 DECREASED MOBILITY AND ENDURANCE: ICD-10-CM

## 2021-03-09 PROCEDURE — 97110 THERAPEUTIC EXERCISES: CPT | Mod: PN

## 2021-03-09 PROCEDURE — 97545 WORK HARDENING: CPT | Mod: PN

## 2021-03-09 PROCEDURE — 97164 PT RE-EVAL EST PLAN CARE: CPT | Mod: PN

## 2021-03-11 ENCOUNTER — CLINICAL SUPPORT (OUTPATIENT)
Dept: REHABILITATION | Facility: HOSPITAL | Age: 51
End: 2021-03-11
Payer: OTHER MISCELLANEOUS

## 2021-03-11 DIAGNOSIS — M25.671 ANKLE STIFFNESS, RIGHT: ICD-10-CM

## 2021-03-11 PROBLEM — Z74.09 DECREASED MOBILITY AND ENDURANCE: Status: ACTIVE | Noted: 2021-03-11

## 2021-03-11 PROBLEM — R53.1 DECREASED STRENGTH: Status: ACTIVE | Noted: 2021-03-11

## 2021-03-11 PROCEDURE — 97545 WORK HARDENING: CPT | Mod: PN,CQ

## 2021-03-16 ENCOUNTER — CLINICAL SUPPORT (OUTPATIENT)
Dept: REHABILITATION | Facility: HOSPITAL | Age: 51
End: 2021-03-16
Payer: OTHER MISCELLANEOUS

## 2021-03-16 DIAGNOSIS — Z74.09 DECREASED MOBILITY AND ENDURANCE: ICD-10-CM

## 2021-03-16 DIAGNOSIS — R53.1 DECREASED STRENGTH: ICD-10-CM

## 2021-03-16 DIAGNOSIS — M25.671 ANKLE STIFFNESS, RIGHT: ICD-10-CM

## 2021-03-16 PROCEDURE — 97545 WORK HARDENING: CPT | Mod: PN

## 2021-03-23 ENCOUNTER — CLINICAL SUPPORT (OUTPATIENT)
Dept: REHABILITATION | Facility: HOSPITAL | Age: 51
End: 2021-03-23
Payer: OTHER MISCELLANEOUS

## 2021-03-23 DIAGNOSIS — R53.1 DECREASED STRENGTH: ICD-10-CM

## 2021-03-23 DIAGNOSIS — M25.671 ANKLE STIFFNESS, RIGHT: ICD-10-CM

## 2021-03-23 DIAGNOSIS — Z74.09 DECREASED MOBILITY AND ENDURANCE: ICD-10-CM

## 2021-03-23 PROCEDURE — 97545 WORK HARDENING: CPT | Mod: PN

## 2021-03-25 ENCOUNTER — CLINICAL SUPPORT (OUTPATIENT)
Dept: REHABILITATION | Facility: HOSPITAL | Age: 51
End: 2021-03-25
Payer: OTHER MISCELLANEOUS

## 2021-03-25 DIAGNOSIS — M25.671 ANKLE STIFFNESS, RIGHT: ICD-10-CM

## 2021-03-25 DIAGNOSIS — R53.1 DECREASED STRENGTH: ICD-10-CM

## 2021-03-25 DIAGNOSIS — Z74.09 DECREASED MOBILITY AND ENDURANCE: ICD-10-CM

## 2021-03-25 PROCEDURE — 97545 WORK HARDENING: CPT | Mod: PN

## 2021-03-30 ENCOUNTER — CLINICAL SUPPORT (OUTPATIENT)
Dept: REHABILITATION | Facility: HOSPITAL | Age: 51
End: 2021-03-30
Payer: OTHER MISCELLANEOUS

## 2021-03-30 DIAGNOSIS — M25.671 ANKLE STIFFNESS, RIGHT: ICD-10-CM

## 2021-03-30 DIAGNOSIS — Z74.09 DECREASED MOBILITY AND ENDURANCE: ICD-10-CM

## 2021-03-30 DIAGNOSIS — R53.1 DECREASED STRENGTH: ICD-10-CM

## 2021-03-30 PROCEDURE — 97545 WORK HARDENING: CPT | Mod: PN

## 2021-04-03 ENCOUNTER — IMMUNIZATION (OUTPATIENT)
Dept: PRIMARY CARE CLINIC | Facility: CLINIC | Age: 51
End: 2021-04-03

## 2021-04-03 DIAGNOSIS — Z23 NEED FOR VACCINATION: Primary | ICD-10-CM

## 2021-04-03 PROCEDURE — 0001A PR IMMUNIZ ADMIN, SARS-COV-2 COVID-19 VACC, 30MCG/0.3ML, 1ST DOSE: CPT | Mod: CV19,S$GLB,, | Performed by: INTERNAL MEDICINE

## 2021-04-03 PROCEDURE — 91300 PR SARS-COV- 2 COVID-19 VACCINE, NO PRSV, 30MCG/0.3ML, IM: CPT | Mod: S$GLB,,, | Performed by: INTERNAL MEDICINE

## 2021-04-03 PROCEDURE — 0001A PR IMMUNIZ ADMIN, SARS-COV-2 COVID-19 VACC, 30MCG/0.3ML, 1ST DOSE: ICD-10-PCS | Mod: CV19,S$GLB,, | Performed by: INTERNAL MEDICINE

## 2021-04-03 PROCEDURE — 91300 PR SARS-COV- 2 COVID-19 VACCINE, NO PRSV, 30MCG/0.3ML, IM: ICD-10-PCS | Mod: S$GLB,,, | Performed by: INTERNAL MEDICINE

## 2021-04-03 RX ADMIN — Medication 0.3 ML: at 10:04

## 2021-04-27 ENCOUNTER — IMMUNIZATION (OUTPATIENT)
Dept: PRIMARY CARE CLINIC | Facility: CLINIC | Age: 51
End: 2021-04-27
Payer: OTHER MISCELLANEOUS

## 2021-04-27 DIAGNOSIS — Z23 NEED FOR VACCINATION: Primary | ICD-10-CM

## 2021-04-27 PROCEDURE — 91300 PR SARS-COV- 2 COVID-19 VACCINE, NO PRSV, 30MCG/0.3ML, IM: ICD-10-PCS | Mod: S$GLB,,, | Performed by: INTERNAL MEDICINE

## 2021-04-27 PROCEDURE — 0002A PR IMMUNIZ ADMIN, SARS-COV-2 COVID-19 VACC, 30MCG/0.3ML, 2ND DOSE: ICD-10-PCS | Mod: CV19,S$GLB,, | Performed by: INTERNAL MEDICINE

## 2021-04-27 PROCEDURE — 91300 PR SARS-COV- 2 COVID-19 VACCINE, NO PRSV, 30MCG/0.3ML, IM: CPT | Mod: S$GLB,,, | Performed by: INTERNAL MEDICINE

## 2021-04-27 PROCEDURE — 0002A PR IMMUNIZ ADMIN, SARS-COV-2 COVID-19 VACC, 30MCG/0.3ML, 2ND DOSE: CPT | Mod: CV19,S$GLB,, | Performed by: INTERNAL MEDICINE

## 2021-04-27 RX ADMIN — Medication 0.3 ML: at 08:04

## 2021-10-13 ENCOUNTER — IMMUNIZATION (OUTPATIENT)
Dept: INTERNAL MEDICINE | Facility: CLINIC | Age: 51
End: 2021-10-13

## 2021-10-13 DIAGNOSIS — Z23 NEED FOR VACCINATION: Primary | ICD-10-CM

## 2021-10-13 PROCEDURE — 0003A COVID-19, MRNA, LNP-S, PF, 30 MCG/0.3 ML DOSE VACCINE: CPT | Mod: PBBFAC,CV19

## 2021-10-13 PROCEDURE — 91300 COVID-19, MRNA, LNP-S, PF, 30 MCG/0.3 ML DOSE VACCINE: CPT | Mod: PBBFAC

## 2022-01-02 ENCOUNTER — OFFICE VISIT (OUTPATIENT)
Dept: URGENT CARE | Facility: CLINIC | Age: 52
End: 2022-01-02
Payer: MEDICAID

## 2022-01-02 VITALS
RESPIRATION RATE: 16 BRPM | SYSTOLIC BLOOD PRESSURE: 132 MMHG | WEIGHT: 307 LBS | OXYGEN SATURATION: 98 % | HEIGHT: 72 IN | BODY MASS INDEX: 41.58 KG/M2 | TEMPERATURE: 99 F | HEART RATE: 72 BPM | DIASTOLIC BLOOD PRESSURE: 86 MMHG

## 2022-01-02 DIAGNOSIS — U07.1 LAB TEST POSITIVE FOR DETECTION OF COVID-19 VIRUS: ICD-10-CM

## 2022-01-02 DIAGNOSIS — Z20.822 ENCOUNTER FOR LABORATORY TESTING FOR COVID-19 VIRUS: Primary | ICD-10-CM

## 2022-01-02 LAB
CTP QC/QA: YES
SARS-COV-2 RDRP RESP QL NAA+PROBE: POSITIVE

## 2022-01-02 PROCEDURE — 99203 PR OFFICE/OUTPT VISIT, NEW, LEVL III, 30-44 MIN: ICD-10-PCS | Mod: S$GLB,,, | Performed by: FAMILY MEDICINE

## 2022-01-02 PROCEDURE — U0002: ICD-10-PCS | Mod: QW,S$GLB,, | Performed by: FAMILY MEDICINE

## 2022-01-02 PROCEDURE — U0002 COVID-19 LAB TEST NON-CDC: HCPCS | Mod: QW,S$GLB,, | Performed by: FAMILY MEDICINE

## 2022-01-02 PROCEDURE — 99203 OFFICE O/P NEW LOW 30 MIN: CPT | Mod: S$GLB,,, | Performed by: FAMILY MEDICINE

## 2022-01-02 RX ORDER — ASPIRIN 81 MG/1
81 TABLET ORAL
COMMUNITY
End: 2022-05-30

## 2022-01-02 RX ORDER — LORATADINE 10 MG/1
10 TABLET ORAL DAILY
Qty: 30 TABLET | Refills: 2 | Status: SHIPPED | OUTPATIENT
Start: 2022-01-02 | End: 2022-05-30

## 2022-01-02 NOTE — PATIENT INSTRUCTIONS
Please isolate yourself at home.  You may leave home and/or return to work once the following conditions are met:    If you were not hospitalized and are not moderately to severely immunocompromised:    More than 5 days since symptoms first appeared AND   More than 24 hours fever free without medications AND   Symptoms are improving   Continue to wear a mask around others for 5 additional days.    If you were hospitalized OR are moderately to severely immunocompromised:   More than 20 days since symptoms first appeared   More than 24 hours fever free without medications   Symptoms have improved    If you had no symptoms but tested positive:   More than 5 days since the date of the first positive test (20 days if moderately to severely immunocompromised). If you develop symptoms, then use the guidelines above.   Continue to wear a mask around others for 5 additional days.      Contact Tracing    As one of the next steps, you will receive a call or text from the [place] Department of Health ([facility]) COVID-19 Tracing Team. See the contact information below so you know not to ignore the health departments call. It is important that you contact them back immediately so they can help.      Contact Tracer Number:  [phone]  Caller ID for most carriers: [facility]     What is contact tracing?  · Contact tracing is a process that helps identify everyone who has been in close contact with an infected person. Contact tracers let those people know they may have been exposed and guide them on next steps. Confidentiality is important for everyone; no one will be told who may have exposed them to the virus.  · Your involvement is important. The more we know about where and how this virus is spreading, the better chance we have at stopping it from spreading further.  What does exposure mean?  · Exposure means you have been within 6 feet for more than 15 minutes with a person who has or had COVID-19.  What kind of  questions do the contact tracers ask?  · A contact tracer will confirm your basic contact information including name, address, phone number, and next of kin, as well as asking about any symptoms you may have had. Theyll also ask you how you think you may have gotten sick, such as places where you may have been exposed to the virus, and people you were with. Those names will never be shared with anyone outside of that call, and will only be used to help trace and stop the spread of the virus.   I have privacy concerns. How will the state use my information?  · Your privacy about your health is important. All calls are completed using call centers that use the appropriate health privacy protection measures (HIPAA compliance), meaning that your patient information is safe. No one will ever ask you any questions related to immigration status. Your health comes first.   Do I have to participate?  · You do not have to participate, but we strongly encourage you to. Contact tracing can help us catch and control new outbreaks as theyre developing to keep your friends and family safe.   What if I dont hear from anyone?  · If you dont receive a call within 24 hours, you can call the number above right away to inquire about your status. That line is open from 8:00 am - 8:00 p.m., 7 days a week.  Contact tracing saves lives! Together, we have the power to beat this virus and keep our loved ones and neighbors safe.    For more information see CDC link below.      https://www.cdc.gov/coronavirus/2019-ncov/hcp/guidance-prevent-spread.html#precautions        Sources:  [facility], [place] [facility] and [facility]

## 2022-01-03 DIAGNOSIS — U07.1 COVID-19 VIRUS DETECTED: ICD-10-CM

## 2022-01-08 ENCOUNTER — NURSE TRIAGE (OUTPATIENT)
Dept: ADMINISTRATIVE | Facility: CLINIC | Age: 52
End: 2022-01-08
Payer: OTHER MISCELLANEOUS

## 2022-01-08 NOTE — TELEPHONE ENCOUNTER
Patient's wife states he is experiencing cough no other symptoms. Patient had negative test on yesterday. Advised for patient per protocol of home care treatment. Patient's wife acknowledged.   Reason for Disposition   [1] COVID-19 diagnosed by positive lab test AND [2] mild symptoms (e.g., cough, fever, others) AND [3] no complications or SOB    Additional Information   Negative: SEVERE difficulty breathing (e.g., struggling for each breath, speaks in single words)   Negative: Difficult to awaken or acting confused (e.g., disoriented, slurred speech)   Negative: Bluish (or gray) lips or face now   Negative: Shock suspected (e.g., cold/pale/clammy skin, too weak to stand, low BP, rapid pulse)   Negative: Sounds like a life-threatening emergency to the triager   Negative: [1] COVID-19 exposure AND [2] no symptoms   Negative: COVID-19 vaccine reaction suspected (e.g., fever, headache, muscle aches) occurring 1 to 3 days after getting vaccine   Negative: COVID-19 vaccine, questions about   Negative: [1] Lives with someone known to have influenza (flu test positive) AND [2] flu-like symptoms (e.g., cough, runny nose, sore throat, SOB; with or without fever)   Negative: [1] Adult with possible COVID-19 symptoms AND [2] triager concerned about severity of symptoms or other causes   Negative: COVID-19 and breastfeeding, questions about   Negative: MILD difficulty breathing (e.g., minimal/no SOB at rest, SOB with walking, pulse <100)   Negative: Chest pain or pressure   Negative: Patient sounds very sick or weak to the triager   Negative: SEVERE or constant chest pain or pressure (Exception: mild central chest pain, present only when coughing)   Negative: MODERATE difficulty breathing (e.g., speaks in phrases, SOB even at rest, pulse 100-120)   Negative: [1] Headache AND [2] stiff neck (can't touch chin to chest)   Negative: Fever > 103 F (39.4 C)   Negative: [1] Fever > 101 F (38.3 C) AND [2] age > 60  years   Negative: [1] Fever > 100.0 F (37.8 C) AND [2] bedridden (e.g., nursing home patient, CVA, chronic illness, recovering from surgery)   Negative: HIGH RISK for severe COVID complications (e.g., age > 64 years, obesity with BMI > 25, pregnant, chronic lung disease or other chronic medical condition)  (Exception: Already seen by PCP and no new or worsening symptoms.)   Negative: [1] HIGH RISK patient AND [2] influenza is widespread in the community AND [3] ONE OR MORE respiratory symptoms: cough, sore throat, runny or stuffy nose   Negative: [1] HIGH RISK patient AND [2] influenza exposure within the last 7 days AND [3] ONE OR MORE respiratory symptoms: cough, sore throat, runny or stuffy nose   Negative: [1] COVID-19 infection suspected by caller or triager AND [2] mild symptoms (cough, fever, or others) AND [3] negative COVID-19 rapid test   Negative: Fever present > 3 days (72 hours)   Negative: [1] Fever returns after gone for over 24 hours AND [2] symptoms worse or not improved   Negative: [1] Continuous (nonstop) coughing interferes with work or school AND [2] no improvement using cough treatment per protocol   Negative: [1] COVID-19 diagnosed by positive lab test AND [2] NO symptoms (e.g., cough, fever, others)    Protocols used: CORONAVIRUS (COVID-19) DIAGNOSED OR [id number]

## 2022-04-05 ENCOUNTER — OFFICE VISIT (OUTPATIENT)
Dept: URGENT CARE | Facility: CLINIC | Age: 52
End: 2022-04-05
Payer: MEDICAID

## 2022-04-05 VITALS
HEIGHT: 72 IN | HEART RATE: 87 BPM | RESPIRATION RATE: 18 BRPM | TEMPERATURE: 98 F | BODY MASS INDEX: 41.58 KG/M2 | SYSTOLIC BLOOD PRESSURE: 134 MMHG | DIASTOLIC BLOOD PRESSURE: 80 MMHG | OXYGEN SATURATION: 96 % | WEIGHT: 307 LBS

## 2022-04-05 DIAGNOSIS — R07.81 RIB PAIN ON LEFT SIDE: Primary | ICD-10-CM

## 2022-04-05 PROCEDURE — 3008F BODY MASS INDEX DOCD: CPT | Mod: CPTII,S$GLB,, | Performed by: PHYSICIAN ASSISTANT

## 2022-04-05 PROCEDURE — 3008F PR BODY MASS INDEX (BMI) DOCUMENTED: ICD-10-PCS | Mod: CPTII,S$GLB,, | Performed by: PHYSICIAN ASSISTANT

## 2022-04-05 PROCEDURE — 99213 OFFICE O/P EST LOW 20 MIN: CPT | Mod: S$GLB,,, | Performed by: PHYSICIAN ASSISTANT

## 2022-04-05 PROCEDURE — 3079F DIAST BP 80-89 MM HG: CPT | Mod: CPTII,S$GLB,, | Performed by: PHYSICIAN ASSISTANT

## 2022-04-05 PROCEDURE — 1159F PR MEDICATION LIST DOCUMENTED IN MEDICAL RECORD: ICD-10-PCS | Mod: CPTII,S$GLB,, | Performed by: PHYSICIAN ASSISTANT

## 2022-04-05 PROCEDURE — 1159F MED LIST DOCD IN RCRD: CPT | Mod: CPTII,S$GLB,, | Performed by: PHYSICIAN ASSISTANT

## 2022-04-05 PROCEDURE — 71101 X-RAY EXAM UNILAT RIBS/CHEST: CPT | Mod: FY,LT,S$GLB, | Performed by: RADIOLOGY

## 2022-04-05 PROCEDURE — 71101 XR RIBS MIN 3 VIEWS W/ PA CHEST LEFT: ICD-10-PCS | Mod: FY,LT,S$GLB, | Performed by: RADIOLOGY

## 2022-04-05 PROCEDURE — 99213 PR OFFICE/OUTPT VISIT, EST, LEVL III, 20-29 MIN: ICD-10-PCS | Mod: S$GLB,,, | Performed by: PHYSICIAN ASSISTANT

## 2022-04-05 PROCEDURE — 3075F SYST BP GE 130 - 139MM HG: CPT | Mod: CPTII,S$GLB,, | Performed by: PHYSICIAN ASSISTANT

## 2022-04-05 PROCEDURE — 1160F RVW MEDS BY RX/DR IN RCRD: CPT | Mod: CPTII,S$GLB,, | Performed by: PHYSICIAN ASSISTANT

## 2022-04-05 PROCEDURE — 3075F PR MOST RECENT SYSTOLIC BLOOD PRESS GE 130-139MM HG: ICD-10-PCS | Mod: CPTII,S$GLB,, | Performed by: PHYSICIAN ASSISTANT

## 2022-04-05 PROCEDURE — 3079F PR MOST RECENT DIASTOLIC BLOOD PRESSURE 80-89 MM HG: ICD-10-PCS | Mod: CPTII,S$GLB,, | Performed by: PHYSICIAN ASSISTANT

## 2022-04-05 PROCEDURE — 1160F PR REVIEW ALL MEDS BY PRESCRIBER/CLIN PHARMACIST DOCUMENTED: ICD-10-PCS | Mod: CPTII,S$GLB,, | Performed by: PHYSICIAN ASSISTANT

## 2022-04-05 NOTE — PROGRESS NOTES
Subjective:       Patient ID: Herman Floyd is a 51 y.o. male.    Vitals:  height is 6' (1.829 m) and weight is 139.3 kg (307 lb) (abnormal). His temperature is 97.5 °F (36.4 °C). His blood pressure is 134/80 and his pulse is 87. His respiration is 18 and oxygen saturation is 96%.     Chief Complaint: Cyst    Pt stated that he noticed a lump on the left side of his abdomen x a month ago. Pt denies pain. Pt stated that he lets the hot water run on the area when he is in the shower with relief. He denies known injury.    Cyst  This is a new problem. The current episode started 1 to 4 weeks ago. The problem occurs constantly. The problem has been unchanged. Pertinent negatives include no abdominal pain, anorexia, arthralgias, change in bowel habit, chest pain, chills, congestion, coughing, diaphoresis, fatigue, fever, headaches, joint swelling, myalgias, nausea, neck pain, numbness, rash, sore throat, swollen glands, urinary symptoms, vertigo, visual change, vomiting or weakness. Nothing aggravates the symptoms. He has tried heat for the symptoms. The treatment provided mild relief.       Constitution: Negative for chills, sweating, fatigue and fever.   HENT: Negative for ear pain, congestion and sore throat.    Neck: Negative for neck pain, neck stiffness and painful lymph nodes.   Cardiovascular: Negative for chest pain, palpitations and sob on exertion.   Eyes: Negative for eye discharge, eye itching and eye pain.   Respiratory: Negative for cough, sputum production and shortness of breath.    Gastrointestinal: Negative for abdominal pain, nausea, vomiting and diarrhea.   Genitourinary: Negative for dysuria, hematuria and pelvic pain.   Musculoskeletal: Negative for pain, joint pain, joint swelling, muscle cramps and muscle ache.   Skin: Negative for pale, rash and wound.   Neurological: Negative for dizziness, history of vertigo, light-headedness, headaches and numbness.   Hematologic/Lymphatic: Negative for  swollen lymph nodes.       Objective:      Physical Exam   Constitutional: He is oriented to person, place, and time. He appears well-developed. He is cooperative. He does not appear ill. No distress.   HENT:   Head: Normocephalic and atraumatic.   Ears:   Right Ear: External ear normal.   Left Ear: External ear normal.   Nose: Nose normal.   Mouth/Throat: Oropharynx is clear and moist.   Eyes: Conjunctivae, EOM and lids are normal.   Neck: Trachea normal and phonation normal. Neck supple.   Cardiovascular: Regular rhythm and normal heart sounds.   No murmur heard.Exam reveals no gallop.   Pulmonary/Chest: Effort normal and breath sounds normal. No respiratory distress. He has no decreased breath sounds. He has no wheezes. He has no rhonchi. He has no rales.           Musculoskeletal: Normal range of motion.         General: Normal range of motion.   Neurological: He is alert and oriented to person, place, and time.   Skin: Skin is warm, dry, intact and not diaphoretic.   Psychiatric: His speech is normal and behavior is normal. Judgment and thought content normal.   Nursing note and vitals reviewed.        Assessment:       1. Rib pain on left side        XR RIB LEFT W/ PA CHEST    Result Date: 4/5/2022  EXAMINATION: XR RIBS MIN 3 VIEWS W/ PA CHEST LEFT CLINICAL HISTORY: Pleurodynia TECHNIQUE: PA chest and four views of the left ribs. COMPARISON: Chest radiograph performed 09/18/2018 FINDINGS: Cardiomediastinal silhouette appears to be within normal limits. Coarse interstitial increased attenuation, as seen on remote study of 09/18/2018.  No definite pneumothorax or large volume pleural effusion. No acute findings identified in the visualized abdomen.  Osseous and soft tissue structures appear without definite acute change. No definite acute displaced left-sided rib fracture.     No convincing evidence of acute cardiopulmonary disease. No definite acute displaced left-sided rib fracture. Electronically signed  by: Isidro Nunez Date:    04/05/2022 Time:    20:31    Plan:         Rib pain on left side  -     XR RIB LEFT W/ PA CHEST; Future; Expected date: 04/05/2022      Patient Instructions   -Apply warm compresses to help with pain.  Rest and stay hydrated.  Take tylenol/motrin as needed for pain.    Please follow up with your primary care provider within 2-5 days if your signs and symptoms have not resolved or worsen.     If your condition worsens or fails to improve we recommend that you receive another evaluation at the emergency room immediately or contact your primary medical clinic to discuss your concerns.   You must understand that you have received an Urgent Care treatment only and that you may be released before all of your medical problems are known or treated. You, the patient, will arrange for follow up care as instructed.

## 2022-04-06 NOTE — PATIENT INSTRUCTIONS
-Continue warm compresses as needed.  Rest and stay hydrated.  Take tylenol/motrin as needed for pain.    Please follow up with your primary care provider within 2-5 days if your signs and symptoms have not resolved or worsen.     If your condition worsens or fails to improve we recommend that you receive another evaluation at the emergency room immediately or contact your primary medical clinic to discuss your concerns.   You must understand that you have received an Urgent Care treatment only and that you may be released before all of your medical problems are known or treated. You, the patient, will arrange for follow up care as instructed.

## 2022-05-30 ENCOUNTER — LAB VISIT (OUTPATIENT)
Dept: LAB | Facility: HOSPITAL | Age: 52
End: 2022-05-30
Attending: FAMILY MEDICINE
Payer: MEDICAID

## 2022-05-30 ENCOUNTER — OFFICE VISIT (OUTPATIENT)
Dept: FAMILY MEDICINE | Facility: HOSPITAL | Age: 52
End: 2022-05-30
Attending: FAMILY MEDICINE
Payer: MEDICAID

## 2022-05-30 VITALS
SYSTOLIC BLOOD PRESSURE: 119 MMHG | HEART RATE: 70 BPM | DIASTOLIC BLOOD PRESSURE: 76 MMHG | WEIGHT: 315 LBS | BODY MASS INDEX: 42.66 KG/M2 | HEIGHT: 72 IN

## 2022-05-30 DIAGNOSIS — E66.9 OBESITY (BMI 35.0-39.9 WITHOUT COMORBIDITY): ICD-10-CM

## 2022-05-30 DIAGNOSIS — E66.9 OBESITY (BMI 35.0-39.9 WITHOUT COMORBIDITY): Primary | ICD-10-CM

## 2022-05-30 LAB
ALBUMIN SERPL BCP-MCNC: 4 G/DL (ref 3.5–5.2)
ALP SERPL-CCNC: 79 U/L (ref 55–135)
ALT SERPL W/O P-5'-P-CCNC: 16 U/L (ref 10–44)
ANION GAP SERPL CALC-SCNC: 10 MMOL/L (ref 8–16)
AST SERPL-CCNC: 18 U/L (ref 10–40)
BASOPHILS # BLD AUTO: 0.03 K/UL (ref 0–0.2)
BASOPHILS NFR BLD: 0.6 % (ref 0–1.9)
BILIRUB SERPL-MCNC: 0.7 MG/DL (ref 0.1–1)
BUN SERPL-MCNC: 13 MG/DL (ref 6–20)
CALCIUM SERPL-MCNC: 9 MG/DL (ref 8.7–10.5)
CHLORIDE SERPL-SCNC: 105 MMOL/L (ref 95–110)
CHOLEST SERPL-MCNC: 184 MG/DL (ref 120–199)
CHOLEST/HDLC SERPL: 5.3 {RATIO} (ref 2–5)
CO2 SERPL-SCNC: 26 MMOL/L (ref 23–29)
CREAT SERPL-MCNC: 0.9 MG/DL (ref 0.5–1.4)
DIFFERENTIAL METHOD: ABNORMAL
EOSINOPHIL # BLD AUTO: 0.1 K/UL (ref 0–0.5)
EOSINOPHIL NFR BLD: 2.2 % (ref 0–8)
ERYTHROCYTE [DISTWIDTH] IN BLOOD BY AUTOMATED COUNT: 11.9 % (ref 11.5–14.5)
EST. GFR  (AFRICAN AMERICAN): >60 ML/MIN/1.73 M^2
EST. GFR  (NON AFRICAN AMERICAN): >60 ML/MIN/1.73 M^2
ESTIMATED AVG GLUCOSE: 111 MG/DL (ref 68–131)
GLUCOSE SERPL-MCNC: 103 MG/DL (ref 70–110)
HBA1C MFR BLD: 5.5 % (ref 4–5.6)
HCT VFR BLD AUTO: 41.4 % (ref 40–54)
HDLC SERPL-MCNC: 35 MG/DL (ref 40–75)
HDLC SERPL: 19 % (ref 20–50)
HGB BLD-MCNC: 13.8 G/DL (ref 14–18)
IMM GRANULOCYTES # BLD AUTO: 0.02 K/UL (ref 0–0.04)
IMM GRANULOCYTES NFR BLD AUTO: 0.4 % (ref 0–0.5)
LDLC SERPL CALC-MCNC: 117.2 MG/DL (ref 63–159)
LYMPHOCYTES # BLD AUTO: 1.9 K/UL (ref 1–4.8)
LYMPHOCYTES NFR BLD: 37.1 % (ref 18–48)
MCH RBC QN AUTO: 30.1 PG (ref 27–31)
MCHC RBC AUTO-ENTMCNC: 33.3 G/DL (ref 32–36)
MCV RBC AUTO: 90 FL (ref 82–98)
MONOCYTES # BLD AUTO: 0.6 K/UL (ref 0.3–1)
MONOCYTES NFR BLD: 12.6 % (ref 4–15)
NEUTROPHILS # BLD AUTO: 2.4 K/UL (ref 1.8–7.7)
NEUTROPHILS NFR BLD: 47.1 % (ref 38–73)
NONHDLC SERPL-MCNC: 149 MG/DL
NRBC BLD-RTO: 0 /100 WBC
PLATELET # BLD AUTO: 170 K/UL (ref 150–450)
PMV BLD AUTO: 9.6 FL (ref 9.2–12.9)
POTASSIUM SERPL-SCNC: 4.1 MMOL/L (ref 3.5–5.1)
PROT SERPL-MCNC: 7.7 G/DL (ref 6–8.4)
RBC # BLD AUTO: 4.58 M/UL (ref 4.6–6.2)
SODIUM SERPL-SCNC: 141 MMOL/L (ref 136–145)
TRIGL SERPL-MCNC: 159 MG/DL (ref 30–150)
TSH SERPL DL<=0.005 MIU/L-ACNC: 2.1 UIU/ML (ref 0.4–4)
WBC # BLD AUTO: 5.09 K/UL (ref 3.9–12.7)

## 2022-05-30 PROCEDURE — 83036 HEMOGLOBIN GLYCOSYLATED A1C: CPT | Performed by: STUDENT IN AN ORGANIZED HEALTH CARE EDUCATION/TRAINING PROGRAM

## 2022-05-30 PROCEDURE — 99213 OFFICE O/P EST LOW 20 MIN: CPT | Performed by: STUDENT IN AN ORGANIZED HEALTH CARE EDUCATION/TRAINING PROGRAM

## 2022-05-30 PROCEDURE — 80061 LIPID PANEL: CPT | Performed by: STUDENT IN AN ORGANIZED HEALTH CARE EDUCATION/TRAINING PROGRAM

## 2022-05-30 PROCEDURE — 85025 COMPLETE CBC W/AUTO DIFF WBC: CPT | Performed by: STUDENT IN AN ORGANIZED HEALTH CARE EDUCATION/TRAINING PROGRAM

## 2022-05-30 PROCEDURE — 36415 COLL VENOUS BLD VENIPUNCTURE: CPT | Performed by: STUDENT IN AN ORGANIZED HEALTH CARE EDUCATION/TRAINING PROGRAM

## 2022-05-30 PROCEDURE — 84443 ASSAY THYROID STIM HORMONE: CPT | Performed by: STUDENT IN AN ORGANIZED HEALTH CARE EDUCATION/TRAINING PROGRAM

## 2022-05-30 PROCEDURE — 80053 COMPREHEN METABOLIC PANEL: CPT | Performed by: STUDENT IN AN ORGANIZED HEALTH CARE EDUCATION/TRAINING PROGRAM

## 2022-05-30 NOTE — PROGRESS NOTES
History & Physical  [facility]      SUBJECTIVE:     Herman Floyd is a 51 y.o. 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 He Stepped off truck in 2018 and broke his R ankle. He has had multiple surgeries and currently has plates in his foot. He is concerned because during recovery he has gained significant weight. Patient reports that he not been taking any medications since 2020. He reports that walks every day. However, he does admit that he does not have a good diet. He reports that he drinks a lot of sugary drinks, including coke, hawaiian punch, and different varieties of fruit juices. He denies CP, SOB, NVD, HA, or fevers. Patient reports that he gets neck stiffness in the morning from time to time.      Patient Active Problem List    Diagnosis Date Noted    Decreased strength 03/11/2021    Decreased mobility and endurance 03/11/2021    Ankle stiffness, right 02/19/2020    Edema 09/26/2019    Class 3 severe obesity due to excess calories with body mass index (BMI) of 40.0 to 44.9 in adult 12/03/2018    Leg pain 11/06/2018    Costochondral chest pain 09/24/2018    Chest pain     Shortness of breath     Other pulmonary embolism without acute cor pulmonale 09/18/2018    Closed fracture of right ankle 08/03/2018        (Not in a hospital admission)    Review of patient's allergies indicates:  No Known Allergies     Past Medical History:   Diagnosis Date    Deafness in right ear       Past Surgical History:   Procedure Laterality Date    ANKLE FRACTURE SURGERY Right 08/2018    FINGER FRACTURE SURGERY        No family history on file.   Social History     Tobacco Use    Smoking status: Never Smoker    Smokeless tobacco: Never Used   Substance Use Topics    Alcohol use: No      Review of Systems   Constitutional: Negative for fever and malaise/fatigue.   HENT: Negative for congestion and sore throat.     Eyes: Negative for blurred vision and visual disturbance.   Cardiovascular: Negative for chest pain and leg swelling.   Respiratory: Negative for cough and shortness of breath.    Musculoskeletal: Positive for stiffness. Negative for arthritis.   Gastrointestinal: Negative for abdominal pain, diarrhea, nausea and vomiting.   Genitourinary: Negative for dysuria and flank pain.   Neurological: Negative for dizziness, headaches and light-headedness.   Psychiatric/Behavioral: Negative for depression. The patient does not have insomnia.        OBJECTIVE:     Vitals:    05/30/22 0932 05/30/22 1002   BP: 139/84 119/76   Pulse: 80 70   Weight: (!) 143.8 kg (317 lb 0.3 oz)    Height: 6' (1.829 m)      Estimated body mass index is 43 kg/m² as calculated from the following:    Height as of this encounter: 6' (1.829 m).    Weight as of this encounter: 143.8 kg (317 lb 0.3 oz).     Physical Exam  Constitutional:       Appearance: Normal appearance. He is obese.   HENT:      Head: Normocephalic and atraumatic.      Right Ear: External ear normal.      Left Ear: External ear normal.      Ears:      Comments: Hearing aid in R ear     Nose: Nose normal.      Mouth/Throat:      Mouth: Mucous membranes are moist.      Pharynx: Oropharynx is clear.   Eyes:      Conjunctiva/sclera: Conjunctivae normal.   Cardiovascular:      Rate and Rhythm: Normal rate and regular rhythm.   Abdominal:      General: There is no distension.      Tenderness: There is no guarding.   Musculoskeletal:      Cervical back: Normal range of motion.      Right lower leg: No edema.      Left lower leg: No edema.   Skin:     General: Skin is warm.   Neurological:      Mental Status: He is alert and oriented to person, place, and time. Mental status is at baseline.   Psychiatric:         Mood and Affect: Mood normal.         Behavior: Behavior normal.         Thought Content: Thought content normal.         Labs:    Lab Results   Component Value Date    WBC 5.09  06/11/2020    HGB 14.7 06/11/2020    HCT 43.5 06/11/2020    MCV 89 06/11/2020     06/11/2020           CMP  Sodium   Date Value Ref Range Status   06/11/2020 138 136 - 145 mmol/L Final     Potassium   Date Value Ref Range Status   06/11/2020 4.3 3.5 - 5.1 mmol/L Final     Chloride   Date Value Ref Range Status   06/11/2020 104 95 - 110 mmol/L Final     CO2   Date Value Ref Range Status   06/11/2020 27 23 - 29 mmol/L Final     Glucose   Date Value Ref Range Status   06/11/2020 95 70 - 110 mg/dL Final     BUN   Date Value Ref Range Status   06/11/2020 9 6 - 20 mg/dL Final     Creatinine   Date Value Ref Range Status   06/11/2020 1.0 0.5 - 1.4 mg/dL Final     Calcium   Date Value Ref Range Status   06/11/2020 9.1 8.7 - 10.5 mg/dL Final     Total Protein   Date Value Ref Range Status   06/11/2020 8.1 6.0 - 8.4 g/dL Final     Albumin   Date Value Ref Range Status   06/11/2020 4.0 3.5 - 5.2 g/dL Final     Total Bilirubin   Date Value Ref Range Status   06/11/2020 0.7 0.1 - 1.0 mg/dL Final     Comment:     For infants and newborns, interpretation of results should be based  on gestational age, weight and in agreement with clinical  observations.  Premature Infant recommended reference ranges:  Up to 24 hours.............<8.0 mg/dL  Up to 48 hours............<12.0 mg/dL  3-5 days..................<15.0 mg/dL  6-29 days.................<15.0 mg/dL       Alkaline Phosphatase   Date Value Ref Range Status   06/11/2020 70 55 - 135 U/L Final     AST   Date Value Ref Range Status   06/11/2020 25 10 - 40 U/L Final     ALT   Date Value Ref Range Status   06/11/2020 34 10 - 44 U/L Final     Anion Gap   Date Value Ref Range Status   06/11/2020 7 (L) 8 - 16 mmol/L Final     eGFR if    Date Value Ref Range Status   06/11/2020 >60 >60 mL/min/1.73 m^2 Final     eGFR if non    Date Value Ref Range Status   06/11/2020 >60 >60 mL/min/1.73 m^2 Final     Comment:     Calculation used to obtain the  estimated glomerular filtration  rate (eGFR) is the CKD-EPI equation.          No results found for: TSH    Lab Results   Component Value Date    HGBA1C 4.6 11/05/2018       The 10-year ASCVD risk score (Daniel COCHRAN JrLexii, et al., 2013) is: 5.6%    Values used to calculate the score:      Age: 51 years      Sex: Male      Is Non- : Yes      Diabetic: No      Tobacco smoker: No      Systolic Blood Pressure: 119 mmHg      Is BP treated: No      HDL Cholesterol: 35 mg/dL      Total Cholesterol: 192 mg/dL    CrCl cannot be calculated (Patient's most recent lab result is older than the maximum 7 days allowed.).      ASSESSMENT/PLAN:     Obesity (BMI 35.0-39.9 without comorbidity)  -     CBC Auto Differential; Future; Expected date: 05/30/2022  -     Comprehensive Metabolic Panel; Future; Expected date: 05/30/2022  -     Hemoglobin A1C; Future; Expected date: 05/30/2022  -     Lipid Panel; Future; Expected date: 05/30/2022  -     TSH; Future; Expected date: 05/30/2022    Repeat /76. Screening labs ordered. Patient encouraged to discontinue drinking sweet drinks. Encouraged to continue walking and staying active. Patient verbalized understanding.    - Colonoscopy: DUE - will discuss at next visit   (Grade A: adults 50-75; colonoscopy q10y or annual fecal immunochemical testing (FIT) as first-tier test; CT colonography q5y, FIT-fecal DNA testing q3y, or flexible sigmoidoscopy q5-10 years as second-tier tests; and capsule colonography q5y as a third-tier test)  - DM: Last A1c: DUE  (Grade B: adults 40-70 with BMI ?25; if normal, repeat q3y)  - If Diabetic:   - Foot Exam: N/A    - Eye Exam: N/A  - MMG: N/A  (Grade B: q1-2y for women 40-75; >75 after discussion on harms and benefits with patient)  - PAP: N/A  (Grade A: q3y with cervical cytology alone in women 21-29 years. For women 30-65 years, q3y with cervical cytology alone, q5y with high-risk HPV (hrHPV) testing alone, or q5y with hrHPV testing in  combination with cytology)  - Statin: NO  (Grade B: adults 40-75 years with no history of CVD, ?1 CVD risk factors (dyslipidemia, DM, HTN, or smoking), and ASCVD ?10%)  - Flu: N/A  (All patients ?6 months, qyear)  - PCV 13: N/A  (adults ?65 years; adults <64 years with HIV, asplenia, CKD, malignancy or solid organ transplant)  - PPSV 23: N/A  (adults ?65 years; adults <64 years who smoke, long-term facility care resident, heart disease (ex. HTN), lung disease, liver disease, DM or alcohol)   - If PPSV 23 is given, wait 1 year before giving PCV 13  - Shingles: N/A  (adults ?50 years)  - HCV: DUE - will order at next blood draw  (Grade B: screen all patients age 18-79)  - HIV: DUE - will order at next blood draw  (Grade A: ages 15-65 years; ?66 if high-risk)  - DXA: N/A  (Grade B: women ?65 years or post-menopausal women with risk-factors)  - LDCT: N/A  (Grade B: q1yr for adults 50-80 years with 20 PY and currently smoke or have quit ?15 years)  - US abdomen: N/A   (Grade B: one-time screening for AAA in men 65-75 years who have ever smoked)  - ASA: N/A  (Grade B: low dose ASA for adults 50-59 years with a ?10% 10-year cardiovascular risk)  - Depression: NO  (All adults)   - Fall risk: NO  Get Up and Go Test (positive >30 seconds, or negative <20; get up, walk 10 feet, turn around and sit; adults ?65 years).  - Tobacco abuse: NEVER SMOKER  (Grade A: all adults)    DUE AT NEXT/FUTURE VISIT:  Colon cancer screening, Hepatitis C screening and HIV screening       Follow up: 1 month to go over screening labs and continue lifestyle modification counseling    Case discussed with staff: Dr. Ray

## 2022-07-20 ENCOUNTER — OFFICE VISIT (OUTPATIENT)
Dept: FAMILY MEDICINE | Facility: HOSPITAL | Age: 52
End: 2022-07-20
Attending: FAMILY MEDICINE
Payer: MEDICAID

## 2022-07-20 VITALS
SYSTOLIC BLOOD PRESSURE: 120 MMHG | HEIGHT: 72 IN | DIASTOLIC BLOOD PRESSURE: 74 MMHG | HEART RATE: 73 BPM | WEIGHT: 307.31 LBS | BODY MASS INDEX: 41.62 KG/M2

## 2022-07-20 DIAGNOSIS — Z87.81 HISTORY OF FRACTURE OF ANKLE: ICD-10-CM

## 2022-07-20 DIAGNOSIS — Z12.11 SCREENING FOR MALIGNANT NEOPLASM OF COLON: ICD-10-CM

## 2022-07-20 DIAGNOSIS — E66.01 CLASS 3 SEVERE OBESITY DUE TO EXCESS CALORIES WITHOUT SERIOUS COMORBIDITY WITH BODY MASS INDEX (BMI) OF 40.0 TO 44.9 IN ADULT: Primary | ICD-10-CM

## 2022-07-20 PROCEDURE — 99213 OFFICE O/P EST LOW 20 MIN: CPT | Performed by: STUDENT IN AN ORGANIZED HEALTH CARE EDUCATION/TRAINING PROGRAM

## 2022-07-20 NOTE — PROGRESS NOTES
Progress Note  [facility]    Subjective:     Herman Floyd is a 51 y.o. 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 Patient is concerned about state of health and would like to talk about ways to stay healthy. Patient also complaining knee and ankle pain. patient sustained an injury back in 2018, s/p surgerical correction. He denies chest pain, shortness a breath, NVD.      Patient Active Problem List    Diagnosis Date Noted    Decreased strength 03/11/2021    Decreased mobility and endurance 03/11/2021    Ankle stiffness, right 02/19/2020    Edema 09/26/2019    Class 3 severe obesity due to excess calories with body mass index (BMI) of 40.0 to 44.9 in adult 12/03/2018    Leg pain 11/06/2018    Costochondral chest pain 09/24/2018    Chest pain     Shortness of breath     Other pulmonary embolism without acute cor pulmonale 09/18/2018    Closed fracture of right ankle 08/03/2018        (Not in a hospital admission)    Review of patient's allergies indicates:  No Known Allergies     Past Medical History:   Diagnosis Date    Deafness in right ear       Past Surgical History:   Procedure Laterality Date    ANKLE FRACTURE SURGERY Right 08/2018    FINGER FRACTURE SURGERY        No family history on file.   Social History     Tobacco Use    Smoking status: Never Smoker    Smokeless tobacco: Never Used   Substance Use Topics    Alcohol use: No      Review of Systems   Constitutional: Negative for fever and malaise/fatigue.   HENT: Negative for congestion and sore throat.    Eyes: Negative for blurred vision and visual disturbance.   Cardiovascular: Negative for chest pain and leg swelling.   Respiratory: Negative for cough and shortness of breath.    Musculoskeletal: Positive for joint pain and stiffness. Negative for arthritis.   Gastrointestinal: Negative for abdominal pain, diarrhea, nausea and vomiting.    Genitourinary: Negative for dysuria and flank pain.   Neurological: Negative for dizziness, headaches and light-headedness.   Psychiatric/Behavioral: Negative for depression. The patient does not have insomnia.        Objective:     Vitals:    07/20/22 1103   BP: 120/74   Pulse: 73   Weight: (!) 139.4 kg (307 lb 5.1 oz)   Height: 6' (1.829 m)     Estimated body mass index is 41.68 kg/m² as calculated from the following:    Height as of this encounter: 6' (1.829 m).    Weight as of this encounter: 139.4 kg (307 lb 5.1 oz).     Physical Exam  Constitutional:       Appearance: Normal appearance. He is obese.   HENT:      Head: Normocephalic and atraumatic.      Right Ear: External ear normal.      Left Ear: External ear normal.      Ears:      Comments: Hearing aid in R ear     Nose: Nose normal.      Mouth/Throat:      Mouth: Mucous membranes are moist.      Pharynx: Oropharynx is clear.   Eyes:      Conjunctiva/sclera: Conjunctivae normal.   Cardiovascular:      Rate and Rhythm: Normal rate and regular rhythm.      Pulses: Normal pulses.      Heart sounds: Normal heart sounds.   Pulmonary:      Effort: Pulmonary effort is normal.      Breath sounds: Normal breath sounds.   Abdominal:      General: Abdomen is flat. Bowel sounds are normal. There is no distension.      Palpations: Abdomen is soft.      Tenderness: There is no abdominal tenderness. There is no guarding.   Musculoskeletal:      Cervical back: Normal range of motion.      Right lower leg: No edema.      Left lower leg: No edema.   Skin:     General: Skin is warm.      Capillary Refill: Capillary refill takes less than 2 seconds.   Neurological:      Mental Status: He is alert and oriented to person, place, and time. Mental status is at baseline.   Psychiatric:         Mood and Affect: Mood normal.         Behavior: Behavior normal.         Thought Content: Thought content normal.         Assessment/Plan:     Class 3 severe obesity due to excess calories  without serious comorbidity with body mass index (BMI) of 40.0 to 44.9 in adult    Screening for malignant neoplasm of colon  -     Case Request Endoscopy: COLONOSCOPY    History of fracture of ankle    Patient presents for labs follow-up as well as general health evaluation. Went over lab work and reassured patient that he is in good health. Triglycerides 159 the rest of lab work unremarkable. A1c 5.5, in good range. Counseled patient on good diet and exercise. Educated him on the importance of this in regards to general state of good health. Also educated patient that following a good diet and exercising will ultimately a in weight loss. This would alleviate a lot of the pain strain that patient put on his lower extremities.    Follow-up: Follow up in 6 months for weight check      Case discussed with staff: Dr. Garg

## 2022-07-27 NOTE — PROGRESS NOTES
I assume primary medical responsibility for this patient. I have reviewed the history, physical, and assessement & treatment plan with the resident and agree that the care is reasonable and necessary. This service has been performed by a resident without the presence of a teaching physician under the primary care exception. If necessary, an addendum of additional findings or evaluation beyond the resident documentation will be noted below.     Katerina Garg MD

## 2022-08-01 ENCOUNTER — TELEPHONE (OUTPATIENT)
Dept: ENDOSCOPY | Facility: HOSPITAL | Age: 52
End: 2022-08-01
Payer: MEDICAID

## 2022-08-01 RX ORDER — SODIUM, POTASSIUM,MAG SULFATES 17.5-3.13G
1 SOLUTION, RECONSTITUTED, ORAL ORAL DAILY
Qty: 1 KIT | Refills: 0 | Status: SHIPPED | OUTPATIENT
Start: 2022-08-01 | End: 2022-08-03

## 2022-08-01 NOTE — TELEPHONE ENCOUNTER
Endoscopy Scheduling Questionnaire:         1. Are you taking any blood thinners? No               If Yes  Have you been on them for longer than one year?     2. Have you been diagnosed with Diverticulitis in past three months?  No    3. Are you on dialysis or have Kidney Disease? No    4. Previous Colonoscopy?  No         If yes Do you have a history of colon polyps?        6. Are you a diabetic?  No    7. Do you have a history of constipation?  No      Procedure scheduled with Dr. Leonardo on  08/17/2022     The prep being used is Suprep    The patient's prep instructions were sent by [employer]

## 2022-08-15 ENCOUNTER — TELEPHONE (OUTPATIENT)
Dept: ENDOSCOPY | Facility: HOSPITAL | Age: 52
End: 2022-08-15
Payer: MEDICAID

## 2022-08-15 NOTE — TELEPHONE ENCOUNTER
Left message instructing patient to call dept @ [phone] between 8am-4pm.    Arrival time to be given @ 4116  (Message sent via [facility] portal)

## 2022-08-16 ENCOUNTER — TELEPHONE (OUTPATIENT)
Dept: ENDOSCOPY | Facility: HOSPITAL | Age: 52
End: 2022-08-16
Payer: MEDICAID

## 2022-08-16 NOTE — TELEPHONE ENCOUNTER
Spoke with patient's wife about arrival time @ 0745.   Covid test = boosted    Prep instructions reviewed: the day before the procedure, follow a clear liquid diet all day, then start the first 1/2 of prep at 5pm and take 2nd 1/2 of prep @ 0230.  Pt must be completely NPO when prep completed @ 0430.  Encouraged to read again the suprep instructions available in portal.       Medications: Do not take Insulin or oral diabetic medications the day of the procedure.  Take as prescribed: heart, seizure and blood pressure medication in the morning with a sip of water (less than an ounce).  Take any breathing medications and bring inhalers to hospital with you Leave all valuables and jewelry at home.     Wear comfortable clothes to procedure to change into hospital gown You cannot drive for 24 hours after your procedure because you will receive sedation for your procedure to make you comfortable.  A ride must be provided at discharge.

## 2022-08-16 NOTE — TELEPHONE ENCOUNTER
Left voice and text messages instructing patient to call dept @ [phone] or [phone] between 8am-3pm.    Arrival time to be given @ 7154  Colon/Suprep  Covid - boosted  (Message sent via [facility] portal)   DC instructions

## 2022-08-17 ENCOUNTER — ANESTHESIA (OUTPATIENT)
Dept: ENDOSCOPY | Facility: HOSPITAL | Age: 52
End: 2022-08-17
Payer: MEDICAID

## 2022-08-17 ENCOUNTER — HOSPITAL ENCOUNTER (OUTPATIENT)
Facility: HOSPITAL | Age: 52
Discharge: HOME | End: 2022-08-17
Attending: INTERNAL MEDICINE | Admitting: INTERNAL MEDICINE
Payer: MEDICAID

## 2022-08-17 ENCOUNTER — ANESTHESIA EVENT (OUTPATIENT)
Dept: ENDOSCOPY | Facility: HOSPITAL | Age: 52
End: 2022-08-17
Payer: MEDICAID

## 2022-08-17 VITALS
WEIGHT: 308 LBS | BODY MASS INDEX: 41.72 KG/M2 | OXYGEN SATURATION: 100 % | RESPIRATION RATE: 21 BRPM | SYSTOLIC BLOOD PRESSURE: 131 MMHG | HEART RATE: 75 BPM | TEMPERATURE: 98 F | DIASTOLIC BLOOD PRESSURE: 70 MMHG | HEIGHT: 72 IN

## 2022-08-17 DIAGNOSIS — Z12.11 COLON CANCER SCREENING: ICD-10-CM

## 2022-08-17 DIAGNOSIS — Z12.12 ENCOUNTER FOR COLORECTAL CANCER SCREENING: Primary | ICD-10-CM

## 2022-08-17 DIAGNOSIS — Z12.11 ENCOUNTER FOR COLORECTAL CANCER SCREENING: Primary | ICD-10-CM

## 2022-08-17 PROCEDURE — 63600175 PHARM REV CODE 636 W HCPCS: Performed by: NURSE ANESTHETIST, CERTIFIED REGISTERED

## 2022-08-17 PROCEDURE — 27201089 HC SNARE, DISP (ANY): Performed by: INTERNAL MEDICINE

## 2022-08-17 PROCEDURE — 25000003 PHARM REV CODE 250: Performed by: NURSE ANESTHETIST, CERTIFIED REGISTERED

## 2022-08-17 PROCEDURE — 37000009 HC ANESTHESIA EA ADD 15 MINS: Performed by: INTERNAL MEDICINE

## 2022-08-17 PROCEDURE — 45385 PR COLONOSCOPY,REMV LESN,SNARE: ICD-10-PCS | Mod: ,,, | Performed by: INTERNAL MEDICINE

## 2022-08-17 PROCEDURE — 88305 TISSUE EXAM BY PATHOLOGIST: CPT | Mod: 26,,, | Performed by: PATHOLOGY

## 2022-08-17 PROCEDURE — 25000003 PHARM REV CODE 250: Performed by: INTERNAL MEDICINE

## 2022-08-17 PROCEDURE — 25000003 PHARM REV CODE 250: Performed by: ORTHOPAEDIC SURGERY

## 2022-08-17 PROCEDURE — 45385 COLONOSCOPY W/LESION REMOVAL: CPT | Mod: ,,, | Performed by: INTERNAL MEDICINE

## 2022-08-17 PROCEDURE — 45385 COLONOSCOPY W/LESION REMOVAL: CPT | Performed by: INTERNAL MEDICINE

## 2022-08-17 PROCEDURE — 00811 ANES LWR INTST NDSC NOS: CPT | Performed by: INTERNAL MEDICINE

## 2022-08-17 PROCEDURE — 88305 TISSUE EXAM BY PATHOLOGIST: ICD-10-PCS | Mod: 26,,, | Performed by: PATHOLOGY

## 2022-08-17 PROCEDURE — 88305 TISSUE EXAM BY PATHOLOGIST: CPT | Performed by: PATHOLOGY

## 2022-08-17 PROCEDURE — 37000008 HC ANESTHESIA 1ST 15 MINUTES: Performed by: INTERNAL MEDICINE

## 2022-08-17 RX ORDER — PROPOFOL 10 MG/ML
VIAL (ML) INTRAVENOUS CONTINUOUS PRN
Status: DISCONTINUED | OUTPATIENT
Start: 2022-08-17 | End: 2022-08-17

## 2022-08-17 RX ORDER — SODIUM CHLORIDE 9 MG/ML
INJECTION, SOLUTION INTRAVENOUS CONTINUOUS
Status: DISCONTINUED | OUTPATIENT
Start: 2022-08-17 | End: 2022-08-17 | Stop reason: HOSPADM

## 2022-08-17 RX ORDER — LIDOCAINE HCL/PF 100 MG/5ML
SYRINGE (ML) INTRAVENOUS
Status: DISCONTINUED | OUTPATIENT
Start: 2022-08-17 | End: 2022-08-17

## 2022-08-17 RX ORDER — SODIUM CHLORIDE 0.9 % (FLUSH) 0.9 %
10 SYRINGE (ML) INJECTION
Status: DISCONTINUED | OUTPATIENT
Start: 2022-08-17 | End: 2022-08-17 | Stop reason: HOSPADM

## 2022-08-17 RX ORDER — PROPOFOL 10 MG/ML
VIAL (ML) INTRAVENOUS
Status: DISCONTINUED | OUTPATIENT
Start: 2022-08-17 | End: 2022-08-17

## 2022-08-17 RX ADMIN — LIDOCAINE HYDROCHLORIDE 50 MG: 20 INJECTION, SOLUTION INTRAVENOUS at 09:08

## 2022-08-17 RX ADMIN — PROPOFOL 150 MCG/KG/MIN: 10 INJECTION, EMULSION INTRAVENOUS at 09:08

## 2022-08-17 RX ADMIN — SODIUM CHLORIDE: 0.9 INJECTION, SOLUTION INTRAVENOUS at 09:08

## 2022-08-17 RX ADMIN — PROPOFOL 100 MG: 10 INJECTION, EMULSION INTRAVENOUS at 09:08

## 2022-08-17 RX ADMIN — SODIUM CHLORIDE: 0.9 INJECTION, SOLUTION INTRAVENOUS at 08:08

## 2022-08-17 NOTE — ANESTHESIA POSTPROCEDURE EVALUATION
Anesthesia Post Evaluation    Patient: Herman Floyd    Procedure(s) Performed: Procedure(s) (LRB):  COLONOSCOPY Suprep (N/A)    Final Anesthesia Type: MAC      Patient location during evaluation: GI PACU  Patient participation: Yes- Able to Participate  Level of consciousness: awake and alert  Post-procedure vital signs: reviewed and stable  Pain management: adequate  Airway patency: patent    PONV status at discharge: No PONV  Anesthetic complications: no      Cardiovascular status: hemodynamically stable  Respiratory status: unassisted, spontaneous ventilation and room air  Hydration status: euvolemic  Follow-up not needed.          Vitals Value Taken Time   BP  08/17/22 0952   Temp  08/17/22 0952   Pulse  08/17/22 0952   Resp  08/17/22 0952   SpO2  08/17/22 0952         No case tracking events are documented in the log.      Pain/Andria Score: No data recorded

## 2022-08-17 NOTE — TRANSFER OF CARE
Anesthesia Transfer of Care Note    Patient: Herman Floyd    Procedure(s) Performed: Procedure(s) (LRB):  COLONOSCOPY Suprep (N/A)    Patient location: GI    Anesthesia Type: MAC    Transport from OR: Transported from OR on room air with adequate spontaneous ventilation    Post pain: adequate analgesia    Post assessment: no apparent anesthetic complications and tolerated procedure well    Post vital signs: stable    Level of consciousness: responds to stimulation and sedated    Nausea/Vomiting: no nausea/vomiting    Complications: none    Transfer of care protocol was followed      Last vitals:   Visit Vitals  /62 (BP Location: Left arm, Patient Position: Lying)   Pulse 72   Temp 36.6 °C (97.9 °F) (Temporal)   Resp 18   Ht 6' (1.829 m)   Wt (!) 139.7 kg (308 lb)   SpO2 97%   BMI 41.77 kg/m²

## 2022-08-17 NOTE — H&P
Short Stay Endoscopy History and Physical    PCP - Brenton Zhao MD  Referring Physician - Brenton Zhao MD  [address], [address]  [place] [id number]    Procedure - colonoscopy  ASA - per anesthesia  Mallampati - per anesthesia  History of Anesthesia problems - no  Family history Anesthesia problems -  no   Plan of anesthesia - General    HPI:  This is a 51 y.o. male here for evaluation of: screening    Reflux - no  Dysphagia - no  Abdominal pain - no  Diarrhea - no    ROS:  Constitutional: No fevers, chills, No weight loss  CV: No chest pain  Pulm: No cough, No shortness of breath  Ophtho: No vision changes  GI: see HPI  Derm: No rash    Medical History:  has a past medical history of Deafness in right ear.    Surgical History:  has a past surgical history that includes Finger fracture surgery and Ankle fracture surgery (Right, 08/2018).    Family History: family history is not on file..    Social History:  reports that he has never smoked. He has never used smokeless tobacco. He reports that he does not drink alcohol and does not use drugs.    Review of patient's allergies indicates:  No Known Allergies    Medications:   No medications prior to admission.       Physical Exam:    Vital Signs:   Vitals:    08/17/22 0857   BP: 133/62   Pulse: 72   Resp: 18   Temp: 97.9 °F (36.6 °C)       General Appearance: Well appearing in no acute distress    Labs:  Lab Results   Component Value Date    WBC 5.09 05/30/2022    HGB 13.8 (L) 05/30/2022    HCT 41.4 05/30/2022     05/30/2022    CHOL 184 05/30/2022    TRIG 159 (H) 05/30/2022    HDL 35 (L) 05/30/2022    ALT 16 05/30/2022    AST 18 05/30/2022     05/30/2022    K 4.1 05/30/2022     05/30/2022    CREATININE 0.9 05/30/2022    BUN 13 05/30/2022    CO2 26 05/30/2022    TSH 2.103 05/30/2022    INR 1.0 11/05/2018    HGBA1C 5.5 05/30/2022       I have explained the risks and benefits of this endoscopic procedure to the patient including but  not limited to bleeding, inflammation, infection, perforation, and death.      Jose Angel Leonardo MD

## 2022-08-17 NOTE — PROVATION PATIENT INSTRUCTIONS
Discharge Summary/Instructions after an Endoscopic Procedure  Patient Name: Herman Floyd  Patient MRN: [id number]  Patient YOB: 1970 Wednesday, August 17, 2022  Jose Angel Leonardo MD  Dear patient,  As a result of recent federal legislation (The Federal Cures Act), you may   receive lab or pathology results from your procedure in your [facility]   account before your physician is able to contact you. Your physician or   their representative will relay the results to you with their   recommendations at their soonest availability.  Thank you,  Your health is very important to us during the Covid Crisis. Following your   procedure today, you will receive a daily text for 2 weeks asking about   signs or symptoms of Covid 19.  Please respond to this text when you   receive it so we can follow up and keep you as safe as possible.   RESTRICTIONS:  During your procedure today, you received medications for sedation.  These   medications may affect your judgment, balance and coordination.  Therefore,   for 24 hours, you have the following restrictions:   - DO NOT drive a car, operate machinery, make legal/financial decisions,   sign important papers or drink alcohol.    ACTIVITY:  Today: no heavy lifting, straining or running due to procedural   sedation/anesthesia.  The following day: return to full activity including work.  DIET:  Eat and drink normally unless instructed otherwise.     TREATMENT FOR COMMON SIDE EFFECTS:  - Mild abdominal pain, nausea, belching, bloating or excessive gas:  rest,   eat lightly and use a heating pad.  - Sore Throat: treat with throat lozenges and/or gargle with warm salt   water.  - Because air was used during the procedure, expelling large amounts of air   from your rectum or belching is normal.  - If a bowel prep was taken, you may not have a bowel movement for 1-3 days.    This is normal.  SYMPTOMS TO WATCH FOR AND REPORT TO YOUR PHYSICIAN:  1. Abdominal pain or bloating, other  than gas cramps.  2. Chest pain.  3. Back pain.  4. Signs of infection such as: chills or fever occurring within 24 hours   after the procedure.  5. Rectal bleeding, which would show as bright red, maroon, or black stools.   (A tablespoon of blood from the rectum is not serious, especially if   hemorrhoids are present.)  6. Vomiting.  7. Weakness or dizziness.  GO DIRECTLY TO THE NEAREST EMERGENCY ROOM IF YOU HAVE ANY OF THE FOLLOWING:      Difficulty breathing              Chills and/or fever over 101 F   Persistent vomiting and/or vomiting blood   Severe abdominal pain   Severe chest pain   Black, tarry stools   Bleeding- more than one tablespoon   Any other symptom or condition that you feel may need urgent attention  Your doctor recommends these additional instructions:  If any biopsies were taken, your doctors clinic will contact you in 1 to 2   weeks with any results.  - Discharge patient to home.   - Resume previous diet.   - Continue present medications.   - Await pathology results.   - Repeat colonoscopy in 7 years for surveillance.   - Return to primary care physician as previously scheduled.  For questions, problems or results please call your physician - Jose Angel Leonardo MD.  EMERGENCY PHONE NUMBER: [phone],  LAB RESULTS: [phone]  IF A COMPLICATION OR EMERGENCY SITUATION ARISES AND YOU ARE UNABLE TO REACH   YOUR PHYSICIAN - GO DIRECTLY TO THE EMERGENCY ROOM.  Jose Angel Leonardo MD  8/17/2022 9:53:03 AM  This report has been verified and signed electronically.  Dear patient,  As a result of recent federal legislation (The Federal Cures Act), you may   receive lab or pathology results from your procedure in your [facility]   account before your physician is able to contact you. Your physician or   their representative will relay the results to you with their   recommendations at their soonest availability.  Thank you,  [employer]OVATION

## 2022-08-17 NOTE — PLAN OF CARE
Pt procedure completed with no complications. Pt. V/S are stable.No distress noted at this time. Will continue to monitor,

## 2022-08-17 NOTE — ANESTHESIA PREPROCEDURE EVALUATION
08/17/2022  Herman Floyd is a 51 y.o., male scheduled for colonoscopy under mac    Past Medical History:   Diagnosis Date    Deafness in right ear      Past Surgical History:   Procedure Laterality Date    ANKLE FRACTURE SURGERY Right 08/2018    FINGER FRACTURE SURGERY       Review of patient's allergies indicates:  No Known Allergies      Pre-op Assessment    I have reviewed the Patient Summary Reports.    I have reviewed the Nursing Notes. I have reviewed the NPO Status.   I have reviewed the Medications.     Review of Systems  Anesthesia Hx:  No problems with previous Anesthesia  History of prior surgery of interest to airway management or planning: Previous anesthesia: General  Denies Personal Hx of Anesthesia complications.   Social:  Non-Smoker, Social Alcohol Use    Hematology/Oncology:        Hematology Comments: Bilateral PE 9/18/18   Cardiovascular:   Denies Dysrhythmias.   Denies Angina.    Pulmonary:   Denies Shortness of breath.  Denies Sleep Apnea.    Renal/:  Renal/ Normal     Hepatic/GI:   GERD, well controlled Denies Liver Disease.    Neurological:   Denies TIA. Denies CVA. Denies Seizures.        Physical Exam  General:  Well nourished      Airway/Jaw/Neck:  Airway Findings: Mouth Opening: Normal   Tongue: Normal   General Airway Assessment: Adult Mallampati: II  Jaw/Neck Findings:  Neck ROM: Normal ROM       Dental:  Dental Findings:    Chest/Lungs:  Chest/Lungs Findings: Clear to auscultation, Normal Respiratory Rate      Heart/Vascular:  Heart Findings: Rate: Normal  Rhythm: Regular Rhythm  Heart murmur: negative       Mental Status:  Mental Status Findings:  Cooperative, Alert and Oriented       EKG 9/27/18:  Normal sinus rhythm  Moderate voltage criteria for LVH, may be normal variant  T wave abnormality, consider inferior ischemia  Abnormal ECG  When compared with ECG of  18-SEP-2018 16:55,  ST no longer depressed in Lateral leads  T wave inversion no longer evident in Lateral leads  Confirmed by RADHA DICKEY, CAMRON    Anesthesia Plan  Type of Anesthesia, risks & benefits discussed:  Anesthesia Type:  MAC    Patient's Preference:   Plan Factors:          Intra-op Monitoring Plan:   Intra-op Monitoring Plan Comments:   Post Op Pain Control Plan: multimodal analgesia  Post Op Pain Control Plan Comments:     Induction:   IV  Beta Blocker:         Informed Consent: Informed consent signed with the Patient and all parties understand the risks and agree with anesthesia plan.  All questions answered.    ASA Score: 3     Day of Surgery Review of History & Physical:        Anesthesia Plan Notes:           Ready For Surgery From Anesthesia Perspective.           Physical Exam  General: Well nourished    Airway:  Mallampati: II   Mouth Opening: Normal  Tongue: Normal  Neck ROM: Normal ROM    Dental:    Chest/Lungs:  Clear to auscultation, Normal Respiratory Rate    Heart:  Rate: Normal  Rhythm: Regular Rhythm          Anesthesia Plan  Type of Anesthesia, risks & benefits discussed:    Anesthesia Type: MAC  Post Op Pain Control Plan: multimodal analgesia  Induction:  IV  Informed Consent: Informed consent signed with the Patient and all parties understand the risks and agree with anesthesia plan.  All questions answered.   ASA Score: 3  Anesthesia Plan Notes:       Ready For Surgery From Anesthesia Perspective.       .

## 2022-08-18 ENCOUNTER — HOSPITAL ENCOUNTER (EMERGENCY)
Facility: HOSPITAL | Age: 52
Discharge: HOME | End: 2022-08-18
Attending: EMERGENCY MEDICINE
Payer: MEDICAID

## 2022-08-18 ENCOUNTER — TELEPHONE (OUTPATIENT)
Dept: ENDOSCOPY | Facility: HOSPITAL | Age: 52
End: 2022-08-18
Payer: MEDICAID

## 2022-08-18 VITALS
DIASTOLIC BLOOD PRESSURE: 79 MMHG | TEMPERATURE: 98 F | SYSTOLIC BLOOD PRESSURE: 142 MMHG | BODY MASS INDEX: 42.72 KG/M2 | HEART RATE: 79 BPM | WEIGHT: 315 LBS | RESPIRATION RATE: 18 BRPM | OXYGEN SATURATION: 98 %

## 2022-08-18 DIAGNOSIS — M79.89 RIGHT LEG SWELLING: ICD-10-CM

## 2022-08-18 DIAGNOSIS — M79.604 RIGHT LEG PAIN: ICD-10-CM

## 2022-08-18 DIAGNOSIS — M79.661 RIGHT CALF PAIN: Primary | ICD-10-CM

## 2022-08-18 PROCEDURE — 63600175 PHARM REV CODE 636 W HCPCS: Performed by: EMERGENCY MEDICINE

## 2022-08-18 PROCEDURE — 96372 THER/PROPH/DIAG INJ SC/IM: CPT | Performed by: EMERGENCY MEDICINE

## 2022-08-18 PROCEDURE — 99285 EMERGENCY DEPT VISIT HI MDM: CPT | Mod: 25

## 2022-08-18 RX ORDER — DICLOFENAC SODIUM 10 MG/G
2 GEL TOPICAL 4 TIMES DAILY
Qty: 2 G | Refills: 0 | Status: SHIPPED | OUTPATIENT
Start: 2022-08-18

## 2022-08-18 RX ORDER — METHOCARBAMOL 500 MG/1
1000 TABLET, FILM COATED ORAL 3 TIMES DAILY PRN
Qty: 30 TABLET | Refills: 0 | Status: SHIPPED | OUTPATIENT
Start: 2022-08-18 | End: 2022-08-23

## 2022-08-18 RX ORDER — KETOROLAC TROMETHAMINE 30 MG/ML
30 INJECTION, SOLUTION INTRAMUSCULAR; INTRAVENOUS
Status: COMPLETED | OUTPATIENT
Start: 2022-08-18 | End: 2022-08-18

## 2022-08-18 RX ORDER — KETOROLAC TROMETHAMINE 10 MG/1
10 TABLET, FILM COATED ORAL EVERY 6 HOURS PRN
Qty: 12 TABLET | Refills: 0 | Status: SHIPPED | OUTPATIENT
Start: 2022-08-18 | End: 2022-08-21

## 2022-08-18 RX ORDER — ORPHENADRINE CITRATE 30 MG/ML
30 INJECTION INTRAMUSCULAR; INTRAVENOUS
Status: COMPLETED | OUTPATIENT
Start: 2022-08-18 | End: 2022-08-18

## 2022-08-18 RX ADMIN — KETOROLAC TROMETHAMINE 30 MG: 30 INJECTION, SOLUTION INTRAMUSCULAR at 10:08

## 2022-08-18 RX ADMIN — ORPHENADRINE CITRATE 30 MG: 30 INJECTION INTRAMUSCULAR; INTRAVENOUS at 10:08

## 2022-08-18 NOTE — TELEPHONE ENCOUNTER
Post procedure f/u call, no answer, message left to call [phone] for any post procedure concerns

## 2022-08-18 NOTE — ED PROVIDER NOTES
Encounter Date: 8/18/2022       History     Chief Complaint   Patient presents with    Leg Pain     Patient reports pain felt to right upper calf that radiates up leg. Pain started on Sunday. States pain feels worse than a lindsay horse. Patient states he is a . Has been applying bio freeze.      Patient is a 51-year-old male with a previous history of right ankle fracture. deafness in his right ear who presents to the ED with complaint of non traumatic right lower extremity pain and swelling since Saturday.   Patient reports pain to the right upper and posterior calf region that radiates up the lateral and posterior aspect of the right thigh towards the hip that is exacerbated with movement. He denies any right knee or right ankle pain. He does report pain with ambulation .  He states that the pain started on Saturday.  Patient states that he tried Biofreeze to the posterior right thigh with minimal improvement. He denies any weakness or tingling.   He has not taken any other medications for his symptoms. He does report that he works as a  and has been working more. He denies any new lower extremity swelling or edema compared to his baseline.         Review of patient's allergies indicates:  No Known Allergies  Past Medical History:   Diagnosis Date    Deafness in right ear      Past Surgical History:   Procedure Laterality Date    ANKLE FRACTURE SURGERY Right 08/2018    COLONOSCOPY N/A 8/17/2022    Procedure: COLONOSCOPY Suprep;  Surgeon: Jose Angel Leonardo MD;  [facility];  Service: Endoscopy;  Laterality: N/A;    FINGER FRACTURE SURGERY       No family history on file.  Social History     Tobacco Use    Smoking status: Never Smoker    Smokeless tobacco: Never Used   Substance Use Topics    Alcohol use: No    Drug use: No     Review of Systems   Constitutional: Negative for chills and fever.   Cardiovascular: Positive for leg swelling. Negative for chest pain and  palpitations.   Gastrointestinal: Negative for abdominal pain, nausea and vomiting.   Musculoskeletal: Positive for arthralgias. Negative for gait problem and myalgias.   Neurological: Negative for dizziness and headaches.   Psychiatric/Behavioral: Negative for agitation and confusion.       Physical Exam     Initial Vitals [08/18/22 0704]   BP Pulse Resp Temp SpO2   (!) 142/79 79 18 98.2 °F (36.8 °C) 98 %      MAP       --         Physical Exam    Nursing note and vitals reviewed.  Constitutional: He appears well-developed and well-nourished.   HENT:   Head: Normocephalic and atraumatic.   Right Ear: External ear normal.   Eyes: Pupils are equal, round, and reactive to light.   Neck:   Normal range of motion.  Cardiovascular: Normal rate, regular rhythm, normal heart sounds and intact distal pulses.   Pulmonary/Chest: Breath sounds normal.   Abdominal: Abdomen is soft. Bowel sounds are normal.   Musculoskeletal:         General: Edema present.      Cervical back: Normal range of motion.      Right hip: No deformity, bony tenderness or crepitus. Normal range of motion. Normal strength.      Right knee: No swelling, effusion, bony tenderness or crepitus. Normal range of motion.      Comments: Tenderness to palpation to the lateral aspect of the RLE; negative Homoman sign; no pitting edema noted; well healed scar to the dorsum of the R foot; the dorsalis pedis pulse is 2+      Neurological: He is alert and oriented to person, place, and time. He has normal strength.   Negative SLR   Examination of the posterior knee demonstrates no findings suggestive of effusion, Baker cyst; there is no erythema, palpable cords noted to the right gastrocnemius region.  The compartments of the right anterior thigh and posterior thigh are compressible and free of pain on passive stretch.  The compartments of the right lower extremity or compressible and free of pain on passive stretch.  No findings suggestive compartment syndrome to  the right lower extremity.   Skin: Skin is warm. Capillary refill takes less than 2 seconds. No erythema.   No erythema or crepitus or bullae noted to the R lower extremity         ED Course   Procedures  Labs Reviewed - No data to display       Imaging Results          X-Ray Hip 2 or 3 views Right (with Pelvis when performed) (Final result)  Result time 08/18/22 10:46:06    Final result by Tian Villanueva MD (08/18/22 10:46:06)                 Impression:      As above.      Electronically signed by: Tian Villanueva  Date:    08/18/2022  Time:    10:46             Narrative:    EXAMINATION:  XR HIP WITH PELVIS WHEN PERFORMED, 2 OR 3  VIEWS RIGHT    CLINICAL HISTORY:  lefg pain;    TECHNIQUE:  AP view of the pelvis and frog leg lateral view of the right hip were performed.    COMPARISON:  None available    FINDINGS:  Femoral heads are appropriately seated with mild hip DJD and marginal osteophyte production at the superior acetabular rim.  No acute fracture, dislocation, or osseous destruction.  Lower lumbar spondylosis with joint space narrowing and subchondral sclerosis at the SI joints.                               X-Ray Tibia Fibula 2 View Right (Final result)  Result time 08/18/22 10:43:46    Final result by Tian Villanueva MD (08/18/22 10:43:46)                 Impression:      As above.      Electronically signed by: Tian Villanueva  Date:    08/18/2022  Time:    10:43             Narrative:    EXAMINATION:  XR TIBIA FIBULA 2 VIEW RIGHT    CLINICAL HISTORY:  Pain in right leg    TECHNIQUE:  AP and lateral views of the right tibia and fibula were performed.    COMPARISON:  Right tib-fib radiograph dated 08/11/2018    FINDINGS:  Chronic post-traumatic deformity of the distal tibia and fibula with tibiotalar hardware which appears intact.  Ankylosis change at the subtalar level.  Ghost tracts from prior tibial shaft hardware.  No acute fracture.  Mild soft tissue prominence about the ankle.                                US Lower Extremity Veins Right (Final result)  Result time 08/18/22 09:29:48    Final result by Rasta Moreno MD (08/18/22 09:29:48)                 Impression:      No evidence of deep venous thrombosis in the right lower extremity.      Electronically signed by: Rasta Moreno MD  Date:    08/18/2022  Time:    09:29             Narrative:    EXAMINATION:  US LOWER EXTREMITY VEINS RIGHT    CLINICAL HISTORY:  Pain in right leg    TECHNIQUE:  Duplex and color flow Doppler evaluation and graded compression of the right lower extremity veins was performed.    COMPARISON:  10/26/2018    FINDINGS:  Right thigh veins: The common femoral, femoral, popliteal, upper greater saphenous, and deep femoral veins are patent and free of thrombus. The veins are normally compressible and have normal phasic flow and augmentation response.    Right calf veins: The visualized calf veins are patent.    Contralateral CFV: The contralateral (left) common femoral vein is patent and free of thrombus.    Miscellaneous: None                                 Medications   ketorolac injection 30 mg (30 mg Intramuscular Given 8/18/22 1049)   orphenadrine injection 30 mg (30 mg Intramuscular Given 8/18/22 1050)     Medical Decision Making:   Clinical Tests:   Lab Tests: Ordered and Reviewed  Radiological Study: Ordered and Reviewed  ED Management:  - ultrasound of the right lower extremity demonstrates no findings suggestive of deep venous thrombosis per final radiology read   - plain radiograph of the R hip demonstrates no acute fracture, dislocation per my interpretation; this was confirmed by the final radiology read   - plain radiograph of the R tib/fib demonstrates no acute fracture, gas or additional acute abnormality per my interpretation; this was confirmed by the final radiology read   - low suspicion for necrotizing infection of the lower extremity, compartment syndrome, deep vein thrombosis, occult fracture; will  treat symptomatically with muscle relaxer, Ketorolac (Patient denies any history or current GI bleeding, renal disease, or current use of blood thinners), and topical pain relief  - No further intervention is indicated at this time after having taken into account the patient's history, physical exam findings, and empirical and objective data obtained during the patient's emergency department workup.   - The patient is at low risk for an emergent medical condition at this time, and I am of the belief that that it is safe to discharge the patient from the emergency department.   - The patient is instructed to follow up as outpatient as indicated on the discharge paperwork.    - I have discussed the specifics of the workup with the patient and the patient has verbalized understanding of the details of the workup, the diagnosis, the treatment plan, and the need for outpatient follow-up.    - Although the patient has no emergent etiology today this does not preclude the development of an emergent condition so, in addition, I have advised the patient that they can return to the ED and/or activate EMS at any time with worsening of their symptoms, change of their symptoms, or with any other medical complaint.    - The patient remained comfortable and stable during their visit in the ED.    - Discharge and follow-up instructions discussed with the patient who expressed understanding and willingness to comply with my recommendations.  - Results of all emergency department tests  discussed thoroughly with patient; all patient questions answered; pt in agreement with plan  - Pt instructed to follow up with PCP in 2-3 days for recheck of today's complaints  - Pt given strict emergency department return precautions for any new or worsening of symptoms  - Pt discharged from the emergency department in stable condition, in no acute distress                        Clinical Impression:   Final diagnoses:  [M79.604] Right leg  pain  [M79.89] Right leg swelling  [M79.661] Right calf pain (Primary)          ED Disposition Condition    Discharge Stable        ED Prescriptions     Medication Sig Dispense Start Date End Date Auth. Provider    methocarbamoL (ROBAXIN) 500 MG Tab Take 2 tablets (1,000 mg total) by mouth 3 (three) times daily as needed. 30 tablet 8/18/2022 8/23/2022 Juan M Galeana MD    diclofenac sodium (VOLTAREN) 1 % Gel Apply 2 g topically 4 (four) times daily. 2 g 8/18/2022  Juan M Galeana MD    ketorolac (TORADOL) 10 mg tablet Take 1 tablet (10 mg total) by mouth every 6 (six) hours as needed for Pain. 12 tablet 8/18/2022 8/21/2022 Juan M Galeana MD        Follow-up Information     Follow up With Specialties Details Why Contact Info    Brenton Zhao MD Family Medicine Schedule an appointment as soon as possible for a visit   [address], [facility] [address]  [facility] [address] [phone]             Juan M Galeana MD  08/18/22 0187

## 2022-08-18 NOTE — Clinical Note
"Herman Howard (Shawn)field was seen and treated in our emergency department on 8/18/2022.  He may return to work on 08/19/2022.       If you have any questions or concerns, please don't hesitate to call.      Kevyn Trevizo LPN    "

## 2022-08-18 NOTE — ED TRIAGE NOTES
Patient is a a  who reports right sided pain radiating from hip down right leg since Sunday with no h/o injury. Presents with limp in gait, using cane for assistance. Patient noted to have thick wallet in rear right pocket - states he sometimes keeps wallet in rear pocket when driving - advised that this may be contributing to sciatic pain.

## 2022-08-19 ENCOUNTER — HOSPITAL ENCOUNTER (EMERGENCY)
Facility: HOSPITAL | Age: 52
Discharge: HOME | End: 2022-08-19
Attending: EMERGENCY MEDICINE
Payer: MEDICAID

## 2022-08-19 VITALS
HEART RATE: 73 BPM | TEMPERATURE: 99 F | DIASTOLIC BLOOD PRESSURE: 71 MMHG | OXYGEN SATURATION: 98 % | SYSTOLIC BLOOD PRESSURE: 133 MMHG | RESPIRATION RATE: 20 BRPM

## 2022-08-19 DIAGNOSIS — M79.606 LEG PAIN: ICD-10-CM

## 2022-08-19 PROCEDURE — 99284 EMERGENCY DEPT VISIT MOD MDM: CPT | Mod: 25

## 2022-08-19 PROCEDURE — 25000003 PHARM REV CODE 250: Performed by: NURSE PRACTITIONER

## 2022-08-19 RX ORDER — METHOCARBAMOL 500 MG/1
500 TABLET, FILM COATED ORAL
Status: COMPLETED | OUTPATIENT
Start: 2022-08-19 | End: 2022-08-19

## 2022-08-19 RX ORDER — OXYCODONE AND ACETAMINOPHEN 5; 325 MG/1; MG/1
1 TABLET ORAL EVERY 4 HOURS PRN
Qty: 12 TABLET | Refills: 0 | Status: SHIPPED | OUTPATIENT
Start: 2022-08-19 | End: 2022-08-21

## 2022-08-19 RX ORDER — OXYCODONE AND ACETAMINOPHEN 5; 325 MG/1; MG/1
1 TABLET ORAL
Status: COMPLETED | OUTPATIENT
Start: 2022-08-19 | End: 2022-08-19

## 2022-08-19 RX ADMIN — OXYCODONE HYDROCHLORIDE AND ACETAMINOPHEN 1 TABLET: 5; 325 TABLET ORAL at 07:08

## 2022-08-19 RX ADMIN — METHOCARBAMOL TABLETS 500 MG: 500 TABLET, COATED ORAL at 07:08

## 2022-08-19 NOTE — ED NOTES
Patient was seen yesterday for same complaint of right hip pain that radiated down his leg but reports the most pain now present is behind his right knee and into his calf area. No discoloration noted skin warm to touch and edema in leg at baseline.

## 2022-08-19 NOTE — Clinical Note
"Herman Howard (Shawn)field was seen and treated in our emergency department on 8/19/2022.  He may return to work on 08/19/2022.       If you have any questions or concerns, please don't hesitate to call.      Arsen Olea RN    "

## 2022-08-20 NOTE — DISCHARGE INSTRUCTIONS

## 2022-08-20 NOTE — ED PROVIDER NOTES
Encounter Date: 8/19/2022       History     Chief Complaint   Patient presents with    Leg Pain     Pt complains of right lower leg pain, pt was seen here yesterday for same, and states no relief from rx meds, pt had ultrasound of leg yesterday      51 yr old male presents to the ER with reports of right lower leg pain. Pt states the pain initially started in the right hip/lower back a few days ago. Pt states the pain then radiated down the leg however has been more present in the right calf since. Was seen here yesterday in which an ultrasound was performed. No fever. No chest pain or sob. Pt is a . PMH of right ankle surgery, deafness to the right ear.     The history is provided by the patient. No  was used.     Review of patient's allergies indicates:  No Known Allergies  Past Medical History:   Diagnosis Date    Deafness in right ear      Past Surgical History:   Procedure Laterality Date    ANKLE FRACTURE SURGERY Right 08/2018    COLONOSCOPY N/A 8/17/2022    Procedure: COLONOSCOPY Suprep;  Surgeon: Jose Angel Leonardo MD;  [facility];  Service: Endoscopy;  Laterality: N/A;    FINGER FRACTURE SURGERY       No family history on file.  Social History     Tobacco Use    Smoking status: Never Smoker    Smokeless tobacco: Never Used   Substance Use Topics    Alcohol use: No    Drug use: No     Review of Systems   Constitutional: Negative for fever.   Respiratory: Negative for shortness of breath.    Cardiovascular: Negative for chest pain.   Gastrointestinal: Negative for diarrhea, nausea, rectal pain and vomiting.   Genitourinary: Negative for dysuria.   Musculoskeletal: Negative for neck pain and neck stiffness.        Right lower leg pain   Skin: Negative for rash and wound.       Physical Exam     Initial Vitals   BP Pulse Resp Temp SpO2   08/19/22 1800 08/19/22 1800 08/19/22 1800 08/19/22 1800 08/19/22 1801   133/71 73 20 98.5 °F (36.9 °C) 98 %      MAP       --                 Physical Exam    Constitutional: He appears well-developed and well-nourished. He is not diaphoretic. He is Obese . No distress.   HENT:   Head: Normocephalic and atraumatic.   Right Ear: Hearing and tympanic membrane normal.   Left Ear: Hearing and tympanic membrane normal.   Nose: Nose normal.   Mouth/Throat: Uvula is midline, oropharynx is clear and moist and mucous membranes are normal.   Eyes: Lids are normal. Pupils are equal, round, and reactive to light.   Cardiovascular: Normal rate.   There is swelling noted to bilateral lower exts.    Pulmonary/Chest: Effort normal and breath sounds normal. No respiratory distress. He has no wheezes. He has no rhonchi.   Abdominal: Abdomen is soft. There is no abdominal tenderness.   Musculoskeletal:         General: Normal range of motion.      Cervical back: No rigidity.        Legs:       Comments: There is swelling noted to the marked locations. There is no erythema present. + pt/dp noted. There is some calf tenderness noted. Normal SLR.      Neurological: He is oriented to person, place, and time.   Skin: Skin is warm and dry. No rash noted.   Psychiatric: He has a normal mood and affect. His behavior is normal. Judgment and thought content normal.         ED Course   Procedures  Labs Reviewed - No data to display       Imaging Results          US Lower Extremity Arteries Right (Final result)  Result time 08/19/22 20:25:15    Final result by Martinez Louis MD (08/19/22 20:25:15)                 Impression:      No focal occlusion or hemodynamically significant stenosis in the major arteries of the right lower extremity.      Electronically signed by: Martinez Louis MD  Date:    08/19/2022  Time:    20:25             Narrative:    EXAMINATION:  US LOWER EXTREMITY ARTERIES RIGHT    CLINICAL HISTORY:  Pain in leg, unspecified.    TECHNIQUE:  Bilateral spectral, color and grayscale images of the large arteries of both lower extremities were  performed.    COMPARISON:  Right lower extremity venous ultrasound, 08/18/2022.    FINDINGS:  Right lower extremity.    CFA: 143 cm/s, triphasic    Prox SFA: 139 cm/s, triphasic    Mid SFA: 138 cm/s, triphasic    Dist SFA: 149 cm/s, triphasic    Pop A: 117 cm/s, triphasic    PTA: 121 cm/s, triphasic    ALANA: 126 cm/s, triphasic    There are parent small collateral vessels in the calf communicating with the posterior tibial and anterior tibial arteries.  This could potentially be related to previous trauma when reviewing tibia/fibular radiographs from 08/18/2022.  Calf soft tissue edema is also noted.                                   Medications   oxyCODONE-acetaminophen 5-325 mg per tablet 1 tablet (1 tablet Oral Given 8/19/22 1902)   methocarbamoL tablet 500 mg (500 mg Oral Given 8/19/22 1901)                 ED Course as of 08/19/22 2041   Fri Aug 19, 2022   2039 Dr Galeana and I have reviewed the pts ultrasound and xrays. Pt will be referred to ortho as all testing here in the ER is unremarkable. In addition will prescribed some percocet for pain control if needed. Strict return precautions given and is stable for dc.  [DT]      ED Course User Index  [DT] Marilee Brown NP             Clinical Impression:   Final diagnoses:  [M79.606] Leg pain          ED Disposition Condition    Discharge Stable        ED Prescriptions     Medication Sig Dispense Start Date End Date Auth. Provider    oxyCODONE-acetaminophen (PERCOCET) 5-325 mg per tablet Take 1 tablet by mouth every 4 (four) hours as needed for Pain. 12 tablet 8/19/2022 8/21/2022 Marilee Brown NP        Follow-up Information     Follow up With Specialties Details Why Contact Info    Brenton Zhao MD Family Medicine Schedule an appointment as soon as possible for a visit in 2 days  [address], [facility] [address]  [facility] [address] [phone]             Marilee Brown NP  08/19/22 2041

## 2022-08-22 LAB
FINAL PATHOLOGIC DIAGNOSIS: NORMAL
GROSS: NORMAL
Lab: NORMAL

## 2022-08-23 ENCOUNTER — OFFICE VISIT (OUTPATIENT)
Dept: FAMILY MEDICINE | Facility: HOSPITAL | Age: 52
End: 2022-08-23
Attending: FAMILY MEDICINE
Payer: MEDICAID

## 2022-08-23 VITALS — HEIGHT: 72 IN | BODY MASS INDEX: 40.13 KG/M2 | WEIGHT: 296.31 LBS

## 2022-08-23 DIAGNOSIS — M54.31 SCIATICA OF RIGHT SIDE: Primary | ICD-10-CM

## 2022-08-23 PROCEDURE — 96372 THER/PROPH/DIAG INJ SC/IM: CPT

## 2022-08-23 PROCEDURE — 99214 OFFICE O/P EST MOD 30 MIN: CPT | Performed by: STUDENT IN AN ORGANIZED HEALTH CARE EDUCATION/TRAINING PROGRAM

## 2022-08-23 RX ORDER — OXYCODONE AND ACETAMINOPHEN 5; 325 MG/1; MG/1
1 TABLET ORAL EVERY 4 HOURS PRN
COMMUNITY

## 2022-08-23 RX ORDER — KETOROLAC TROMETHAMINE 30 MG/ML
60 INJECTION, SOLUTION INTRAMUSCULAR; INTRAVENOUS
Status: COMPLETED | OUTPATIENT
Start: 2022-08-23 | End: 2022-08-23

## 2022-08-23 RX ORDER — GABAPENTIN 300 MG/1
300 CAPSULE ORAL NIGHTLY
Qty: 30 CAPSULE | Refills: 1 | Status: SHIPPED | OUTPATIENT
Start: 2022-08-23 | End: 2023-07-27

## 2022-08-23 RX ADMIN — KETOROLAC TROMETHAMINE 60 MG: 30 INJECTION, SOLUTION INTRAMUSCULAR at 11:08

## 2022-08-23 NOTE — PROGRESS NOTES
Toradol 60 mg Injection given. Bandage applied. Tolerated well. Patient instructed to wait in lobby for 15 min before leaving.Information on medication given. Verbalized understanding.

## 2022-08-25 PROBLEM — M54.31 SCIATICA OF RIGHT SIDE: Status: ACTIVE | Noted: 2022-08-25

## 2022-08-25 PROBLEM — R07.89 COSTOCHONDRAL CHEST PAIN: Status: RESOLVED | Noted: 2018-09-24 | Resolved: 2022-08-25

## 2022-08-25 PROBLEM — S82.891A CLOSED FRACTURE OF RIGHT ANKLE: Status: RESOLVED | Noted: 2018-08-03 | Resolved: 2022-08-25

## 2022-08-25 NOTE — PROGRESS NOTES
Progress Note  [facility]    Subjective:     Herman Floyd is a 51 y.o. year old male with PMHx of R ankle fracture who presents to clinic for:    right-sided pain that started in his right lower back 8 days ago. The pain initially started in his lower back, then radiated down his posterior thigh, to his lateral knee to his calf and soleus. Pt states that the pain is worse when walking and is interfering with his activities of daily life, at 7-8/10 in intensity. Pt was seen in the ED on Friday, reported that the medications did not help his pain (including Norco). Pt has appointment scheduled with DR. Renae from ortho in September. Other wise feels good. Denies CP, SOB, NVD, weakness, dizziness, or paresthesias.      Patient Active Problem List    Diagnosis Date Noted    Decreased strength 03/11/2021    Decreased mobility and endurance 03/11/2021    Ankle stiffness, right 02/19/2020    Edema 09/26/2019    Class 3 severe obesity due to excess calories with body mass index (BMI) of 40.0 to 44.9 in adult 12/03/2018    Leg pain 11/06/2018    Costochondral chest pain 09/24/2018    Chest pain     Shortness of breath     Other pulmonary embolism without acute cor pulmonale 09/18/2018    Closed fracture of right ankle 08/03/2018        (Not in a hospital admission)    Review of patient's allergies indicates:  No Known Allergies     Past Medical History:   Diagnosis Date    Deafness in right ear       Past Surgical History:   Procedure Laterality Date    ANKLE FRACTURE SURGERY Right 08/2018    COLONOSCOPY N/A 8/17/2022    Procedure: COLONOSCOPY Suprep;  Surgeon: Jose Angel Leonardo MD;  Location: [facility];  Service: Endoscopy;  Laterality: N/A;    FINGER FRACTURE SURGERY        No family history on file.   Social History     Tobacco Use    Smoking status: Never Smoker    Smokeless tobacco: Never Used   Substance Use Topics    Alcohol use: No      Review of Systems   Constitutional: Negative for  fever and malaise/fatigue.   HENT: Negative for congestion and sore throat.    Eyes: Negative for blurred vision and visual disturbance.   Cardiovascular: Negative for chest pain and leg swelling.   Respiratory: Negative for cough and shortness of breath.    Musculoskeletal: Positive for joint pain and stiffness. Negative for arthritis.   Gastrointestinal: Negative for abdominal pain, diarrhea, nausea and vomiting.   Genitourinary: Negative for dysuria and flank pain.   Neurological: Negative for dizziness, headaches, light-headedness, numbness and paresthesias.   Psychiatric/Behavioral: Negative for depression. The patient does not have insomnia.        Objective:     Vitals:    08/23/22 1042   Weight: 134.4 kg (296 lb 4.8 oz)   Height: 6' (1.829 m)     Estimated body mass index is 40.19 kg/m² as calculated from the following:    Height as of this encounter: 6' (1.829 m).    Weight as of this encounter: 134.4 kg (296 lb 4.8 oz).     Physical Exam  Constitutional:       Appearance: Normal appearance. He is obese.   HENT:      Head: Normocephalic and atraumatic.      Right Ear: External ear normal.      Left Ear: External ear normal.      Ears:      Comments: Hearing aid in R ear     Nose: Nose normal.      Mouth/Throat:      Mouth: Mucous membranes are moist.      Pharynx: Oropharynx is clear.   Eyes:      Conjunctiva/sclera: Conjunctivae normal.   Cardiovascular:      Rate and Rhythm: Normal rate and regular rhythm.      Pulses: Normal pulses.      Heart sounds: Normal heart sounds.   Pulmonary:      Effort: Pulmonary effort is normal.      Breath sounds: Normal breath sounds.   Abdominal:      General: Abdomen is flat. Bowel sounds are normal. There is no distension.      Palpations: Abdomen is soft.      Tenderness: There is no abdominal tenderness. There is no guarding.   Musculoskeletal:      Cervical back: Normal range of motion.      Lumbar back: No tenderness.      Right lower leg: No edema.      Left lower  leg: No edema.   Skin:     General: Skin is warm.      Capillary Refill: Capillary refill takes less than 2 seconds.   Neurological:      Mental Status: He is alert and oriented to person, place, and time. Mental status is at baseline.      Cranial Nerves: Cranial nerves 2-12 are intact.      Motor: Motor function is intact.      Coordination: Coordination is intact.      Comments: R straight leg pos   Psychiatric:         Mood and Affect: Mood normal.         Behavior: Behavior normal.         Thought Content: Thought content normal.         Assessment/Plan:     Sciatica of right side  -     Ambulatory referral/consult to Physical/Occupational Therapy; Future; Expected date: 08/30/2022  -     ketorolac injection 60 mg  -     gabapentin (NEURONTIN) 300 MG capsule; Take 1 capsule (300 mg total) by mouth every evening.  Dispense: 30 capsule; Refill: 1    Patient presenting with features of Sciatica without signs of radiculopathy. S/p Toradol injection with moderate pain relief. PT referral placed. Educated patient on the progression of sciatica and discussed goals and expectations. Encouraged patient to go to PT. Patient agreed and verbalized understanding. Also ordered Gabapentin for added pain management. Encouraged to go to ortho.    Follow-up:1 month      Case discussed with staff: Dr. Lim      This note was partially created using [employer] Voice Recognition software. Typographical and content errors may occur with this process. While efforts are made to detect and correct such errors, in some cases errors will persist. For this reason, wording in this document should be considered in the proper context and not strictly verbatim.

## 2022-09-13 ENCOUNTER — TELEPHONE (OUTPATIENT)
Dept: SPORTS MEDICINE | Facility: CLINIC | Age: 52
End: 2022-09-13
Payer: MEDICAID

## 2022-09-16 ENCOUNTER — PATIENT MESSAGE (OUTPATIENT)
Dept: SPORTS MEDICINE | Facility: CLINIC | Age: 52
End: 2022-09-16
Payer: MEDICAID

## 2022-09-16 ENCOUNTER — TELEPHONE (OUTPATIENT)
Dept: SPORTS MEDICINE | Facility: CLINIC | Age: 52
End: 2022-09-16
Payer: MEDICAID

## 2022-09-16 NOTE — TELEPHONE ENCOUNTER
Left message letting patient know we need to reschedule his appointment on Wednesday. Will reach out to patient via portal as well.

## 2022-09-20 ENCOUNTER — CLINICAL SUPPORT (OUTPATIENT)
Dept: REHABILITATION | Facility: HOSPITAL | Age: 52
End: 2022-09-20
Payer: MEDICAID

## 2022-09-20 DIAGNOSIS — M53.86 DECREASED ROM OF LUMBAR SPINE: ICD-10-CM

## 2022-09-20 DIAGNOSIS — M25.671 DECREASED RANGE OF MOTION OF RIGHT ANKLE: ICD-10-CM

## 2022-09-20 DIAGNOSIS — M54.31 SCIATICA OF RIGHT SIDE: ICD-10-CM

## 2022-09-20 DIAGNOSIS — R53.1 DECREASED STRENGTH: Primary | ICD-10-CM

## 2022-09-20 PROCEDURE — 97161 PT EVAL LOW COMPLEX 20 MIN: CPT | Mod: PN

## 2022-09-20 NOTE — PLAN OF CARE
[facility]  Physical Therapy Initial Evaluation    Date: 9/20/2022   Name: Herman GUERRERO [facility] Number: [id number]    Therapy Diagnosis:   Encounter Diagnoses   Name Primary?    Sciatica of right side     Decreased range of motion of right ankle     Decreased strength Yes    Decreased ROM of lumbar spine      Physician: Brenton Zhao MD    Physician Orders: PT Eval and Treat  Medical Diagnosis from Referral: M54.31 (ICD-10-CM) - Sciatica of right side  Evaluation Date: 9/20/2022  Authorization Period Expiration: 05/11/2023  Plan of Care Expiration: 11/18/22  Progress Note Due: 11/18/22  Visit # / Visits authorized: 1/20   FOTO: 1/5    Precautions: Standard, Hard of hearing, right TCJ-ankle fusion    Time In: 3:30 PM  Time Out: 4:00 PM  Total Appointment Time (timed & untimed codes): 30 minutes (1 LCE)    SUBJECTIVE     Date of onset: 8/18/2022    History of current condition - Herman reports: he started having right sided low back pain that worsened with pain going down his right leg to the side of his right calf. He made two visits to the emergency room because he was unable to walk or stand. His pain is slowly getting better, but still feels pain in his right calf. Pain was sharp and shooting, and will still feel it at times if he walks a lot. Denies any numbness or tingling.  for [employer], and no limitations with sitting or work activities other than his chronic right ankle impairments. Received injections and gabapentin with no relief; Aleve works the best for him. Pain mainly with transitions and with prolonged walking/standing activities.     Falls: none     Imaging, 8/18/22 Hip MRI  Femoral heads are appropriately seated with mild hip DJD and marginal osteophyte production at the superior acetabular rim.  No acute fracture, dislocation, or osseous destruction.  Lower lumbar spondylosis with joint space narrowing and subchondral sclerosis at the SI joints.    Prior  Therapy: yes for right ankle   Social History: 16 steps to upstairs bedroom   Occupation:  for [employer]. Does not have to move freBeQuan   Prior Level of Function: no low back pain  Current Level of Function: increased right calf pain with standing and walking activities    Pain:  Current 4/10, worst 10/10, best 0/10   Location: right calf  Description: Aching, Grabbing, Tight, Sharp, and Shooting  Aggravating Factors: Standing and Walking  Easing Factors: Aleve    Patients goals: to have no low back pain     Medical History:   Past Medical History:   Diagnosis Date    Closed fracture of right ankle 8/3/2018    Deafness in right ear        Surgical History:   Herman Floyd  has a past surgical history that includes Finger fracture surgery; Ankle fracture surgery (Right, 08/2018); and Colonoscopy (N/A, 8/17/2022).    Medications:   Herman has a current medication list which includes the following prescription(s): diclofenac sodium, gabapentin, and oxycodone-acetaminophen, and the following Facility-Administered Medications: sodium chloride 0.9% and lidocaine (pf) 10 mg/ml (1%).    Allergies:   Review of patient's allergies indicates:  No Known Allergies     OBJECTIVE     Gait: early right heel off, right TCK locked from fusion, right hip external rotation, right lumbar extension-rotation syndrome  Posture: right hip ER  Reflexes:    Clonus: WNL   Encarnacion's Test: negative   Babinski Test: not tested  DTR:    Right Left Comment   Patellar (L3-4) 2+ 2+    Achilles (S1) Not tested 2+      Sensation: WNL  Palpation: +tender to palpation at right SIJ and right lateral hip    A/PROM and MMT:  * = right side of low back pain with testing  NT = Not tested  AROM: (degrees) LUMBAR   Flexion (40-50) 100%   Extension (15-20) 80%   Right side bending (~20) 80%   Left side bending  (~20) 80%   Right rotation (5-10) 100%   Left rotation (5-10) 100%     Hip  Right   Left  Pain/Dysfunction with Movement    AROM PROM MMT  AROM PROM MMT    Flexion (L2,120 deg) 90 100 4/5 100 110 5/5    Extension (20 deg) 0 0 2+/5 0 0 2+/5    Abduction (L5, 45 deg) WFL WFL 4/5 WFL WFL 4+/5    Adduction (30 deg) WFL WFL 5/5 WFL WFL 5/5    IR (30 deg) 10 WFL 5/5 10 WFL 5/5    ER (45 deg) WFL WFL 4/5 WFL WFL 4/5       Knee  Right   Left  Pain/Dysfunction with Movement    AROM PROM MMT AROM PROM MMT    Flexion (S2, 140 deg) WFL WFL 5/5 WFL WFL 5/5    Extension (L3, 0-5 deg) WFL WFL 5/5 WFL WFL 5/5      Ankle  Right   Left  Pain/Dysfunction with Movement    AROM PROM MMT AROM PROM MMT    Dorsiflexion (L4, 20 deg) 0 0 NT WFL WFL 5/5    Plantarflexion (S1, 50 deg) 0 0 NT WFL WFL 5/5    Inversion (20-30 deg) 0 0 NT WFL WFL 5/5    Eversion (5-10 deg) 0 0 NT WFL WFL 5/5      Lumbar Tests:  Slump test = negative Bilateral   Quadrant test = positive on right  SLR Test (L4-S3) = positive on right  Lumbar distraction = not tested  Ely's test (L2,L3,L4) = not tested  Prone Instability Test = not tested  Vertical compression, elbow flexion, lumbar protective mechanism testing: not tested  Leg length, hip level = re-check next visit, right>left   Flick test = + on right    Cluster for stabilization:   Age < 40,  SLR > 91, +PIT, aberrant motion = Met 1/4    Cluster for spinal stenosis:   Grey s/s, leg > LBP, pain during walking/standing, relief upon sitting, > 47 y/o = Met 4/5    Hip Special Tests:  FADIR = positive right for right buttock pain  GAYLE (+ if knee higher than other knee) = positive on right  Scour Test = not tested    Hip OA cluster: (4/5 = 24.3 LR), x-ray gold standard  - pain with squatting, pain with active hip ext, lateral hip pain with active hip flexion, passive hip IR<25 degrees, (+) scour test with add causing lateral hip or groin pain    Limitation/Restriction for FOTO Lumbar Survey    Therapist reviewed FOTO scores for Herman Floyd on 9/20/2022.   FOTO documents entered into [employer] - see Media section.    Limitation Score: 47%  Predicted  Score: 32%       TREATMENT     Total Treatment time (time-based codes) separate from Evaluation: 10 minutes      Herman received the treatments listed below:      therapeutic exercises to develop strength, endurance, ROM, flexibility, posture, and core stabilization for 10 minutes including:  Education      PATIENT EDUCATION AND HOME EXERCISES     Education provided:    - abdominal bracing, lifting and carrying body mechanics, avoid activities that increase concordant pain  - course of therapy, prognosis  - importance of home exercise program  - correlation between right ankle and low back symptoms  - walk and stand to his tolerance    Written Home Exercises Provided: yes. Exercises were reviewed and Herman was able to demonstrate them prior to the end of the session. Herman demonstrated good  understanding of the education provided. See EMR under Patient Instructions for exercises provided during therapy sessions.    ASSESSMENT   Herman is a 52 y.o. male referred to outpatient Physical Therapy at [facility] with a medical diagnosis of Sciatica of right side. Pt currently presents with right sided low back and right leg pain, decreased lumbar ROM, decreased lumbar and RLE strength, impaired posture, impaired balance and gait, and functional deficits with transitions to stand, squatting, and prolonged walking/standing activities. Pt has past history of right talocrural ankle fusion that changes his gait mechanics greatly and likely impacts his right hip/lumbar movement patterns.     Patient prognosis is Good.   Patient will benefit from skilled outpatient Physical Therapy to address the deficits stated above and in the chart below, provide patient /family education, and to maximize patientt's level of independence.     Plan of care discussed with patient: Yes  Patient's spiritual, cultural and educational needs considered and patient is agreeable to the plan of care and goals as stated below:     Anticipated Barriers  for therapy: comorbidities, right ankle fusion    Medical Necessity is demonstrated by the following  History  Co-morbidities and personal factors that may impact the plan of care Co-morbidities:   Hard of hearing, right TCJ-ankle fusion    Personal Factors:   no deficits     moderate   Examination  Body Structures and Functions, activity limitations and participation restrictions that may impact the plan of care Body Regions:   back  lower extremities  trunk    Body Systems:    gross symmetry  ROM  strength  gross coordinated movement  balance  gait  transfers  transitions  motor control    Participation Restrictions:   Recreational walking    Activity limitations:   Learning and applying knowledge  no deficits    General Tasks and Commands  no deficits    Communication  no deficits    Mobility  lifting and carrying objects  walking    Self care  no deficits    Domestic Life  shopping  doing house work (cleaning house, washing dishes, laundry)    Interactions/Relationships  no deficits    Life Areas  no deficits    Community and Social Life  no deficits         high   Clinical Presentation stable and uncomplicated low   Decision Making/ Complexity Score: low     GOALS: Short Term Goals: 4 weeks  1. Pt will demo good TA muscle contraction for improved deep abdominal strength and lumbar stability.  2. Increase lumbar ROM to 100% of WNL in order to improve functional mobility.    3. Pt will demo good sitting/standing posture and body mechanics for improved spine health and decreased risk of future injury.  4. Pt to tolerate HEP to improve ROM and independence with ADL's.    Long Term Goals: 8 weeks  1. Report decreased low back pain without radiculopathy to </= 2/10 with transitions to stand to increase tolerance for ADLs and increased QoL.  2. Increase strength to >/= 4/5 MMT grade for core and BLE to increase tolerance for ADL and work activities.  3. Pt to demonstrate negative SLR and/or Slump Test in order to show  improved core strength and decreased nerve/dural tension.  4. Patient's goal: to have no low back pain  5. Pt will report at </= 32% impaired on FOTO lumbar score for low back pain disability to demonstrate decrease in disability and improvement in back pain.    PLAN   Plan of care Certification: 9/20/2022 to 11/18/22.    Outpatient Physical Therapy 2 times weekly for 8 weeks to include the following interventions: Cervical/Lumbar Traction, Electrical Stimulation, Gait Training, Manual Therapy, Moist Heat/ Ice, Neuromuscular Re-ed, Orthotic Management and Training, Patient Education, Self Care, Therapeutic Activities, and Therapeutic Exercise.     Nate Ruiz, PT      I CERTIFY THE NEED FOR THESE SERVICES FURNISHED UNDER THIS PLAN OF TREATMENT AND WHILE UNDER MY CARE   Physician's comments:     Physician's Signature: ___________________________________________________

## 2022-09-30 PROBLEM — M25.671 DECREASED RANGE OF MOTION OF RIGHT ANKLE: Status: ACTIVE | Noted: 2022-09-30

## 2022-09-30 PROBLEM — M53.86 DECREASED ROM OF LUMBAR SPINE: Status: ACTIVE | Noted: 2022-09-30

## 2022-10-04 ENCOUNTER — CLINICAL SUPPORT (OUTPATIENT)
Dept: REHABILITATION | Facility: HOSPITAL | Age: 52
End: 2022-10-04
Payer: MEDICAID

## 2022-10-04 DIAGNOSIS — M25.671 DECREASED RANGE OF MOTION OF RIGHT ANKLE: Primary | ICD-10-CM

## 2022-10-04 DIAGNOSIS — R53.1 DECREASED STRENGTH: ICD-10-CM

## 2022-10-04 DIAGNOSIS — M53.86 DECREASED ROM OF LUMBAR SPINE: ICD-10-CM

## 2022-10-04 PROCEDURE — 97110 THERAPEUTIC EXERCISES: CPT | Mod: PN

## 2022-10-04 NOTE — PROGRESS NOTES
[facility]   Physical Therapy Treatment Note     Name: Herman GUERRERO [facility] Number: [id number]    Therapy Diagnosis:   Encounter Diagnoses   Name Primary?    Decreased range of motion of right ankle Yes    Decreased strength     Decreased ROM of lumbar spine      Physician: Brenton Zhao MD    Visit Date: 10/4/2022    Physician Orders: PT Eval and Treat  Medical Diagnosis from Referral: M54.31 (ICD-10-CM) - Sciatica of right side  Evaluation Date: 9/20/2022  Authorization Period Expiration: 05/11/2023  Plan of Care Expiration: 11/18/22  Progress Note Due: 11/18/22  Visit # / Visits authorized: 2/20   FOTO: 2/5     Precautions: Standard, Hard of hearing, right TCJ-ankle fusion    PTA Visit #: 0/5     Time In: 12:20 PM  Time Out: 1:00 PM  Total Billable Time: 40 minutes (3 TE)    SUBJECTIVE     Pt reports: he was not able to do too much exercises at home and late today due to work. Mild right sided low back pain, but can still feel the soreness at the side of his right calf  He was compliant with home exercise program.  Response to previous treatment: 1st after  Functional change: 1st after    Pain: 4/10  Location: right calf    OBJECTIVE     Objective Measures updated at progress report unless specified.     Treatment     Herman received the treatments listed below:      manual therapy techniques: Joint mobilizations, Manual traction, Myofacial release, Soft tissue Mobilization, and Friction Massage were applied to the: lumbar spine and right hip for 00 minutes, including:  Long axis traction with belt  Lateral right hip traction with belt    therapeutic exercises to develop strength, endurance, ROM, flexibility, posture, and core stabilization for 40 minutes including:  Sciatic nerve glides with ball: 20x Bilateral  TA with PPT: 20x5'' holds  Open books: 8x10'' holds Bilateral   Sidelying clams: 20x Bilateral, red theraband   Bridges: 2x6 double leg  Prone laying flat with small  pillow under chest: 2 mins    Patient Education and Home Exercises     Home Exercises Provided and Patient Education Provided   Education provided:   - abdominal bracing, lifting and carrying body mechanics, avoid activities that increase concordant pain  - course of therapy, prognosis  - importance of home exercise program  - correlation between right ankle and low back symptoms  - walk and stand to his tolerance    Written Home Exercises Provided: yes. Exercises were reviewed and Herman was able to demonstrate them prior to the end of the session.  Herman demonstrated good  understanding of the education provided. See EMR under Patient Instructions for exercises provided during therapy sessions    ASSESSMENT     Herman is a 52 y.o. male referred to outpatient Physical Therapy at [facility] with a medical diagnosis of Sciatica of right side. Pt was 20 minutes late limiting exercises, will perform manual next visit. Pt did well today though, and heavy hip abductor and hamstring muscle fatigue after session. Assess response next visit.    Herman Is progressing well towards his goals.   Pt prognosis is Good.     Pt will continue to benefit from skilled outpatient physical therapy to address the deficits listed in the problem list box on initial evaluation, provide pt/family education and to maximize pt's level of independence in the home and community environment.     Pt's spiritual, cultural and educational needs considered and pt agreeable to plan of care and goals.     Anticipated barriers to physical therapy: comorbidities, right ankle fusion    Goals:   GOALS: Short Term Goals: 4 weeks  1. Pt will demo good TA muscle contraction for improved deep abdominal strength and lumbar stability. - progressing  2. Increase lumbar ROM to 100% of WNL in order to improve functional mobility.  - progressing  3. Pt will demo good sitting/standing posture and body mechanics for improved spine health and decreased risk of future  injury.- progressing  4. Pt to tolerate HEP to improve ROM and independence with ADL's.- progressing     Long Term Goals: 8 weeks  1. Report decreased low back pain without radiculopathy to </= 2/10 with transitions to stand to increase tolerance for ADLs and increased QoL.- progressing  2. Increase strength to >/= 4/5 MMT grade for core and BLE to increase tolerance for ADL and work activities.- progressing  3. Pt to demonstrate negative SLR and/or Slump Test in order to show improved core strength and decreased nerve/dural tension.- progressing  4. Patient's goal: to have no low back pain - progressing  5. Pt will report at </= 32% impaired on FOTO lumbar score for low back pain disability to demonstrate decrease in disability and improvement in back pain.- progressing    PLAN     Cont per POC.    Nate Ruiz, PT

## 2022-10-06 ENCOUNTER — CLINICAL SUPPORT (OUTPATIENT)
Dept: REHABILITATION | Facility: HOSPITAL | Age: 52
End: 2022-10-06
Payer: MEDICAID

## 2022-10-06 DIAGNOSIS — M53.86 DECREASED ROM OF LUMBAR SPINE: ICD-10-CM

## 2022-10-06 DIAGNOSIS — R53.1 DECREASED STRENGTH: ICD-10-CM

## 2022-10-06 DIAGNOSIS — M25.671 DECREASED RANGE OF MOTION OF RIGHT ANKLE: Primary | ICD-10-CM

## 2022-10-06 PROCEDURE — 97110 THERAPEUTIC EXERCISES: CPT | Mod: PN,CQ

## 2022-10-06 NOTE — PROGRESS NOTES
[facility]   Physical Therapy Treatment Note     Name: Herman GUERRERO [facility] Number: [id number]    Therapy Diagnosis:   Encounter Diagnoses   Name Primary?    Decreased range of motion of right ankle Yes    Decreased strength     Decreased ROM of lumbar spine      Physician: Brenton Zhao MD    Visit Date: 10/6/2022    Physician Orders: PT Eval and Treat  Medical Diagnosis from Referral: M54.31 (ICD-10-CM) - Sciatica of right side  Evaluation Date: 9/20/2022  Authorization Period Expiration: 05/11/2023  Plan of Care Expiration: 11/18/22  Progress Note Due: 11/18/22  Visit # / Visits authorized: 3/20   FOTO: 3/5  PTA Visit #: 1/5     Time In: 4:05 PM  Time Out: 4:50 PM  Total Billable Time: 45 minutes (3 TE)     Precautions: Standard, Hard of hearing, right TCJ-ankle fusion    SUBJECTIVE     Pt reports: his pain a little better but not much.  He was compliant with home exercise program.  Response to previous treatment: no problems.  Functional change: not at this time.    Pain: 3/10  Location: right calf    OBJECTIVE     Objective Measures updated at progress report unless specified.     Treatment     Herman received the treatments listed below:      manual therapy techniques: Joint mobilizations, Manual traction, Myofacial release, Soft tissue Mobilization, and Friction Massage were applied to the: lumbar spine and right hip for 00 minutes, including:  Long axis traction with belt  Lateral right hip traction with belt    therapeutic exercises to develop strength, endurance, ROM, flexibility, posture, and core stabilization for 45 minutes including:    Sciatic nerve glides with ball: 20x Bilateral  TA with PPT: 20x5'' holds  Bridges: 2x8 double leg   Open books: 10x10'' holds Bilateral   Sidelying clams: 20x Bilateral, red theraband   Prone laying flat with small pillow under chest: 3 mins   Standing hip extension: 2x10 Bilateral     Patient Education and Home Exercises     Home  Exercises Provided and Patient Education Provided   Education provided:   - abdominal bracing, lifting and carrying body mechanics, avoid activities that increase concordant pain  - course of therapy, prognosis  - importance of home exercise program  - correlation between right ankle and low back symptoms  - walk and stand to his tolerance    Written Home Exercises Provided: yes. Exercises were reviewed and Herman was able to demonstrate them prior to the end of the session.  Herman demonstrated good  understanding of the education provided. See EMR under Patient Instructions for exercises provided during therapy sessions    ASSESSMENT     Herman is a 52 y.o. male referred to outpatient Physical Therapy at [facility] with a medical diagnosis of Sciatica of right side. Patient continues to fatigue by the end of therapy.  Patient completed his therapy along with new exercise added as well as today's progressions, noted in bold, with no increase in symptoms prior to leaving the clinic.     Herman Is progressing well towards his goals.   Pt prognosis is Good.     Pt will continue to benefit from skilled outpatient physical therapy to address the deficits listed in the problem list box on initial evaluation, provide pt/family education and to maximize pt's level of independence in the home and community environment.     Pt's spiritual, cultural and educational needs considered and pt agreeable to plan of care and goals.     Anticipated barriers to physical therapy: comorbidities, right ankle fusion    GOALS:   Short Term Goals: 4 weeks  1. Pt will demo good TA muscle contraction for improved deep abdominal strength and lumbar stability. - progressing  2. Increase lumbar ROM to 100% of WNL in order to improve functional mobility.  - progressing  3. Pt will demo good sitting/standing posture and body mechanics for improved spine health and decreased risk of future injury.- progressing  4. Pt to tolerate HEP to improve ROM  and independence with ADL's.- progressing     Long Term Goals: 8 weeks  1. Report decreased low back pain without radiculopathy to </= 2/10 with transitions to stand to increase tolerance for ADLs and increased QoL.- progressing  2. Increase strength to >/= 4/5 MMT grade for core and BLE to increase tolerance for ADL and work activities.- progressing  3. Pt to demonstrate negative SLR and/or Slump Test in order to show improved core strength and decreased nerve/dural tension.- progressing  4. Patient's goal: to have no low back pain - progressing  5. Pt will report at </= 32% impaired on FOTO lumbar score for low back pain disability to demonstrate decrease in disability and improvement in back pain.- progressing    PLAN     Cont per POC.    Wesley Easley, PTA

## 2022-10-11 ENCOUNTER — CLINICAL SUPPORT (OUTPATIENT)
Dept: REHABILITATION | Facility: HOSPITAL | Age: 52
End: 2022-10-11
Payer: MEDICAID

## 2022-10-11 DIAGNOSIS — R53.1 DECREASED STRENGTH: ICD-10-CM

## 2022-10-11 DIAGNOSIS — M25.671 DECREASED RANGE OF MOTION OF RIGHT ANKLE: Primary | ICD-10-CM

## 2022-10-11 DIAGNOSIS — M53.86 DECREASED ROM OF LUMBAR SPINE: ICD-10-CM

## 2022-10-11 PROCEDURE — 97110 THERAPEUTIC EXERCISES: CPT | Mod: PN,CQ

## 2022-10-11 NOTE — PROGRESS NOTES
[facility]   Physical Therapy Treatment Note     Name: Herman GUERRERO [facility] Number: [id number]    Therapy Diagnosis:   Encounter Diagnoses   Name Primary?    Decreased range of motion of right ankle Yes    Decreased strength     Decreased ROM of lumbar spine      Physician: Brenton Zhao MD    Visit Date: 10/11/2022    Physician Orders: PT Eval and Treat  Medical Diagnosis from Referral: M54.31 (ICD-10-CM) - Sciatica of right side  Evaluation Date: 9/20/2022  Authorization Period Expiration: 05/11/2023  Plan of Care Expiration: 11/18/22  Progress Note Due: 11/18/22  Visit # / Visits authorized: 4/20   FOTO: 4/5  PTA Visit #: 2/5     Time In: 4:07 PM  Time Out: 4:45 PM  Total Billable Time: 38 minutes (3 TE)     Precautions: Standard, Hard of hearing, right TCJ-ankle fusion    SUBJECTIVE     Pt reports: his pain is about the same as last visit.  Patient reports he seldom performs his home exercise program due to not having any time or being exhausted when he gets in from work.  He was not compliant with home exercise program.  Response to previous treatment: no problems.  Functional change: not at this time.    Pain: 3/10  Location: right calf    OBJECTIVE     Objective Measures updated at progress report unless specified.     Treatment     Herman received the treatments listed below:      manual therapy techniques: Joint mobilizations, Manual traction, Myofacial release, Soft tissue Mobilization, and Friction Massage were applied to the: lumbar spine and right hip for 00 minutes, including:  Long axis traction with belt  Lateral right hip traction with belt    therapeutic exercises to develop strength, endurance, ROM, flexibility, posture, and core stabilization for 38 minutes including:    Sciatic nerve glides with ball: 20x Bilateral  TA with PPT: 20x5'' holds  Bridges: 2x10 double leg   Open books: 10x10'' holds Bilateral   Sidelying clams: 20x Bilateral, red theraband   Prone  laying flat with small pillow under chest: 3 mins     Standing hip extension: 2x10 Bilateral   Theraband rows with feet staggered: x10 black theraband   Theraband SAPD with feet staggered: x10 black theraband    Patient Education and Home Exercises     Home Exercises Provided and Patient Education Provided   Education provided:   - abdominal bracing, lifting and carrying body mechanics, avoid activities that increase concordant pain  - course of therapy, prognosis  - importance of home exercise program  - correlation between right ankle and low back symptoms  - walk and stand to his tolerance    Written Home Exercises Provided: yes. Exercises were reviewed and Herman was able to demonstrate them prior to the end of the session.  Herman demonstrated good  understanding of the education provided. See EMR under Patient Instructions for exercises provided during therapy sessions    ASSESSMENT     Herman is a 52 y.o. male referred to outpatient Physical Therapy at [facility] with a medical diagnosis of Sciatica of right side. Patient continues to fatigue by the end of therapy.  Patient requires regular cues for core engagement with resistance exercises. Discussed with patient the importance of his home exercise program.     Herman Is progressing well towards his goals.   Pt prognosis is Good.     Pt will continue to benefit from skilled outpatient physical therapy to address the deficits listed in the problem list box on initial evaluation, provide pt/family education and to maximize pt's level of independence in the home and community environment.     Pt's spiritual, cultural and educational needs considered and pt agreeable to plan of care and goals.     Anticipated barriers to physical therapy: comorbidities, right ankle fusion    GOALS:   Short Term Goals: 4 weeks  1. Pt will demo good TA muscle contraction for improved deep abdominal strength and lumbar stability. - progressing  2. Increase lumbar ROM to 100% of WNL  in order to improve functional mobility.  - progressing  3. Pt will demo good sitting/standing posture and body mechanics for improved spine health and decreased risk of future injury.- progressing  4. Pt to tolerate HEP to improve ROM and independence with ADL's.- progressing     Long Term Goals: 8 weeks  1. Report decreased low back pain without radiculopathy to </= 2/10 with transitions to stand to increase tolerance for ADLs and increased QoL.- progressing  2. Increase strength to >/= 4/5 MMT grade for core and BLE to increase tolerance for ADL and work activities.- progressing  3. Pt to demonstrate negative SLR and/or Slump Test in order to show improved core strength and decreased nerve/dural tension.- progressing  4. Patient's goal: to have no low back pain - progressing  5. Pt will report at </= 32% impaired on FOTO lumbar score for low back pain disability to demonstrate decrease in disability and improvement in back pain.- progressing    PLAN     Cont per POC.    Wesley Easley, PTA

## 2022-10-11 NOTE — PATIENT INSTRUCTIONS
Strengthening: Resisted Row    Face anchor at waist height, medium to wide stance. Thumbs up, pull arms back, squeezing shoulder blades together. Do not shrug up. Slowly return to start.  Repeat 10 times each side per set. Do 1 sets per session. Do 2 sessions per day.      Strengthening: Resisted Extension    Hold black elastic band in both hand, elbow straight and arm in front of body. Pull arm back, elbow straight, and squeeze shoulder blades back. Do not shrug.  Repeat 10 times each side per set. Do 1 sets per session. Do 2 sessions per day.

## 2022-10-13 ENCOUNTER — CLINICAL SUPPORT (OUTPATIENT)
Dept: REHABILITATION | Facility: HOSPITAL | Age: 52
End: 2022-10-13
Payer: MEDICAID

## 2022-10-13 DIAGNOSIS — R53.1 DECREASED STRENGTH: ICD-10-CM

## 2022-10-13 DIAGNOSIS — M53.86 DECREASED ROM OF LUMBAR SPINE: ICD-10-CM

## 2022-10-13 DIAGNOSIS — M25.671 DECREASED RANGE OF MOTION OF RIGHT ANKLE: Primary | ICD-10-CM

## 2022-10-13 PROCEDURE — 97110 THERAPEUTIC EXERCISES: CPT | Mod: PN

## 2022-10-13 NOTE — PROGRESS NOTES
[facility]   Physical Therapy Treatment Note     Name: Herman GUERRERO [facility] Number: [id number]    Therapy Diagnosis:   Encounter Diagnoses   Name Primary?    Decreased range of motion of right ankle Yes    Decreased strength     Decreased ROM of lumbar spine        Physician: Brenton Zhao MD    Visit Date: 10/13/2022    Physician Orders: PT Eval and Treat  Medical Diagnosis from Referral: M54.31 (ICD-10-CM) - Sciatica of right side  Evaluation Date: 9/20/2022  Authorization Period Expiration: 05/11/2023  Plan of Care Expiration: 11/18/22  Progress Note Due: 11/18/22  Visit # / Visits authorized: 5/20   FOTO: 4/5  PTA Visit #: 2/5     Time In: 4:09 PM  Time Out:  5:20 PM  Total Billable Time: 71 minutes (5 TE)     Precautions: Standard, Hard of hearing, right TCJ-ankle fusion    SUBJECTIVE     Pt reports: his pain in his right calf and therapy progress overall have remained unchanged. He continues to note that he seldomly performs his home exercise program due to time constraints with working at night and being fatigued.  He was not compliant with home exercise program.  Response to previous treatment: no significant change  Functional change: none noted    Pain: 3/10  Location: right calf    OBJECTIVE     Objective Measures updated at progress report unless specified.     Treatment     Herman received the treatments listed below:      manual therapy techniques: Joint mobilizations, Manual traction, Myofacial release, Soft tissue Mobilization, and Friction Massage were applied to the: lumbar spine and right hip for 16 minutes, including:  Long axis traction with belt  Lateral right hip traction with belt  Short axis right hip manual traction    therapeutic exercises to develop strength, endurance, ROM, flexibility, posture, and core stabilization for 55 minutes including:    Sciatic nerve glides with ball: 20x Bilateral  TA with PPT: 20x5'' holds  Bridges: 2x10 double leg   Open  books: 10x10'' holds Bilateral   Prone laying flat with small pillow under chest: 1'  Prone on elbows: 1'   Standing hip extension: 2x10 Bilateral     Out of time  Sidelying clams: 20x Bilateral, red theraband   Theraband rows with feet staggered: x10 black theraband   Theraband SAPD with feet staggered: x10 black theraband    Patient Education and Home Exercises     Home Exercises Provided and Patient Education Provided   Education provided:   - abdominal bracing, lifting and carrying body mechanics, avoid activities that increase concordant pain  - course of therapy, prognosis  - importance of home exercise program  - correlation between right ankle and low back symptoms  - walk and stand to his tolerance    Written Home Exercises Provided: yes. Exercises were reviewed and Herman was able to demonstrate them prior to the end of the session.  Herman demonstrated good  understanding of the education provided. See EMR under Patient Instructions for exercises provided during therapy sessions    ASSESSMENT     Herman is a 52 y.o. male referred to outpatient Physical Therapy at [facility] with a medical diagnosis of Sciatica of right side. Patient continues to show lack of progress due to noncompliance with home exercise program. Patient experiencing low back pain with movement due to hyperextension moment likely stemming from hypomobility of right ankle secondary to fusion. Significant time spent today educating patient on prognosis and importance of adhering to home exercise program. There was concern of potential DVT in right calf - Chang's sign was performed and was negative. Patient also appeared to have a negative Straight leg raise test. Will start incorporating more core stabilization activities due to concerns with patient being flexed majority of day due to  job.      Herman Is progressing well towards his goals.   Pt prognosis is Good.     Pt will continue to benefit from skilled outpatient  physical therapy to address the deficits listed in the problem list box on initial evaluation, provide pt/family education and to maximize pt's level of independence in the home and community environment.     Pt's spiritual, cultural and educational needs considered and pt agreeable to plan of care and goals.     Anticipated barriers to physical therapy: comorbidities, right ankle fusion    GOALS:   Short Term Goals: 4 weeks  1. Pt will demo good TA muscle contraction for improved deep abdominal strength and lumbar stability. - progressing  2. Increase lumbar ROM to 100% of WNL in order to improve functional mobility.  - progressing  3. Pt will demo good sitting/standing posture and body mechanics for improved spine health and decreased risk of future injury.- progressing  4. Pt to tolerate HEP to improve ROM and independence with ADL's.- progressing     Long Term Goals: 8 weeks  1. Report decreased low back pain without radiculopathy to </= 2/10 with transitions to stand to increase tolerance for ADLs and increased QoL.- progressing  2. Increase strength to >/= 4/5 MMT grade for core and BLE to increase tolerance for ADL and work activities.- progressing  3. Pt to demonstrate negative SLR and/or Slump Test in order to show improved core strength and decreased nerve/dural tension.- progressing  4. Patient's goal: to have no low back pain - progressing  5. Pt will report at </= 32% impaired on FOTO lumbar score for low back pain disability to demonstrate decrease in disability and improvement in back pain.- progressing    PLAN     Core stabilization program    Rich Nina, SPT

## 2022-10-18 ENCOUNTER — CLINICAL SUPPORT (OUTPATIENT)
Dept: REHABILITATION | Facility: HOSPITAL | Age: 52
End: 2022-10-18
Payer: MEDICAID

## 2022-10-18 DIAGNOSIS — R53.1 DECREASED STRENGTH: ICD-10-CM

## 2022-10-18 DIAGNOSIS — M25.671 DECREASED RANGE OF MOTION OF RIGHT ANKLE: Primary | ICD-10-CM

## 2022-10-18 DIAGNOSIS — M53.86 DECREASED ROM OF LUMBAR SPINE: ICD-10-CM

## 2022-10-18 PROCEDURE — 97110 THERAPEUTIC EXERCISES: CPT | Mod: PN,CQ

## 2022-10-18 NOTE — PROGRESS NOTES
[facility]   Physical Therapy Treatment Note     Name: Herman GUERRERO [facility] Number: [id number]    Therapy Diagnosis:   Encounter Diagnoses   Name Primary?    Decreased range of motion of right ankle Yes    Decreased strength     Decreased ROM of lumbar spine        Physician: Brenton Zhao MD    Visit Date: 10/18/2022    Physician Orders: PT Eval and Treat  Medical Diagnosis from Referral: M54.31 (ICD-10-CM) - Sciatica of right side  Evaluation Date: 9/20/2022  Authorization Period Expiration: 05/11/2023  Plan of Care Expiration: 11/18/22  Progress Note Due: 11/18/22  Visit # / Visits authorized: 5/25   FOTO: 5/5  PTA Visit #: 1/5     Time In: 4:18 PM  Time Out:  4:59 PM  Total Billable Time: 41 minutes (3 TE)     Precautions: Standard, Hard of hearing, right TCJ-ankle fusion    SUBJECTIVE     Pt reports: his back is hurting more today in his back, 4/10, feels that it is because he had to go out of town to [place].  Patient states his calf pain is about the same.  He was not compliant with home exercise program.  Response to previous treatment: no significant change  Functional change: none noted    Pain: 3/10  Location: right calf    OBJECTIVE     Objective Measures updated at progress report unless specified.     Treatment     Herman received the treatments listed below:      manual therapy techniques: Joint mobilizations, Manual traction, Myofacial release, Soft tissue Mobilization, and Friction Massage were applied to the: lumbar spine and right hip for 00 minutes, including:  Long axis traction with belt  Lateral right hip traction with belt  Short axis right hip manual traction    therapeutic exercises to develop strength, endurance, ROM, flexibility, posture, and core stabilization for 41 minutes including:    Sciatic nerve glides with ball: 20x Bilateral  TA with PPT: 20x5'' holds  Bridges: 2x10 double leg   Open books: 10x10'' holds Bilateral   Sidelying clams: 20x Bilateral,  red theraband   Prone laying flat with small pillow under chest: 1'  Prone on elbows: 1'     Standing hip extension: 2x10 Bilateral   Theraband rows with feet staggered: 2x10 black theraband   Theraband SAPD with feet staggered: x10 black theraband    Patient Education and Home Exercises     Home Exercises Provided and Patient Education Provided   Education provided:   - abdominal bracing, lifting and carrying body mechanics, avoid activities that increase concordant pain  - course of therapy, prognosis  - importance of home exercise program  - correlation between right ankle and low back symptoms  - walk and stand to his tolerance    Written Home Exercises Provided: yes. Exercises were reviewed and Herman was able to demonstrate them prior to the end of the session.  Herman demonstrated good  understanding of the education provided. See EMR under Patient Instructions for exercises provided during therapy sessions    ASSESSMENT     Herman is a 52 y.o. male referred to outpatient Physical Therapy at [facility] with a medical diagnosis of Sciatica of right side. Patient was 18 minutes late for his appointment due to traffic. Patient continues to show lack of progress due to noncompliance with home exercise program. Patient experiencing low back pain with movement due to hyperextension moment likely stemming from hypomobility of right ankle secondary to fusion.  Patient was moving a little slower than usual due to back pain today but stated his back felt a little better by the end of therapy.      Herman Is progressing well towards his goals.   Pt prognosis is Good.     Pt will continue to benefit from skilled outpatient physical therapy to address the deficits listed in the problem list box on initial evaluation, provide pt/family education and to maximize pt's level of independence in the home and community environment.     Pt's spiritual, cultural and educational needs considered and pt agreeable to plan of care and  goals.     Anticipated barriers to physical therapy: comorbidities, right ankle fusion    GOALS:   Short Term Goals: 4 weeks  1. Pt will demo good TA muscle contraction for improved deep abdominal strength and lumbar stability. - progressing  2. Increase lumbar ROM to 100% of WNL in order to improve functional mobility.  - progressing  3. Pt will demo good sitting/standing posture and body mechanics for improved spine health and decreased risk of future injury.- progressing  4. Pt to tolerate HEP to improve ROM and independence with ADL's.- progressing     Long Term Goals: 8 weeks  1. Report decreased low back pain without radiculopathy to </= 2/10 with transitions to stand to increase tolerance for ADLs and increased QoL.- progressing  2. Increase strength to >/= 4/5 MMT grade for core and BLE to increase tolerance for ADL and work activities.- progressing  3. Pt to demonstrate negative SLR and/or Slump Test in order to show improved core strength and decreased nerve/dural tension.- progressing  4. Patient's goal: to have no low back pain - progressing  5. Pt will report at </= 32% impaired on FOTO lumbar score for low back pain disability to demonstrate decrease in disability and improvement in back pain.- progressing    PLAN     Core stabilization program    Wesley Easley, PTA

## 2022-10-25 ENCOUNTER — CLINICAL SUPPORT (OUTPATIENT)
Dept: REHABILITATION | Facility: HOSPITAL | Age: 52
End: 2022-10-25
Payer: MEDICAID

## 2022-10-25 DIAGNOSIS — M53.86 DECREASED ROM OF LUMBAR SPINE: ICD-10-CM

## 2022-10-25 DIAGNOSIS — R53.1 DECREASED STRENGTH: ICD-10-CM

## 2022-10-25 DIAGNOSIS — M25.671 DECREASED RANGE OF MOTION OF RIGHT ANKLE: Primary | ICD-10-CM

## 2022-10-25 PROCEDURE — 97110 THERAPEUTIC EXERCISES: CPT | Mod: PN,CQ

## 2022-10-25 NOTE — PROGRESS NOTES
[facility]   Physical Therapy Treatment Note     Name: Herman GUERRERO [facility] Number: [id number]    Therapy Diagnosis:   Encounter Diagnoses   Name Primary?    Decreased range of motion of right ankle Yes    Decreased strength     Decreased ROM of lumbar spine        Physician: Brenton Zhao MD    Visit Date: 10/25/2022    Physician Orders: PT Eval and Treat  Medical Diagnosis from Referral: M54.31 (ICD-10-CM) - Sciatica of right side  Evaluation Date: 9/20/2022  Authorization Period Expiration: 05/11/2023  Plan of Care Expiration: 11/18/22  Progress Note Due: 11/18/22  Visit # / Visits authorized: 6/25   FOTO: 6/5  PTA Visit #: 2/5     Time In: 12:22 PM  Time Out:  1:00 PM  Total Billable Time: 38 minutes (3 TE)     Precautions: Standard, Hard of hearing, right TCJ-ankle fusion    SUBJECTIVE     Pt reports: having no pain today.  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  Patient reports he was also experiencing pain while performing prone on elbows, the last few seconds.  Patient states both times the pain was tolerable.  Patient states he was late because he was coming from [place] for his job.   He was not compliant with home exercise program.    Response to previous treatment: no significant change  Functional change: none noted    Pain: 0/10  Location: right calf    OBJECTIVE     Objective Measures updated at progress report unless specified.     Treatment     Herman received the treatments listed below:      manual therapy techniques: Joint mobilizations, Manual traction, Myofacial release, Soft tissue Mobilization, and Friction Massage were applied to the: lumbar spine and right hip for 00 minutes, including:  Long axis traction with belt  Lateral right hip traction with belt  Short axis right hip manual traction    therapeutic exercises to develop strength, endurance, ROM, flexibility, posture,  and core stabilization for 38 minutes including:    Sciatic nerve glides with ball: 20x Bilateral  TA with PPT: 20x5'' holds  Bridges: 2x10 double leg   Open books: 10x10'' holds Bilateral   Sidelying clams: 20x Bilateral, red theraband   Prone laying flat with small pillow under chest: 2'  Prone on elbows: 2'    Standing hip extension: 3x10 Bilateral   Theraband rows with feet staggered: 2x10 black theraband   Theraband SAPD with feet staggered: x10 black theraband - OOT    Patient Education and Home Exercises     Home Exercises Provided and Patient Education Provided   Education provided:   - abdominal bracing, lifting and carrying body mechanics, avoid activities that increase concordant pain  - course of therapy, prognosis  - importance of home exercise program  - correlation between right ankle and low back symptoms  - walk and stand to his tolerance    Written Home Exercises Provided: yes. Exercises were reviewed and Herman was able to demonstrate them prior to the end of the session.  Herman demonstrated good  understanding of the education provided. See EMR under Patient Instructions for exercises provided during therapy sessions    ASSESSMENT     Herman is a 52 y.o. male referred to outpatient Physical Therapy at [facility] with a medical diagnosis of Sciatica of right side. Patient again arrived late for his appointment, today he was 22 minutes late because his job had him in [place] this morning. Patient continues to show lack of progress due to noncompliance with home exercise program. Patient experiencing low back pain with movement due to hyperextension moment likely stemming from hypomobility of right ankle secondary to fusion.  Patient did not complete all of his exercises due to arriving late today.      Herman Is progressing well towards his goals.   Pt prognosis is Good.     Pt will continue to benefit from skilled outpatient physical therapy to address the deficits listed in the problem list  box on initial evaluation, provide pt/family education and to maximize pt's level of independence in the home and community environment.     Pt's spiritual, cultural and educational needs considered and pt agreeable to plan of care and goals.     Anticipated barriers to physical therapy: comorbidities, right ankle fusion    GOALS:   Short Term Goals: 4 weeks  1. Pt will demo good TA muscle contraction for improved deep abdominal strength and lumbar stability. - progressing  2. Increase lumbar ROM to 100% of WNL in order to improve functional mobility.  - progressing  3. Pt will demo good sitting/standing posture and body mechanics for improved spine health and decreased risk of future injury.- progressing  4. Pt to tolerate HEP to improve ROM and independence with ADL's.- progressing     Long Term Goals: 8 weeks  1. Report decreased low back pain without radiculopathy to </= 2/10 with transitions to stand to increase tolerance for ADLs and increased QoL.- progressing  2. Increase strength to >/= 4/5 MMT grade for core and BLE to increase tolerance for ADL and work activities.- progressing  3. Pt to demonstrate negative SLR and/or Slump Test in order to show improved core strength and decreased nerve/dural tension.- progressing  4. Patient's goal: to have no low back pain - progressing  5. Pt will report at </= 32% impaired on FOTO lumbar score for low back pain disability to demonstrate decrease in disability and improvement in back pain.- progressing    PLAN     Core stabilization program    Wesley Easley, PTA

## 2022-10-27 ENCOUNTER — CLINICAL SUPPORT (OUTPATIENT)
Dept: REHABILITATION | Facility: HOSPITAL | Age: 52
End: 2022-10-27
Payer: MEDICAID

## 2022-10-27 DIAGNOSIS — M53.86 DECREASED ROM OF LUMBAR SPINE: ICD-10-CM

## 2022-10-27 DIAGNOSIS — M25.671 DECREASED RANGE OF MOTION OF RIGHT ANKLE: Primary | ICD-10-CM

## 2022-10-27 DIAGNOSIS — R53.1 DECREASED STRENGTH: ICD-10-CM

## 2022-10-27 PROCEDURE — 97110 THERAPEUTIC EXERCISES: CPT | Mod: PN,CQ

## 2022-10-27 NOTE — PROGRESS NOTES
[facility]   Physical Therapy Treatment Note     Name: Herman GUERRERO [facility] Number: [id number]    Therapy Diagnosis:   Encounter Diagnoses   Name Primary?    Decreased range of motion of right ankle Yes    Decreased strength     Decreased ROM of lumbar spine        Physician: Brenton Zhao MD    Visit Date: 10/27/2022    Physician Orders: PT Eval and Treat  Medical Diagnosis from Referral: M54.31 (ICD-10-CM) - Sciatica of right side  Evaluation Date: 9/20/2022  Authorization Period Expiration: 05/11/2023  Plan of Care Expiration: 11/18/22  Progress Note Due: 11/18/22  Visit # / Visits authorized: 7/25   FOTO: 1/5 completed 10/27/22  PTA Visit #: 3/5     Time In: 3:15 PM  Time Out: 3:59 PM  Total Billable Time: 44 minutes (3 TE)     Precautions: Standard, Hard of hearing, right TCJ-ankle fusion    SUBJECTIVE     Pt reports: having no pain today.    He was not compliant with home exercise program.    Response to previous treatment: no significant change  Functional change: none noted    Pain: 0/10  Location: right calf    OBJECTIVE     Objective Measures updated at progress report unless specified.     Treatment     Herman received the treatments listed below:      manual therapy techniques: Joint mobilizations, Manual traction, Myofacial release, Soft tissue Mobilization, and Friction Massage were applied to the: lumbar spine and right hip for 00 minutes, including:  Long axis traction with belt  Lateral right hip traction with belt  Short axis right hip manual traction    therapeutic exercises to develop strength, endurance, ROM, flexibility, posture, and core stabilization for 44 minutes including:    Sciatic nerve glides with ball: 20x Bilateral  TA with PPT: 20x5'' holds  Bridges: 2x10 double leg   Open books: 10x10'' holds Bilateral   Sidelying clams: 20x Bilateral, red theraband   Prone laying flat with small pillow under chest: 2'  Prone on elbows: 2'     Standing hip  extension: 3x10 Bilateral   Theraband rows with feet staggered: 2x10 black theraband   Theraband SAPD with feet staggered: x10 black theraband     Patient Education and Home Exercises     Home Exercises Provided and Patient Education Provided   Education provided:   - abdominal bracing, lifting and carrying body mechanics, avoid activities that increase concordant pain  - course of therapy, prognosis  - importance of home exercise program  - correlation between right ankle and low back symptoms  - walk and stand to his tolerance    Written Home Exercises Provided: yes. Exercises were reviewed and Herman was able to demonstrate them prior to the end of the session.  Herman demonstrated good  understanding of the education provided. See EMR under Patient Instructions for exercises provided during therapy sessions    ASSESSMENT     Herman is a 52 y.o. male referred to outpatient Physical Therapy at [facility] with a medical diagnosis of Sciatica of right side. Patient again arrived late for his appointment, today he was 15 minutes late because of his job. Patient continues to show lack of progress due to noncompliance with home exercise program. Patient experiencing low back pain with movement due to hyperextension moment likely stemming from hypomobility of right ankle secondary to fusion.  Patient completed his therapy with no increase in symptoms prior to leaving the clinic.      Herman Is progressing well towards his goals.   Pt prognosis is Good.     Pt will continue to benefit from skilled outpatient physical therapy to address the deficits listed in the problem list box on initial evaluation, provide pt/family education and to maximize pt's level of independence in the home and community environment.     Pt's spiritual, cultural and educational needs considered and pt agreeable to plan of care and goals.     Anticipated barriers to physical therapy: comorbidities, right ankle fusion    GOALS:   Short Term Goals:  4 weeks  1. Pt will demo good TA muscle contraction for improved deep abdominal strength and lumbar stability. - progressing  2. Increase lumbar ROM to 100% of WNL in order to improve functional mobility.  - progressing  3. Pt will demo good sitting/standing posture and body mechanics for improved spine health and decreased risk of future injury.- progressing  4. Pt to tolerate HEP to improve ROM and independence with ADL's.- progressing     Long Term Goals: 8 weeks  1. Report decreased low back pain without radiculopathy to </= 2/10 with transitions to stand to increase tolerance for ADLs and increased QoL.- progressing  2. Increase strength to >/= 4/5 MMT grade for core and BLE to increase tolerance for ADL and work activities.- progressing  3. Pt to demonstrate negative SLR and/or Slump Test in order to show improved core strength and decreased nerve/dural tension.- progressing  4. Patient's goal: to have no low back pain - progressing  5. Pt will report at </= 32% impaired on FOTO lumbar score for low back pain disability to demonstrate decrease in disability and improvement in back pain.- progressing    PLAN     Core stabilization program    Wesley Easley, PTA                c

## 2022-11-01 ENCOUNTER — CLINICAL SUPPORT (OUTPATIENT)
Dept: REHABILITATION | Facility: HOSPITAL | Age: 52
End: 2022-11-01
Payer: MEDICAID

## 2022-11-01 DIAGNOSIS — R53.1 DECREASED STRENGTH: ICD-10-CM

## 2022-11-01 DIAGNOSIS — M53.86 DECREASED ROM OF LUMBAR SPINE: ICD-10-CM

## 2022-11-01 DIAGNOSIS — M25.671 DECREASED RANGE OF MOTION OF RIGHT ANKLE: Primary | ICD-10-CM

## 2022-11-01 PROCEDURE — 97110 THERAPEUTIC EXERCISES: CPT | Mod: PN,CQ

## 2022-11-01 NOTE — PROGRESS NOTES
[facility]   Physical Therapy Treatment Note     Name: Herman GUERRERO [facility] Number: [id number]    Therapy Diagnosis:   Encounter Diagnoses   Name Primary?    Decreased range of motion of right ankle Yes    Decreased strength     Decreased ROM of lumbar spine        Physician: Brenton Zhao MD    Visit Date: 11/1/2022    Physician Orders: PT Eval and Treat  Medical Diagnosis from Referral: M54.31 (ICD-10-CM) - Sciatica of right side  Evaluation Date: 9/20/2022  Authorization Period Expiration: 05/11/2023  Plan of Care Expiration: 11/18/22  Progress Note Due: 11/18/22  Visit # / Visits authorized: 8/25   FOTO: 2/5 completed 10/27/22  PTA Visit #: 4/5     Time In: 12:15 PM  Time Out: 1:00 PM  Total Billable Time: 45 minutes (3 TE)     Precautions: Standard, Hard of hearing, right TCJ-ankle fusion    SUBJECTIVE     Pt reports: having no pain today.  Patient states his biggest problem is still the swelling in his ankle/foot.  He was not compliant with home exercise program.    Response to previous treatment: no significant change  Functional change: none noted    Pain: 0/10  Location: right calf    OBJECTIVE     Objective Measures updated at progress report unless specified.     Treatment     Herman received the treatments listed below:      manual therapy techniques: Joint mobilizations, Manual traction, Myofacial release, Soft tissue Mobilization, and Friction Massage were applied to the: lumbar spine and right hip for 00 minutes, including:  Long axis traction with belt  Lateral right hip traction with belt  Short axis right hip manual traction    therapeutic exercises to develop strength, endurance, ROM, flexibility, posture, and core stabilization for 45 minutes including:    Sciatic nerve glides with ball: 20x Bilateral  TA with PPT: 20x5'' holds  Bridges: 2x10 double leg   Open books: 10x10'' holds Bilateral   Sidelying clams: 20x Bilateral, red theraband   Prone laying flat with  small pillow under chest: 2'  Prone on elbows: 2'     Standing hip extension: 3x10 Bilateral   Theraband rows with feet staggered: 3x10 black theraband   Theraband SAPD with feet staggered: 2x10 black theraband     Patient Education and Home Exercises     Home Exercises Provided and Patient Education Provided   Education provided:   - abdominal bracing, lifting and carrying body mechanics, avoid activities that increase concordant pain  - course of therapy, prognosis  - importance of home exercise program  - correlation between right ankle and low back symptoms  - walk and stand to his tolerance    Written Home Exercises Provided: yes. Exercises were reviewed and Herman was able to demonstrate them prior to the end of the session.  Herman demonstrated good  understanding of the education provided. See EMR under Patient Instructions for exercises provided during therapy sessions    ASSESSMENT     Herman is a 52 y.o. male referred to outpatient Physical Therapy at [facility] with a medical diagnosis of Sciatica of right side. Patient again arrived late for his appointment, today he was 15 minutes late because of his job. Patient continues to show lack of progress due to noncompliance with home exercise program due to work schedule. Patient experiencing low back pain with movement due to hyperextension moment likely stemming from hypomobility of right ankle secondary to fusion.  Patient completed his therapy and had no difficulty with today' progressions, noted in bold, with no increase in symptoms prior to leaving the clinic.      Herman Is progressing well towards his goals.   Pt prognosis is Good.     Pt will continue to benefit from skilled outpatient physical therapy to address the deficits listed in the problem list box on initial evaluation, provide pt/family education and to maximize pt's level of independence in the home and community environment.     Pt's spiritual, cultural and educational needs considered  and pt agreeable to plan of care and goals.     Anticipated barriers to physical therapy: comorbidities, right ankle fusion    GOALS:   Short Term Goals: 4 weeks  1. Pt will demo good TA muscle contraction for improved deep abdominal strength and lumbar stability. - progressing  2. Increase lumbar ROM to 100% of WNL in order to improve functional mobility.  - progressing  3. Pt will demo good sitting/standing posture and body mechanics for improved spine health and decreased risk of future injury.- progressing  4. Pt to tolerate HEP to improve ROM and independence with ADL's.- progressing     Long Term Goals: 8 weeks  1. Report decreased low back pain without radiculopathy to </= 2/10 with transitions to stand to increase tolerance for ADLs and increased QoL.- progressing  2. Increase strength to >/= 4/5 MMT grade for core and BLE to increase tolerance for ADL and work activities.- progressing  3. Pt to demonstrate negative SLR and/or Slump Test in order to show improved core strength and decreased nerve/dural tension.- progressing  4. Patient's goal: to have no low back pain - progressing  5. Pt will report at </= 32% impaired on FOTO lumbar score for low back pain disability to demonstrate decrease in disability and improvement in back pain.- progressing    PLAN     Core stabilization program    Wesley Easley, PTA

## 2022-11-03 ENCOUNTER — CLINICAL SUPPORT (OUTPATIENT)
Dept: REHABILITATION | Facility: HOSPITAL | Age: 52
End: 2022-11-03
Payer: MEDICAID

## 2022-11-03 DIAGNOSIS — R53.1 DECREASED STRENGTH: ICD-10-CM

## 2022-11-03 DIAGNOSIS — M53.86 DECREASED ROM OF LUMBAR SPINE: ICD-10-CM

## 2022-11-03 DIAGNOSIS — M25.671 DECREASED RANGE OF MOTION OF RIGHT ANKLE: Primary | ICD-10-CM

## 2022-11-03 PROCEDURE — 97110 THERAPEUTIC EXERCISES: CPT | Mod: PN,CQ

## 2022-11-03 NOTE — PROGRESS NOTES
[facility]   Physical Therapy Treatment Note     Name: Herman GUERRERO [facility] Number: [id number]    Therapy Diagnosis:   Encounter Diagnoses   Name Primary?    Decreased range of motion of right ankle Yes    Decreased strength     Decreased ROM of lumbar spine        Physician: Brenton Zhao MD    Visit Date: 11/3/2022    Physician Orders: PT Eval and Treat  Medical Diagnosis from Referral: M54.31 (ICD-10-CM) - Sciatica of right side  Evaluation Date: 9/20/2022  Authorization Period Expiration: 05/11/2023  Plan of Care Expiration: 11/18/22  Progress Note Due: 11/18/22  Visit # / Visits authorized: 9/25   FOTO: 3/5 completed 10/27/22  PTA Visit #: 5/5     Time In: 3:00 PM  Time Out: 3:48 PM  Total Billable Time: 48 minutes (3 TE)     Precautions: Standard, Hard of hearing, right TCJ-ankle fusion    SUBJECTIVE     Pt reports: having no pain today.    He was not compliant with home exercise program.    Response to previous treatment: no significant change  Functional change: none noted    Pain: 0/10  Location: right calf    OBJECTIVE     Objective Measures updated at progress report unless specified.     Treatment     Herman received the treatments listed below:      manual therapy techniques: Joint mobilizations, Manual traction, Myofacial release, Soft tissue Mobilization, and Friction Massage were applied to the: lumbar spine and right hip for 00 minutes, including:  Long axis traction with belt  Lateral right hip traction with belt  Short axis right hip manual traction    therapeutic exercises to develop strength, endurance, ROM, flexibility, posture, and core stabilization for 48 minutes including:    Sciatic nerve glides with ball: 20x Bilateral  TA with PPT: 20x5'' holds  Bridges: 2x10 double leg   Open books: 10x10'' holds Bilateral   Sidelying clams: 20x Bilateral, red theraband   Prone laying flat with small pillow under chest: 2'   Prone on elbows: 2'      Standing hip  extension: 3x10 Bilateral   Theraband rows with feet staggered: 3x10 black theraband   Theraband SAPD with feet staggered: 2x10 black theraband     Patient Education and Home Exercises     Home Exercises Provided and Patient Education Provided   Education provided:   - abdominal bracing, lifting and carrying body mechanics, avoid activities that increase concordant pain  - course of therapy, prognosis  - importance of home exercise program  - correlation between right ankle and low back symptoms  - walk and stand to his tolerance    Written Home Exercises Provided: yes. Exercises were reviewed and Herman was able to demonstrate them prior to the end of the session.  Herman demonstrated good  understanding of the education provided. See EMR under Patient Instructions for exercises provided during therapy sessions    ASSESSMENT     Herman is a 52 y.o. male referred to outpatient Physical Therapy at [facility] with a medical diagnosis of Sciatica of right side.  Patient continues to show lack of progress due to noncompliance with home exercise program due to work schedule. Patient experiencing low back pain with movement due to hyperextension moment likely stemming from hypomobility of right ankle secondary to fusion.  Patient completed his therapy with no increase in symptoms prior to leaving the clinic.      Herman Is progressing well towards his goals.   Pt prognosis is Good.     Pt will continue to benefit from skilled outpatient physical therapy to address the deficits listed in the problem list box on initial evaluation, provide pt/family education and to maximize pt's level of independence in the home and community environment.     Pt's spiritual, cultural and educational needs considered and pt agreeable to plan of care and goals.     Anticipated barriers to physical therapy: comorbidities, right ankle fusion    GOALS:   Short Term Goals: 4 weeks  1. Pt will demo good TA muscle contraction for improved deep  abdominal strength and lumbar stability. - progressing  2. Increase lumbar ROM to 100% of WNL in order to improve functional mobility.  - progressing  3. Pt will demo good sitting/standing posture and body mechanics for improved spine health and decreased risk of future injury.- progressing  4. Pt to tolerate HEP to improve ROM and independence with ADL's.- progressing     Long Term Goals: 8 weeks  1. Report decreased low back pain without radiculopathy to </= 2/10 with transitions to stand to increase tolerance for ADLs and increased QoL.- progressing  2. Increase strength to >/= 4/5 MMT grade for core and BLE to increase tolerance for ADL and work activities.- progressing  3. Pt to demonstrate negative SLR and/or Slump Test in order to show improved core strength and decreased nerve/dural tension.- progressing  4. Patient's goal: to have no low back pain - progressing  5. Pt will report at </= 32% impaired on FOTO lumbar score for low back pain disability to demonstrate decrease in disability and improvement in back pain.- progressing    PLAN     Core stabilization program    Wesley Easley, PTA

## 2022-11-09 ENCOUNTER — CLINICAL SUPPORT (OUTPATIENT)
Dept: REHABILITATION | Facility: HOSPITAL | Age: 52
End: 2022-11-09
Attending: STUDENT IN AN ORGANIZED HEALTH CARE EDUCATION/TRAINING PROGRAM
Payer: MEDICAID

## 2022-11-09 DIAGNOSIS — R53.1 DECREASED STRENGTH: ICD-10-CM

## 2022-11-09 DIAGNOSIS — M25.671 DECREASED RANGE OF MOTION OF RIGHT ANKLE: Primary | ICD-10-CM

## 2022-11-09 DIAGNOSIS — M53.86 DECREASED ROM OF LUMBAR SPINE: ICD-10-CM

## 2022-11-09 PROCEDURE — 97110 THERAPEUTIC EXERCISES: CPT | Mod: PN

## 2022-11-09 NOTE — PROGRESS NOTES
[facility]   Physical Therapy Treatment Note     Name: Herman GUERRERO [facility] Number: [id number]    Therapy Diagnosis:   Encounter Diagnoses   Name Primary?    Decreased range of motion of right ankle Yes    Decreased strength     Decreased ROM of lumbar spine      Physician: No ref. provider found    Visit Date: 11/9/2022    Physician Orders: PT Eval and Treat  Medical Diagnosis from Referral: M54.31 (ICD-10-CM) - Sciatica of right side  Evaluation Date: 9/20/2022  Authorization Period Expiration: 05/11/2023  Plan of Care Expiration: 11/18/22  Progress Note Due: 11/18/22  Visit # / Visits authorized: 9/25   FOTO: 3/5 completed 10/27/22  PTA Visit #: 0/5     Time In: 12:05 PM  Time Out: 1:00 PM  Total Billable Time: 55 minutes (4 TE) - Medicaid     Precautions: Standard, Hard of hearing, right TCJ-ankle fusion    SUBJECTIVE     Pt reports: having no pain today, and having much more good days than bad days. Bad days are typically when he has low back pain when standing for more than 1-2 hours, but can do well with this now typically. When the right ankle   He was not compliant with home exercise program.    Response to previous treatment: no significant change  Functional change: none noted    Pain: 0/10  Location: right calf    OBJECTIVE     Objective Measures updated at progress report unless specified.     Treatment     Herman received the treatments listed below:      manual therapy techniques: Joint mobilizations, Manual traction, Myofacial release, Soft tissue Mobilization, and Friction Massage were applied to the: lumbar spine and right hip for 00 minutes, including:  Long axis traction with belt  Lateral right hip traction with belt  Short axis right hip manual traction    therapeutic exercises to develop strength, endurance, ROM, flexibility, posture, and core stabilization for 48 minutes including:    Sciatic nerve glides with ball: 20x Bilateral-  TA with PPT with BKFO: 15x  Bilateral   Bridges: 3x10 double leg   Open books: 10x10'' holds Bilateral -  Sidelying clams: 20x Bilateral, green theraband  Prone laying flat with small pillow under chest: 2'   Prone press ups on elbows: 2x10    Standing hip extension: 3x10 Bilateral   Theraband rows with feet staggered: 3x10 black theraband   Theraband SAPD with feet staggered: 2x10 black theraband     Patient Education and Home Exercises     Home Exercises Provided and Patient Education Provided   Education provided:   - abdominal bracing, lifting and carrying body mechanics, avoid activities that increase concordant pain  - course of therapy, prognosis  - importance of home exercise program  - correlation between right ankle and low back symptoms  - walk and stand to his tolerance    Written Home Exercises Provided: yes. Exercises were reviewed and Herman was able to demonstrate them prior to the end of the session.  Herman demonstrated good  understanding of the education provided. See EMR under Patient Instructions for exercises provided during therapy sessions    ASSESSMENT     Herman is a 52 y.o. male referred to outpatient Physical Therapy [facility] with a medical diagnosis of Sciatica of right side.  Patient continues to show lack of progress due to noncompliance with home exercise program due to work schedule. Patient experiencing low back pain with movement due to hyperextension moment likely stemming from hypomobility of right ankle secondary to fusion. 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 Pt achieved FOTO goal, and low back only bothers him when right ankle is irritated typically. Schedule extended for 2 more weeks for 2x a week.      Herman Is progressing well towards his goals.   Pt prognosis is Good.     Pt will continue to benefit from skilled outpatient physical therapy to address the deficits listed in  the problem list box on initial evaluation, provide pt/family education and to maximize pt's level of independence in the home and community environment.     Pt's spiritual, cultural and educational needs considered and pt agreeable to plan of care and goals.     Anticipated barriers to physical therapy: comorbidities, right ankle fusion    GOALS:   Short Term Goals: 4 weeks  1. Pt will demo good TA muscle contraction for improved deep abdominal strength and lumbar stability. - progressing  2. Increase lumbar ROM to 100% of WNL in order to improve functional mobility.  - progressing  3. Pt will demo good sitting/standing posture and body mechanics for improved spine health and decreased risk of future injury.- progressing  4. Pt to tolerate HEP to improve ROM and independence with ADL's.- progressing     Long Term Goals: 8 weeks  1. Report decreased low back pain without radiculopathy to </= 2/10 with transitions to stand to increase tolerance for ADLs and increased QoL.- progressing  2. Increase strength to >/= 4/5 MMT grade for core and BLE to increase tolerance for ADL and work activities.- progressing  3. Pt to demonstrate negative SLR and/or Slump Test in order to show improved core strength and decreased nerve/dural tension.- progressing  4. Patient's goal: to have no low back pain - progressing  5. Pt will report at </= 32% impaired on FOTO lumbar score for low back pain disability to demonstrate decrease in disability and improvement in back pain.- progressing    PLAN     Core stabilization program    Nate Ruiz, PT

## 2022-11-29 ENCOUNTER — CLINICAL SUPPORT (OUTPATIENT)
Dept: REHABILITATION | Facility: HOSPITAL | Age: 52
End: 2022-11-29
Attending: STUDENT IN AN ORGANIZED HEALTH CARE EDUCATION/TRAINING PROGRAM
Payer: MEDICAID

## 2022-11-29 DIAGNOSIS — R53.1 DECREASED STRENGTH: ICD-10-CM

## 2022-11-29 DIAGNOSIS — M53.86 DECREASED ROM OF LUMBAR SPINE: ICD-10-CM

## 2022-11-29 DIAGNOSIS — M25.671 DECREASED RANGE OF MOTION OF RIGHT ANKLE: Primary | ICD-10-CM

## 2022-11-29 PROCEDURE — 97110 THERAPEUTIC EXERCISES: CPT | Mod: PN

## 2022-11-29 NOTE — PROGRESS NOTES
[facility]   Physical Therapy Treatment and Discharge Note     Name: Herman GUERRERO [facility] Number: [id number]    Therapy Diagnosis:   Encounter Diagnoses   Name Primary?    Decreased range of motion of right ankle Yes    Decreased strength     Decreased ROM of lumbar spine      Physician: No ref. provider found    Visit Date: 11/29/2022    Physician Orders: PT Eval and Treat  Medical Diagnosis from Referral: M54.31 (ICD-10-CM) - Sciatica of right side  Evaluation Date: 9/20/2022  Authorization Period Expiration: 05/11/2023  Plan of Care Expiration: 11/18/22  Progress Note Due: 11/18/22  Visit # / Visits authorized: 10/25 (+2)   FOTO: 2/10  PTA Visit #: 0/5     Time In: 1:05 PM  Time Out: 2:00 PM  Total Billable Time: 55 minutes (4 TE) - Medicaid     Precautions: Standard, Hard of hearing, right TCJ-ankle fusion    SUBJECTIVE     Pt reports: no low back pain and has been doing good for a couple of weeks now. He has been having trouble getting to therapy due to his schedule and his work is getting busier for the end of the year. Would like to make today his last day.  He was not compliant with home exercise program.    Response to previous treatment: no significant change  Functional change: none noted    Pain: 0/10  Location: right calf    OBJECTIVE     Objective Measures updated at progress report unless specified.     Treatment     Herman received the treatments listed below:      manual therapy techniques: Joint mobilizations, Manual traction, Myofacial release, Soft tissue Mobilization, and Friction Massage were applied to the: lumbar spine and right hip for 00 minutes, including:  Long axis traction with belt  Lateral right hip traction with belt  Short axis right hip manual traction    therapeutic exercises to develop strength, endurance, ROM, flexibility, posture, and core stabilization for 55 minutes including:    Sciatic nerve glides with ball: 20x Bilateral-  TA with PPT with  BKFO: 15x Bilateral   Bridges: 3x10 double leg   Open books: 10x10'' holds Bilateral  Sidelying clams: 30x Bilateral, green theraband  Prone laying flat with small pillow under chest: 2'   Prone press ups on elbows: 2x10    Not today:  Standing hip extension: 3x10 Bilateral   Theraband rows with feet staggered: 3x10 black theraband   Theraband SAPD with feet staggered: 2x10 black theraband     Patient Education and Home Exercises     Home Exercises Provided and Patient Education Provided   Education provided:   - abdominal bracing, lifting and carrying body mechanics, avoid activities that increase concordant pain  - course of therapy, prognosis  - importance of home exercise program  - correlation between right ankle and low back symptoms  - walk and stand to his tolerance    Written Home Exercises Provided: yes. Exercises were reviewed and Herman was able to demonstrate them prior to the end of the session.  Herman demonstrated good  understanding of the education provided. See EMR under Patient Instructions for exercises provided during therapy sessions    ASSESSMENT     Herman is a 52 y.o. male referred to outpatient Physical Therapy at [facility] with a medical diagnosis of Sciatica of right side.  Pt denies any low back pain and right ankle swelling is his biggest complaint now. He reports that his right ankle has been swelling more frequently with standing and walking activities to where he will decrease weight from it. No low back pain despite being busier at work and this is the reason why he would like to discharge today. Pt has full lumbar AROM without pain, negative straight leg raise Testing, and right lumbar extension rotation syndrome still present though not symptomatic. Only muscle weakness present in right glut extension and pt instructed to perform bridges with posterior pelvic tilts at home. Pt discharged from PT.    Herman Is progressing well towards his goals.   Pt prognosis is Good.     Pt will  continue to benefit from skilled outpatient physical therapy to address the deficits listed in the problem list box on initial evaluation, provide pt/family education and to maximize pt's level of independence in the home and community environment.     Pt's spiritual, cultural and educational needs considered and pt agreeable to plan of care and goals.     Anticipated barriers to physical therapy: comorbidities, right ankle fusion    GOALS:   Short Term Goals: 4 weeks  1. Pt will demo good TA muscle contraction for improved deep abdominal strength and lumbar stability. - met  2. Increase lumbar ROM to 100% of WNL in order to improve functional mobility.  - met  3. Pt will demo good sitting/standing posture and body mechanics for improved spine health and decreased risk of future injury.- met  4. Pt to tolerate HEP to improve ROM and independence with ADL's.- met     Long Term Goals: 8 weeks  1. Report decreased low back pain without radiculopathy to </= 2/10 with transitions to stand to increase tolerance for ADLs and increased QoL.- met  2. Increase strength to >/= 4/5 MMT grade for core and BLE to increase tolerance for ADL and work activities.- met  3. Pt to demonstrate negative SLR and/or Slump Test in order to show improved core strength and decreased nerve/dural tension.- met  4. Patient's goal: to have no low back pain - met  5. Pt will report at </= 32% impaired on FOTO lumbar score for low back pain disability to demonstrate decrease in disability and improvement in back pain.- met    PLAN     Pt discharged from PT.    Nate Ruiz, PT

## 2023-05-09 NOTE — TELEPHONE ENCOUNTER
Left message with the pt's adjustor Ellis Galicia for an update regarding the patient's case and authorization. Asked Ellis to contact the clinic at his earliest convenience regarding.   
Hypertension

## 2023-07-25 ENCOUNTER — OFFICE VISIT (OUTPATIENT)
Dept: FAMILY MEDICINE | Facility: HOSPITAL | Age: 53
End: 2023-07-25
Payer: MEDICAID

## 2023-07-25 VITALS — DIASTOLIC BLOOD PRESSURE: 75 MMHG | HEART RATE: 96 BPM | SYSTOLIC BLOOD PRESSURE: 115 MMHG | TEMPERATURE: 97 F

## 2023-07-25 DIAGNOSIS — Z11.4 SCREENING FOR HIV (HUMAN IMMUNODEFICIENCY VIRUS): ICD-10-CM

## 2023-07-25 DIAGNOSIS — R53.81 PHYSICAL DECONDITIONING: ICD-10-CM

## 2023-07-25 DIAGNOSIS — Z11.59 ENCOUNTER FOR HEPATITIS C SCREENING TEST FOR LOW RISK PATIENT: ICD-10-CM

## 2023-07-25 DIAGNOSIS — E87.6 HYPOKALEMIA: ICD-10-CM

## 2023-07-25 DIAGNOSIS — K59.00 CONSTIPATION, UNSPECIFIED CONSTIPATION TYPE: ICD-10-CM

## 2023-07-25 DIAGNOSIS — L03.115 CELLULITIS OF RIGHT LOWER EXTREMITY: Primary | ICD-10-CM

## 2023-07-25 PROBLEM — S82.402K: Status: ACTIVE | Noted: 2018-11-28

## 2023-07-25 PROBLEM — S82.202K: Status: ACTIVE | Noted: 2018-11-28

## 2023-07-25 PROBLEM — Z98.1 S/P ANKLE FUSION: Status: ACTIVE | Noted: 2018-12-10

## 2023-07-25 PROCEDURE — 99214 OFFICE O/P EST MOD 30 MIN: CPT | Performed by: STUDENT IN AN ORGANIZED HEALTH CARE EDUCATION/TRAINING PROGRAM

## 2023-07-25 RX ORDER — LEVOFLOXACIN 750 MG/1
750 TABLET ORAL DAILY
COMMUNITY
End: 2023-07-27

## 2023-07-25 RX ORDER — POLYETHYLENE GLYCOL 3350 17 G/17G
17 POWDER, FOR SOLUTION ORAL DAILY
Qty: 30 EACH | Refills: 1 | Status: SHIPPED | OUTPATIENT
Start: 2023-07-25

## 2023-07-25 RX ORDER — FUROSEMIDE 40 MG/1
40 TABLET ORAL 2 TIMES DAILY
COMMUNITY
End: 2023-08-17 | Stop reason: SDUPTHER

## 2023-07-25 RX ORDER — LINEZOLID 600 MG/1
600 TABLET, FILM COATED ORAL
COMMUNITY
End: 2023-07-27 | Stop reason: SDUPTHER

## 2023-07-25 NOTE — PROGRESS NOTES
Progress Note  [facility]    Subjective:     Herman Floyd is a 52 y.o. year old male with PMHx as below who presents to clinic for:    CC: Post-hospital admission follow-up for cellulitis of the right lower extremity and related concerns.    Cellulitis of the right lower extremity:  - Developed suddenly during ambulation and led to a week-long hospital admission in [place].  - Associated with subjective fevers, expected to resolve with the ongoing course of antibiotics (Levofloxacin and Linezolid).  - History of extensive injuries to the same extremity, potentially increasing susceptibility to infections.  - Managed with Lasix for the swelling and antibiotics for the infection.    Physical deconditioning:  - Difficulty in walking, likely a consequence of recent hospital admission, infection, and previous injuries to the lower extremity.    Hypokalemia:  - Specific cause unclear, but may be related to use of diuretics like Lasix.    Constipation:  - New onset, likely related to pain medication.  - Last BM 7 days ago      Hospital course and last A/P from hospital admission:    53 yo male  with prior history of R ankle  fracture and indwelling hardware presented to the ER with RLE swelling and pain  with clinical appearance of severe cellulitis. Met sepsis criteria as well, as  he was tachycardic and febrile in ER with elevated WBCs on labs. XR right  ankle showed severe soft tissue edema but intact hardware. 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 Ultrasound of the right lower extremity was negative for  DVTs. ESR >130, Lactic acid 2.4, WBC 13.7. Started on empiric antibiotics, and  blood cultures obtained (no growth). MRI of the extremity ordered and negative  for anything except for soft tissue edema. His clinical course waxed and waned  a bit, and leukocytosis persisted despite clinical improvement, but  Procalcitonin trended down, swelling and erythema improved and blood  cultures  ultimately negative. By 7/24 he was ambulating with PT without issue. At this  time I think he's medically stable for dc, but he needs to follow up with his  orthopedic surgeon to determine if any surgical intervention is warranted. Will  dc home with 7 more days of po Zyvox and Levaquin    Assessment/Plan  ---Assessment/Plan Acute severe Sepsis 2/2 RLE cellulitis  - continue Vancomycin, cefepime changed to Flagyl / Levaquin  - ESR >130, CRP >20, PCT 2.16>1.01  -blood cultures no growth x 2 days  -WBC 15.3<16.2  -remains tachycardic    RLE cellulitis in the setting of retained hardware and venous insufficiency  -complicated in the setting of retained hardware  - continue anbx as above  - follow cultures  -MRI extremity-shows no radiographic evidence of osteomyelitis  -US negative fro DVTs  -Lasix given last pm help fluid in right  -add compression to right lower extremity    wound on coccyx  - no etiology on CT A/P  - no obvious perirectal abscess on exam per Dr. Blount, but did not some  breakdown to his buttocks  - Wound care RN  -UA not indicative of infection    Hyperbilirubinemia with mild transaminitis-fatty liver and hepatomegaly  - Bili 5.3>3.4>1.9  - Check direct/indect bili , Hepatitis panel neg  - denies any abdominal pain, ETOH abuse, or drug abuse  -CT abdomen/pelvis shows gb sludge, hepatic steatosis and hepatomegaly.  -states he drinks alcohol occasionally but prior to 2018 he was a heavy drinker  adviced to stop all forms of alcohol usage and limit tylenol to 2000 mg/24  hours.    Acute Hypokalemia  - oral KCL  trend and treat    Patient Active Problem List    Diagnosis Date Noted    Decreased range of motion of right ankle 09/30/2022    Decreased ROM of lumbar spine 09/30/2022    Sciatica of right side 08/25/2022    Decreased strength 03/11/2021    Decreased mobility and endurance 03/11/2021    Ankle stiffness, right 02/19/2020    Edema 09/26/2019    Class 3 severe obesity due to excess  calories with body mass index (BMI) of 40.0 to 44.9 in adult 12/03/2018    Leg pain 11/06/2018    Chest pain     Shortness of breath     Other pulmonary embolism without acute cor pulmonale 09/18/2018        (Not in a hospital admission)    Review of patient's allergies indicates:  No Known Allergies     Past Medical History:   Diagnosis Date    Closed fracture of right ankle 8/3/2018    Deafness in right ear       Past Surgical History:   Procedure Laterality Date    ANKLE FRACTURE SURGERY Right 08/2018    COLONOSCOPY N/A 8/17/2022    Procedure: COLONOSCOPY Suprep;  Surgeon: Jose Angel Leonardo MD;  Location: [facility];  Service: Endoscopy;  Laterality: N/A;    FINGER FRACTURE SURGERY        No family history on file.   Social History     Tobacco Use    Smoking status: Never    Smokeless tobacco: Never   Substance Use Topics    Alcohol use: No      Review of Systems   Constitutional:        Pertinent pos/neg in HPI   All other systems reviewed and are negative.    Objective:     There were no vitals filed for this visit.  Estimated body mass index is 40.19 kg/m² as calculated from the following:    Height as of 8/23/22: 6' (1.829 m).    Weight as of 8/23/22: 134.4 kg (296 lb 4.8 oz).     Physical Exam  Constitutional:       Appearance: Normal appearance. He is normal weight.   HENT:      Head: Normocephalic and atraumatic.   Eyes:      Conjunctiva/sclera: Conjunctivae normal.   Cardiovascular:      Rate and Rhythm: Normal rate and regular rhythm.      Pulses: Normal pulses.      Heart sounds: Normal heart sounds.   Pulmonary:      Effort: Pulmonary effort is normal.      Breath sounds: Normal breath sounds.   Abdominal:      General: Abdomen is flat. Bowel sounds are normal. There is no distension.      Palpations: Abdomen is soft.      Tenderness: There is no abdominal tenderness.   Musculoskeletal:      Cervical back: Normal range of motion.      Right lower leg: Edema present.      Left lower leg: Edema present.       Comments: Erythema and swelling noted over the right lower extremity, localized warmth may suggest ongoing infection.   Skin:     General: Skin is warm and dry.      Capillary Refill: Capillary refill takes less than 2 seconds.   Neurological:      Mental Status: He is alert and oriented to person, place, and time. Mental status is at baseline.   Psychiatric:         Mood and Affect: Mood normal.       Assessment/Plan:     Cellulitis of right lower extremity  -     CBC Auto Differential; Future; Expected date: 07/25/2023  Recent hospitalization due to cellulitis suggests a severe infection. The ongoing antibiotic therapy and the resolution of fever would suggest improving condition.  - Continue with prescribed antibiotics, Levofloxacin and Linezolid. Reports that needs to  these abx from pharmacy  - Scheduled CBC Auto Differential to monitor the response to the antibiotics.    Physical deconditioning  -     Ambulatory referral/consult to Physical/Occupational Therapy; Future; Expected date: 08/01/2023  The patient's difficulty walking is suggestive of deconditioning, likely related to the recent illness and hospitalization.  - Ambulatory referral/consult to Physical/Occupational Therapy to assist with mobility and physical strength.  - Advised to request specific location when called to schedule    Hypokalemia  -     Comprehensive Metabolic Panel; Future; Expected date: 07/25/2023  The patient's use of Lasix for lower extremity swelling might have contributed to hypokalemia.  - Continue monitoring potassium levels with a scheduled Comprehensive Metabolic Panel.    Constipation, unspecified constipation type  -     polyethylene glycol (GLYCOLAX) 17 gram PwPk; Take 17 g by mouth once daily.  Dispense: 30 each; Refill: 1  Likely secondary to pain medication.  - Prescribed polyethylene glycol (Miralax) 17g PO once daily. The patient will continue this until constipation is resolved.  - Consider reevaluation of  pain management if constipation persists.    Screening for HIV (human immunodeficiency virus)  -     HIV 1/2 Ag/Ab (4th Gen); Future; Expected date: 07/25/2023    Encounter for hepatitis C screening test for low risk patient  -     Hepatitis C Antibody; Future; Expected date: 07/25/2023       In regards to A&P, patient verbalized understanding and agreeable to plan      Follow-up: 1 month Cellulitis and labs f/u       Case discussed with staff: Dr. Lim      This note was partially created using [employer] Voice Recognition software. Typographical and content errors may occur with this process. While efforts are made to detect and correct such errors, in some cases errors will persist. For this reason, wording in this document should be considered in the proper context and not strictly verbatim.    The following information is provided to all patients.  This information is to help you find resources for any of the problems found today that may be affecting your health:                Living healthy guide: www.[facility].[place].[facility]       Understanding Diabetes: www.diabetes.org       Eating healthy: www.cdc.gov/healthyweight      [facility] home safety checklist: www.cdc.gov/steadi/patient.html      Agency on Aging: www.goea.[place].[facility]       Alcoholics anonymous (AA): www.aa.org      Physical Activity: www.trent.nih.gov/oq1syuj       Tobacco use: www.quitwithusla.org        Brenton Zhao MD  [facility], PGY-2  07/25/2023

## 2023-07-26 ENCOUNTER — TELEPHONE (OUTPATIENT)
Dept: FAMILY MEDICINE | Facility: HOSPITAL | Age: 53
End: 2023-07-26
Payer: MEDICAID

## 2023-07-26 DIAGNOSIS — L03.115 CELLULITIS OF RIGHT LOWER EXTREMITY: ICD-10-CM

## 2023-07-26 DIAGNOSIS — L03.115 CELLULITIS OF RIGHT LOWER EXTREMITY: Primary | ICD-10-CM

## 2023-07-26 DIAGNOSIS — M62.81 MUSCLE WEAKNESS (GENERALIZED): ICD-10-CM

## 2023-07-26 DIAGNOSIS — R54 AGE-RELATED PHYSICAL DEBILITY: ICD-10-CM

## 2023-07-26 PROBLEM — K59.00 CONSTIPATION: Status: ACTIVE | Noted: 2023-07-26

## 2023-07-26 PROBLEM — R53.81 PHYSICAL DECONDITIONING: Status: ACTIVE | Noted: 2023-07-26

## 2023-07-26 NOTE — TELEPHONE ENCOUNTER
Return phone call to the spouse o fthis 52y.o. male.  She simply wanted to inform Dr. Zhao that the appointment with the specialist is tomorrow.  If she has any questions she will make an appointment.   ----- Message from Brenton Zhao MD sent at 7/26/2023  4:12 PM CDT -----  Regarding: RE: callm pt wife about pt meds  I have gotten blown up by this patient's wife the whole day. I addressed the pertinent and time sensitive concern already. She will have to come in for appointment next time to discuss any further matters. I am currently in the inpatient service and I am not a  service.   ----- Message -----  From: Puja Booth LPN  Sent: 7/26/2023   4:08 PM CDT  To: Brenton Zhao MD  Subject: FW: callm pt wife about pt meds                    ----- Message -----  From: Brigette Flores  Sent: 7/26/2023   1:12 PM CDT  To: Pavel Farris Staff  Subject: callm pt wife about pt meds                      Pt wife called to see if the Dr can give her a call back to discuss about her  medicine... Call back # [phone]

## 2023-07-26 NOTE — TELEPHONE ENCOUNTER
Called Patient's wife back regarding inquiry about abx issues. Apparently patient's Linezolid is not covered by insurance. Some background: patient recently admitted and discharged from OS in [place] for Cellulitis, discharged with abx. Discussed with wife that unable to prescribe an alternative given the initial choice of abx. She will need to Establish with ID for further management. Will place referral now. Patient/wife verbalized understanding/ I answered and addressed all of their questions and concerns.    Brenton Zhao MD  [facility], PGY-3  07/26/2023

## 2023-07-27 ENCOUNTER — TELEPHONE (OUTPATIENT)
Dept: INFECTIOUS DISEASES | Facility: CLINIC | Age: 53
End: 2023-07-27
Payer: MEDICAID

## 2023-07-27 ENCOUNTER — OFFICE VISIT (OUTPATIENT)
Dept: INFECTIOUS DISEASES | Facility: CLINIC | Age: 53
End: 2023-07-27
Payer: MEDICAID

## 2023-07-27 ENCOUNTER — CLINICAL SUPPORT (OUTPATIENT)
Dept: REHABILITATION | Facility: HOSPITAL | Age: 53
End: 2023-07-27
Payer: MEDICAID

## 2023-07-27 ENCOUNTER — LAB VISIT (OUTPATIENT)
Dept: LAB | Facility: HOSPITAL | Age: 53
End: 2023-07-27
Payer: MEDICAID

## 2023-07-27 ENCOUNTER — CLINICAL SUPPORT (OUTPATIENT)
Dept: INFECTIOUS DISEASES | Facility: CLINIC | Age: 53
End: 2023-07-27
Payer: MEDICAID

## 2023-07-27 VITALS
TEMPERATURE: 98 F | HEART RATE: 96 BPM | SYSTOLIC BLOOD PRESSURE: 137 MMHG | BODY MASS INDEX: 40.19 KG/M2 | HEIGHT: 72 IN | DIASTOLIC BLOOD PRESSURE: 80 MMHG

## 2023-07-27 DIAGNOSIS — L03.115 CELLULITIS OF RIGHT LOWER EXTREMITY: ICD-10-CM

## 2023-07-27 DIAGNOSIS — Z74.09 IMPAIRED FUNCTIONAL MOBILITY AND ACTIVITY TOLERANCE: ICD-10-CM

## 2023-07-27 DIAGNOSIS — M62.81 MUSCLE WEAKNESS (GENERALIZED): ICD-10-CM

## 2023-07-27 DIAGNOSIS — R54 AGE-RELATED PHYSICAL DEBILITY: Primary | ICD-10-CM

## 2023-07-27 DIAGNOSIS — Z98.1 S/P ANKLE FUSION: Primary | ICD-10-CM

## 2023-07-27 PROBLEM — M53.86 DECREASED ROM OF LUMBAR SPINE: Status: RESOLVED | Noted: 2022-09-30 | Resolved: 2023-07-27

## 2023-07-27 PROBLEM — M25.671 DECREASED RANGE OF MOTION OF RIGHT ANKLE: Status: RESOLVED | Noted: 2022-09-30 | Resolved: 2023-07-27

## 2023-07-27 PROBLEM — R53.1 DECREASED STRENGTH: Status: RESOLVED | Noted: 2021-03-11 | Resolved: 2023-07-27

## 2023-07-27 LAB
ALBUMIN SERPL BCP-MCNC: 2.9 G/DL (ref 3.5–5.2)
ALP SERPL-CCNC: 87 U/L (ref 55–135)
ALT SERPL W/O P-5'-P-CCNC: 72 U/L (ref 10–44)
ANION GAP SERPL CALC-SCNC: 13 MMOL/L (ref 8–16)
AST SERPL-CCNC: 52 U/L (ref 10–40)
BASOPHILS NFR BLD: 0 % (ref 0–1.9)
BILIRUB SERPL-MCNC: 1 MG/DL (ref 0.1–1)
BUN SERPL-MCNC: 13 MG/DL (ref 6–20)
CALCIUM SERPL-MCNC: 9.5 MG/DL (ref 8.7–10.5)
CHLORIDE SERPL-SCNC: 99 MMOL/L (ref 95–110)
CO2 SERPL-SCNC: 23 MMOL/L (ref 23–29)
CREAT SERPL-MCNC: 0.8 MG/DL (ref 0.5–1.4)
CRP SERPL-MCNC: 78.5 MG/L (ref 0–8.2)
DIFFERENTIAL METHOD: ABNORMAL
EOSINOPHIL NFR BLD: 1 % (ref 0–8)
ERYTHROCYTE [DISTWIDTH] IN BLOOD BY AUTOMATED COUNT: 12.9 % (ref 11.5–14.5)
ERYTHROCYTE [SEDIMENTATION RATE] IN BLOOD BY PHOTOMETRIC METHOD: 108 MM/HR (ref 0–23)
EST. GFR  (NO RACE VARIABLE): >60 ML/MIN/1.73 M^2
GLUCOSE SERPL-MCNC: 90 MG/DL (ref 70–110)
HCT VFR BLD AUTO: 34.9 % (ref 40–54)
HGB BLD-MCNC: 11.1 G/DL (ref 14–18)
IMM GRANULOCYTES # BLD AUTO: ABNORMAL K/UL (ref 0–0.04)
IMM GRANULOCYTES NFR BLD AUTO: ABNORMAL % (ref 0–0.5)
LYMPHOCYTES NFR BLD: 21 % (ref 18–48)
MCH RBC QN AUTO: 29.3 PG (ref 27–31)
MCHC RBC AUTO-ENTMCNC: 31.8 G/DL (ref 32–36)
MCV RBC AUTO: 92 FL (ref 82–98)
METAMYELOCYTES NFR BLD MANUAL: 1 %
MONOCYTES NFR BLD: 3 % (ref 4–15)
NEUTROPHILS NFR BLD: 72 % (ref 38–73)
NEUTS BAND NFR BLD MANUAL: 2 %
NRBC BLD-RTO: 0 /100 WBC
PLATELET # BLD AUTO: 376 K/UL (ref 150–450)
PLATELET BLD QL SMEAR: ABNORMAL
PMV BLD AUTO: 9.6 FL (ref 9.2–12.9)
POTASSIUM SERPL-SCNC: 4.5 MMOL/L (ref 3.5–5.1)
PROT SERPL-MCNC: 9.4 G/DL (ref 6–8.4)
RBC # BLD AUTO: 3.79 M/UL (ref 4.6–6.2)
SODIUM SERPL-SCNC: 135 MMOL/L (ref 136–145)
WBC # BLD AUTO: 9.07 K/UL (ref 3.9–12.7)

## 2023-07-27 PROCEDURE — 85007 BL SMEAR W/DIFF WBC COUNT: CPT | Performed by: INTERNAL MEDICINE

## 2023-07-27 PROCEDURE — 3079F PR MOST RECENT DIASTOLIC BLOOD PRESSURE 80-89 MM HG: ICD-10-PCS | Mod: CPTII,,, | Performed by: INTERNAL MEDICINE

## 2023-07-27 PROCEDURE — 3075F PR MOST RECENT SYSTOLIC BLOOD PRESS GE 130-139MM HG: ICD-10-PCS | Mod: CPTII,,, | Performed by: INTERNAL MEDICINE

## 2023-07-27 PROCEDURE — 3079F DIAST BP 80-89 MM HG: CPT | Mod: CPTII,,, | Performed by: INTERNAL MEDICINE

## 2023-07-27 PROCEDURE — 90471 IMMUNIZATION ADMIN: CPT | Mod: PBBFAC

## 2023-07-27 PROCEDURE — 1159F MED LIST DOCD IN RCRD: CPT | Mod: CPTII,,, | Performed by: INTERNAL MEDICINE

## 2023-07-27 PROCEDURE — 1160F RVW MEDS BY RX/DR IN RCRD: CPT | Mod: CPTII,,, | Performed by: INTERNAL MEDICINE

## 2023-07-27 PROCEDURE — 99999PBSHW TDAP VACCINE GREATER THAN OR EQUAL TO 7YO IM: Mod: PBBFAC,,,

## 2023-07-27 PROCEDURE — 99205 OFFICE O/P NEW HI 60 MIN: CPT | Mod: S$PBB,,, | Performed by: INTERNAL MEDICINE

## 2023-07-27 PROCEDURE — 99214 OFFICE O/P EST MOD 30 MIN: CPT | Mod: PBBFAC | Performed by: INTERNAL MEDICINE

## 2023-07-27 PROCEDURE — 3008F PR BODY MASS INDEX (BMI) DOCUMENTED: ICD-10-PCS | Mod: CPTII,,, | Performed by: INTERNAL MEDICINE

## 2023-07-27 PROCEDURE — 85027 COMPLETE CBC AUTOMATED: CPT | Performed by: INTERNAL MEDICINE

## 2023-07-27 PROCEDURE — 99999 PR PBB SHADOW E&M-EST. PATIENT-LVL IV: ICD-10-PCS | Mod: PBBFAC,,, | Performed by: INTERNAL MEDICINE

## 2023-07-27 PROCEDURE — 99999PBSHW TDAP VACCINE GREATER THAN OR EQUAL TO 7YO IM: ICD-10-PCS | Mod: PBBFAC,,,

## 2023-07-27 PROCEDURE — 1160F PR REVIEW ALL MEDS BY PRESCRIBER/CLIN PHARMACIST DOCUMENTED: ICD-10-PCS | Mod: CPTII,,, | Performed by: INTERNAL MEDICINE

## 2023-07-27 PROCEDURE — 97163 PT EVAL HIGH COMPLEX 45 MIN: CPT | Mod: PN

## 2023-07-27 PROCEDURE — 99205 PR OFFICE/OUTPT VISIT, NEW, LEVL V, 60-74 MIN: ICD-10-PCS | Mod: S$PBB,,, | Performed by: INTERNAL MEDICINE

## 2023-07-27 PROCEDURE — 90715 TDAP VACCINE 7 YRS/> IM: CPT | Mod: PBBFAC

## 2023-07-27 PROCEDURE — 86140 C-REACTIVE PROTEIN: CPT | Performed by: INTERNAL MEDICINE

## 2023-07-27 PROCEDURE — 1159F PR MEDICATION LIST DOCUMENTED IN MEDICAL RECORD: ICD-10-PCS | Mod: CPTII,,, | Performed by: INTERNAL MEDICINE

## 2023-07-27 PROCEDURE — 99999 PR PBB SHADOW E&M-EST. PATIENT-LVL IV: CPT | Mod: PBBFAC,,, | Performed by: INTERNAL MEDICINE

## 2023-07-27 PROCEDURE — 3008F BODY MASS INDEX DOCD: CPT | Mod: CPTII,,, | Performed by: INTERNAL MEDICINE

## 2023-07-27 PROCEDURE — 36415 COLL VENOUS BLD VENIPUNCTURE: CPT | Performed by: INTERNAL MEDICINE

## 2023-07-27 PROCEDURE — 85652 RBC SED RATE AUTOMATED: CPT | Performed by: INTERNAL MEDICINE

## 2023-07-27 PROCEDURE — 80053 COMPREHEN METABOLIC PANEL: CPT | Performed by: INTERNAL MEDICINE

## 2023-07-27 PROCEDURE — 3075F SYST BP GE 130 - 139MM HG: CPT | Mod: CPTII,,, | Performed by: INTERNAL MEDICINE

## 2023-07-27 PROCEDURE — 97110 THERAPEUTIC EXERCISES: CPT | Mod: PN

## 2023-07-27 RX ORDER — LINEZOLID 600 MG/1
600 TABLET, FILM COATED ORAL EVERY 12 HOURS
Qty: 60 TABLET | Refills: 1 | Status: ACTIVE | OUTPATIENT
Start: 2023-07-27

## 2023-07-27 RX ORDER — KETOCONAZOLE 20 MG/G
CREAM TOPICAL DAILY
Qty: 60 G | Refills: 3 | Status: ACTIVE | OUTPATIENT
Start: 2023-07-27

## 2023-07-27 RX ORDER — CIPROFLOXACIN 750 MG/1
750 TABLET, FILM COATED ORAL EVERY 12 HOURS
Qty: 60 TABLET | Refills: 1 | Status: ACTIVE | OUTPATIENT
Start: 2023-07-27 | End: 2023-08-17

## 2023-07-27 NOTE — PROGRESS NOTES
Patient received the Tdap vaccine in the right deltoid. Pt tolerated well. Pt asked to wait in the clinic 15 minutes after injection in the event of an allergic reaction. Pt verbalized understanding. Pt left in NAD.

## 2023-07-27 NOTE — PLAN OF CARE
"[facility]  Physical Therapy Neurological Rehabilitation Initial Evaluation     Name: Hemran GUERRERO [facility] Number: [id number]    Therapy Diagnosis:   Encounter Diagnosis   Name Primary?    Impaired functional mobility and activity tolerance      Physician: Jacob Recinos DO    Physician Orders: PT Eval and Treat   Medical Diagnosis from Referral: Muscle weakness (generalized) [M62.81], Age-related physical debility [R54], Cellulitis of right lower extremity [L03.115]  Evaluation Date: 7/27/2023  Authorization Period Expiration: 10/1/2023  Plan of Care Expiration: 9/22/2023  Progress Note Due: 8/27/2023  Visit # / Visits authorized: 1/ 1  FOTO: 1/ 3    Precautions: Standard and History of several right ankle surgeries; hard of hearing    Time In: 9:35AM  Time Out: 10:15AM  Total Billable Time: 40 minutes (1 high eval; 1 TE)    Subjective      Date of onset: 7/15/2023    History of current condition - Herman reports: 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 He was in [place] when he developed a fever. Tried to "sweat it out" but then leg and foot began to swell on Monday morning. Woke up on Tuesday morning with worsened foot swelling. Admitted to hospital in [place]. Diagnosed with cellulitis and started on course of antibiotics. Wife is trying to set up a follow up with surgeon as soon as possible. He has however followed up with primary care (and did follow up with infectious diseases after this visit with PT). Admitted to the hospital for almost one week. Using crutches for mobility because he is pain limited in weight bearing. He has had 6 total surgeries to right ankle for old work injury that occurred in August 2018. Most recent surgery was ankle fusion in January 2020.      Imaging  - X-Ray Ankle Right (7/18/2023): There is severe soft tissue swelling about the ankle. There is intact hardware within the distal tibia. No loosening, reaction or fragmentation of the " "orthopedic hardware is identified. There appears to be a chronic nonunion fracture of the distal fibula. No definite acute fracture or dislocation is present.    - MRA Lower Extremity Right (7/19/2023): Diffuse severe generalized subcutaneous soft tissue edema. Clinical correlation recommended to rule out cellulitis.   - MRI Lower Extremity Right (7/19/2023): Severe diffuse generalized subcutaneous soft tissue edema. Clinical correlation recommended to rule out cellulitis.     Prior Therapy: At this location post-op ankle  Social History: Lives with wife and son   Falls:  No falls  DME: Axillary crutches    Home Environment: Two level home with one step to enter  Exercise Routine / History: Not currently   Family Present at time of Eval: Son present in waiting room  Occupation:   Prior Level of Function: Independent  Current Level of Function: Not currently driving/working; pain limited with walking; debilitated    Pain:  Current 10/10, worst 10/10, best 5/10   Location: right foot/ankle   Description: "undescribable"  Aggravating Factors: weight bearing  Easing Factors: avoiding weight bearing    Patient's goals: "to help me back walking without these crutches"    Medical History:   Past Medical History:   Diagnosis Date    Closed fracture of right ankle 8/3/2018    Deafness in right ear        Surgical History:   Herman Floyd  has a past surgical history that includes Finger fracture surgery; Ankle fracture surgery (Right, 08/2018); and Colonoscopy (N/A, 8/17/2022).    Medications:   Herman has a current medication list which includes the following prescription(s): ciprofloxacin hcl, diclofenac sodium, furosemide, gabapentin, ketoconazole, linezolid, oxycodone-acetaminophen, and polyethylene glycol, and the following Facility-Administered Medications: sodium chloride 0.9% and lidocaine (pf) 10 mg/ml (1%).    Allergies:   Review of patient's allergies indicates:  No Known Allergies     Objective  "     - Command followin% simple and complex   - Speech: no deficits     Mental status: alert, oriented to person, place, and time, normal mood, behavior, speech, dress, motor activity, and thought processes  Behavior:  calm, cooperative, and adequate rapport can be established  Attention Span and Concentration:  Normal    Visual/Auditory: patient hard of hearing         RANGE OF MOTION--LOWER EXTREMITIES  (R) LE significantly decreased active and passive dorsiflexion, plantarflexion, inversion, and eversion  (L) LE: WNLs    Lower Extremity Strength   RLE LLE   Hip Flexion: 5/5 5/5   Hip Abduction: 4+/5 4+/5   Knee Extension: Unable to test due to pain 5/5   Knee Flexion: Unable to test due to pain 5/5   Ankle Dorsiflexion: 2/5 4+/5   Ankle Plantarflexion: 2/5 5/5     Figure 8 (see media for photographs)  - 80cm right lower extremity  - 60cm left lower extremity    Timed Up and Go: 47.7 seconds with axillary crutches    Self-Selected Walking Speed:  0.26m/s    Gait Assessment:   - AD used: Axillary crutches  - Assistance: SBA  - Distance: 100+ feet throughout course of assessment   - Stairs: Not assessed today    Gait Analysis:  Deviations noted: Decreased stance time right lower extremity    Impairments contributing to deviations:  right foot/ankle pain    Abbreviated ABC: 3.3%    Intake Outcome Measure for FOTO NOC-Neuromuscular disorder Survey    Therapist reviewed FOTO scores for Herman Floyd on 2023.   FOTO documents entered into [employer] - see Media section.    Intake Score: 21%       Treatment     Total Treatment time separate from Evaluation: 9 minutes    Herman received the treatments listed below:      therapeutic exercises to develop strength and endurance for 10 minutes including:  - Elevated ankle pumps with return demonstration  - Long arc quad with return demonstration  - Straight leg raise with return demonstration  - Sidelying hip abduction with return demonstration  - Patient education on  importance of contacting orthopedic surgeon as soon as possible     Suggestions for follow up = open chain strengthening; stepper bike if tolerated; UBE for endurance; closed chain activity to patient's tolerance    Patient Education and Home Exercises     Education provided:   - PT plan of care  - HEP instruction  - Contact orthopedic surgeon as soon as possible    Written Home Exercises Provided: yes.  Exercises were reviewed and Herman was able to demonstrate them prior to the end of the session.  Herman demonstrated good  understanding of the education provided.     See EMR under Patient Instructions for exercises provided 7/27/2023.    Assessment     Herman is a 52 y.o. male referred to outpatient Physical Therapy with a medical diagnosis of Muscle weakness (generalized) [M62.81], Age-related physical debility [R54], Cellulitis of right lower extremity [L03.115]. Patient presents with impaired gait, transfers, and functional mobility; increased risk for falls per TUG; significantly reduced mobility of distal right lower extremity; impaired balance confidence per Abbreviated ABC; and decreased self-perception of functional mobility per FOTO. He would benefit from skilled physical therapy services to address listed impairments, decrease risk for falls/future injury, and improve overall quality of life.     Patient prognosis is Fair.   Patient will benefit from skilled outpatient Physical Therapy to address the deficits stated above and in the chart below, provide patient /family education, and to maximize patientt's level of independence.     Plan of care discussed with patient: Yes  Patient's spiritual, cultural and educational needs considered and patient is agreeable to the plan of care and goals as stated below:     Anticipated Barriers for therapy: Chronicity of right ankle dysfunction    Medical Necessity is demonstrated by the following  History  Co-morbidities and personal factors that may impact the plan of  care [] LOW: no personal factors / co-morbidities  [x] MODERATE: 1-2 personal factors / co-morbidities  [] HIGH: 3+ personal factors / co-morbidities    Moderate / High Support Documentation:   Co-morbidities affecting plan of care:   Closed fracture of right ankle   Deafness in right ear     Personal Factors:   no deficits     Examination  Body Structures and Functions, activity limitations and participation restrictions that may impact the plan of care [] LOW: addressing 1-2 elements  [] MODERATE: 3+ elements  [x] HIGH: 4+ elements (please support below)    Moderate / High Support Documentation: See above documentation     Clinical Presentation [] LOW: stable  [] MODERATE: Evolving  [x] HIGH: Unstable     Decision Making/ Complexity Score: high       Goals:  Short Term Goals: 4 weeks   Patient will be compliant with HEP in order to maximize PT benefits  Patient will complete TUG in </= 30 seconds with least restrictive assistive device in order to reduce risk for falls and improve safety with functional mobility  Patient will score >/=25% on Abbreviated ABC in order to improve balance confidence with community mobility    Long Term Goals: 8 weeks   Patient will score >/=50% on Abbreviated ABC in order to improve balance confidence with community mobility  Patient will improve bilateral hip MMT grades to 5/5 grade in order to improve strength for ADL completion  Patient will complete TUG in </= 20 seconds with least restrictive assistive device in order to reduce risk for falls and improve safety with functional mobility  Patient will report 0 falls from initiation of PT management  Patient will begin some form of home/community fitness in order to sustain progress gained in PT    Plan     Plan of care Certification: 7/27/2023 to 9/22/2023.    Outpatient Physical Therapy 2 times weekly for 8 weeks to include the following interventions: Gait Training, Manual Therapy, Moist Heat/ Ice, Neuromuscular Re-ed, Orthotic  Management and Training, Patient Education, Self Care, Therapeutic Activities, Therapeutic Exercise, and modalities PRN .     MARLEY VALADEZ PT        Physician's Signature: _________________________________________ Date: ________________

## 2023-07-27 NOTE — PROGRESS NOTES
"Subjective:      Patient ID: Herman Floyd is a 52 y.o. male.    Chief Complaint:Recurrent Skin Infections      History of Present Illness      Consult from Dr. Brenton Zhao, , [facility].    RE:  RLE cellulitis    "Hospital course and last A/P from hospital admission in [place], [place].     51 yo male  with prior history of R ankle  fracture and indwelling hardware presented to the ER with RLE swelling and pain  with clinical appearance of severe cellulitis. Met sepsis criteria as well, as  he was tachycardic and febrile in ER with elevated WBCs on labs. XR right  ankle showed severe soft tissue edema but intact hardware. 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 Ultrasound of the right lower extremity was negative for  DVTs. ESR >130, Lactic acid 2.4, WBC 13.7. Started on empiric antibiotics, and  blood cultures obtained (no growth). MRI of the extremity ordered and negative  for anything except for soft tissue edema. His clinical course waxed and waned  a bit, and leukocytosis persisted despite clinical improvement, but  Procalcitonin trended down, swelling and erythema improved and blood cultures  ultimately negative. By 7/24 he was ambulating with PT without issue. At this  time I think he's medically stable for dc, but he needs to follow up with his  orthopedic surgeon to determine if any surgical intervention is warranted. Will  dc home with 7 more days of po Zyvox and Levaquin"    NOTE:  linezolide not dispensed in [place] due to insurance coverage    Additional history, s/p multiple R ankle surgeries from [id number] including pins, screws, garcia x 9 months, in 2020 R ankle surgery with internal harware fixation per pt, Dr. Kadie Grider in [place].      Review of Systems   Musculoskeletal:  Positive for joint pain and joint swelling.   Objective:   Physical Exam      Assessment:     1. Cellulitis of right lower extremity    Concern about R ankle hardware, will follow closely.    Plan:      Herman was seen today for " recurrent skin infections.    Diagnoses and all orders for this visit:    S/P ankle fusion    Cellulitis of right lower extremity  -     Ambulatory referral/consult to Infectious Disease  -     CBC Auto Differential; Future  -     Comprehensive Metabolic Panel; Future  -     C-Reactive Protein; Future  -     Sedimentation rate; Future    Other orders  -     linezolid (ZYVOX) 600 mg Tab; Take 1 tablet (600 mg total) by mouth every 12 (twelve) hours.  -     ketoconazole (NIZORAL) 2 % cream; Apply topically once daily.  -     ciprofloxacin HCl (CIPRO) 750 MG tablet; Take 1 tablet (750 mg total) by mouth every 12 (twelve) hours.  -     (In Office Administered) Tdap Vaccine      Sent Rx into specialty pharmacy to gain prior authorization.    RTC 2 weeks.    Time: 80 minutes   50% of time spent on face-to-face counseling and coordination of care. Counseling included review of test results, diagnosis, and treatment plan with patient and/or family.

## 2023-07-28 ENCOUNTER — TELEPHONE (OUTPATIENT)
Dept: PHARMACY | Facility: CLINIC | Age: 53
End: 2023-07-28
Payer: MEDICAID

## 2023-07-28 ENCOUNTER — HOSPITAL ENCOUNTER (OUTPATIENT)
Facility: HOSPITAL | Age: 53
Discharge: OTHER INSTITUTION | End: 2023-07-29
Attending: EMERGENCY MEDICINE | Admitting: FAMILY MEDICINE
Payer: MEDICAID

## 2023-07-28 DIAGNOSIS — L03.115 CELLULITIS OF RIGHT LEG: ICD-10-CM

## 2023-07-28 DIAGNOSIS — T81.42XA INFECTION OF DEEP INCISIONAL SURGICAL SITE AFTER PROCEDURE, INITIAL ENCOUNTER: Primary | ICD-10-CM

## 2023-07-28 DIAGNOSIS — M00.9 SEPTIC ARTHRITIS OF RIGHT ANKLE, DUE TO UNSPECIFIED ORGANISM: ICD-10-CM

## 2023-07-28 PROBLEM — Z74.09 IMPAIRED FUNCTIONAL MOBILITY AND ACTIVITY TOLERANCE: Status: ACTIVE | Noted: 2023-07-28

## 2023-07-28 LAB
ALBUMIN SERPL BCP-MCNC: 3 G/DL (ref 3.5–5.2)
ALP SERPL-CCNC: 88 U/L (ref 55–135)
ALT SERPL W/O P-5'-P-CCNC: 78 U/L (ref 10–44)
ANION GAP SERPL CALC-SCNC: 11 MMOL/L (ref 8–16)
AST SERPL-CCNC: 59 U/L (ref 10–40)
BASOPHILS # BLD AUTO: 0.06 K/UL (ref 0–0.2)
BASOPHILS NFR BLD: 0.6 % (ref 0–1.9)
BILIRUB SERPL-MCNC: 0.8 MG/DL (ref 0.1–1)
BUN SERPL-MCNC: 16 MG/DL (ref 6–20)
CALCIUM SERPL-MCNC: 9.3 MG/DL (ref 8.7–10.5)
CHLORIDE SERPL-SCNC: 101 MMOL/L (ref 95–110)
CO2 SERPL-SCNC: 23 MMOL/L (ref 23–29)
CREAT SERPL-MCNC: 1.1 MG/DL (ref 0.5–1.4)
DIFFERENTIAL METHOD: ABNORMAL
EOSINOPHIL # BLD AUTO: 0.2 K/UL (ref 0–0.5)
EOSINOPHIL NFR BLD: 1.7 % (ref 0–8)
ERYTHROCYTE [DISTWIDTH] IN BLOOD BY AUTOMATED COUNT: 13 % (ref 11.5–14.5)
EST. GFR  (NO RACE VARIABLE): >60 ML/MIN/1.73 M^2
GLUCOSE SERPL-MCNC: 94 MG/DL (ref 70–110)
HCT VFR BLD AUTO: 33.9 % (ref 40–54)
HGB BLD-MCNC: 11.2 G/DL (ref 14–18)
IMM GRANULOCYTES # BLD AUTO: 0.43 K/UL (ref 0–0.04)
IMM GRANULOCYTES NFR BLD AUTO: 4.2 % (ref 0–0.5)
LYMPHOCYTES # BLD AUTO: 1.6 K/UL (ref 1–4.8)
LYMPHOCYTES NFR BLD: 16 % (ref 18–48)
MCH RBC QN AUTO: 29.2 PG (ref 27–31)
MCHC RBC AUTO-ENTMCNC: 33 G/DL (ref 32–36)
MCV RBC AUTO: 88 FL (ref 82–98)
MONOCYTES # BLD AUTO: 0.5 K/UL (ref 0.3–1)
MONOCYTES NFR BLD: 5 % (ref 4–15)
NEUTROPHILS # BLD AUTO: 7.4 K/UL (ref 1.8–7.7)
NEUTROPHILS NFR BLD: 72.5 % (ref 38–73)
NRBC BLD-RTO: 0 /100 WBC
PLATELET # BLD AUTO: 432 K/UL (ref 150–450)
PMV BLD AUTO: 8.3 FL (ref 9.2–12.9)
POTASSIUM SERPL-SCNC: 4.6 MMOL/L (ref 3.5–5.1)
PROT SERPL-MCNC: 9.2 G/DL (ref 6–8.4)
RBC # BLD AUTO: 3.84 M/UL (ref 4.6–6.2)
SODIUM SERPL-SCNC: 135 MMOL/L (ref 136–145)
WBC # BLD AUTO: 10.19 K/UL (ref 3.9–12.7)

## 2023-07-28 PROCEDURE — 85025 COMPLETE CBC W/AUTO DIFF WBC: CPT | Performed by: PHYSICIAN ASSISTANT

## 2023-07-28 PROCEDURE — 25000003 PHARM REV CODE 250: Performed by: EMERGENCY MEDICINE

## 2023-07-28 PROCEDURE — 63600175 PHARM REV CODE 636 W HCPCS: Performed by: EMERGENCY MEDICINE

## 2023-07-28 PROCEDURE — 25500020 PHARM REV CODE 255: Performed by: EMERGENCY MEDICINE

## 2023-07-28 PROCEDURE — 99285 EMERGENCY DEPT VISIT HI MDM: CPT | Mod: 25

## 2023-07-28 PROCEDURE — 96367 TX/PROPH/DG ADDL SEQ IV INF: CPT

## 2023-07-28 PROCEDURE — 86140 C-REACTIVE PROTEIN: CPT | Performed by: PHYSICIAN ASSISTANT

## 2023-07-28 PROCEDURE — 80053 COMPREHEN METABOLIC PANEL: CPT | Performed by: PHYSICIAN ASSISTANT

## 2023-07-28 RX ADMIN — PIPERACILLIN AND TAZOBACTAM 4.5 G: 4; .5 INJECTION, POWDER, LYOPHILIZED, FOR SOLUTION INTRAVENOUS; PARENTERAL at 10:07

## 2023-07-28 RX ADMIN — IOHEXOL 75 ML: 350 INJECTION, SOLUTION INTRAVENOUS at 10:07

## 2023-07-28 NOTE — Clinical Note
Diagnosis: Infection of deep incisional surgical site after procedure, initial encounter [[id number]]   Future Attending Provider: DELANO MENDES [[id number]]   Admitting Provider:: DELANO MENDES [[id number]]

## 2023-07-29 VITALS
SYSTOLIC BLOOD PRESSURE: 115 MMHG | BODY MASS INDEX: 37.3 KG/M2 | RESPIRATION RATE: 17 BRPM | TEMPERATURE: 98 F | OXYGEN SATURATION: 98 % | WEIGHT: 275 LBS | HEART RATE: 90 BPM | DIASTOLIC BLOOD PRESSURE: 63 MMHG

## 2023-07-29 PROBLEM — L03.90 CELLULITIS: Status: ACTIVE | Noted: 2023-07-29

## 2023-07-29 PROBLEM — T84.7XXA INFECTION AT SITE OF EXTERNAL FIXATOR PIN: Status: ACTIVE | Noted: 2023-07-29

## 2023-07-29 PROBLEM — L03.90 CELLULITIS: Status: RESOLVED | Noted: 2023-07-29 | Resolved: 2023-07-29

## 2023-07-29 PROBLEM — Z96.669: Status: RESOLVED | Noted: 2023-07-29 | Resolved: 2023-07-29

## 2023-07-29 PROBLEM — L02.91 SOFT TISSUE ABSCESS: Status: ACTIVE | Noted: 2023-07-29

## 2023-07-29 PROBLEM — Z96.669: Status: ACTIVE | Noted: 2023-07-29

## 2023-07-29 LAB
CRP SERPL-MCNC: 53 MG/L (ref 0–8.2)
ESTIMATED AVG GLUCOSE: 88 MG/DL (ref 68–131)
HBA1C MFR BLD: 4.7 % (ref 4–5.6)

## 2023-07-29 PROCEDURE — 63600175 PHARM REV CODE 636 W HCPCS: Performed by: EMERGENCY MEDICINE

## 2023-07-29 PROCEDURE — 25000003 PHARM REV CODE 250: Performed by: STUDENT IN AN ORGANIZED HEALTH CARE EDUCATION/TRAINING PROGRAM

## 2023-07-29 PROCEDURE — 63600175 PHARM REV CODE 636 W HCPCS: Performed by: STUDENT IN AN ORGANIZED HEALTH CARE EDUCATION/TRAINING PROGRAM

## 2023-07-29 PROCEDURE — 96375 TX/PRO/DX INJ NEW DRUG ADDON: CPT

## 2023-07-29 PROCEDURE — 63600175 PHARM REV CODE 636 W HCPCS: Performed by: FAMILY MEDICINE

## 2023-07-29 PROCEDURE — 96366 THER/PROPH/DIAG IV INF ADDON: CPT

## 2023-07-29 PROCEDURE — 96365 THER/PROPH/DIAG IV INF INIT: CPT

## 2023-07-29 PROCEDURE — 87040 BLOOD CULTURE FOR BACTERIA: CPT | Mod: 59 | Performed by: EMERGENCY MEDICINE

## 2023-07-29 PROCEDURE — 83036 HEMOGLOBIN GLYCOSYLATED A1C: CPT | Performed by: STUDENT IN AN ORGANIZED HEALTH CARE EDUCATION/TRAINING PROGRAM

## 2023-07-29 PROCEDURE — 25000003 PHARM REV CODE 250: Performed by: EMERGENCY MEDICINE

## 2023-07-29 PROCEDURE — G0378 HOSPITAL OBSERVATION PER HR: HCPCS

## 2023-07-29 PROCEDURE — 25000003 PHARM REV CODE 250: Performed by: FAMILY MEDICINE

## 2023-07-29 PROCEDURE — 96376 TX/PRO/DX INJ SAME DRUG ADON: CPT

## 2023-07-29 RX ORDER — SODIUM CHLORIDE 0.9 % (FLUSH) 0.9 %
5 SYRINGE (ML) INJECTION
Status: DISCONTINUED | OUTPATIENT
Start: 2023-07-29 | End: 2023-07-30 | Stop reason: HOSPADM

## 2023-07-29 RX ORDER — FUROSEMIDE 40 MG/1
40 TABLET ORAL 2 TIMES DAILY
Status: DISCONTINUED | OUTPATIENT
Start: 2023-07-29 | End: 2023-07-29

## 2023-07-29 RX ORDER — IBUPROFEN 200 MG
24 TABLET ORAL
Status: CANCELLED | OUTPATIENT
Start: 2023-07-29

## 2023-07-29 RX ORDER — FUROSEMIDE 40 MG/1
40 TABLET ORAL DAILY
Status: CANCELLED | OUTPATIENT
Start: 2023-07-30

## 2023-07-29 RX ORDER — MORPHINE SULFATE 4 MG/ML
4 INJECTION, SOLUTION INTRAMUSCULAR; INTRAVENOUS
Status: COMPLETED | OUTPATIENT
Start: 2023-07-29 | End: 2023-07-29

## 2023-07-29 RX ORDER — SODIUM CHLORIDE 0.9 % (FLUSH) 0.9 %
5 SYRINGE (ML) INJECTION
Status: CANCELLED | OUTPATIENT
Start: 2023-07-29

## 2023-07-29 RX ORDER — TALC
9 POWDER (GRAM) TOPICAL NIGHTLY PRN
Status: CANCELLED | OUTPATIENT
Start: 2023-07-29

## 2023-07-29 RX ORDER — HEPARIN SODIUM 5000 [USP'U]/ML
5000 INJECTION, SOLUTION INTRAVENOUS; SUBCUTANEOUS EVERY 8 HOURS
Status: CANCELLED | OUTPATIENT
Start: 2023-07-29

## 2023-07-29 RX ORDER — FUROSEMIDE 40 MG/1
40 TABLET ORAL DAILY
Status: DISCONTINUED | OUTPATIENT
Start: 2023-07-30 | End: 2023-07-30 | Stop reason: HOSPADM

## 2023-07-29 RX ORDER — IBUPROFEN 200 MG
16 TABLET ORAL
Status: CANCELLED | OUTPATIENT
Start: 2023-07-29

## 2023-07-29 RX ORDER — ONDANSETRON 2 MG/ML
4 INJECTION INTRAMUSCULAR; INTRAVENOUS
Status: COMPLETED | OUTPATIENT
Start: 2023-07-29 | End: 2023-07-29

## 2023-07-29 RX ORDER — IBUPROFEN 200 MG
16 TABLET ORAL
Status: DISCONTINUED | OUTPATIENT
Start: 2023-07-29 | End: 2023-07-30 | Stop reason: HOSPADM

## 2023-07-29 RX ORDER — FUROSEMIDE 40 MG/1
40 TABLET ORAL
Status: COMPLETED | OUTPATIENT
Start: 2023-07-29 | End: 2023-07-29

## 2023-07-29 RX ORDER — IBUPROFEN 200 MG
24 TABLET ORAL
Status: DISCONTINUED | OUTPATIENT
Start: 2023-07-29 | End: 2023-07-30 | Stop reason: HOSPADM

## 2023-07-29 RX ORDER — AMOXICILLIN 250 MG
1 CAPSULE ORAL 2 TIMES DAILY PRN
Status: DISCONTINUED | OUTPATIENT
Start: 2023-07-29 | End: 2023-07-30 | Stop reason: HOSPADM

## 2023-07-29 RX ORDER — TALC
9 POWDER (GRAM) TOPICAL NIGHTLY PRN
Status: DISCONTINUED | OUTPATIENT
Start: 2023-07-29 | End: 2023-07-30 | Stop reason: HOSPADM

## 2023-07-29 RX ORDER — OXYCODONE HYDROCHLORIDE 5 MG/1
5 TABLET ORAL EVERY 6 HOURS PRN
Status: DISCONTINUED | OUTPATIENT
Start: 2023-07-29 | End: 2023-07-30 | Stop reason: HOSPADM

## 2023-07-29 RX ORDER — GLUCAGON 1 MG
1 KIT INJECTION
Status: CANCELLED | OUTPATIENT
Start: 2023-07-29

## 2023-07-29 RX ORDER — AMOXICILLIN 250 MG
1 CAPSULE ORAL 2 TIMES DAILY PRN
Status: CANCELLED | OUTPATIENT
Start: 2023-07-29

## 2023-07-29 RX ORDER — OXYCODONE AND ACETAMINOPHEN 5; 325 MG/1; MG/1
1 TABLET ORAL EVERY 6 HOURS PRN
Status: DISCONTINUED | OUTPATIENT
Start: 2023-07-29 | End: 2023-07-29

## 2023-07-29 RX ORDER — OXYCODONE HYDROCHLORIDE 5 MG/1
5 TABLET ORAL EVERY 6 HOURS PRN
Status: CANCELLED | OUTPATIENT
Start: 2023-07-29

## 2023-07-29 RX ORDER — HEPARIN SODIUM 5000 [USP'U]/ML
5000 INJECTION, SOLUTION INTRAVENOUS; SUBCUTANEOUS EVERY 8 HOURS
Status: DISCONTINUED | OUTPATIENT
Start: 2023-07-29 | End: 2023-07-30 | Stop reason: HOSPADM

## 2023-07-29 RX ORDER — ACETAMINOPHEN 325 MG/1
650 TABLET ORAL EVERY 8 HOURS PRN
Status: DISCONTINUED | OUTPATIENT
Start: 2023-07-29 | End: 2023-07-29

## 2023-07-29 RX ORDER — GLUCAGON 1 MG
1 KIT INJECTION
Status: DISCONTINUED | OUTPATIENT
Start: 2023-07-29 | End: 2023-07-30 | Stop reason: HOSPADM

## 2023-07-29 RX ADMIN — VANCOMYCIN HYDROCHLORIDE 2000 MG: 10 INJECTION, POWDER, LYOPHILIZED, FOR SOLUTION INTRAVENOUS at 12:07

## 2023-07-29 RX ADMIN — MORPHINE SULFATE 4 MG: 4 INJECTION INTRAVENOUS at 12:07

## 2023-07-29 RX ADMIN — FUROSEMIDE 40 MG: 40 TABLET ORAL at 03:07

## 2023-07-29 RX ADMIN — VANCOMYCIN HYDROCHLORIDE 2000 MG: 1 INJECTION, POWDER, LYOPHILIZED, FOR SOLUTION INTRAVENOUS at 07:07

## 2023-07-29 RX ADMIN — MORPHINE SULFATE 4 MG: 4 INJECTION INTRAVENOUS at 03:07

## 2023-07-29 RX ADMIN — FUROSEMIDE 40 MG: 40 TABLET ORAL at 04:07

## 2023-07-29 RX ADMIN — ONDANSETRON 4 MG: 2 INJECTION INTRAMUSCULAR; INTRAVENOUS at 12:07

## 2023-07-29 RX ADMIN — PIPERACILLIN SODIUM AND TAZOBACTAM SODIUM 4.5 G: 4; .5 INJECTION, POWDER, LYOPHILIZED, FOR SOLUTION INTRAVENOUS at 04:07

## 2023-07-29 RX ADMIN — MORPHINE SULFATE 4 MG: 4 INJECTION INTRAVENOUS at 04:07

## 2023-07-29 NOTE — ASSESSMENT & PLAN NOTE
On admission:  Has had multiple orthopedic surgeries on R ankle at [facility]  On furosemide PRN for swelling    Plan:  - Started on IV Vanc & Zosyn  - Blood cultures obtained & pending  - Pain control: Oxycodone Q6hrs PRN  - CT: Abscess within the soft tissues of the anteromedial ankle, abutting the anterior plate fixation hardware.  No CT evidence of acute osteomyelitis.  - Per ortho, no additional imaging required at this time  - NPO in anticipation of surgery

## 2023-07-29 NOTE — ED PROVIDER NOTES
Emergency Department Encounter  Provider Note    Herman Floyd  [id number]  7/28/2023    Evaluation:    History:     Chief Complaint   Patient presents with    Wound Infection     Patient discharged from hospital in [place] on Monday for cellulitis of right lower leg. States leg started with yellow drainage today. Denies fever, redness or increased pain. Patient has had 6-7 surgeries to same ankle. Currently taking cipro at home.       History of Present Illness:  Herman Floyd is a 52 y.o. male who has a past medical history of Closed fracture of right ankle (8/3/2018) and Deafness in right ear.    The patient presents to the ED due to R ankle pain and leg swelling.     Patient reports symptoms started about 2 weeks ago. He was seen in a hospital in [place] and admitted for a week for IV ABX. He was discharged and instructed to follow-up with his Orthopedist.   He was seen by his PCP and ID this week, and has been taking oral ABX including Cipro, Levaquin, and Linezolid.   He states he called his Orthopedist and is attempting to get an appointment next week.   He denies any fever but reports severe pain, drainage, and limited ROM of his R ankle.       Past Medical History:   Diagnosis Date    Closed fracture of right ankle 8/3/2018    Deafness in right ear      Past Surgical History:   Procedure Laterality Date    ANKLE FRACTURE SURGERY Right 08/2018    COLONOSCOPY N/A 8/17/2022    Procedure: COLONOSCOPY Suprep;  Surgeon: Jose Angel Leonardo MD;  Location: [facility];  Service: Endoscopy;  Laterality: N/A;    FINGER FRACTURE SURGERY       No family history on file.  Social History     Socioeconomic History    Marital status:    Tobacco Use    Smoking status: Never    Smokeless tobacco: Never   Substance and Sexual Activity    Alcohol use: No    Drug use: No    Sexual activity: Yes     Review of patient's allergies indicates:  No Known Allergies    Review of Systems   Cardiovascular:  Positive for leg  swelling.   Musculoskeletal:  Positive for arthralgias and myalgias.   Skin:  Positive for color change, rash and wound.       Physical Exam:     Initial Vitals [07/28/23 1905]   BP Pulse Resp Temp SpO2   132/68 105 18 97.5 °F (36.4 °C) 97 %      MAP       --         Physical Exam    Nursing note and vitals reviewed.  Constitutional: He appears well-developed and well-nourished. He is not diaphoretic. No distress.   HENT:   Head: Normocephalic and atraumatic.   Mouth/Throat: Oropharynx is clear and moist.   Eyes: EOM are normal. Pupils are equal, round, and reactive to light.   Neck: No tracheal deviation present.   Cardiovascular:  Normal rate, regular rhythm, normal heart sounds and intact distal pulses.           Pulmonary/Chest: Breath sounds normal. No stridor. No respiratory distress.   Abdominal: Abdomen is soft. He exhibits no distension and no mass. There is no abdominal tenderness.   Musculoskeletal:         General: No edema. Normal range of motion.      Comments: Diffuse RLE edema, warmth, and erythema.   Diffuse swelling of R ankle with superficial ulcerations and weeping wounds to anterior ankle.   Pulses intact.      Neurological: He is alert and oriented to person, place, and time. No cranial nerve deficit or sensory deficit.   Skin: Skin is warm and dry. Capillary refill takes less than 2 seconds. No rash noted.   Psychiatric: He has a normal mood and affect. His behavior is normal. Thought content normal.                   ED Course:   Critical Care    Date/Time: 7/29/2023 12:05 AM    Performed by: Aly Dempsey MD  Authorized by: Aly Dempsey MD  Total critical care time (exclusive of procedural time) : 0 minutes  Critical care was necessary to treat or prevent imminent or life-threatening deterioration of the following conditions: septic joint requiring emergent Ortho evaluation.          Medical Decision Making:    History Acquisition:   Additional historians utilized:  none    Prior  medical records were reviewed:   Admitted 7/18-7/24 for cellulitis.  Seen by PCP 7/25 for cellulitis, continued Levaquin and Linezolid.   Seen by ID yesterday 7/27 for cellulitis. Scheduled for f/u in 2 weeks.     The patient's list of active medical problems, social history, medications, and allergies as documented has been reviewed.     Differential Diagnoses:   Based on available information and initial assessment, Differential Diagnosis includes, but is not limited to:  Cellulitis, abscess, necrotizing fasciitis, osteomyelitis, septic joint, MRSA, DVT, drug eruption, allergic/contact dermatitis, EM/SJS, viral exanthem, local trauma/contusion        EKG:       Labs:     Labs Reviewed   CBC W/ AUTO DIFFERENTIAL - Abnormal; Notable for the following components:       Result Value    RBC 3.84 (*)     Hemoglobin 11.2 (*)     Hematocrit 33.9 (*)     MPV 8.3 (*)     Immature Granulocytes 4.2 (*)     Immature Grans (Abs) 0.43 (*)     Lymph % 16.0 (*)     All other components within normal limits   COMPREHENSIVE METABOLIC PANEL - Abnormal; Notable for the following components:    Sodium 135 (*)     Total Protein 9.2 (*)     Albumin 3.0 (*)     AST 59 (*)     ALT 78 (*)     All other components within normal limits   C-REACTIVE PROTEIN - Abnormal; Notable for the following components:    CRP 53.0 (*)     All other components within normal limits   CULTURE, BLOOD    Narrative:     Collection has been rescheduled by [facility] at 07/29/2023 04:59 Reason:   Orders just reliesed T LAB.  at 04am.   Collection has been rescheduled by [facility] at 07/29/2023 04:59 Reason:   Orders just reliesed T LAB.  at 04am.    CULTURE, BLOOD    Narrative:     Collection has been rescheduled by [facility] at 07/29/2023 04:59 Reason:   Orders just reliesed T LAB.  at 04am.   Collection has been rescheduled by [facility] at 07/29/2023 04:59 Reason:   Orders just reliesed T LAB.  at 04am.    C-REACTIVE PROTEIN     Independent review of the labs ordered include:   CRP  elevated 53  Labs including CBC/CMP otherwise reassuring      Imaging:     Imaging Results              CT Ankle (Including Hindfoot) With Contrast Right (Final result)  Result time 07/28/23 23:51:38      Final result by Horacio Hurley DO (07/28/23 23:51:38)                   Impression:      1. Abscess within the soft tissues of the anteromedial ankle, abutting the anterior plate fixation hardware.  No CT evidence of acute osteomyelitis.  2. Extensive circumferential soft tissue edema of the ankle and hindfoot as above, compatible with cellulitis.  3. Chronic and remote postoperative changes of the ankle as detailed above, stable.      Electronically signed by: Horacio Hurley  Date:    07/28/2023  Time:    23:51               Narrative:    EXAMINATION:  CT ANKLE (INCLUDING HINDFOOT) WITH CONTRAST RIGHT    CLINICAL HISTORY:  Osteomyelitis, ankle;Ankle pain, chronic, osteoarthritis suspected;Septic arthritis suspected, ankle, xray done;    TECHNIQUE:  Axial CT images of the right ankle with sagittal and coronal reformats without intravenous contrast.    COMPARISON:  CT of the right ankle from 05/14/2020.    FINDINGS:  Bone/articulations: There is diffuse osteopenia.  There are postoperative changes of prior tibiotalar calcaneal arthrodesis with removal of the intramedullary nail and placement of an anteriorly applied plate and multiple screws about the distal tibia and the talus as well as and anteromedially applied screw extending through the distal tibia and the talar dome.  There are numerous additional ghost tracks throughout the ankle bones.  Multiple areas of focal osseous bridging again noted.  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  In comparison with prior, osseous structures appear identical.  There is no acute fracture or dislocation.  There is no CT evidence of acute osteomyelitis.    Soft tissues: There is a multilocular rim enhancing fluid collection along  the anteromedial aspect of the ankle measuring approximately 5.6 x 3.2 x 2.5 cm, abutting the anterior plate fixation hardware.  There is circumferential soft tissue edema of the ankle and the dorsal aspect of the foot.  There is no evidence of soft tissue gas.  There is severe fatty atrophy of the gastrocnemius muscle and mild fatty atrophy of the remaining muscles.    Miscellaneous: Vascular structures grossly unremarkable.                                         Additional Consideration:   Additional testing considered during clinical course: none    Social determinants of health considered during development of treatment plan include: poor access to care    Current co-morbidities considered which impacted clinical decision making: obesity, prior tib/fib fracture    Case discussed with additional provider: Orthopedics on call, state patient is better served at [place] [facility] where his surgeon is currently practicing.     Medications   vancomycin 2 g in dextrose 5 % 500 mL IVPB (0 mg Intravenous Stopped 7/29/23 0355)   piperacillin-tazobactam (ZOSYN) 4.5 g in dextrose 5 % in water (D5W) 100 mL IVPB (MB+) (0 g Intravenous Stopped 7/28/23 2301)   iohexoL (OMNIPAQUE 350) injection 75 mL (75 mLs Intravenous Given 7/28/23 2243)   morphine injection 4 mg (4 mg Intravenous Given 7/29/23 0043)   ondansetron injection 4 mg (4 mg Intravenous Given 7/29/23 0043)   furosemide tablet 40 mg (40 mg Oral Given 7/29/23 0319)   morphine injection 4 mg (4 mg Intravenous Given 7/29/23 0353)        ED Course as of 07/29/23 1204   Sat Jul 29, 202329, 2023 0012 SpO2: 97 % [SS]   0012 Resp: 18 [SS]   0012 Pulse: 105 [SS]   0012 Temp Source: Oral [SS]   0012 Temp: 97.5 °F (36.4 °C) [SS]   0012 BP: 132/68  Tachycardic, otherwise reassuring.  [SS]   0012 Comprehensive metabolic panel(!)  Mild transaminitis.  [SS]   0012 CBC auto differential(!)  Unremarkable  [SS]   0012 CT Ankle (Including Hindfoot) With Contrast Right  CT ankle independently interpreted:  multiple abscesses to anterior ankle, appearing adjacent to fixation hardware, concerning for post-op hardware infection.  Agree with radiologist interpretation.    [SS]   0013 [facility] contacted to initiate transfer to facility with Orthopedic Surgery on call. [facility] contacted in [place], feel patient would best be served at [facility] where patient's prior Orthopedist currently practices.  [SS]      ED Course User Index  [SS] Aly Dempsey MD                     Clinical Impression:       ICD-10-CM ICD-9-CM   1. Infection of deep incisional surgical site after procedure, initial encounter  T81.42XA 998.59   2. Septic arthritis of right ankle, due to unspecified organism  M00.9 711.07   3. Cellulitis of right leg  L03.115 682.6         Follow-up Information    None                  Aly Dempsey MD  07/29/23 1209       Aly Dempsey MD  07/29/23 1507

## 2023-07-29 NOTE — PROGRESS NOTES
"Pharmacokinetic Initial Assessment: IV Vancomycin    Assessment/Plan:    Initiate intravenous vancomycin with loading dose of 2000 mg once followed by a maintenance dose of vancomycin 2000mg IV every 12 hours  Desired empiric serum trough concentration is 10 to 15 mcg/mL  Draw vancomycin trough level 60 min prior to fourth dose on 7-31 at approximately 06:00  Pharmacy will continue to follow and monitor vancomycin.      Please contact pharmacy at extension [phone] with any questions regarding this assessment.     Thank you for the consult,   Wesley CHAIDEZ Nandini       Patient brief summary:  Herman Floyd is a 52 y.o. male initiated on antimicrobial therapy with IV Vancomycin for treatment of suspected bone/joint infection    Drug Allergies:   Review of patient's allergies indicates:  No Known Allergies    Actual Body Weight:   124.7 kg    Renal Function:   Estimated Creatinine Clearance: 105.9 mL/min (based on SCr of 1.1 mg/dL).,     Dialysis Method (if applicable):  N/A    CBC (last 72 hours):  Recent Labs   Lab Result Units 07/27/23  1304 07/28/23  2051   WBC K/uL 9.07 10.19   Hemoglobin g/dL 11.1* 11.2*   Hematocrit % 34.9* 33.9*   Platelets K/uL 376 432   Gran % % 72.0 72.5   Lymph % % 21.0 16.0*   Mono % % 3.0* 5.0   Eosinophil % % 1.0 1.7   Basophil % % 0.0 0.6   Differential Method  Manual Automated       Metabolic Panel (last 72 hours):  Recent Labs   Lab Result Units 07/27/23  1304 07/28/23  2051   Sodium mmol/L 135* 135*   Potassium mmol/L 4.5 4.6   Chloride mmol/L 99 101   CO2 mmol/L 23 23   Glucose mg/dL 90 94   BUN mg/dL 13 16   Creatinine mg/dL 0.8 1.1   Albumin g/dL 2.9* 3.0*   Total Bilirubin mg/dL 1.0 0.8   Alkaline Phosphatase U/L 87 88   AST U/L 52* 59*   ALT U/L 72* 78*       Drug levels (last 3 results):  No results for input(s): "VANCOMYCINRA", "VANCORANDOM", "VANCOMYCINPE", "VANCOPEAK", "VANCOMYCINTR", "VANCOTROUGH" in the last 72 hours.    Microbiologic Results:  Microbiology Results (last 7 " days)       Procedure Component Value Units Date/Time    Blood Culture #2 **CANNOT BE ORDERED STAT** [[id number]] Collected: 07/29/23 0557    Order Status: Completed Specimen: Blood from Antecubital, Right Arm Updated: 07/29/23 1715     Blood Culture, Routine No Growth to date    Narrative:      Collection has been rescheduled by [facility] at 07/29/2023 04:59 Reason:   Orders just reliesed T LAB.  at 04am.   Collection has been rescheduled by [facility] at 07/29/2023 04:59 Reason:   Orders just reliesed T LAB.  at 04am.     Blood Culture #1 **CANNOT BE ORDERED STAT** [[id number]] Collected: 07/29/23 0603    Order Status: Completed Specimen: Blood Updated: 07/29/23 1715     Blood Culture, Routine No Growth to date    Narrative:      Collection has been rescheduled by [facility] at 07/29/2023 04:59 Reason:   Orders just reliesed T LAB.  at 04am.   Collection has been rescheduled by [facility] at 07/29/2023 04:59 Reason:   Orders just reliesed T LAB.  at 04am.

## 2023-07-29 NOTE — ED NOTES
Nurse spoke with patients wife. Updated her on plan of care. Patient should be getting transferred to [facility] once a bed opens up. Patient aware of care of plan. Patient requesting something to eat. Nurse explained NPO for surgery.

## 2023-07-29 NOTE — SUBJECTIVE & OBJECTIVE
Past Medical History:   Diagnosis Date    Closed fracture of right ankle 8/3/2018    Deafness in right ear        Past Surgical History:   Procedure Laterality Date    ANKLE FRACTURE SURGERY Right 08/2018    COLONOSCOPY N/A 8/17/2022    Procedure: COLONOSCOPY Suprep;  Surgeon: Jose Angel Leonardo MD;  Location: [facility];  Service: Endoscopy;  Laterality: N/A;    FINGER FRACTURE SURGERY         Review of patient's allergies indicates:  No Known Allergies    Current Facility-Administered Medications on File Prior to Encounter   Medication    0.9%  NaCl infusion    lidocaine (PF) 10 mg/ml (1%) injection 10 mg     Current Outpatient Medications on File Prior to Encounter   Medication Sig    ciprofloxacin HCl (CIPRO) 750 MG tablet Take 1 tablet (750 mg total) by mouth every 12 (twelve) hours.    diclofenac sodium (VOLTAREN) 1 % Gel Apply 2 g topically 4 (four) times daily.    furosemide (LASIX) 40 MG tablet Take 40 mg by mouth 2 (two) times daily.    gabapentin (NEURONTIN) 300 MG capsule Take 1 capsule (300 mg total) by mouth every evening.    ketoconazole (NIZORAL) 2 % cream Apply topically once daily.    linezolid (ZYVOX) 600 mg Tab Take 1 tablet (600 mg total) by mouth every 12 (twelve) hours.    oxyCODONE-acetaminophen (PERCOCET) 5-325 mg per tablet Take 1 tablet by mouth every 4 (four) hours as needed for Pain.    polyethylene glycol (GLYCOLAX) 17 gram PwPk Take 17 g by mouth once daily.     Family History    None       Tobacco Use    Smoking status: Never    Smokeless tobacco: Never   Substance and Sexual Activity    Alcohol use: No    Drug use: No    Sexual activity: Yes     Review of Systems   Constitutional:  Negative for chills and fever.   HENT:  Negative for rhinorrhea and sore throat.    Eyes:  Negative for pain and visual disturbance.   Respiratory:  Negative for cough and shortness of breath.    Cardiovascular:  Negative for chest pain and palpitations.   Gastrointestinal:  Negative for abdominal pain, nausea  and vomiting.   Genitourinary:  Negative for dysuria and hematuria.   Musculoskeletal:  Positive for joint swelling (R ankle). Negative for gait problem and neck stiffness.   Skin:  Positive for wound (R ankle). Negative for rash.   Neurological:  Negative for tremors and headaches.   Psychiatric/Behavioral:  Negative for agitation and confusion.      Objective:     Vital Signs (Most Recent):  Temp: 97.8 °F (36.6 °C) (07/29/23 0618)  Pulse: 94 (07/29/23 1809)  Resp: 14 (07/29/23 1639)  BP: 130/74 (07/29/23 1808)  SpO2: 100 % (07/29/23 1809) Vital Signs (24h Range):  Temp:  [97.5 °F (36.4 °C)-97.8 °F (36.6 °C)] 97.8 °F (36.6 °C)  Pulse:  [] 94  Resp:  [14-20] 14  SpO2:  [96 %-100 %] 100 %  BP: (112-132)/(64-77) 130/74     Weight: 124.7 kg (275 lb)  Body mass index is 37.3 kg/m².     Physical Exam  Vitals reviewed.   Constitutional:       General: He is not in acute distress.     Appearance: Normal appearance. He is obese. He is not ill-appearing.   HENT:      Head: Normocephalic and atraumatic.      Right Ear: Tympanic membrane normal.      Left Ear: Tympanic membrane normal.      Nose: Nose normal.      Mouth/Throat:      Mouth: Mucous membranes are moist.   Eyes:      Extraocular Movements: Extraocular movements intact.      Conjunctiva/sclera: Conjunctivae normal.      Pupils: Pupils are equal, round, and reactive to light.   Cardiovascular:      Rate and Rhythm: Normal rate and regular rhythm.      Pulses: Normal pulses.      Heart sounds: Normal heart sounds. No murmur heard.  Pulmonary:      Effort: Pulmonary effort is normal. No respiratory distress.      Breath sounds: Normal breath sounds. No stridor. No wheezing or rhonchi.   Abdominal:      General: Abdomen is flat. Bowel sounds are normal. There is no distension.      Palpations: Abdomen is soft.      Tenderness: There is no abdominal tenderness.   Musculoskeletal:         General: Tenderness (R ankle) present. No swelling or signs of injury.       Cervical back: Normal range of motion. No rigidity or tenderness.      Right lower leg: Edema present.      Left lower leg: No edema.      Comments: R ankle  Decreased ROM  (eversion, inversion)  Swelling  Neurovascularly intact distally.   Skin:     General: Skin is warm.      Findings: Lesion present.      Comments: Break in skin with red purulence surrounded by Erythema and swelling   Neurological:      General: No focal deficit present.      Mental Status: He is alert and oriented to person, place, and time.      Cranial Nerves: No cranial nerve deficit.      Motor: No weakness.   Psychiatric:         Mood and Affect: Mood normal.         Behavior: Behavior normal.         Thought Content: Thought content normal.         Judgment: Judgment normal.              CRANIAL NERVES     CN III, IV, VI   Pupils are equal, round, and reactive to light.       Significant Labs: All pertinent labs within the past 24 hours have been reviewed.  CBC:   Recent Labs   Lab 07/28/23 2051   WBC 10.19   HGB 11.2*   HCT 33.9*        CMP:   Recent Labs   Lab 07/28/23 2051   *   K 4.6      CO2 23   GLU 94   BUN 16   CREATININE 1.1   CALCIUM 9.3   PROT 9.2*   ALBUMIN 3.0*   BILITOT 0.8   ALKPHOS 88   AST 59*   ALT 78*   ANIONGAP 11       Significant Imaging: I have reviewed all pertinent imaging results/findings within the past 24 hours.

## 2023-07-29 NOTE — ASSESSMENT & PLAN NOTE
"On admission:  1 week history of inflammation with abscess opening 2 days ago  Was most currently on Ciprofloxacin, Linezolid    Plan:  - Plan as "Infection at site of external fixator pin"  "

## 2023-07-29 NOTE — ED NOTES
Per transfer center, [facility] is at capacity.     Pt provided with a snack with MD approval.

## 2023-07-29 NOTE — PROVIDER PROGRESS NOTES - EMERGENCY DEPT.
This is an assumption of care note.     Upon shift change, the patient was transferred to me at 4:08 PM in stable condition.     Herman Floyd is a 52 y.o. male who  has a past medical history of Closed fracture of right ankle (8/3/2018) and Deafness in right ear.    Patient presented to ED with chief complaint of R leg pain and swelling.  CT concerning for abscess, possibly involving ankle joint hardware.     [facility] consulted but state patient should be transferred to [facility] for Ortho evaluation.     Workup currently in progress.   Transfer to [facility] pending at this time.    Update:   No acute events during shift.    4:26 PM  Discussed with [facility] again, who state they will be available for consult if patient is admitted to [place] and will evaluate in the morning.    5:15 PM  Discussed with [facility], who will check with staff to see if they feel comfortable admitting the patient while awaiting trasnfer to [facility].    5:28 PM  Called [facility] for TFR update. Verified that the TFR request will still be active if the patient is admitted to [facility] FM service.    6:00 PM  Discussed patient with [facility], who will admit the patient.    VITALS:  Vitals:    07/29/23 1001 07/29/23 1002 07/29/23 1402 07/29/23 1450   BP:  125/75 131/74    Pulse: 76  78 81   Resp:       Temp:       TempSrc:       SpO2: 97%  98% 98%   Weight:             IMAGING:  Imaging Results              CT Ankle (Including Hindfoot) With Contrast Right (Final result)  Result time 07/28/23 23:51:38      Final result by Horacio Hurley DO (07/28/23 23:51:38)                   Impression:      1. Abscess within the soft tissues of the anteromedial ankle, abutting the anterior plate fixation hardware.  No CT evidence of acute osteomyelitis.  2. Extensive circumferential soft tissue edema of the ankle and hindfoot as above, compatible with cellulitis.  3. Chronic and remote postoperative changes of the ankle as detailed above,  stable.      Electronically signed by: Horacio Hurley  Date:    07/28/2023  Time:    23:51               Narrative:    EXAMINATION:  CT ANKLE (INCLUDING HINDFOOT) WITH CONTRAST RIGHT    CLINICAL HISTORY:  Osteomyelitis, ankle;Ankle pain, chronic, osteoarthritis suspected;Septic arthritis suspected, ankle, xray done;    TECHNIQUE:  Axial CT images of the right ankle with sagittal and coronal reformats without intravenous contrast.    COMPARISON:  CT of the right ankle from 05/14/2020.    FINDINGS:  Bone/articulations: There is diffuse osteopenia.  There are postoperative changes of prior tibiotalar calcaneal arthrodesis with removal of the intramedullary nail and placement of an anteriorly applied plate and multiple screws about the distal tibia and the talus as well as and anteromedially applied screw extending through the distal tibia and the talar dome.  There are numerous additional ghost tracks throughout the ankle bones.  Multiple areas of focal osseous bridging again noted.  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  In comparison with prior, osseous structures appear identical.  There is no acute fracture or dislocation.  There is no CT evidence of acute osteomyelitis.    Soft tissues: There is a multilocular rim enhancing fluid collection along the anteromedial aspect of the ankle measuring approximately 5.6 x 3.2 x 2.5 cm, abutting the anterior plate fixation hardware.  There is circumferential soft tissue edema of the ankle and the dorsal aspect of the foot.  There is no evidence of soft tissue gas.  There is severe fatty atrophy of the gastrocnemius muscle and mild fatty atrophy of the remaining muscles.    Miscellaneous: Vascular structures grossly unremarkable.                                        LABS:  Labs Reviewed   CBC W/ AUTO DIFFERENTIAL - Abnormal; Notable for the following components:       Result Value    RBC 3.84 (*)     Hemoglobin 11.2 (*)      Hematocrit 33.9 (*)     MPV 8.3 (*)     Immature Granulocytes 4.2 (*)     Immature Grans (Abs) 0.43 (*)     Lymph % 16.0 (*)     All other components within normal limits   COMPREHENSIVE METABOLIC PANEL - Abnormal; Notable for the following components:    Sodium 135 (*)     Total Protein 9.2 (*)     Albumin 3.0 (*)     AST 59 (*)     ALT 78 (*)     All other components within normal limits   C-REACTIVE PROTEIN - Abnormal; Notable for the following components:    CRP 53.0 (*)     All other components within normal limits   CULTURE, BLOOD    Narrative:     Collection has been rescheduled by [facility] at 07/29/2023 04:59 Reason:   Orders just reliesed T LAB.  at 04am.   Collection has been rescheduled by [facility] at 07/29/2023 04:59 Reason:   Orders just reliesed T LAB.  at 04am.    CULTURE, BLOOD    Narrative:     Collection has been rescheduled by [facility] at 07/29/2023 04:59 Reason:   Orders just reliesed T LAB.  at 04am.   Collection has been rescheduled by [facility] at 07/29/2023 04:59 Reason:   Orders just reliesed T LAB.  at 04am.    C-REACTIVE PROTEIN         MEDICATIONS:  Medications   piperacillin-tazobactam (ZOSYN) 4.5 g in dextrose 5 % in water (D5W) 100 mL IVPB (MB+) (has no administration in time range)   vancomycin 2 g in dextrose 5 % 500 mL IVPB (0 mg Intravenous Stopped 7/29/23 0355)   piperacillin-tazobactam (ZOSYN) 4.5 g in dextrose 5 % in water (D5W) 100 mL IVPB (MB+) (0 g Intravenous Stopped 7/28/23 2301)   iohexoL (OMNIPAQUE 350) injection 75 mL (75 mLs Intravenous Given 7/28/23 2243)   morphine injection 4 mg (4 mg Intravenous Given 7/29/23 0043)   ondansetron injection 4 mg (4 mg Intravenous Given 7/29/23 0043)   furosemide tablet 40 mg (40 mg Oral Given 7/29/23 0319)   morphine injection 4 mg (4 mg Intravenous Given 7/29/23 0353)         IMPRESSION:  1. Infection of deep incisional surgical site after procedure, initial encounter    2. Septic arthritis of right ankle, due to unspecified organism    3. Cellulitis of  right leg           DISCHARGE MEDICATIONS:  Current Discharge Medication List        Critical Care    Date/Time: 7/29/2023 7:40 PM    Performed by: Aly Dempsey MD  Authorized by: Aly Dempsey MD  Total critical care time (exclusive of procedural time) : 0 (45) minutes  Critical care was necessary to treat or prevent imminent or life-threatening deterioration of the following conditions: septic arthritis, cellulitis with abscess.            DISPOSITION:  Admitted in stable condition.

## 2023-07-29 NOTE — ED NOTES
Spoke with [facility], states is on full saturation, Valentina SHAW and Zulma DICKEY informed, to attempt to get pt admitted at [facility] or [facility].

## 2023-07-29 NOTE — HPI
52-year-old M with no prior PMHx who presents to the ED with reports of worsening right ankle pain and swelling. In ED patient states that he noticed right ankle swelling 12 days ago (7/17). Two days prior he began having intermittent subjective fevers, last occurring 2 days ago. He began having yellow and white purulent draining and decreased skin integrity today. 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 Has since had 6 more orthopedic surgeries to ankle with last being in 2022. He denies any recent injury. He was recently admitted in [place] for cellulitis and was treated with levoquin and linezolid. Seen by ID at [facility] (Dr. Reynolds) on the 27th, switched from Levaquin to ciprofloxacin. Patient denies fever, chills, chest pain, abdominal pain, N/V/D. Reports EtOH use of about 7 beers/week. Denies tobacco or drug use.    In the ED, initial vitals /68, , Temp 97.5F, SpO2 97% on room air. CMP abnormal for AST 59 ALT 78. . The patient's CBC unremarkable with normal WBC. Patient received lasix, morphine, zofran, zosyn, and vancomycin while in the ED. Abscess within the soft tissues of the anteromedial ankle, abutting the anterior plate fixation hardware.  No CT evidence of acute osteomyelitis. Blood cultures ordered. [facility] consulted for evaluation for admission for right ankle septic joint with overlying abscess.

## 2023-07-29 NOTE — ED NOTES
Received report from Lisa MEJIA; pt AAO x4, does not appear in distress but c/o 10/10 pain to LLE.    Contacted [facility] for updates, no rooms available. Is also unsure if pt will be receving a surgery today. States will contact transfer center. Pt to remain NPO at this time.     Spouse at bs, siderails x2, call light in reach and instructed how to use.      Review of patient's allergies indicates:  No Known Allergies    APPEARANCE: Alert, oriented x 4, and in no acute distress.  NEURO: 5/5 strength major flexors/extensors bilaterally. Sensory intact to light touch bilaterally. Sasha coma scale: eyes open spontaneously-4, oriented & converses-5, obeys commands-6. No neurological abnormalities. Denies HA, dizziness, photophobia.  CARDIAC: Normal rate and rhythm through apical/radial pulse, S1 and S2 noted, denies CP.   RESPIRATORY:Normal rate and effort, breath sounds clear bilaterally throughout chest anterior and posterior. Respirations are equal and unlabored, no obvious signs of distress. No SOB reported.  PERIPHERAL VASCULAR: +2 peripheral pulses present. Normal cap refill <3sec. No edema. Warm to touch.   GASTRO: Abdomen soft, flat, bowel sounds normal and active in all 4 quadrants, no tenderness, no abdominal distention. Denies NVD.  MUSC: Full ROM. No bony tenderness or soft tissue tenderness. No obvious deformity.  SKIN: +RLE pain and swelling to ankle, extremity significantly larger in comparison to LLE. GENITOURINARY: Voids spontaneously, denies itching, burning, hematuria.    Side rails x 2, bed low and locked position, call light in reach and instructed how to use. Pulse ox, and automatic BP cuff applied; alarms set and audible.

## 2023-07-29 NOTE — FIRST PROVIDER EVALUATION
Emergency Department TeleTriage Encounter Note      CHIEF COMPLAINT    Chief Complaint   Patient presents with    Wound Infection     Patient discharged from hospital in [place] on Monday for cellulitis of right lower leg. States leg started with yellow drainage today. Denies fever, redness or increased pain. Patient has had 6-7 surgeries to same ankle. Currently taking cipro at home.       VITAL SIGNS   Initial Vitals [07/28/23 1905]   BP Pulse Resp Temp SpO2   132/68 105 18 97.5 °F (36.4 °C) 97 %      MAP       --            ALLERGIES    Review of patient's allergies indicates:  No Known Allergies    PROVIDER TRIAGE NOTE  This is a teletriage evaluation of a 52 y.o. male presenting to the ED complaining of wound infection. Patient reports cellulitis to lower right leg. Started cipro yesterday but draining yellow purulent fluid. Denies fever.     Patient is alert and oriented. He speaks in complete sentences. He is sitting upright in the chair in no distress.     Initial orders will be placed and care will be transferred to an alternate provider when patient is roomed for a full evaluation. Any additional orders and the final disposition will be determined by that provider.         ORDERS  Labs Reviewed   CBC W/ AUTO DIFFERENTIAL   COMPREHENSIVE METABOLIC PANEL       ED Orders (720h ago, onward)      Start Ordered     Status Ordering Provider    07/28/23 1927 07/28/23 1926  CBC auto differential  STAT         Ordered DILSHAD LEAL    07/28/23 1927 07/28/23 1926  Comprehensive metabolic panel  STAT         Ordered DILSHAD LEAL    07/28/23 1927 07/28/23 1926  Insert Saline lock IV  Once         Ordered DILSHAD LEAL              Virtual Visit Note: The provider triage portion of this emergency department evaluation and documentation was performed via [employer], a HIPAA-compliant telemedicine application, in concert with a tele-presenter in the room. A face to face patient evaluation with one of my  colleagues will occur once the patient is placed in an emergency department room.      DISCLAIMER: This note was prepared with [employer] voice recognition transcription software. Garbled syntax, mangled pronouns, and other bizarre constructions may be attributed to that software system.

## 2023-07-30 NOTE — HOSPITAL COURSE
52-year-old male with above past medical history who was admitted to [facility] for antibiotic therapy in the setting of left ankle cellulitis, soft tissue abscess concerning for septic arthritis.  While at [facility] received vancomycin and Zosyn IV in lieu of patient's previous p.o. regimen of ciprofloxacin and linezolid.  Remained afebrile vital signs stable.  CT scan of the ankle revealed multiple abscesses abutting patient's hardware in the right ankle.  Patient was initially targeted to transfer to [facility] for surgical management with orthopedist Dr. Renae who had performed several of the patient's 6 previous ankle surgeries. Given high risk of septic joint and infected hardware, expedience in orthopedic evaluation is of greater importance then potential for continuity.  As [facility] is not unavailable target in a timely fashion, transfer to [place] is more appropriate at this time.  [facility] at [facility] is in agreement with this plan.[facility] was on diversion for over 24 hours.  [facility] and Orthopedic team at [place] accepted patient and patient will be transferred to their facility.

## 2023-07-30 NOTE — DISCHARGE SUMMARY
[facility]  [facility] Medicine  Discharge Summary      Patient Name: Herman Floyd  MRN: [id number]  JOSE: [id number]   Patient Class: OP- Observation  Admission Date: 7/28/2023  Hospital Length of Stay: 0 days  Discharge Date and Time: 7/29/2023 10:05 PM  Attending Physician: No att. providers found   Discharging Provider: Umer Dawson MD  Primary Care Provider: Brenton Zhao MD    Primary Care Team: Networked reference to record PCT     HPI:   52-year-old M with no prior PMHx who presents to the ED with reports of worsening right ankle pain and swelling. In ED patient states that he noticed right ankle swelling 12 days ago (7/17). Two days prior he began having intermittent subjective fevers, last occurring 2 days ago. He began having yellow and white purulent draining and decreased skin integrity today. 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 Has since had 6 more orthopedic surgeries to ankle with last being in 2022. He denies any recent injury. He was recently admitted in [place] for cellulitis and was treated with levoquin and linezolid. Seen by ID at [facility] (Dr. Reynolds) on the 27th, switched from Levaquin to ciprofloxacin. Patient denies fever, chills, chest pain, abdominal pain, N/V/D. Reports EtOH use of about 7 beers/week. Denies tobacco or drug use.    In the ED, initial vitals /68, , Temp 97.5F, SpO2 97% on room air. CMP abnormal for AST 59 ALT 78. . The patient's CBC unremarkable with normal WBC. Patient received lasix, morphine, zofran, zosyn, and vancomycin while in the ED. Abscess within the soft tissues of the anteromedial ankle, abutting the anterior plate fixation hardware.  No CT evidence of acute osteomyelitis. Blood cultures ordered. [facility] consulted for evaluation for admission for right ankle septic joint with overlying abscess.        * No surgery found *      Hospital  "Course:   52-year-old male with above past medical history who was admitted to [facility] for antibiotic therapy in the setting of left ankle cellulitis, soft tissue abscess concerning for septic arthritis.  While at [facility] received vancomycin and Zosyn IV in lieu of patient's previous p.o. regimen of ciprofloxacin and linezolid.  Remained afebrile vital signs stable.  CT scan of the ankle revealed multiple abscesses abutting patient's hardware in the right ankle.  Patient was initially targeted to transfer to [facility] for surgical management with orthopedist Dr. Renae who had performed several of the patient's 6 previous ankle surgeries. Given high risk of septic joint and infected hardware, expedience in orthopedic evaluation is of greater importance then potential for continuity.  As [facility] is not unavailable target in a timely fashion, transfer to [place] is more appropriate at this time.  [facility] at [facility] is in agreement with this plan.[facility] was on diversion for over 24 hours.  [facility] and Orthopedic team at [place] accepted patient and patient will be transferred to their facility.         Goals of Care Treatment Preferences:  Code Status: Full Code      Consults:     ID  * Infection at site of external fixator pin  On admission:  Has had multiple orthopedic surgeries on R ankle at [facility]  On furosemide PRN for swelling    Plan:  - Started on IV Vanc & Zosyn  - Blood cultures obtained & pending  - Pain control: Oxycodone Q6hrs PRN  - CT: Abscess within the soft tissues of the anteromedial ankle, abutting the anterior plate fixation hardware.  No CT evidence of acute osteomyelitis.  - Per ortho, no additional imaging required at this time  - NPO in anticipation of surgery    Soft tissue abscess  On admission:  1 week history of inflammation with abscess opening 2 days ago  Was most currently on Ciprofloxacin, Linezolid    Plan:  - Plan as "Infection at site of external fixator " "pin"    Endocrine  Class 3 severe obesity due to excess calories with body mass index (BMI) of 40.0 to 44.9 in adult  No other co-morbidities      Final Active Diagnoses:    Diagnosis Date Noted POA    PRINCIPAL PROBLEM:  Infection at site of external fixator pin [T84.7XXA] 07/29/2023 Yes    Soft tissue abscess [L02.91] 07/29/2023 Yes    Class 3 severe obesity due to excess calories with body mass index (BMI) of 40.0 to 44.9 in adult [E66.01, Z68.41] 12/03/2018 Not Applicable      Problems Resolved During this Admission:       Discharged Condition: stable    Disposition: Another Health Care Inst*    Follow Up:    Patient Instructions:   No discharge procedures on file.    Significant Diagnostic Studies: Labs:   CMP   Recent Labs   Lab 07/28/23 2051   *   K 4.6      CO2 23   GLU 94   BUN 16   CREATININE 1.1   CALCIUM 9.3   PROT 9.2*   ALBUMIN 3.0*   BILITOT 0.8   ALKPHOS 88   AST 59*   ALT 78*   ANIONGAP 11    and CBC   Recent Labs   Lab 07/28/23 2051   WBC 10.19   HGB 11.2*   HCT 33.9*          Pending Diagnostic Studies:     None         Medications:  Transfer Medications (for Discharge Readmit only):   No current facility-administered medications for this encounter.     Current Outpatient Medications   Medication Sig Dispense Refill    ciprofloxacin HCl (CIPRO) 750 MG tablet Take 1 tablet (750 mg total) by mouth every 12 (twelve) hours. 60 tablet 1    diclofenac sodium (VOLTAREN) 1 % Gel Apply 2 g topically 4 (four) times daily. 2 g 0    furosemide (LASIX) 40 MG tablet Take 40 mg by mouth 2 (two) times daily.      gabapentin (NEURONTIN) 300 MG capsule Take 1 capsule (300 mg total) by mouth every evening. 30 capsule 1    ketoconazole (NIZORAL) 2 % cream Apply topically once daily. 60 g 3    linezolid (ZYVOX) 600 mg Tab Take 1 tablet (600 mg total) by mouth every 12 (twelve) hours. 60 tablet 1    oxyCODONE-acetaminophen (PERCOCET) 5-325 mg per tablet Take 1 tablet by mouth every 4 " (four) hours as needed for Pain.      polyethylene glycol (GLYCOLAX) 17 gram PwPk Take 17 g by mouth once daily. 30 each 1     Facility-Administered Medications Ordered in Other Encounters   Medication Dose Route Frequency Provider Last Rate Last Admin    0.9%  NaCl infusion   Intravenous Continuous Deejay Mitchell MD   New Bag at 08/17/22 0859    lidocaine (PF) 10 mg/ml (1%) injection 10 mg  1 mL Intradermal Once Deejay Mitchell MD           Indwelling Lines/Drains at time of discharge:   Lines/Drains/Airways     Airway  Duration                Airway - Non-Surgical 11/06/18 0926 Mask 1726 days                Time spent on the discharge of patient: 45 minutes         Umer Dawson MD  Department of Hospital Medicine  [facility] - Emergency Dept

## 2023-08-03 LAB
BACTERIA BLD CULT: NORMAL
BACTERIA BLD CULT: NORMAL

## 2023-08-07 ENCOUNTER — TELEPHONE (OUTPATIENT)
Dept: REHABILITATION | Facility: HOSPITAL | Age: 53
End: 2023-08-07
Payer: MEDICAID

## 2023-08-07 ENCOUNTER — TELEPHONE (OUTPATIENT)
Dept: FAMILY MEDICINE | Facility: HOSPITAL | Age: 53
End: 2023-08-07
Payer: MEDICAID

## 2023-08-07 NOTE — TELEPHONE ENCOUNTER
----- Message from Dahlia Rivera sent at 8/7/2023  8:53 AM CDT -----  Patient requested a Dr. Rodriguez for work since he is not able to work, due to his ankle's  injury,            Miss Edwards (wife) [phone]

## 2023-08-07 NOTE — TELEPHONE ENCOUNTER
LVM with wife inquiring about patient's current status and requested callback.    Rosita Viera, PT, DPT

## 2023-08-07 NOTE — TELEPHONE ENCOUNTER
Spoke with patient's wife who reported he underwent another surgical procedure for hardware removal of the ankle. Wife understands he will now need new orders to return to outpatient therapy due to change in status.    Rosita Viera, PT, DPT

## 2023-08-10 ENCOUNTER — OFFICE VISIT (OUTPATIENT)
Dept: INFECTIOUS DISEASES | Facility: CLINIC | Age: 53
End: 2023-08-10
Payer: MEDICAID

## 2023-08-10 VITALS
TEMPERATURE: 99 F | SYSTOLIC BLOOD PRESSURE: 113 MMHG | BODY MASS INDEX: 37.3 KG/M2 | HEIGHT: 72 IN | DIASTOLIC BLOOD PRESSURE: 70 MMHG

## 2023-08-10 DIAGNOSIS — S82.402K TIBIA/FIBULA FRACTURE, LEFT, CLOSED, WITH NONUNION, SUBSEQUENT ENCOUNTER: Primary | ICD-10-CM

## 2023-08-10 DIAGNOSIS — Z98.1 S/P ANKLE FUSION: ICD-10-CM

## 2023-08-10 DIAGNOSIS — S82.202K TIBIA/FIBULA FRACTURE, LEFT, CLOSED, WITH NONUNION, SUBSEQUENT ENCOUNTER: Primary | ICD-10-CM

## 2023-08-10 PROCEDURE — 3078F PR MOST RECENT DIASTOLIC BLOOD PRESSURE < 80 MM HG: ICD-10-PCS | Mod: CPTII,,, | Performed by: INTERNAL MEDICINE

## 2023-08-10 PROCEDURE — 3078F DIAST BP <80 MM HG: CPT | Mod: CPTII,,, | Performed by: INTERNAL MEDICINE

## 2023-08-10 PROCEDURE — 3074F SYST BP LT 130 MM HG: CPT | Mod: CPTII,,, | Performed by: INTERNAL MEDICINE

## 2023-08-10 PROCEDURE — 99215 PR OFFICE/OUTPT VISIT, EST, LEVL V, 40-54 MIN: ICD-10-PCS | Mod: S$PBB,,, | Performed by: INTERNAL MEDICINE

## 2023-08-10 PROCEDURE — 99417 PR PROLONGED SVC, OUTPT, W/WO DIRECT PT CONTACT,  EA ADDTL 15 MIN: ICD-10-PCS | Mod: S$PBB,,, | Performed by: INTERNAL MEDICINE

## 2023-08-10 PROCEDURE — 3008F BODY MASS INDEX DOCD: CPT | Mod: CPTII,,, | Performed by: INTERNAL MEDICINE

## 2023-08-10 PROCEDURE — 3044F PR MOST RECENT HEMOGLOBIN A1C LEVEL <7.0%: ICD-10-PCS | Mod: CPTII,,, | Performed by: INTERNAL MEDICINE

## 2023-08-10 PROCEDURE — 99215 OFFICE O/P EST HI 40 MIN: CPT | Mod: S$PBB,,, | Performed by: INTERNAL MEDICINE

## 2023-08-10 PROCEDURE — 1159F PR MEDICATION LIST DOCUMENTED IN MEDICAL RECORD: ICD-10-PCS | Mod: CPTII,,, | Performed by: INTERNAL MEDICINE

## 2023-08-10 PROCEDURE — 1159F MED LIST DOCD IN RCRD: CPT | Mod: CPTII,,, | Performed by: INTERNAL MEDICINE

## 2023-08-10 PROCEDURE — 3044F HG A1C LEVEL LT 7.0%: CPT | Mod: CPTII,,, | Performed by: INTERNAL MEDICINE

## 2023-08-10 PROCEDURE — 99417 PROLNG OP E/M EACH 15 MIN: CPT | Mod: S$PBB,,, | Performed by: INTERNAL MEDICINE

## 2023-08-10 PROCEDURE — 3008F PR BODY MASS INDEX (BMI) DOCUMENTED: ICD-10-PCS | Mod: CPTII,,, | Performed by: INTERNAL MEDICINE

## 2023-08-10 PROCEDURE — 99999 PR PBB SHADOW E&M-EST. PATIENT-LVL III: ICD-10-PCS | Mod: PBBFAC,,, | Performed by: INTERNAL MEDICINE

## 2023-08-10 PROCEDURE — 99999 PR PBB SHADOW E&M-EST. PATIENT-LVL III: CPT | Mod: PBBFAC,,, | Performed by: INTERNAL MEDICINE

## 2023-08-10 PROCEDURE — 3074F PR MOST RECENT SYSTOLIC BLOOD PRESSURE < 130 MM HG: ICD-10-PCS | Mod: CPTII,,, | Performed by: INTERNAL MEDICINE

## 2023-08-10 PROCEDURE — 99213 OFFICE O/P EST LOW 20 MIN: CPT | Mod: PBBFAC | Performed by: INTERNAL MEDICINE

## 2023-08-10 RX ORDER — LEVOFLOXACIN 750 MG/1
TABLET ORAL
COMMUNITY
Start: 2023-08-03 | End: 2023-08-17 | Stop reason: SDUPTHER

## 2023-08-14 ENCOUNTER — PATIENT MESSAGE (OUTPATIENT)
Dept: INFECTIOUS DISEASES | Facility: CLINIC | Age: 53
End: 2023-08-14
Payer: MEDICAID

## 2023-08-15 ENCOUNTER — TELEPHONE (OUTPATIENT)
Dept: INFECTIOUS DISEASES | Facility: CLINIC | Age: 53
End: 2023-08-15
Payer: MEDICAID

## 2023-08-15 NOTE — PROGRESS NOTES
Subjective:      Patient ID: Herman Floyd is a 52 y.o. male.    Chief Complaint:Follow-up      History of Present Illness    Patient see in follow up for RLE cellulitis, remote ankle fusion and infection.  No culture data available (please see my last note)    He subsequently has been see in [place] [facility] ED and was transferred to [facility] for hardware removal.  We have requested those records and they have not yet been obtained.  Called wife and she confirmed hardware was removed, but uncertain of details of case findings.    Has had PICC placed and by calling his infusion company, they indicated he is on dalvancin q weekly with 6 week course planned, levofloxacin and has f/u ortho appt with Dr. Malloy at [facility].  Have asked infusion [employer] to draw routine labs weekly and to fax to me.    AnyWhere care reviewed, nothing pertinent regarding this latest surgery available.    Objective:   Physical Exam  Vitals and nursing note reviewed.   Constitutional:       Appearance: Normal appearance.   Musculoskeletal:      Right lower leg: Edema present.      Comments: R leg swollen, desquamating skin, not unwrapped.   Neurological:      Mental Status: He is alert.       Assessment:     1. Tibia/fibula fracture, left, closed, with nonunion, subsequent encounter    2. S/P ankle fusion    Presumed infected hardware, previous significant cellulitis.    Plan:      Continue current regime per ortho surg  Awaiting record receipt  RTC near [facility] with infusion company     Time: 90 minutes   50% of time spent on face-to-face counseling and coordination of care. Counseling included review of test results, diagnosis, and treatment plan with patient and/or family.

## 2023-08-15 NOTE — TELEPHONE ENCOUNTER
Called and left message for patient to return my call asked if they could please let me know which medication it is that they need as they have zyvox and levaquin on there profile.              ----- Message from Mina Greco sent at 8/15/2023  9:39 AM CDT -----  Regarding: Advisement/medication  Contact: @[phone]  Patients wife is calling to see if the patients medication was sent out. Patients insurance company only provides a certain amount and patients wife would like someone to reach out to her.( She does not know the name of medication). Please call and advise @[phone]

## 2023-08-17 ENCOUNTER — OFFICE VISIT (OUTPATIENT)
Dept: FAMILY MEDICINE | Facility: HOSPITAL | Age: 53
End: 2023-08-17
Payer: MEDICAID

## 2023-08-17 ENCOUNTER — HOSPITAL ENCOUNTER (EMERGENCY)
Facility: HOSPITAL | Age: 53
Discharge: HOME | End: 2023-08-17
Attending: EMERGENCY MEDICINE
Payer: MEDICAID

## 2023-08-17 VITALS
RESPIRATION RATE: 16 BRPM | HEART RATE: 70 BPM | SYSTOLIC BLOOD PRESSURE: 119 MMHG | OXYGEN SATURATION: 100 % | WEIGHT: 275 LBS | DIASTOLIC BLOOD PRESSURE: 69 MMHG | TEMPERATURE: 98 F | BODY MASS INDEX: 37.3 KG/M2

## 2023-08-17 VITALS
DIASTOLIC BLOOD PRESSURE: 73 MMHG | BODY MASS INDEX: 37.32 KG/M2 | HEART RATE: 79 BPM | SYSTOLIC BLOOD PRESSURE: 118 MMHG | WEIGHT: 275.56 LBS | HEIGHT: 72 IN

## 2023-08-17 DIAGNOSIS — M79.89 PAIN AND SWELLING OF RIGHT LOWER LEG: ICD-10-CM

## 2023-08-17 DIAGNOSIS — M79.89 LEG SWELLING: ICD-10-CM

## 2023-08-17 DIAGNOSIS — M79.661 PAIN AND SWELLING OF RIGHT LOWER LEG: ICD-10-CM

## 2023-08-17 DIAGNOSIS — L03.115 CELLULITIS OF RIGHT LOWER EXTREMITY: ICD-10-CM

## 2023-08-17 DIAGNOSIS — T50.1X5A LOOP DIURETIC CAUSING ADVERSE EFFECT IN THERAPEUTIC USE, INITIAL ENCOUNTER: Primary | ICD-10-CM

## 2023-08-17 PROCEDURE — 99284 EMERGENCY DEPT VISIT MOD MDM: CPT | Mod: 25,27

## 2023-08-17 PROCEDURE — 99214 OFFICE O/P EST MOD 30 MIN: CPT

## 2023-08-17 RX ORDER — DALBAVANCIN 500 MG/25ML
INJECTION, POWDER, FOR SOLUTION INTRAVENOUS
COMMUNITY
Start: 2023-08-10

## 2023-08-17 RX ORDER — FUROSEMIDE 40 MG/1
40 TABLET ORAL 2 TIMES DAILY
Qty: 60 TABLET | Refills: 0 | Status: SHIPPED | OUTPATIENT
Start: 2023-08-17

## 2023-08-17 RX ORDER — HYDROCODONE BITARTRATE AND ACETAMINOPHEN 5; 325 MG/1; MG/1
TABLET ORAL
COMMUNITY
Start: 2023-08-03

## 2023-08-17 RX ORDER — LEVOFLOXACIN 750 MG/1
TABLET ORAL
Qty: 28 TABLET | Refills: 0 | Status: SHIPPED | OUTPATIENT
Start: 2023-08-17 | End: 2023-09-21

## 2023-08-17 RX ORDER — ASPIRIN 81 MG/1
1 TABLET ORAL DAILY
COMMUNITY

## 2023-08-17 NOTE — ED NOTES
Pt denies wants/needs at this time, SR x 1, call light remains in reach and instructed how to use.

## 2023-08-17 NOTE — ED PROVIDER NOTES
Encounter Date: 8/17/2023       History     Chief Complaint   Patient presents with    Post-op Problem     Pt was sent to the ED by PCP for possible blood clot in right leg. Pt reports having hardware taken out of his ankle on July 30, pt states he has a hard lump in his leg. Pt denies pain to the area. Area appears swollen. Denies SOB.      Patient is a 51 yo M who presents to the ED from his PCP with complaint of RLE swelling and pain.  Patient has a history of a closed fracture of the right ankle surgically repaired in 2018 underwent drainage of an infection of this wound at [facility] in early August of this year.  Being evaluated by his PCP earlier today Lasix when he mentioned that the PCP that he has been having some right lower extremity pain swelling, particularly to the right calf.  The patient was subsequently sent down to the ED to rule out a possible blood clot in the right lower extremity.  He denies any chest pain, shortness of breath fevers or chills.      Review of patient's allergies indicates:  No Known Allergies  Past Medical History:   Diagnosis Date    Closed fracture of right ankle 8/3/2018    Deafness in right ear      Past Surgical History:   Procedure Laterality Date    ANKLE FRACTURE SURGERY Right 08/2018    COLONOSCOPY N/A 8/17/2022    Procedure: COLONOSCOPY Suprep;  Surgeon: Jose Angel Leonardo MD;  Location: [facility];  Service: Endoscopy;  Laterality: N/A;    FINGER FRACTURE SURGERY       No family history on file.  Social History     Tobacco Use    Smoking status: Never    Smokeless tobacco: Never   Substance Use Topics    Alcohol use: No    Drug use: No     Review of Systems   Constitutional:  Negative for chills and fever.   Respiratory:  Negative for chest tightness and shortness of breath.    Cardiovascular:  Positive for leg swelling. Negative for chest pain and palpitations.   Gastrointestinal:  Negative for abdominal pain, diarrhea, nausea and vomiting.    Genitourinary:  Negative for dysuria and flank pain.   Musculoskeletal:  Negative for back pain and joint swelling.   Skin:  Negative for color change and rash.   Neurological:  Negative for dizziness, tremors, seizures, weakness and headaches.   Psychiatric/Behavioral:  Negative for agitation and confusion.        Physical Exam     Initial Vitals [08/17/23 1154]   BP Pulse Resp Temp SpO2   136/62 84 18 98.4 °F (36.9 °C) 99 %      MAP       --         Physical Exam    Nursing note and vitals reviewed.  Constitutional: He appears well-developed and well-nourished.   HENT:   Head: Normocephalic and atraumatic.   Right Ear: External ear normal.   Left Ear: External ear normal.   Eyes: EOM are normal. Pupils are equal, round, and reactive to light.   Neck:   Normal range of motion.  Cardiovascular:  Normal rate, regular rhythm, normal heart sounds and intact distal pulses.           Pulmonary/Chest: Breath sounds normal.   Abdominal: Abdomen is soft. Bowel sounds are normal.   Musculoskeletal:         General: Tenderness and edema present.      Cervical back: Normal range of motion.      Comments: Generalized edema to the right lower extremity; there is an area of tenderness and firmness to the medial calf; there is no overlying erythema; the right lower extremities well per is a surgical bandage over the patient's previous incision site to the right ankle; the wound appears clean dry and intact without purulent discharge on further examination     Neurological: He is alert and oriented to person, place, and time. He has normal strength. GCS score is 15. GCS eye subscore is 4. GCS verbal subscore is 5. GCS motor subscore is 6.   Skin: Skin is warm. Capillary refill takes less than 2 seconds. No erythema.   Psychiatric: He has a normal mood and affect.         ED Course   Procedures  Labs Reviewed - No data to display       Imaging Results              US Lower Extremity Veins Right (Final result)  Result time 08/17/23  14:19:56      Final result by Marcin Eaton MD (08/17/23 14:19:56)                   Impression:      No evidence of deep venous thrombosis in the right lower extremity.      Electronically signed by: Marcin Eaton MD  Date:    08/17/2023  Time:    14:19               Narrative:    EXAMINATION:  US LOWER EXTREMITY VEINS RIGHT    CLINICAL HISTORY:  Other specified soft tissue disorders    TECHNIQUE:  Duplex and color flow Doppler evaluation and graded compression of the right lower extremity veins was performed.    FINDINGS:  Duplex and color flow Doppler evaluation does not reveal any evidence of deep venous thrombosis within the right common femoral, femoral, popliteal, peroneal, posterior tibial, anterior tibial veins.  There is edema of the right calf.  The left common femoral vein is patent.                                       X-Ray Tibia Fibula 2 View Right (Final result)  Result time 08/17/23 13:42:22      Final result by Tian Villanueva MD (08/17/23 13:42:22)                   Impression:      As above.      Electronically signed by: Tian Villanueva  Date:    08/17/2023  Time:    13:42               Narrative:    EXAMINATION:  XR TIBIA FIBULA 2 VIEW RIGHT    CLINICAL HISTORY:  Other specified soft tissue disorders    TECHNIQUE:  AP and lateral views of the right tibia and fibula were performed.    COMPARISON:  08/18/2022    FINDINGS:  Similar chronic deformity at the distal tibia and fibula near the ankle with tibiotalar fusion changes.  Multiple rounded lucencies at the mid tibial shaft and distal tibia with appearance suggesting ghost tracks from prior hardware removal.  No definite acute fracture or new hunter osseous destruction.  Soft tissue swelling about the ankle and distal lower extremity.                                       Medications - No data to display  Medical Decision Making:   Initial Assessment:   Patient is a 52-year-old male presents to the ED with a right swelling firmness;  patient underwent incision and drainage of reported abscess to the right ankle on 08/03; concern for possible DVT; will obtain plain radiograph and formal venous ultrasound of the right lower extremity  Differential Diagnosis:   DVT, dependent edema, Baker's cyst, PVD, PAD, cellulitis   Clinical Tests:   Lab Tests: Ordered and Reviewed  Radiological Study: Ordered and Reviewed  ED Management:  - plain radiograph of the RLE negative for acute osseous abnormality, gas or other acute abnormality per my independent abnormality  - formal ultrasound of the RLE demonstrates no DVT or other acute finding  - no emergent intervention indicated at this time  - pt encouraged to take antibiotics and Lasix as prescribed  - No further intervention is indicated at this time after having taken into account the patient's history, physical exam findings, and empirical and objective data obtained during the patient's emergency department workup.   - The patient is at low risk for an emergent medical condition at this time, and I am of the belief that that it is safe to discharge the patient from the emergency department.   - The patient is instructed to follow up as outpatient as indicated on the discharge paperwork.    - I have discussed the specifics of the workup with the patient and the patient has verbalized understanding of the details of the workup, the diagnosis, the treatment plan, and the need for outpatient follow-up.    - Although the patient has no emergent etiology today this does not preclude the development of an emergent condition so, in addition, I have advised the patient that they can return to the ED and/or activate EMS at any time with worsening of their symptoms, change of their symptoms, or with any other medical complaint.    - The patient remained comfortable and stable during their visit in the ED.    - Discharge and follow-up instructions discussed with the patient who expressed understanding and willingness  to comply with my recommendations.  - Results of all emergency department tests  discussed thoroughly with patient; all patient questions answered; pt in agreement with plan  - Pt instructed to follow up with PCP in 2-3 days for recheck of today's complaints  - Pt given strict emergency department return precautions for any new or worsening of symptoms  - Pt discharged from the emergency department in stable condition, in no acute distress                 ED Course as of 08/17/23 1524   u Aug 17, 2023   1359 X-Ray Tibia Fibula 2 View Right  No acute osseous abnormality; no gas per my interpretation.  [LC]   1523 US Lower Extremity Veins Right  No findings of deep vein thrombosis per final radiology read.  [LC]      ED Course User Index  [LC] Juan M Galeana MD                 Clinical Impression:   Final diagnoses:  [M79.89] Leg swelling        ED Disposition Condition    Discharge Stable          ED Prescriptions    None       Follow-up Information       Follow up With Specialties Details Why Contact Info    Brenton Zhao MD Family Medicine Schedule an appointment as soon as possible for a visit   [address], [facility] [address]  [facility] [id number] [phone]               Juan M Galeana MD  08/17/23 1521       Juan M Galeana MD  08/17/23 1527

## 2023-08-17 NOTE — PROGRESS NOTES
[facility]  History & Physical    SUBJECTIVE:     Chief Complaint:   F/u R ankle cellulitis/abscess      History of Present Illness:  52 y.o. male who  has a past medical history of Closed fracture of right ankle (8/3/2018) with DVT and PE following and Deafness in right ear. presents to clinic today for follow up drainage of ankle abscesses at [facility] on 8/3/23. He denies fevers and states that pain is largely improving without discharge from site. He requests refills of levoquin (until 9/14s) and lasix. Patient still following with Ortho and ID.  He adds complaints firmness to medial right calf. He states it is mildly painful when walking. Denies CP/SOB.  Denies smoking, states he is trying to stay mobile, but ambulates with crutches. Patient has previously been on Eliquis for provoked PE s/p surgery.  He is amenable to going to Emergency for DVT US.        Allergies:  Review of patient's allergies indicates:  No Known Allergies    Home Medications:  Current Outpatient Medications on File Prior to Visit   Medication Sig    aspirin (ECOTRIN) 81 MG EC tablet Take 1 tablet by mouth once daily.    DALVANCE 500 mg Soln injection Inject into the vein.    ketoconazole (NIZORAL) 2 % cream Apply topically once daily.    linezolid (ZYVOX) 600 mg Tab Take 1 tablet (600 mg total) by mouth every 12 (twelve) hours.    polyethylene glycol (GLYCOLAX) 17 gram PwPk Take 17 g by mouth once daily.    [DISCONTINUED] furosemide (LASIX) 40 MG tablet Take 40 mg by mouth 2 (two) times daily.    [DISCONTINUED] levoFLOXacin (LEVAQUIN) 750 MG tablet TAKE 1 TABLET BY MOUTH DAILY FOR 6 WEEKS    diclofenac sodium (VOLTAREN) 1 % Gel Apply 2 g topically 4 (four) times daily. (Patient not taking: Reported on 8/17/2023)    gabapentin (NEURONTIN) 300 MG capsule Take 1 capsule (300 mg total) by mouth every evening.    HYDROcodone-acetaminophen (NORCO) 5-325 mg per tablet TAKE 1 TABLET BY MOUTH EVERY 6 HOURS AS NEEDED AS DIRECTED FOR PAIN  SCALE 4 TO 6    oxyCODONE-acetaminophen (PERCOCET) 5-325 mg per tablet Take 1 tablet by mouth every 4 (four) hours as needed for Pain.    [DISCONTINUED] ciprofloxacin HCl (CIPRO) 750 MG tablet Take 1 tablet (750 mg total) by mouth every 12 (twelve) hours. (Patient not taking: Reported on 8/17/2023)     Current Facility-Administered Medications on File Prior to Visit   Medication    0.9%  NaCl infusion    lidocaine (PF) 10 mg/ml (1%) injection 10 mg       Past Medical History:   Diagnosis Date    Closed fracture of right ankle 8/3/2018    Deafness in right ear      Past Surgical History:   Procedure Laterality Date    ANKLE FRACTURE SURGERY Right 08/2018    COLONOSCOPY N/A 8/17/2022    Procedure: COLONOSCOPY Suprep;  Surgeon: Jose Angel Leonardo MD;  Location: [facility];  Service: Endoscopy;  Laterality: N/A;    FINGER FRACTURE SURGERY       History reviewed. No pertinent family history.  Social History     Tobacco Use    Smoking status: Never    Smokeless tobacco: Never   Substance Use Topics    Alcohol use: No    Drug use: No        Review of Systems   Constitutional:  Negative for chills and fever.   Respiratory:  Negative for cough and shortness of breath.    Cardiovascular:  Positive for leg swelling. Negative for chest pain, palpitations and claudication.   Gastrointestinal:  Negative for abdominal pain, heartburn, nausea and vomiting.   Genitourinary:  Negative for dysuria and urgency.   Musculoskeletal:  Positive for joint pain and myalgias.   Neurological:  Negative for dizziness and headaches.        OBJECTIVE:     Vital Signs:  Pulse: 79 (08/17/23 1043)  BP: 118/73 (08/17/23 1043)    Physical Exam  Constitutional:       Appearance: He is obese.   HENT:      Head: Normocephalic and atraumatic.   Cardiovascular:      Rate and Rhythm: Normal rate and regular rhythm.      Pulses: Normal pulses.      Heart sounds: Normal heart sounds.   Pulmonary:      Effort: Pulmonary effort is normal.   Abdominal:      General:  Abdomen is flat.      Palpations: Abdomen is soft.      Tenderness: There is no abdominal tenderness.   Musculoskeletal:         General: Tenderness present.      Right lower leg: Edema present.      Left lower leg: Edema present.      Comments: R lower extremity swelling, tenderness and pitting edema with firm area to medial calf... wound per MEDIA in chart  NV intact   Skin:     Findings: Bruising, erythema and lesion present.   Neurological:      Mental Status: He is alert and oriented to person, place, and time.      Sensory: No sensory deficit.      Gait: Gait abnormal.      Comments: Antalgic gait  NV intact to RLE   Psychiatric:         Mood and Affect: Mood normal.         Behavior: Behavior normal.         Thought Content: Thought content normal.         Judgment: Judgment normal.         A/P:  Herman was seen today for follow-up.    Diagnoses and all orders for this visit:    Loop diuretic causing adverse effect in therapeutic use, initial encounter  -     Comprehensive Metabolic Panel; Future    Cellulitis of right lower extremity  Pain and swelling of right lower leg   - Refer to EMERGENCY for DVT US   - Continue to follow with ORTHO and ID    Other orders  -     levoFLOXacin (LEVAQUIN) 750 MG tablet; TAKE 1 TABLET BY MOUTH DAILY FOR 6 WEEKS  -     furosemide (LASIX) 40 MG tablet; Take 1 tablet (40 mg total) by mouth 2 (two) times daily.            DUE AT NEXT/FUTURE VISIT:  Results of DVT US  Consider anti-coagulation        No follow-ups on file.      Lawrence Richards  [facility], PGY-2  08/17/2023    This note was partially created using [employer] Voice Recognition software. Typographical and content errors may occur with this process. While efforts are made to detect and correct such errors, in some cases errors will persist. For this reason, wording in this document should be considered in the proper context and not strictly verbatim     The following information is provided to all  patients.  This information is to help you find resources for any of the problems found today that may be affecting your health:                Living healthy guide: www.[facility].[place].[facility]       Understanding Diabetes: www.diabetes.org       Eating healthy: www.cdc.gov/healthyweight      CDC home safety checklist: www.cdc.gov/steadi/patient.html      Agency on Aging: www.goea.[place].[facility]       Alcoholics anonymous (AA): www.aa.org      Physical Activity: www.[place].nih.gov/ad4xtvt       Tobacco use: www.quitwithusla.org

## 2023-08-17 NOTE — DISCHARGE INSTRUCTIONS
No findings to suggest deep vein thrombosis (blood clot) in your leg.     Please take medications as prescribed.    Please follow up with your primary care physician.

## 2023-08-17 NOTE — ED NOTES
Pt presents to ED for possible blood clot, sent to ED by PCP. Pt has hx of clots to RLE, states felt hardened area that has spread. Denies CP, SOB, pain to extremity. Pt has 2+ pedal pulses, denies numbness/tingling, able to wiggle toes.     Patient identifiers verified by spelling and stated name on armband along with .    Review of patient's allergies indicates:  No Known Allergies    APPEARANCE: Alert, oriented x 4, and in no acute distress.  NEURO: 5/5 strength major flexors/extensors bilaterally. Sensory intact to light touch bilaterally. Sasha coma scale: eyes open spontaneously-4, oriented & converses-5, obeys commands-6. No neurological abnormalities. Denies HA, dizziness, photophobia.  CARDIAC: Normal rate and rhythm through apical/radial pulse, S1 and S2 noted, denies CP.   RESPIRATORY:Normal rate and effort, breath sounds clear bilaterally throughout chest anterior and posterior. Respirations are equal and unlabored, no obvious signs of distress. No SOB reported.  PERIPHERAL VASCULAR: +2 peripheral pulses present. Normal cap refill <3sec. No edema. Warm to touch.   GASTRO: Abdomen soft, flat, bowel sounds normal and active in all 4 quadrants, no tenderness, no abdominal distention. Denies NVD.  MUSC: +ambulates with crutches No obvious deformity.  SKIN: +RLE shin wound without bleeding.  GENITOURINARY: Voids spontaneously, denies itching, burning, hematuria.

## 2023-08-21 ENCOUNTER — TELEPHONE (OUTPATIENT)
Dept: FAMILY MEDICINE | Facility: HOSPITAL | Age: 53
End: 2023-08-21
Payer: MEDICAID

## 2023-08-21 NOTE — TELEPHONE ENCOUNTER
----- Message from Brigette Flores sent at 8/18/2023  3:13 PM CDT -----  Regarding: FW: prior authorization  Pt Wife called back again for the pt medicine... levoFLOXacin (LEVAQUIN) 750 MG tablet.. pt wife said her  really needs this medicine. Pharmacy needs a prior authorization.. call back # [phone]  ----- Message -----  From: Brigette Flores  Sent: 8/18/2023  12:08 PM CDT  To: [facility] Staff  Subject: prior authorization                              Pt wife ,Grace,called and said the pt needs a prior authorization for the full amount of the prescription to be release... levoFLOXacin (LEVAQUIN) 750 MG ...call this # for the verbal authorization...  [id number].. wife call back # [phone].left. pt wife said the pt needs the medicine bad and would like for it to be sent today...[facility]

## 2023-09-12 DIAGNOSIS — M25.571 CHRONIC PAIN OF RIGHT ANKLE: ICD-10-CM

## 2023-09-12 DIAGNOSIS — G89.29 CHRONIC PAIN OF RIGHT ANKLE: ICD-10-CM

## 2023-09-12 DIAGNOSIS — M79.671 ACUTE PAIN OF RIGHT FOOT: Primary | ICD-10-CM

## 2023-09-12 DIAGNOSIS — M79.671 ACUTE PAIN OF RIGHT FOOT: ICD-10-CM

## 2023-09-12 DIAGNOSIS — Z98.890 STATUS POST HARDWARE REMOVAL: ICD-10-CM

## 2023-09-12 DIAGNOSIS — Z98.890 STATUS POST HARDWARE REMOVAL: Primary | ICD-10-CM

## 2023-09-13 ENCOUNTER — CLINICAL SUPPORT (OUTPATIENT)
Dept: REHABILITATION | Facility: HOSPITAL | Age: 53
End: 2023-09-13
Payer: MEDICAID

## 2023-09-13 DIAGNOSIS — G89.29 CHRONIC PAIN OF RIGHT ANKLE: ICD-10-CM

## 2023-09-13 DIAGNOSIS — Z98.890 STATUS POST HARDWARE REMOVAL: ICD-10-CM

## 2023-09-13 DIAGNOSIS — M79.671 ACUTE PAIN OF RIGHT FOOT: ICD-10-CM

## 2023-09-13 DIAGNOSIS — M25.571 CHRONIC PAIN OF RIGHT ANKLE: ICD-10-CM

## 2023-09-13 PROCEDURE — 97161 PT EVAL LOW COMPLEX 20 MIN: CPT | Mod: PN

## 2023-09-13 PROCEDURE — 97110 THERAPEUTIC EXERCISES: CPT | Mod: PN

## 2023-09-13 NOTE — PLAN OF CARE
"[facility]  Physical Therapy Initial Evaluation    Date: 9/13/2023   Name: Herman GUERRERO [facility] Number: [id number]    Therapy Diagnosis:   Encounter Diagnoses   Name Primary?    Acute pain of right foot     Chronic pain of right ankle     Status post hardware removal      Physician: Ruthann Champion PA    Physician Orders: PT Eval and Treat  Medical Diagnosis from Referral:   M79.671 (ICD-10-CM) - Acute pain of right foot   M25.571,G89.29 (ICD-10-CM) - Chronic pain of right ankle   Z98.890 (ICD-10-CM) - Status post hardware removal     Evaluation Date: 9/13/2023  Authorization Period Expiration: 10/13/2023  Plan of Care Expiration: 11/10/23  Progress Note Due: 10/10/23  Visit # / Visits authorized: 1/1  FOTO: 1/5    Precautions: Standard, deafness in right ear, right Talocrual and calcaneal bony fusion    Time In: 10:20 AM  Time Out: 11:00 AM  Total Appointment Time (timed & untimed codes): 40 minutes (1 TE, 1 LCE)    SUBJECTIVE   Date of onset: 7/30/23    History of current condition - Herman reports: he received surgery to removal all right ankle hardware on 7/30/23 by Dr. Marcum at [facility], stayed in hospital for 1-2 days, did some walking in hospital with RW, and was discharged to home. His right ankle bony fusion remains after hardware removal and was deemed stable. He is WBAT, and using his quad cane and his regular edema right lower leg wrap. 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 He was in [place] when he developed a fever. Tried to "sweat it out" but then leg and foot began to swell on Monday morning. Woke up on Tuesday morning with worsened foot swelling. Admitted to hospital in [place]. Diagnosed with cellulitis and started on course of antibiotics. Wife is trying to set up a follow up with surgeon as soon as possible. He has however followed up with primary care (and did follow up with infectious diseases " after this visit with PT). Admitted to the hospital for almost one week. Using crutches for mobility because he is pain limited in weight bearing. He has had 6 total surgeries to right ankle for old work injury that occurred in August 2018. Most recent surgery was ankle fusion in January 2020.     2/18/2020 PT evaluation: Prior Sx performed: INTRAMEDULLARY NAIL PLACEMENT LOWER EXTREMITY TTC NAIL 12/10/2019  History of current condition - Herman reports: that he has had 6 total surgeries to the R ankle since his injury in August of 2018. Most recent Sx in January of 2020 - ankle fusion. PT reports no complications; does however reports Hx of blood clots with prior ankle Sx. Reports since Sx that he has been fairly ok, reports that is currently NWB for 6-12 weeks from Sx. Reports he is supposed to potentially be able to WB at his next MD f/u.     Falls: none     Imaging, see EMR    Prior Therapy: yes from [id number]  Social History: [facility]  Occupation: , off of work for now since surgery  Prior Level of Function: chronic right ankle hypomobility since fusion  Current Level of Function: difficulty with WB and standing/walking activities    Pain:  Current 4/10, worst 6/10, best 2/10   Location: right ankle  Description: Aching, Dull, and Tight  Aggravating Factors: walking/standing activities  Easing Factors: sitting down    Patients goals: to get back to walking without quad cane and to work     Medical History:   Past Medical History:   Diagnosis Date    Closed fracture of right ankle 8/3/2018    Deafness in right ear        Surgical History:   Herman Floyd  has a past surgical history that includes Finger fracture surgery; Ankle fracture surgery (Right, 08/2018); and Colonoscopy (N/A, 8/17/2022).    Medications:   Herman has a current medication list which includes the following prescription(s): aspirin, dalvance, diclofenac sodium, furosemide, gabapentin, hydrocodone-acetaminophen, ketoconazole,  levofloxacin, linezolid, oxycodone-acetaminophen, and polyethylene glycol, and the following Facility-Administered Medications: sodium chloride 0.9% and lidocaine (pf) 10 mg/ml (1%).    Allergies:   Review of patient's allergies indicates:  No Known Allergies     OBJECTIVE     Gait: decreased RLE WB while using quad cane in left hand  Posture: left trunk shift on LLE  Sensation: WNL  Palpation: +tender to palpation at anterior aspect of TCJ and dorsum of foot at 1-3 cuneiforms     A/PROM and MMT  * = right ankle pain with testing  NT = Not tested  Hip  Right   Left  Pain/Dysfunction with Movement    AROM PROM MMT AROM PROM MMT    Flexion 100 105 4/5 110 115 4/5    Extension NT NT 4/5 NT NT 4/5    Abduction WFL WFL 3+/5 WFL WFL 4-/5    Adduction WFL WFL NT WFL WFL NT    Internal rotation WFL WFL NT WFL WFL NT    External rotation WFL WFL NT WFL WFL NT       Knee  Right   Left  Pain/Dysfunction with Movement    AROM PROM MMT AROM PROM MMT    Flexion (140 deg) WFL WFL 4-/5 WFL WFL 4+/5    Extension (0-5 deg) WFL WFL 4-/5 WFL WFL 4/5      Ankle  Right   Left  Pain/Dysfunction with Movement    AROM PROM MMT AROM PROM MMT    Great toe (45/70 deg) -- -- 5/5 -- -- 5/5    Plantarflexion (50 deg 13 14 NT WNL WNL 5/5    Dorsiflexion (20 deg) -3 -2 NT WNL WNL 5/5    Inversion (20-30 deg) 4 5 NT WNL WNL 5/5    Eversion (5-10 deg) 4 5 NT WNL WNL 5/5      DL heel raise assessment = NT  SL heel raise assessment = NT    Special tests:  Anterior Drawer Test = TCJ bony fusion present  Calcaneofibular ligament stress test = NT  Interdigital Neuroma Test = negative B  Talar Tilit Test = not tested  Navicular drop test = not tested   (Difference of >10 mm is considered significant excessive foot pronation.)  Tinel's Sign Test (tarsal tunnel syndrome) = not tested  Blair test = no tested     Limitation/Restriction for FOTO Ankle Survey    Therapist reviewed FOTO scores for Herman Floyd on 9/13/2023.   FOTO documents entered into  EPIC - see Media section.    Limitation Score: 56%  Predicted: 38%       TREATMENT   Total Treatment time (time-based codes) separate from Evaluation: 8 minutes      Herman received the treatments listed below:      therapeutic exercises to develop strength, endurance, ROM, flexibility, posture, and core stabilization for 8 minutes including:  Ankle circles: 5x right  Standing left to right weight shifts: 10x right with quad cane    PATIENT EDUCATION AND HOME EXERCISES     Home Exercises and Patient Education Provided:  Education provided:   - proper foot wear  - course of therapy, prognosis  - importance of HEP    Home Exercises Provided: yes.  Exercises were reviewed and Herman was able to demonstrate them prior to the end of the session.  Herman demonstrated good  understanding of the education provided.     See EMR under Patient Instructions for exercises provided 9/13/2023.    ASSESSMENT   Herman is a 52 y.o. male referred to outpatient Physical Therapy with a medical diagnosis of Acute pain of right foot, Status post hardware removal, and Chronic pain of right ankle presenting to PT at [facility]. Pt currently presents with right ankle pain, decreased right ankle ROM, decreased RLE strength, impaired posture, impaired balance and gait, and functional deficits with standing/walking/stairs. Therapy will focus primarily in restoring his normal RLE WB, walking without AD,    Patient prognosis is Good.   Patient will benefit from skilled outpatient Physical Therapy to address the deficits stated above and in the chart below, provide patient /family education, and to maximize patientt's level of independence.     Plan of care discussed with patient: Yes  Pt's spiritual, cultural and educational needs considered and patient is agreeable to the plan of care and goals as stated below:     Anticipated Barriers for therapy: right talocrural joint fusion, right foot osteoarthritis    Medical  Necessity is demonstrated by the following  History  Co-morbidities and personal factors that may impact the plan of care Co-morbidities:   None     Personal Factors:   no deficits     low   Examination  Body Structures and Functions, activity limitations and participation restrictions that may impact the plan of care Body Regions:   back  lower extremities  trunk    Body Systems:    gross symmetry  ROM  strength  gross coordinated movement  balance  gait  transfers  transitions  motor control  edema    Participation Restrictions:   working    Activity limitations:   Learning and applying knowledge  no deficits    General Tasks and Commands  no deficits    Communication  no deficits    Mobility  lifting and carrying objects  walking    Self care  no deficits    Domestic Life  doing house work (cleaning house, washing dishes, laundry)    Interactions/Relationships  no deficits    Life Areas  no deficits    Community and Social Life  no deficits         high   Clinical Presentation stable and uncomplicated low   Decision Making/ Complexity Score: low     GOALS: Short Term Goals:  4 weeks  1. Report decreased right ankle pain </= 1/10 to increase tolerance for ADLs.  2. Increase right ankle AROM by 2 degrees in each plane in order to walk with min to no compensation.  3. Pt will demo good sitting and standing posture for improved spine and joint alignment for improved biomechanics.  4. Pt to tolerate HEP to improve ROM and independence with ADL's.    Long Term Goals: 8 weeks  1. Report decreased right ankle pain </= 1/10 with stair stepping and squatting to increase tolerance for increased QoL and improved ADLs.  2. Patient goal: to get back to walking without quad cane and to work  3. Increase strength to >/= 4/5 for BLE to increase tolerance for ADL and work activities.  4. Pt will report at </= 38% impaired on ANKLE FOTO score to demo increased functional mobility.   5. Pt will be able to ambulate community  distances and negotiate stairs with minimal ankle pain for increased functional mobility and QoL.  6. Pt will be able to ambulate all surfaces without AD use for increased functional independence.     PLAN   Plan of care Certification: 9/13/2023 to 11/10/23.    Outpatient Physical Therapy 2 times weekly for 8 weeks to include the following interventions: Cervical/Lumbar Traction, Electrical Stimulation, Gait Training, Manual Therapy, Moist Heat/ Ice, Neuromuscular Re-ed, Orthotic Management and Training, Patient Education, Self Care, Therapeutic Activities, and Therapeutic Exercise.     Nate Ruiz, PT      I CERTIFY THE NEED FOR THESE SERVICES FURNISHED UNDER THIS PLAN OF TREATMENT AND WHILE UNDER MY CARE   Physician's comments:     Physician's Signature: ___________________________________________________

## 2023-09-14 ENCOUNTER — PATIENT MESSAGE (OUTPATIENT)
Dept: INFECTIOUS DISEASES | Facility: CLINIC | Age: 53
End: 2023-09-14
Payer: MEDICAID

## 2023-09-18 ENCOUNTER — CLINICAL SUPPORT (OUTPATIENT)
Dept: REHABILITATION | Facility: HOSPITAL | Age: 53
End: 2023-09-18
Payer: MEDICAID

## 2023-09-18 DIAGNOSIS — M25.671 ANKLE STIFFNESS, RIGHT: Primary | ICD-10-CM

## 2023-09-18 DIAGNOSIS — Z74.09 DECREASED MOBILITY AND ENDURANCE: ICD-10-CM

## 2023-09-18 PROCEDURE — 97110 THERAPEUTIC EXERCISES: CPT | Mod: PN

## 2023-09-18 NOTE — PROGRESS NOTES
[facility]   Physical Therapy Treatment Note     Name: Herman GUERRERO [facility] Number: [id number]    Therapy Diagnosis:   Encounter Diagnoses   Name Primary?    Ankle stiffness, right Yes    Decreased mobility and endurance      Physician: Ruthann Champion PA    Visit Date: 9/18/2023    Physician Orders: PT Eval and Treat  Medical Diagnosis from Referral:   M79.671 (ICD-10-CM) - Acute pain of right foot   M25.571,G89.29 (ICD-10-CM) - Chronic pain of right ankle   Z98.890 (ICD-10-CM) - Status post hardware removal    Evaluation Date: 9/13/2023  Authorization Period Expiration: 10/13/2023  Plan of Care Expiration: 11/10/23  Progress Note Due: 10/10/23  Visit # / Visits authorized: 2/1  FOTO: 2/5  PTA Visit #: 0/5      Precautions: Standard, deafness in right ear, right Talocrual and calcaneal bony fusion    Time In: 9:05 AM  Time Out: 10:00 AM  Total Billable Time: 55 minutes (4 TE) - Medicaid    SUBJECTIVE     Pt reports: mild right heel pain when he is standing and walking alot. Uses the motorized cart at the grocery store  He was compliant with home exercise program.  Response to previous treatment: 1st after  Functional change: 1st after    Pain: 2/10  Location: right ankle    OBJECTIVE     Objective Measures updated at progress report unless specified.     Treatment     Herman received the treatments listed below:      manual therapy techniques: Joint mobilizations, Manual traction, Myofacial release, Soft tissue Mobilization, and Friction Massage were applied to the: right foot for 00 minutes, including:  none    therapeutic exercises to develop strength, endurance, ROM, flexibility, posture, and core stabilization for 35 minutes including:  Straight leg raise: 2x10 right  Sidelying hip abduction: 2x10 right  Heel slides: 3x10 right, 3#    therapeutic activities to improve functional performance for 10  minutes, including:  Lateral step ups: 2x10 right, 4 inch step    Gait training for  10 minutes includin'x2 with quad cane at Mod I  Left to right weight shift + holds: 20x    Patient Education and Home Exercises     Home Exercises Provided and Patient Education Provided   Education provided:   - proper foot wear  - course of therapy, prognosis  - importance of HEP    Written Home Exercises Provided: yes. Exercises were reviewed and Herman was able to demonstrate them prior to the end of the session.  Herman demonstrated good  understanding of the education provided. See EMR under Patient Instructions for exercises provided during therapy sessions    ASSESSMENT     Herman is a 52 y.o. male referred to outpatient Physical Therapy with a medical diagnosis of Acute pain of right foot, Status post hardware removal, and Chronic pain of right ankle presenting to PT at [facility].  Pt did well and had to initiate more Bilateral hip and quad strengthening than planned due to difficulty with transitions to stand and complaints of right leg fatigue. Caution with heavy right hip flexor and closed pack hip activities due to mild right hip irritation. History of right sided sciatica secondary to right ankle fusions. Continue weight bearing progression as priority.     Herman Is progressing well towards his goals.   Pt prognosis is Good.     Pt will continue to benefit from skilled outpatient physical therapy to address the deficits listed in the problem list box on initial evaluation, provide pt/family education and to maximize pt's level of independence in the home and community environment.     Pt's spiritual, cultural and educational needs considered and pt agreeable to plan of care and goals.     Anticipated barriers to physical therapy: right talocrural joint fusion, right foot osteoarthritis    Goals:   GOALS: Short Term Goals:  4 weeks  1. Report decreased right ankle pain </= 1/10 to increase tolerance for ADLs.  2. Increase right ankle AROM by 2 degrees in each plane in  order to walk with min to no compensation.  3. Pt will demo good sitting and standing posture for improved spine and joint alignment for improved biomechanics.  4. Pt to tolerate HEP to improve ROM and independence with ADL's.     Long Term Goals: 8 weeks  1. Report decreased right ankle pain </= 1/10 with stair stepping and squatting to increase tolerance for increased QoL and improved ADLs.  2. Patient goal: to get back to walking without quad cane and to work  3. Increase strength to >/= 4/5 for BLE to increase tolerance for ADL and work activities.  4. Pt will report at </= 38% impaired on ANKLE FOTO score to demo increased functional mobility.   5. Pt will be able to ambulate community distances and negotiate stairs with minimal ankle pain for increased functional mobility and QoL.  6. Pt will be able to ambulate all surfaces without AD use for increased functional independence.     PLAN     Cont per POC. Focusing on improving RLE WB.    Nate Ruiz, PT

## 2023-09-19 NOTE — PROGRESS NOTES
Infectious Disease Clinic Note  09/21/2023       Subjective:       Patient ID: Herman Floyd is a 52 y.o. male being seen for an new visit.    Chief Complaint: No chief complaint on file.    HPI    Mr. Floyd is a 53y/o M pt with hx of R ankle fracture s/p multiple surgeries ([id number] including pins, screws, garcia x 9 months, in 2020 R ankle surgery with internal harware fixation) c/b cellulitis and hardware infection who presents for follow-up.     Seen in ID clinic by Dr. Villa on 7/27 for RLE cellulitis. He was prescribed linezolid and cipro. Was then admitted to [facility] where he underwent RLE ankle surgery with hardware removal on 7/30. Discharged home on 8/3/23. Per pt, he presented to infusion plus the next day and was started on weekly IV dalvance 500mg on 8/4 for 6 wks- which he has now completed. He was also started on oral levaquin on 8/24- which he is still taking.     Saw ortho 9/11. Wound healing well.     Interval hx:  Reports feeling well. Denies fevers, chills, sweats or other signs or symptoms of systemic infection. Is still taking levaquin and is tolerating well. Reports RLE edema is still present. Surgical wound has healed and there is no drainage. Has been working with PT. Of note, has RLE US that ruled out DVTon 8/17.    Per chart, our clinic was unable to obtain outside records. Pt does not have records with him or access to them. He is unsure if he had any positive surgical cultures.     No family history on file.  Social History     Socioeconomic History    Marital status:    Tobacco Use    Smoking status: Never    Smokeless tobacco: Never   Substance and Sexual Activity    Alcohol use: No    Drug use: No    Sexual activity: Yes     Past Surgical History:   Procedure Laterality Date    ANKLE FRACTURE SURGERY Right 08/2018    COLONOSCOPY N/A 8/17/2022    Procedure: COLONOSCOPY Suprep;  Surgeon: Jose Angel Leonardo MD;  Location: [facility];  Service: Endoscopy;   Laterality: N/A;    FINGER FRACTURE SURGERY         Patient's Medications   New Prescriptions    No medications on file   Previous Medications    ASPIRIN (ECOTRIN) 81 MG EC TABLET    Take 1 tablet by mouth once daily.    DALVANCE 500 MG SOLN INJECTION    Inject into the vein.    DICLOFENAC SODIUM (VOLTAREN) 1 % GEL    Apply 2 g topically 4 (four) times daily.    FUROSEMIDE (LASIX) 40 MG TABLET    Take 1 tablet (40 mg total) by mouth 2 (two) times daily.    GABAPENTIN (NEURONTIN) 300 MG CAPSULE    Take 1 capsule (300 mg total) by mouth every evening.    HYDROCODONE-ACETAMINOPHEN (NORCO) 5-325 MG PER TABLET    TAKE 1 TABLET BY MOUTH EVERY 6 HOURS AS NEEDED AS DIRECTED FOR PAIN SCALE 4 TO 6    KETOCONAZOLE (NIZORAL) 2 % CREAM    Apply topically once daily.    LEVOFLOXACIN (LEVAQUIN) 750 MG TABLET    TAKE 1 TABLET BY MOUTH DAILY FOR 6 WEEKS    LINEZOLID (ZYVOX) 600 MG TAB    Take 1 tablet (600 mg total) by mouth every 12 (twelve) hours.    OXYCODONE-ACETAMINOPHEN (PERCOCET) 5-325 MG PER TABLET    Take 1 tablet by mouth every 4 (four) hours as needed for Pain.    POLYETHYLENE GLYCOL (GLYCOLAX) 17 GRAM PWPK    Take 17 g by mouth once daily.   Modified Medications    No medications on file   Discontinued Medications    No medications on file       Patient Active Problem List    Diagnosis Date Noted    Acute pain of right foot 09/13/2023    Chronic pain of right ankle 09/13/2023    Status post hardware removal 09/13/2023    Soft tissue abscess 07/29/2023    Infection at site of external fixator pin 07/29/2023    Impaired functional mobility and activity tolerance 07/28/2023    Physical deconditioning 07/26/2023    Cellulitis of right lower extremity 07/26/2023    Constipation 07/26/2023    Sciatica of right side 08/25/2022    Decreased mobility and endurance 03/11/2021    Ankle stiffness, right 02/19/2020    Edema 09/26/2019    S/P ankle fusion 12/10/2018    Class 3 severe obesity due to excess calories with body mass index  (BMI) of 40.0 to 44.9 in adult 12/03/2018    Tibia/fibula fracture, left, closed, with nonunion, subsequent encounter 11/28/2018     Formatting of this note might be different from the original.  Added automatically from request for surgery [id number]      Leg pain 11/06/2018    Chest pain     Shortness of breath     Other pulmonary embolism without acute cor pulmonale 09/18/2018           CTA 9/18/2018   Bilateral PE   No cor pulmonale   No DVT   Treated with Eliquis           CTA 11/9/2018   Resolution of PE         Review of Systems   Review of Systems   Constitutional:  Negative for chills and fever.   Cardiovascular:  Negative for chest pain.   Gastrointestinal:  Negative for diarrhea.   Musculoskeletal:         +RLE edema   All other systems reviewed and are negative.          Objective:      /69   Pulse 77   Temp 98.4 °F (36.9 °C)   Wt 128.5 kg (283 lb 4.7 oz)   BMI 38.42 kg/m²   Estimated body mass index is 38.42 kg/m² as calculated from the following:    Height as of 8/17/23: 6' (1.829 m).    Weight as of this encounter: 128.5 kg (283 lb 4.7 oz).    Physical Exam  Vitals and nursing note reviewed.   Constitutional:       General: He is not in acute distress.     Appearance: Normal appearance. He is obese. He is not ill-appearing.   HENT:      Head: Normocephalic and atraumatic.      Nose: Nose normal. No congestion.      Mouth/Throat:      Mouth: Mucous membranes are moist.      Pharynx: Oropharynx is clear.   Eyes:      Conjunctiva/sclera: Conjunctivae normal.      Pupils: Pupils are equal, round, and reactive to light.   Cardiovascular:      Rate and Rhythm: Normal rate and regular rhythm.   Pulmonary:      Effort: Pulmonary effort is normal. No respiratory distress.      Breath sounds: Normal breath sounds.   Abdominal:      General: Abdomen is flat. There is no distension.      Palpations: Abdomen is soft.   Musculoskeletal:         General: No swelling. Normal range of motion.      Cervical back:  Normal range of motion and neck supple.   Skin:     General: Skin is warm and dry.      Comments: RLE > LLE edema. Surgical wound is healing well. No drainage. See image   Neurological:      General: No focal deficit present.      Mental Status: He is alert and oriented to person, place, and time.   Psychiatric:         Mood and Affect: Mood normal.         Behavior: Behavior normal.         Assessment:         1. Cellulitis of right lower extremity              Plan:       Diagnoses and all orders for this visit:    Cellulitis of right lower extremity  51y/o M with complicated surgical hx following R ankle fracture c/b cellulitis and hardware infection now s/p removal 8/30 presents for f/u. Unfortunately no surgical cx / med record available for review. Per ortho note, all hardware has been removed. Has completed 6 weeks dalvance and is currently on wk 4 levaquin.    Plan:  --complete 6 wk course levaquin- last day approx 9/4  --counseled on signs and symptoms of infection and advised to notify me or return to ED if these were to arise  --continue to f/u with ortho  --continue working with PT  --will try to obtain culture data from [facility] microlab   --flu shot today   -     Influenza - Quadrivalent *Preferred* (6 months+) (PF)    RTC PRN  Liza Stringer MD  Infectious Disease        Total professional time spent for the encounter: 30 minutes  Time was spent preparing to see the patient, reviewing results of prior testing, obtaining and/or reviewing separately obtained history, performing a medically appropriate examination and interview, counseling and educating the patient/family, ordering medications/tests/procedures, referring and communicating with other health care professionals, documenting clinical information in the electronic health record, and independently interpreting results.

## 2023-09-20 ENCOUNTER — CLINICAL SUPPORT (OUTPATIENT)
Dept: REHABILITATION | Facility: HOSPITAL | Age: 53
End: 2023-09-20
Payer: MEDICAID

## 2023-09-20 ENCOUNTER — CLINICAL SUPPORT (OUTPATIENT)
Dept: INFECTIOUS DISEASES | Facility: CLINIC | Age: 53
End: 2023-09-20
Payer: MEDICAID

## 2023-09-20 ENCOUNTER — OFFICE VISIT (OUTPATIENT)
Dept: INFECTIOUS DISEASES | Facility: CLINIC | Age: 53
End: 2023-09-20
Payer: MEDICAID

## 2023-09-20 VITALS
BODY MASS INDEX: 38.42 KG/M2 | SYSTOLIC BLOOD PRESSURE: 121 MMHG | TEMPERATURE: 98 F | DIASTOLIC BLOOD PRESSURE: 69 MMHG | WEIGHT: 283.31 LBS | HEART RATE: 77 BPM

## 2023-09-20 DIAGNOSIS — Z00.00 PREVENTATIVE HEALTH CARE: Primary | ICD-10-CM

## 2023-09-20 DIAGNOSIS — Z74.09 DECREASED MOBILITY AND ENDURANCE: ICD-10-CM

## 2023-09-20 DIAGNOSIS — L03.115 CELLULITIS OF RIGHT LOWER EXTREMITY: Primary | ICD-10-CM

## 2023-09-20 DIAGNOSIS — M25.671 ANKLE STIFFNESS, RIGHT: Primary | ICD-10-CM

## 2023-09-20 PROCEDURE — 3044F HG A1C LEVEL LT 7.0%: CPT | Mod: CPTII,,, | Performed by: INTERNAL MEDICINE

## 2023-09-20 PROCEDURE — 99999 PR PBB SHADOW E&M-EST. PATIENT-LVL II: ICD-10-PCS | Mod: PBBFAC,,, | Performed by: INTERNAL MEDICINE

## 2023-09-20 PROCEDURE — 3008F PR BODY MASS INDEX (BMI) DOCUMENTED: ICD-10-PCS | Mod: CPTII,,, | Performed by: INTERNAL MEDICINE

## 2023-09-20 PROCEDURE — 99999PBSHW FLU VACCINE (QUAD) GREATER THAN OR EQUAL TO 3YO PRESERVATIVE FREE IM: Mod: PBBFAC,,,

## 2023-09-20 PROCEDURE — 3008F BODY MASS INDEX DOCD: CPT | Mod: CPTII,,, | Performed by: INTERNAL MEDICINE

## 2023-09-20 PROCEDURE — 99999 PR PBB SHADOW E&M-EST. PATIENT-LVL II: CPT | Mod: PBBFAC,,, | Performed by: INTERNAL MEDICINE

## 2023-09-20 PROCEDURE — 99214 PR OFFICE/OUTPT VISIT, EST, LEVL IV, 30-39 MIN: ICD-10-PCS | Mod: S$PBB,,, | Performed by: INTERNAL MEDICINE

## 2023-09-20 PROCEDURE — 3044F PR MOST RECENT HEMOGLOBIN A1C LEVEL <7.0%: ICD-10-PCS | Mod: CPTII,,, | Performed by: INTERNAL MEDICINE

## 2023-09-20 PROCEDURE — 97110 THERAPEUTIC EXERCISES: CPT | Mod: PN

## 2023-09-20 PROCEDURE — 3078F PR MOST RECENT DIASTOLIC BLOOD PRESSURE < 80 MM HG: ICD-10-PCS | Mod: CPTII,,, | Performed by: INTERNAL MEDICINE

## 2023-09-20 PROCEDURE — 3074F PR MOST RECENT SYSTOLIC BLOOD PRESSURE < 130 MM HG: ICD-10-PCS | Mod: CPTII,,, | Performed by: INTERNAL MEDICINE

## 2023-09-20 PROCEDURE — 99999PBSHW FLU VACCINE (QUAD) GREATER THAN OR EQUAL TO 3YO PRESERVATIVE FREE IM: ICD-10-PCS | Mod: PBBFAC,,,

## 2023-09-20 PROCEDURE — 90471 IMMUNIZATION ADMIN: CPT | Mod: PBBFAC

## 2023-09-20 PROCEDURE — 99214 OFFICE O/P EST MOD 30 MIN: CPT | Mod: S$PBB,,, | Performed by: INTERNAL MEDICINE

## 2023-09-20 PROCEDURE — 3074F SYST BP LT 130 MM HG: CPT | Mod: CPTII,,, | Performed by: INTERNAL MEDICINE

## 2023-09-20 PROCEDURE — 3078F DIAST BP <80 MM HG: CPT | Mod: CPTII,,, | Performed by: INTERNAL MEDICINE

## 2023-09-20 PROCEDURE — 99212 OFFICE O/P EST SF 10 MIN: CPT | Mod: PBBFAC | Performed by: INTERNAL MEDICINE

## 2023-09-20 NOTE — PROGRESS NOTES
[facility]   Physical Therapy Treatment Note     Name: Herman GUERRERO [facility] Number: [id number]    Therapy Diagnosis:   Encounter Diagnoses   Name Primary?    Ankle stiffness, right Yes    Decreased mobility and endurance      Physician: Ruthann Champion PA    Visit Date: 9/20/2023    Physician Orders: PT Eval and Treat  Medical Diagnosis from Referral:   M79.671 (ICD-10-CM) - Acute pain of right foot   M25.571,G89.29 (ICD-10-CM) - Chronic pain of right ankle   Z98.890 (ICD-10-CM) - Status post hardware removal    Evaluation Date: 9/13/2023  Authorization Period Expiration: 10/13/2023  Plan of Care Expiration: 11/10/23  Progress Note Due: 10/10/23  Visit # / Visits authorized: 3/1  FOTO: 3/5  PTA Visit #: 0/5      Precautions: Standard, deafness in right ear, right Talocrual and calcaneal bony fusion    Time In: 10:05 AM  Time Out: 11:00 AM  Total Billable Time: 55 minutes (4 TE) - Medicaid    SUBJECTIVE     Pt reports: having a little extra soreness at the side of his right ankle. Walking ok and knees were a little sore after last visit as well.   He was compliant with home exercise program.  Response to previous treatment: knee soreness  Functional change: walking more    Pain: 2/10  Location: right ankle    OBJECTIVE     Objective Measures updated at progress report unless specified.     Treatment     Herman received the treatments listed below:      manual therapy techniques: Joint mobilizations, Manual traction, Myofacial release, Soft tissue Mobilization, and Friction Massage were applied to the: right foot for 00 minutes, including:  none    therapeutic exercises to develop strength, endurance, ROM, flexibility, posture, and core stabilization for 45 minutes including:  Straight leg raise: 2x10 right, 3x10 left  Sidelying hip abduction: 3x10 Bilateral   Heel slides: 2x12 right, 2# (decreased from last visit)  Bridges: 3x10 double leg   Long arc quad/HSC: 30x, 3#  right    therapeutic activities to improve functional performance for 10 minutes, including:  Lateral step ups: 2x10 right, 4 inch step  Sidestepping in // bars: 2 laps with min upper extremity use    Gait training for 00 minutes includin'x2 with quad cane at Mod I  Left to right weight shift + holds: 20x    Patient Education and Home Exercises     Home Exercises Provided and Patient Education Provided   Education provided:   - proper foot wear  - course of therapy, prognosis  - importance of HEP    Written Home Exercises Provided: yes. Exercises were reviewed and Herman was able to demonstrate them prior to the end of the session.  Herman demonstrated good  understanding of the education provided. See EMR under Patient Instructions for exercises provided during therapy sessions    ASSESSMENT     Herman is a 52 y.o. male referred to outpatient Physical Therapy with a medical diagnosis of Acute pain of right foot, Status post hardware removal, and Chronic pain of right ankle presenting to PT at [facility].  Decreased standing activities and heavy knee flexion activities today due to knee joint soreness from last visit. Did well today, and can pursue more loading activities next visit. Improve RLE WB in parallel bars, and knee joints may need some time to adjust to increase in weight bearing.     Herman Is progressing well towards his goals.   Pt prognosis is Good.     Pt will continue to benefit from skilled outpatient physical therapy to address the deficits listed in the problem list box on initial evaluation, provide pt/family education and to maximize pt's level of independence in the home and community environment.     Pt's spiritual, cultural and educational needs considered and pt agreeable to plan of care and goals.     Anticipated barriers to physical therapy: right talocrural joint fusion, right foot osteoarthritis    GOALS: Short Term Goals:  4 weeks  1. Report decreased  right ankle pain </= 1/10 to increase tolerance for ADLs.  2. Increase right ankle AROM by 2 degrees in each plane in order to walk with min to no compensation.  3. Pt will demo good sitting and standing posture for improved spine and joint alignment for improved biomechanics.  4. Pt to tolerate HEP to improve ROM and independence with ADL's.     Long Term Goals: 8 weeks  1. Report decreased right ankle pain </= 1/10 with stair stepping and squatting to increase tolerance for increased QoL and improved ADLs.  2. Patient goal: to get back to walking without quad cane and to work  3. Increase strength to >/= 4/5 for BLE to increase tolerance for ADL and work activities.  4. Pt will report at </= 38% impaired on ANKLE FOTO score to demo increased functional mobility.   5. Pt will be able to ambulate community distances and negotiate stairs with minimal ankle pain for increased functional mobility and QoL.  6. Pt will be able to ambulate all surfaces without AD use for increased functional independence.     PLAN     Cont per POC. Focusing on improving RLE WB.    Nate Ruiz, PT

## 2023-09-20 NOTE — PROGRESS NOTES
Patient received the flu vaccine in the right deltoid. Pt tolerated well. Pt asked to wait in the clinic 15 minutes after injection in the event of an allergic reaction. Pt verbalized understanding. Pt left in NAD.

## 2023-09-26 ENCOUNTER — CLINICAL SUPPORT (OUTPATIENT)
Dept: REHABILITATION | Facility: HOSPITAL | Age: 53
End: 2023-09-26
Payer: MEDICAID

## 2023-09-26 DIAGNOSIS — Z74.09 DECREASED MOBILITY AND ENDURANCE: ICD-10-CM

## 2023-09-26 DIAGNOSIS — M25.671 ANKLE STIFFNESS, RIGHT: Primary | ICD-10-CM

## 2023-09-26 PROCEDURE — 97110 THERAPEUTIC EXERCISES: CPT | Mod: PN

## 2023-09-26 NOTE — PROGRESS NOTES
[facility]   Physical Therapy Treatment Note     Name: Herman GUERRERO [facility] Number: [id number]    Therapy Diagnosis:   Encounter Diagnoses   Name Primary?    Ankle stiffness, right Yes    Decreased mobility and endurance      Physician: Ruthann Champion PA    Visit Date: 9/26/2023    Physician Orders: PT Eval and Treat  Medical Diagnosis from Referral:   M79.671 (ICD-10-CM) - Acute pain of right foot   M25.571,G89.29 (ICD-10-CM) - Chronic pain of right ankle   Z98.890 (ICD-10-CM) - Status post hardware removal    Evaluation Date: 9/13/2023  Authorization Period Expiration: 10/13/2023  Plan of Care Expiration: 11/10/23  Progress Note Due: 10/10/23  Visit # / Visits authorized: 1/1, 3/20  FOTO: 4/5  PTA Visit #: 0/5      Precautions: Standard, deafness in right ear, right Talocrual and calcaneal bony fusion    Time In: 9:05 AM  Time Out: 10:00 AM  Total Billable Time: 55 minutes (4 TE) - Medicaid    SUBJECTIVE     Pt reports: complains of right knee stiffness when he bends his knee with some pain. Does not remember much pain before the surgery and right knee was more flexible back then. Knee soreness and stiffness more prominent as of early last week. Staying active at home with standing and walking activities.   He was compliant with home exercise program.  Response to previous treatment: knee soreness  Functional change: walking more    Pain: 2/10  Location: right ankle    OBJECTIVE     Objective Measures updated at progress report unless specified.     Treatment     Herman received the treatments listed below:      manual therapy techniques: Joint mobilizations, Manual traction, Myofacial release, Soft tissue Mobilization, and Friction Massage were applied to the: right foot for 00 minutes, including:  Next hooklying lumbar/hip traction  Long axis RLE traction (avoid ankle traction due to recent surgery)    therapeutic exercises to develop strength, endurance, ROM, flexibility,  posture, and core stabilization for 55 minutes including: assessment  Short arc quad: 2x15x5'' holds, right, 2#  Sidelying hip abduction: 2x10 Bilateral (pain along right lateral thigh and calf present) - Held today at 2/10 ---> supine or standing next  Sidelying clams: 2x8, right (pillow between knees)  Bridges: 3x10 double leg   Straight leg raise: 2x10 right, 3x10 left - HOLD due to right hip pain  Right hip adductor stretch: 3x30'' holds right   Long arc quad/HSC: 30x, 3# right  Seated heel slides on ground: 2x15, 2# right - next  NuStep: 5 mins, L1    therapeutic activities to improve functional performance for 00 minutes, including:  Lateral step ups: 2x10 right, 4 inch step  Sidestepping in // bars: 2 laps with min upper extremity use    Gait training for 00 minutes includin'x2 with quad cane at Mod I  Left to right weight shift + holds: 20x    Patient Education and Home Exercises     Home Exercises Provided and Patient Education Provided   Education provided:   - proper foot wear  - course of therapy, prognosis  - importance of HEP    Written Home Exercises Provided: yes. Exercises were reviewed and Herman was able to demonstrate them prior to the end of the session.  Herman demonstrated good  understanding of the education provided. See EMR under Patient Instructions for exercises provided during therapy sessions    ASSESSMENT     Herman is a 52 y.o. male referred to outpatient Physical Therapy with a medical diagnosis of Acute pain of right foot, Status post hardware removal, and Chronic pain of right ankle presenting to PT at [facility].  Decreased activities today and small right hip reassessment. Right hip osteoarthritis signs and symptoms with groin pain with closed pack position, + for FABERs/FADIRs/Scour Testing, and decreased right hip A/PROM overall. Decreased RLE WB remains, and he was told to take more rest breaks from standing for now. Will switch up exercise  routine for right hip and knee irritation that has been present.     Herman Is progressing well towards his goals.   Pt prognosis is Good.     Pt will continue to benefit from skilled outpatient physical therapy to address the deficits listed in the problem list box on initial evaluation, provide pt/family education and to maximize pt's level of independence in the home and community environment.     Pt's spiritual, cultural and educational needs considered and pt agreeable to plan of care and goals.     Anticipated barriers to physical therapy: right talocrural joint fusion, right foot osteoarthritis    GOALS: Short Term Goals:  4 weeks  1. Report decreased right ankle pain </= 1/10 to increase tolerance for ADLs.  2. Increase right ankle AROM by 2 degrees in each plane in order to walk with min to no compensation.  3. Pt will demo good sitting and standing posture for improved spine and joint alignment for improved biomechanics.  4. Pt to tolerate HEP to improve ROM and independence with ADL's.     Long Term Goals: 8 weeks  1. Report decreased right ankle pain </= 1/10 with stair stepping and squatting to increase tolerance for increased QoL and improved ADLs.  2. Patient goal: to get back to walking without quad cane and to work  3. Increase strength to >/= 4/5 for BLE to increase tolerance for ADL and work activities.  4. Pt will report at </= 38% impaired on ANKLE FOTO score to demo increased functional mobility.   5. Pt will be able to ambulate community distances and negotiate stairs with minimal ankle pain for increased functional mobility and QoL.  6. Pt will be able to ambulate all surfaces without AD use for increased functional independence.     PLAN     Cont per POC. Focusing on improving RLE WB.    Nate Ruiz, PT

## 2023-09-28 ENCOUNTER — CLINICAL SUPPORT (OUTPATIENT)
Dept: REHABILITATION | Facility: HOSPITAL | Age: 53
End: 2023-09-28
Payer: MEDICAID

## 2023-09-28 DIAGNOSIS — Z74.09 DECREASED MOBILITY AND ENDURANCE: ICD-10-CM

## 2023-09-28 DIAGNOSIS — M25.671 ANKLE STIFFNESS, RIGHT: Primary | ICD-10-CM

## 2023-09-28 PROCEDURE — 97110 THERAPEUTIC EXERCISES: CPT | Mod: PN,CQ

## 2023-09-28 NOTE — PROGRESS NOTES
"[facility]   Physical Therapy Treatment Note     Name: Herman GUERRERO [facility] Number: [id number]    Therapy Diagnosis:   Encounter Diagnoses   Name Primary?    Ankle stiffness, right Yes    Decreased mobility and endurance      Physician: Ruthann Champion PA    Visit Date: 9/28/2023    Physician Orders: PT Eval and Treat  Medical Diagnosis from Referral:   M79.671 (ICD-10-CM) - Acute pain of right foot   M25.571,G89.29 (ICD-10-CM) - Chronic pain of right ankle   Z98.890 (ICD-10-CM) - Status post hardware removal    Evaluation Date: 9/13/2023  Authorization Period Expiration: 10/13/2023  Plan of Care Expiration: 11/10/23  Progress Note Due: 10/10/23  Visit # / Visits authorized: 1/1, 3420  FOTO: 4/5  PTA Visit #: 1/5      Precautions: Standard, deafness in right ear, right Talocrual and calcaneal bony fusion    Time In: 10:05 AM  Time Out: 11:00 AM  Total Billable Time: 55 minutes (4 TE) - Medicaid    SUBJECTIVE     Pt reports: "My leg feels better than last time". Pt agreeable to PT session   He was compliant with home exercise program.  Response to previous treatment: knee soreness  Functional change: walking more    Pain: 2/10  Location: right ankle    OBJECTIVE     Objective Measures updated at progress report unless specified.     Treatment     Herman received the treatments listed below:      manual therapy techniques: Joint mobilizations, Manual traction, Myofacial release, Soft tissue Mobilization, and Friction Massage were applied to the: right foot for 00 minutes, including:  Next hooklying lumbar/hip traction  Long axis RLE traction (avoid ankle traction due to recent surgery)    therapeutic exercises to develop strength, endurance, ROM, flexibility, posture, and core stabilization for 55 minutes including: assessment    -Short arc quad: 2x15x5'' holds, right, 2#  -Sidelying hip abduction: 2x10 Bilateral --> supine or standing next  -Sidelying clams: 2x8, right (pillow between " knees)  -Bridges: 3x10 double leg   -Straight leg raise: 2x10 B 1.5 #  -Right hip adductor stretch: 3x30'' holds right   -Long arc quad/HSC: 30x, 3# right  -Seated heel slides on ground: 2x15, 2# right - next  -NuStep: 7 mins, L3    therapeutic activities to improve functional performance for 00 minutes, including:  Lateral step ups: 2x10 right, 4 inch step  Sidestepping in // bars: 2 laps with min upper extremity use    Gait training for 00 minutes includin'x2 with quad cane at Mod I  Left to right weight shift + holds: 20x    Patient Education and Home Exercises     Home Exercises Provided and Patient Education Provided   Education provided:   - proper foot wear  - course of therapy, prognosis  - importance of HEP    Written Home Exercises Provided: yes. Exercises were reviewed and Herman was able to demonstrate them prior to the end of the session.  Herman demonstrated good  understanding of the education provided. See EMR under Patient Instructions for exercises provided during therapy sessions    ASSESSMENT     Herman is a 52 y.o. male referred to outpatient Physical Therapy with a medical diagnosis of Acute pain of right foot, Status post hardware removal, and Chronic pain of right ankle presenting to PT at [facility] and [facility].  Pt able to resume previous therapeutic exercises with no reports of increased pain reported. Rest breaks granted for general fatigue recovery but not due to R LE pain.   Herman Is progressing well towards his goals.   Pt prognosis is Good.     Pt will continue to benefit from skilled outpatient physical therapy to address the deficits listed in the problem list box on initial evaluation, provide pt/family education and to maximize pt's level of independence in the home and community environment.     Pt's spiritual, cultural and educational needs considered and pt agreeable to plan of care and goals.     Anticipated barriers to physical therapy: right talocrural  joint fusion, right foot osteoarthritis    GOALS: Short Term Goals:  4 weeks  1. Report decreased right ankle pain </= 1/10 to increase tolerance for ADLs.  2. Increase right ankle AROM by 2 degrees in each plane in order to walk with min to no compensation.  3. Pt will demo good sitting and standing posture for improved spine and joint alignment for improved biomechanics.  4. Pt to tolerate HEP to improve ROM and independence with ADL's.     Long Term Goals: 8 weeks  1. Report decreased right ankle pain </= 1/10 with stair stepping and squatting to increase tolerance for increased QoL and improved ADLs.  2. Patient goal: to get back to walking without quad cane and to work  3. Increase strength to >/= 4/5 for BLE to increase tolerance for ADL and work activities.  4. Pt will report at </= 38% impaired on ANKLE FOTO score to demo increased functional mobility.   5. Pt will be able to ambulate community distances and negotiate stairs with minimal ankle pain for increased functional mobility and QoL.  6. Pt will be able to ambulate all surfaces without AD use for increased functional independence.     PLAN     Cont per POC. Focusing on improving RLE WB.    Rubén Daniel, PTA

## 2023-10-02 ENCOUNTER — CLINICAL SUPPORT (OUTPATIENT)
Dept: REHABILITATION | Facility: HOSPITAL | Age: 53
End: 2023-10-02
Payer: MEDICAID

## 2023-10-02 DIAGNOSIS — Z74.09 DECREASED MOBILITY AND ENDURANCE: ICD-10-CM

## 2023-10-02 DIAGNOSIS — M25.671 ANKLE STIFFNESS, RIGHT: Primary | ICD-10-CM

## 2023-10-02 PROCEDURE — 97110 THERAPEUTIC EXERCISES: CPT | Mod: PN

## 2023-10-02 NOTE — PROGRESS NOTES
[facility]   Physical Therapy Treatment Note     Name: Herman GUERRERO [facility] Number: [id number]    Therapy Diagnosis:   Encounter Diagnoses   Name Primary?    Ankle stiffness, right Yes    Decreased mobility and endurance      Physician: Ruthann Champion PA    Visit Date: 10/2/2023    Physician Orders: PT Eval and Treat  Medical Diagnosis from Referral:   M79.671 (ICD-10-CM) - Acute pain of right foot   M25.571,G89.29 (ICD-10-CM) - Chronic pain of right ankle   Z98.890 (ICD-10-CM) - Status post hardware removal    Evaluation Date: 9/13/2023  Authorization Period Expiration: 10/13/2023  Plan of Care Expiration: 11/10/23  Progress Note Due: 10/10/23  Visit # / Visits authorized: 1/1, 5/?  FOTO: 6/5 - Next  PTA Visit #: 0/5      Precautions: Standard, deafness in right ear, right Talocrual and calcaneal bony fusion    Time In: 11:05 AM  Time Out: 12:00 PM  Total Billable Time: 55 minutes (4 TE) - Medicaid    SUBJECTIVE     Pt reports: mild Bilateral knees soreness the past 2 weeks, stiffness in the mornings, and difficulty with lifting his right knee up to dress socks/shoes.   He was compliant with home exercise program.  Response to previous treatment: knee soreness  Functional change: walking more    Pain: 4/10  Location: right ankle  Pain: 2/10  Location: right ankle    OBJECTIVE     Objective Measures updated at progress report unless specified.     Treatment     Herman received the treatments listed below:      manual therapy techniques: Joint mobilizations, Manual traction, Myofacial release, Soft tissue Mobilization, and Friction Massage were applied to the: right foot for 00 minutes, including:  Next hooklying lumbar/hip traction  Long axis RLE traction (avoid ankle traction due to recent surgery)    therapeutic exercises to develop strength, endurance, ROM, flexibility, posture, and core stabilization for 55 minutes including:     -Short arc quad: 2x15x5'' holds, right,  2#  -Sidelying hip abduction: 2x10 Bilateral --> supine or standing next  -Sidelying clams: 2x12x3'' holds, right  -Bridges: 3x10 double leg   -Straight leg raise: 2x8 Bilateral, 0#  -Right hip adductor stretch: 3x30'' holds right   -Long arc quad/HSC: 30x, 3# right  -Seated heel slides on ground: 2x15, 2# right - next  -NuStep: 7 mins, L3    therapeutic activities to improve functional performance for 00 minutes, including:  Lateral step ups: 2x10 right, 4 inch step  Sidestepping in // bars: 2 laps with min upper extremity use    Gait training for 00 minutes includin'x2 with quad cane at Mod I  Left to right weight shift + holds: 20x    Patient Education and Home Exercises     Home Exercises Provided and Patient Education Provided   Education provided:   - proper foot wear  - course of therapy, prognosis  - importance of HEP    Written Home Exercises Provided: yes. Exercises were reviewed and Herman was able to demonstrate them prior to the end of the session.  Herman demonstrated good  understanding of the education provided. See EMR under Patient Instructions for exercises provided during therapy sessions    ASSESSMENT     Herman is a 52 y.o. male referred to outpatient Physical Therapy with a medical diagnosis of Acute pain of right foot, Status post hardware removal, and Chronic pain of right ankle presenting to PT at [facility] and [facility].  Advancing Bilateral hip and quad strengthening to his tolerance. Appears that standing load is higher than muscle threshold at this time, minimal pain while at rest sitting. Bilateral knee and right hip pain has been improving and did much better today compared to last week.   Herman Is progressing well towards his goals.   Pt prognosis is Good.     Pt will continue to benefit from skilled outpatient physical therapy to address the deficits listed in the problem list box on initial evaluation, provide pt/family education and to maximize pt's level of  independence in the home and community environment.     Pt's spiritual, cultural and educational needs considered and pt agreeable to plan of care and goals.     Anticipated barriers to physical therapy: right talocrural joint fusion, right foot osteoarthritis    GOALS: Short Term Goals:  4 weeks  1. Report decreased right ankle pain </= 1/10 to increase tolerance for ADLs.  2. Increase right ankle AROM by 2 degrees in each plane in order to walk with min to no compensation.  3. Pt will demo good sitting and standing posture for improved spine and joint alignment for improved biomechanics.  4. Pt to tolerate HEP to improve ROM and independence with ADL's.     Long Term Goals: 8 weeks  1. Report decreased right ankle pain </= 1/10 with stair stepping and squatting to increase tolerance for increased QoL and improved ADLs.  2. Patient goal: to get back to walking without quad cane and to work  3. Increase strength to >/= 4/5 for BLE to increase tolerance for ADL and work activities.  4. Pt will report at </= 38% impaired on ANKLE FOTO score to demo increased functional mobility.   5. Pt will be able to ambulate community distances and negotiate stairs with minimal ankle pain for increased functional mobility and QoL.  6. Pt will be able to ambulate all surfaces without AD use for increased functional independence.     PLAN     Cont per POC. Focusing on improving RLE WB.    Nate Ruiz, PT

## 2023-10-04 ENCOUNTER — CLINICAL SUPPORT (OUTPATIENT)
Dept: REHABILITATION | Facility: HOSPITAL | Age: 53
End: 2023-10-04
Payer: MEDICAID

## 2023-10-04 DIAGNOSIS — M25.671 ANKLE STIFFNESS, RIGHT: Primary | ICD-10-CM

## 2023-10-04 DIAGNOSIS — Z74.09 DECREASED MOBILITY AND ENDURANCE: ICD-10-CM

## 2023-10-04 PROCEDURE — 97110 THERAPEUTIC EXERCISES: CPT | Mod: PN

## 2023-10-04 NOTE — PROGRESS NOTES
[facility]   Physical Therapy Treatment Note     Name: Herman GUERRERO [facility] Number: [id number]    Therapy Diagnosis:   Encounter Diagnoses   Name Primary?    Ankle stiffness, right Yes    Decreased mobility and endurance      Physician: Ruthann Champion PA    Visit Date: 10/4/2023    Physician Orders: PT Eval and Treat  Medical Diagnosis from Referral:   M79.671 (ICD-10-CM) - Acute pain of right foot   M25.571,G89.29 (ICD-10-CM) - Chronic pain of right ankle   Z98.890 (ICD-10-CM) - Status post hardware removal    Evaluation Date: 9/13/2023  Authorization Period Expiration: 10/13/2023  Plan of Care Expiration: 11/10/23  Progress Note Due: 10/10/23  Visit # / Visits authorized: 1/1, 6/20  FOTO: 7/5 - Done today  PTA Visit #: 0/5      Precautions: Standard, deafness in right ear, right Talocrual and calcaneal bony fusion    Time In: 9:05 AM  Time Out: 10:00 PM  Total Billable Time: 55 minutes (4 TE) - Medicaid    SUBJECTIVE     Pt reports: left knee has been giving him some trouble the past day to where it almost gave out. Will tell his MD next week for his appointment. Right hip, knee, and ankle doing ok.   He was compliant with home exercise program.  Response to previous treatment: knee soreness  Functional change: walking more    Pain: 4/10  Location: right ankle  Pain: 2/10  Location: right ankle    OBJECTIVE     Objective Measures updated at progress report unless specified.     Treatment     Herman received the treatments listed below:      manual therapy techniques: Joint mobilizations, Manual traction, Myofacial release, Soft tissue Mobilization, and Friction Massage were applied to the: right foot for 00 minutes, including:  Next hooklying lumbar/hip traction  Long axis RLE traction (avoid ankle traction due to recent surgery)    therapeutic exercises to develop strength, endurance, ROM, flexibility, posture, and core stabilization for 55 minutes including:     -Sidelying hip  abduction: 3x10 Bilateral, 1# on left and 0# on right  -Sidelying clams: 2x12x3'' holds, Bilateral   -Bridges: 3x10 double leg   -Straight leg raise: 2x10 left and 3x10 right, 1# on left and 0# on right  -Right hip adductor stretch: 4x30'' holds right, done by PT  -Long arc quad/HSC: 30x, 3# Bilaterally  -Seated heel slides on ground: 2x15, 3# right  -NuStep: 7 mins, L3    therapeutic activities to improve functional performance for 00 minutes, including:  Lateral step ups: 2x10 right, 4 inch step  Sidestepping in // bars: 2 laps with min upper extremity use    Gait training for 00 minutes includin'x2 with quad cane at Mod I  Left to right weight shift + holds: 20x    Patient Education and Home Exercises     Home Exercises Provided and Patient Education Provided   Education provided:   - proper foot wear  - course of therapy, prognosis  - importance of HEP    Written Home Exercises Provided: yes. Exercises were reviewed and Herman was able to demonstrate them prior to the end of the session.  Herman demonstrated good  understanding of the education provided. See EMR under Patient Instructions for exercises provided during therapy sessions    ASSESSMENT     Herman is a 52 y.o. male referred to outpatient Physical Therapy with a medical diagnosis of Acute pain of right foot, Status post hardware removal, and Chronic pain of right ankle presenting to PT at [facility].  Good tolerance for activities with actually more left lateral hip pain rather than right hip pain that was very minimal. No acute Bilateral knee pain at this time, and right ankle feeling good. Near baseline gait deviations reported by patient. Progressed hip and quad strengthening and can trial return to standing activities next visit likely leg press. He sees MD next week and he is anxious to return to work due to financial concerns.     Herman Is progressing well towards his goals.   Pt prognosis is Good.     Pt will  continue to benefit from skilled outpatient physical therapy to address the deficits listed in the problem list box on initial evaluation, provide pt/family education and to maximize pt's level of independence in the home and community environment.     Pt's spiritual, cultural and educational needs considered and pt agreeable to plan of care and goals.     Anticipated barriers to physical therapy: right talocrural joint fusion, right foot osteoarthritis    GOALS: Short Term Goals:  4 weeks  1. Report decreased right ankle pain </= 1/10 to increase tolerance for ADLs.  2. Increase right ankle AROM by 2 degrees in each plane in order to walk with min to no compensation.  3. Pt will demo good sitting and standing posture for improved spine and joint alignment for improved biomechanics.  4. Pt to tolerate HEP to improve ROM and independence with ADL's.     Long Term Goals: 8 weeks  1. Report decreased right ankle pain </= 1/10 with stair stepping and squatting to increase tolerance for increased QoL and improved ADLs.  2. Patient goal: to get back to walking without quad cane and to work  3. Increase strength to >/= 4/5 for BLE to increase tolerance for ADL and work activities.  4. Pt will report at </= 38% impaired on ANKLE FOTO score to demo increased functional mobility.   5. Pt will be able to ambulate community distances and negotiate stairs with minimal ankle pain for increased functional mobility and QoL.  6. Pt will be able to ambulate all surfaces without AD use for increased functional independence.     PLAN     Cont per POC. Focusing on improving RLE WB.    Nate Ruiz, PT

## 2023-10-09 ENCOUNTER — CLINICAL SUPPORT (OUTPATIENT)
Dept: REHABILITATION | Facility: HOSPITAL | Age: 53
End: 2023-10-09
Payer: MEDICAID

## 2023-10-09 DIAGNOSIS — Z74.09 DECREASED MOBILITY AND ENDURANCE: ICD-10-CM

## 2023-10-09 DIAGNOSIS — M25.671 ANKLE STIFFNESS, RIGHT: Primary | ICD-10-CM

## 2023-10-09 PROCEDURE — 97110 THERAPEUTIC EXERCISES: CPT | Mod: PN

## 2023-10-09 NOTE — PROGRESS NOTES
[facility]   Physical Therapy Treatment Note     Name: Herman GUERRERO [facility] Number: [id number]    Therapy Diagnosis:   Encounter Diagnoses   Name Primary?    Ankle stiffness, right Yes    Decreased mobility and endurance      Physician: Ruthann Champion PA    Visit Date: 10/9/2023    Physician Orders: PT Eval and Treat  Medical Diagnosis from Referral:   M79.671 (ICD-10-CM) - Acute pain of right foot   M25.571,G89.29 (ICD-10-CM) - Chronic pain of right ankle   Z98.890 (ICD-10-CM) - Status post hardware removal    Evaluation Date: 9/13/2023  Authorization Period Expiration: 10/13/2023  Plan of Care Expiration: 11/10/23  Progress Note Due: 10/10/23  Visit # / Visits authorized: 1/1, 6/20  FOTO: 7/5 - Done today  PTA Visit #: 0/5      Precautions: Standard, deafness in right ear, right Talocrual and calcaneal bony fusion    Time In: 9:05 AM  Time Out: 10:00 PM  Total Billable Time: 55 minutes (4 TE) - Medicaid    SUBJECTIVE     Pt reports: left knee has been giving him some trouble the past day to where it almost gave out. Will tell his MD next week for his appointment. Right hip, knee, and ankle doing ok.   He was compliant with home exercise program.  Response to previous treatment: knee soreness  Functional change: walking more    Pain: 4/10  Location: right ankle  Pain: 2/10  Location: right ankle    OBJECTIVE     Objective Measures updated at progress report unless specified.     Treatment     Herman received the treatments listed below:      manual therapy techniques: Joint mobilizations, Manual traction, Myofacial release, Soft tissue Mobilization, and Friction Massage were applied to the: right foot for 00 minutes, including:  Next hooklying lumbar/hip traction  Long axis RLE traction (avoid ankle traction due to recent surgery)    therapeutic exercises to develop strength, endurance, ROM, flexibility, posture, and core stabilization for 55 minutes including:     -Sidelying hip  abduction: 3x10 Bilateral, 1# on left and 0# on right  -Sidelying clams: 2x12x3'' holds, Bilateral   -Bridges: 3x10 double leg   -Straight leg raise: 2x10 left and 3x10 right, 1# on left and 0# on right  -Right hip adductor stretch: 4x30'' holds right, done by PT  -Long arc quad/HSC: 30x, 3# Bilaterally  -Seated heel slides on ground: 2x15, 3# right  -NuStep: 7 mins, L3    therapeutic activities to improve functional performance for 00 minutes, including:  Lateral step ups: 2x10 right, 4 inch step  Sidestepping in // bars: 2 laps with min upper extremity use    Gait training for 00 minutes includin'x2 with quad cane at Mod I  Left to right weight shift + holds: 20x    Patient Education and Home Exercises     Home Exercises Provided and Patient Education Provided   Education provided:   - proper foot wear  - course of therapy, prognosis  - importance of HEP    Written Home Exercises Provided: yes. Exercises were reviewed and Herman was able to demonstrate them prior to the end of the session.  Herman demonstrated good  understanding of the education provided. See EMR under Patient Instructions for exercises provided during therapy sessions    ASSESSMENT     Herman is a 52 y.o. male referred to outpatient Physical Therapy with a medical diagnosis of Acute pain of right foot, Status post hardware removal, and Chronic pain of right ankle presenting to PT at [facility].  Good tolerance for activities with actually more left lateral hip pain rather than right hip pain that was very minimal. No acute Bilateral knee pain at this time, and right ankle feeling good. Near baseline gait deviations reported by patient. Progressed hip and quad strengthening and can trial return to standing activities next visit likely leg press. He sees MD next week and he is anxious to return to work due to financial concerns.     Herman Is progressing well towards his goals.   Pt prognosis is Good.     Pt will  continue to benefit from skilled outpatient physical therapy to address the deficits listed in the problem list box on initial evaluation, provide pt/family education and to maximize pt's level of independence in the home and community environment.     Pt's spiritual, cultural and educational needs considered and pt agreeable to plan of care and goals.     Anticipated barriers to physical therapy: right talocrural joint fusion, right foot osteoarthritis    GOALS: Short Term Goals:  4 weeks  1. Report decreased right ankle pain </= 1/10 to increase tolerance for ADLs.  2. Increase right ankle AROM by 2 degrees in each plane in order to walk with min to no compensation.  3. Pt will demo good sitting and standing posture for improved spine and joint alignment for improved biomechanics.  4. Pt to tolerate HEP to improve ROM and independence with ADL's.     Long Term Goals: 8 weeks  1. Report decreased right ankle pain </= 1/10 with stair stepping and squatting to increase tolerance for increased QoL and improved ADLs.  2. Patient goal: to get back to walking without quad cane and to work  3. Increase strength to >/= 4/5 for BLE to increase tolerance for ADL and work activities.  4. Pt will report at </= 38% impaired on ANKLE FOTO score to demo increased functional mobility.   5. Pt will be able to ambulate community distances and negotiate stairs with minimal ankle pain for increased functional mobility and QoL.  6. Pt will be able to ambulate all surfaces without AD use for increased functional independence.     PLAN     Cont per POC. Focusing on improving RLE WB.    Nate Ruiz, PT

## 2023-10-09 NOTE — PROGRESS NOTES
[facility]   Physical Therapy Treatment Note     Name: Herman GUERRERO [facility] Number: [id number]    Therapy Diagnosis:   Encounter Diagnoses   Name Primary?    Ankle stiffness, right Yes    Decreased mobility and endurance      Physician: Ruthann Champion PA    Visit Date: 10/9/2023    Physician Orders: PT Eval and Treat  Medical Diagnosis from Referral:   M79.671 (ICD-10-CM) - Acute pain of right foot   M25.571,G89.29 (ICD-10-CM) - Chronic pain of right ankle   Z98.890 (ICD-10-CM) - Status post hardware removal    Evaluation Date: 9/13/2023  Authorization Period Expiration: 10/13/2023  Plan of Care Expiration: 11/10/23  Progress Note Due: 10/10/23  Visit # / Visits authorized: 1/1, 7/20  FOTO: 8/5 - Done today  PTA Visit #: 0/5      Precautions: Standard, deafness in right ear, right Talocrual and calcaneal bony fusion    Time In: 10:11 AM  Time Out: 11:05 PM  Total Billable Time: 54 minutes (4 TE) - Medicaid    SUBJECTIVE     Pt reports: knees are doing much better and no hip pain at this time. Has been feeling some right heel discomfort off and on the past couple of days only when he puts pressure on it.   He was compliant with home exercise program.  Response to previous treatment: knee soreness  Functional change: walking more    Pain: 4/10  Location: right ankle  Pain: 2/10  Location: right ankle    OBJECTIVE     Objective Measures updated at progress report unless specified.     Treatment     Herman received the treatments listed below:      manual therapy techniques: Joint mobilizations, Manual traction, Myofacial release, Soft tissue Mobilization, and Friction Massage were applied to the: right foot for 00 minutes, including:  Next hooklying lumbar/hip traction  Long axis RLE traction (avoid ankle traction due to recent surgery)    therapeutic exercises to develop strength, endurance, ROM, flexibility, posture, and core stabilization for 39 minutes including:     -Sidelying hip  "abduction: 3x10 Bilateral, 1# on left and 0# on right  -Sidelying clams: 2x12x3'' holds, Bilateral   -Bridges: 3x10 double leg   -Straight leg raise: 2x10 left and 3x10 right, 1# on left and 0# on right  -Right hip adductor stretch: 4x30'' holds right, done by PT  -Long arc quad/HSC: 30x, 3# Bilaterally  -Seated heel slides on ground: 2x15, 3# right  -NuStep: 7 mins, L3-NT    therapeutic activities to improve functional performance for 00 minutes, including:  Lateral step ups: 2x10 right, 4 inch step-NT  Sidestepping in // bars: 2 laps with min upper extremity use-NT    Gait training for 15 minutes includin'x2 with quad cane at Mod I-NT  Left to right weight shift + holds: 20x  Toe taps to step: 2x15 6" box, R LE in stance    Patient Education and Home Exercises     Home Exercises Provided and Patient Education Provided   Education provided:   - proper foot wear  - course of therapy, prognosis  - importance of HEP    Written Home Exercises Provided: yes. Exercises were reviewed and Herman was able to demonstrate them prior to the end of the session.  Herman demonstrated good  understanding of the education provided. See EMR under Patient Instructions for exercises provided during therapy sessions    ASSESSMENT     Herman is a 52 y.o. male referred to outpatient Physical Therapy with a medical diagnosis of Acute pain of right foot, Status post hardware removal, and Chronic pain of right ankle presenting to PT at [facility].  Pt tolerated treatment well today. He demonstrated good ability to weight shift onto R LE in double leg stance with visible R ankle musculature strategy to maintain balance. Toe taps appropriately challenged his ability to balance on R LE and he often required minimal UE support, quick stepping pattern to decrease time in SLS, or hesitancy to  L foot.     Herman Is progressing well towards his goals.   Pt prognosis is Good.     Pt will continue to benefit " from skilled outpatient physical therapy to address the deficits listed in the problem list box on initial evaluation, provide pt/family education and to maximize pt's level of independence in the home and community environment.     Pt's spiritual, cultural and educational needs considered and pt agreeable to plan of care and goals.     Anticipated barriers to physical therapy: right talocrural joint fusion, right foot osteoarthritis    GOALS: Short Term Goals:  4 weeks  1. Report decreased right ankle pain </= 1/10 to increase tolerance for ADLs.  2. Increase right ankle AROM by 2 degrees in each plane in order to walk with min to no compensation.  3. Pt will demo good sitting and standing posture for improved spine and joint alignment for improved biomechanics.  4. Pt to tolerate HEP to improve ROM and independence with ADL's.     Long Term Goals: 8 weeks  1. Report decreased right ankle pain </= 1/10 with stair stepping and squatting to increase tolerance for increased QoL and improved ADLs.  2. Patient goal: to get back to walking without quad cane and to work  3. Increase strength to >/= 4/5 for BLE to increase tolerance for ADL and work activities.  4. Pt will report at </= 38% impaired on ANKLE FOTO score to demo increased functional mobility.   5. Pt will be able to ambulate community distances and negotiate stairs with minimal ankle pain for increased functional mobility and QoL.  6. Pt will be able to ambulate all surfaces without AD use for increased functional independence.     PLAN     Cont per POC. Focusing on improving RLE WB.    Berhane Morales, SPT

## 2023-10-11 ENCOUNTER — CLINICAL SUPPORT (OUTPATIENT)
Dept: REHABILITATION | Facility: HOSPITAL | Age: 53
End: 2023-10-11
Payer: MEDICAID

## 2023-10-11 DIAGNOSIS — Z74.09 DECREASED MOBILITY AND ENDURANCE: ICD-10-CM

## 2023-10-11 DIAGNOSIS — M25.671 ANKLE STIFFNESS, RIGHT: Primary | ICD-10-CM

## 2023-10-11 PROCEDURE — 97110 THERAPEUTIC EXERCISES: CPT | Mod: PN | Performed by: PHYSICAL THERAPIST

## 2023-10-11 NOTE — PROGRESS NOTES
[facility]   Physical Therapy Treatment Note     Name: Herman GUERRERO [facility] Number: [id number]    Therapy Diagnosis:   Encounter Diagnoses   Name Primary?    Ankle stiffness, right Yes    Decreased mobility and endurance        Physician: Ruthann Champion PA    Visit Date: 10/11/2023    Physician Orders: PT Eval and Treat  Medical Diagnosis from Referral:   M79.671 (ICD-10-CM) - Acute pain of right foot   M25.571,G89.29 (ICD-10-CM) - Chronic pain of right ankle   Z98.890 (ICD-10-CM) - Status post hardware removal    Evaluation Date: 9/13/2023  Authorization Period Expiration: 10/13/2023  Plan of Care Expiration: 11/10/23  Progress Note Due: 10/10/23  Visit # / Visits authorized: 1/1, 8/20  FOTO: Done 10/4/23  PTA Visit #: 0/5      Precautions: Standard, deafness in right ear, right Talocrual and calcaneal bony fusion    Time In: 10:11 AM (arrived late)  Time Out: 10:55 PM  Total Billable Time: 44 minutes (3 TE) - Medicaid    SUBJECTIVE     Pt reports: he saw his doctor on Monday who cleared him to go back to work. He contacted his work and is waiting to hear back on when he will be returning.    He was compliant with home exercise program.  Response to previous treatment: knee soreness  Functional change: walking more    Pain: 4/10  Location: right ankle  Pain: 2/10  Location: right ankle    OBJECTIVE     Objective Measures updated at progress report unless specified.     Treatment     Herman received the treatments listed below:        therapeutic exercises to develop strength, endurance, ROM, flexibility, posture, and core stabilization for 32 minutes including:     -Sidelying hip abduction: 3x10 Bilateral, 1# on left and 0# on right  -Sidelying clams: 3x10x5'' holds, Bilateral   -Bridges: 3x10 double leg   -Straight leg raise: 3x10 each, 1# on left and 0# on right  -Right hip adductor stretch: 4x30'' holds right, done by PT  -Long arc quad/HSC: 30x, 4# Bilaterally  -theraband  "hamstring curls: red theraband 30x each    Not today  -Seated heel slides on ground: 2x15, 3# right      therapeutic activities to improve functional performance for 00 minutes, including:  Lateral step ups: 2x10 right, 4 inch step-NT  Sidestepping in // bars: 2 laps with min upper extremity use-NT    Gait training for 12 minutes includin'x2 with quad cane at Mod I-NT  Left to right weight shift + holds: 20x  Pre gait forward weight shift (right in front) + holds: 20x  Toe taps to step: 2x15 6" box, R LE in stance    Patient Education and Home Exercises     Home Exercises Provided and Patient Education Provided   Education provided:   - proper foot wear  - course of therapy, prognosis  - importance of HEP    Written Home Exercises Provided: yes. Exercises were reviewed and Herman was able to demonstrate them prior to the end of the session.  Herman demonstrated good  understanding of the education provided. See EMR under Patient Instructions for exercises provided during therapy sessions    ASSESSMENT     Herman is a 52 y.o. male referred to outpatient Physical Therapy with a medical diagnosis of Acute pain of right foot, Status post hardware removal, and Chronic pain of right ankle presenting to PT at [facility].  Improved weight shifting/load tolerance on right lower extremity today. Pt recently cleared by physician for return to work. Will await response from work prior to determination of any possible changes in treatment plan.    Herman Is progressing well towards his goals.   Pt prognosis is Good.     Pt will continue to benefit from skilled outpatient physical therapy to address the deficits listed in the problem list box on initial evaluation, provide pt/family education and to maximize pt's level of independence in the home and community environment.     Pt's spiritual, cultural and educational needs considered and pt agreeable to plan of care and goals.     Anticipated " barriers to physical therapy: right talocrural joint fusion, right foot osteoarthritis    GOALS: Short Term Goals:  4 weeks  1. Report decreased right ankle pain </= 1/10 to increase tolerance for ADLs.  2. Increase right ankle AROM by 2 degrees in each plane in order to walk with min to no compensation.  3. Pt will demo good sitting and standing posture for improved spine and joint alignment for improved biomechanics.  4. Pt to tolerate HEP to improve ROM and independence with ADL's.     Long Term Goals: 8 weeks  1. Report decreased right ankle pain </= 1/10 with stair stepping and squatting to increase tolerance for increased QoL and improved ADLs.  2. Patient goal: to get back to walking without quad cane and to work  3. Increase strength to >/= 4/5 for BLE to increase tolerance for ADL and work activities.  4. Pt will report at </= 38% impaired on ANKLE FOTO score to demo increased functional mobility.   5. Pt will be able to ambulate community distances and negotiate stairs with minimal ankle pain for increased functional mobility and QoL.  6. Pt will be able to ambulate all surfaces without AD use for increased functional independence.     PLAN     Cont per POC. Focusing on improving RLE WB.    Bradley Peter, PT

## 2023-10-16 ENCOUNTER — CLINICAL SUPPORT (OUTPATIENT)
Dept: REHABILITATION | Facility: HOSPITAL | Age: 53
End: 2023-10-16
Payer: MEDICAID

## 2023-10-16 DIAGNOSIS — Z74.09 DECREASED MOBILITY AND ENDURANCE: ICD-10-CM

## 2023-10-16 DIAGNOSIS — M25.671 ANKLE STIFFNESS, RIGHT: Primary | ICD-10-CM

## 2023-10-16 PROCEDURE — 97110 THERAPEUTIC EXERCISES: CPT | Mod: PN,CQ

## 2023-10-16 NOTE — PROGRESS NOTES
"[facility]   Physical Therapy Treatment Note     Name: Herman GUERRERO Kittrell  Clinic Number: [id number]    Therapy Diagnosis:   Encounter Diagnoses   Name Primary?    Ankle stiffness, right Yes    Decreased mobility and endurance        Physician: Ruthann Champion PA    Visit Date: 10/16/2023    Physician Orders: PT Eval and Treat  Medical Diagnosis from Referral:   M79.671 (ICD-10-CM) - Acute pain of right foot   M25.571,G89.29 (ICD-10-CM) - Chronic pain of right ankle   Z98.890 (ICD-10-CM) - Status post hardware removal    Evaluation Date: 9/13/2023  Authorization Period Expiration: 10/13/2023  Plan of Care Expiration: 11/10/23  Progress Note Due: 10/10/23  Visit # / Visits authorized: 1/1, 9/20  FOTO: Done 10/4/23  PTA Visit #: 1/5      Precautions: Standard, deafness in right ear, right Talocrual and calcaneal bony fusion    Time In: 10:10 AM   Time Out: 11:00 AM  Total Billable Time: 45 minutes (3 TE) - Medicaid    SUBJECTIVE     Pt reports: "I feel ok". Pt does not report complaints with no adverse reactions.     He was compliant with home exercise program.  Response to previous treatment: knee soreness  Functional change: walking more    Pain: 4/10 PRE Tx  Location: right ankle  Pain: 2/10 POST Tx  Location: right ankle    OBJECTIVE     Objective Measures updated at progress report unless specified.     Treatment     Herman received the treatments listed below:        therapeutic exercises to develop strength, endurance, ROM, flexibility, posture, and core stabilization for 30 minutes including:     -Sidelying hip abduction: 3x10 Bilateral, 1# on left and 0# on right  -Sidelying clams: 3x10x5'' holds, Bilateral   -Bridges: 3x10 double leg   -Straight leg raise: 3x10 each, 1# on left and 0# on right  -Right hip adductor stretch: 4x30'' holds right, done by PT  -Long arc quad/HSC: 30x, 4# Bilaterally  -theraband hamstring curls: red theraband 30x each    Not today  -Seated heel slides on " "ground: 2x15, 3# right    therapeutic activities to improve functional performance for 00 minutes, including:  Lateral step ups: 2x10 right, 4 inch step-NT  Sidestepping in // bars: 2 laps with min upper extremity use-NT    Gait training for 15 minutes includin'x2 with quad cane at Mod I-NT  Left to right weight shift + holds: 20x  Pre gait forward weight shift (right in front) + holds: 20x  Toe taps to step: 2x15 6" box, R LE in stance    Patient Education and Home Exercises     Home Exercises Provided and Patient Education Provided   Education provided:   - proper foot wear  - course of therapy, prognosis  - importance of HEP    Written Home Exercises Provided: yes. Exercises were reviewed and Herman was able to demonstrate them prior to the end of the session.  Herman demonstrated good  understanding of the education provided. See EMR under Patient Instructions for exercises provided during therapy sessions    ASSESSMENT     Herman is a 52 y.o. male referred to outpatient Physical Therapy with a medical diagnosis of Acute pain of right foot, Status post hardware removal, and Chronic pain of right ankle presenting to PT at [facility].  Herman reports he will have a physical performed to screen for work. He was able to complete today's session with no reports of increased R ankle / foot pain. He performed standing activities with appropriate weight distribution in // bars. Possibly approaching discharge from PT services.     Herman Is progressing well towards his goals.   Pt prognosis is Good.     Pt will continue to benefit from skilled outpatient physical therapy to address the deficits listed in the problem list box on initial evaluation, provide pt/family education and to maximize pt's level of independence in the home and community environment.     Pt's spiritual, cultural and educational needs considered and pt agreeable to plan of care and goals.     Anticipated barriers to " physical therapy: right talocrural joint fusion, right foot osteoarthritis    GOALS: Short Term Goals:  4 weeks  1. Report decreased right ankle pain </= 1/10 to increase tolerance for ADLs.  2. Increase right ankle AROM by 2 degrees in each plane in order to walk with min to no compensation.  3. Pt will demo good sitting and standing posture for improved spine and joint alignment for improved biomechanics.  4. Pt to tolerate HEP to improve ROM and independence with ADL's.     Long Term Goals: 8 weeks  1. Report decreased right ankle pain </= 1/10 with stair stepping and squatting to increase tolerance for increased QoL and improved ADLs.  2. Patient goal: to get back to walking without quad cane and to work  3. Increase strength to >/= 4/5 for BLE to increase tolerance for ADL and work activities.  4. Pt will report at </= 38% impaired on ANKLE FOTO score to demo increased functional mobility.   5. Pt will be able to ambulate community distances and negotiate stairs with minimal ankle pain for increased functional mobility and QoL.  6. Pt will be able to ambulate all surfaces without AD use for increased functional independence.     PLAN     Cont per POC. Focusing on improving RLE WB.    Rubén Daniel, PTA

## 2023-10-18 ENCOUNTER — CLINICAL SUPPORT (OUTPATIENT)
Dept: REHABILITATION | Facility: HOSPITAL | Age: 53
End: 2023-10-18
Payer: MEDICAID

## 2023-10-18 DIAGNOSIS — M25.671 ANKLE STIFFNESS, RIGHT: Primary | ICD-10-CM

## 2023-10-18 DIAGNOSIS — Z74.09 DECREASED MOBILITY AND ENDURANCE: ICD-10-CM

## 2023-10-18 PROCEDURE — 97110 THERAPEUTIC EXERCISES: CPT | Mod: PN

## 2023-10-18 NOTE — PROGRESS NOTES
[facility]   Physical Therapy Treatment and Discharge Note     Name: Herman GUERRERO [place]  Clinic Number: [id number]    Therapy Diagnosis:   Encounter Diagnoses   Name Primary?    Ankle stiffness, right Yes    Decreased mobility and endurance      Physician: Ruthann Champion PA    Visit Date: 10/18/2023    Physician Orders: PT Eval and Treat  Medical Diagnosis from Referral:   M79.671 (ICD-10-CM) - Acute pain of right foot   M25.571,G89.29 (ICD-10-CM) - Chronic pain of right ankle   Z98.890 (ICD-10-CM) - Status post hardware removal    Evaluation Date: 9/13/2023  Authorization Period Expiration: 10/13/2023  Plan of Care Expiration: 11/10/23  Progress Note Due: 10/10/23  Visit # / Visits authorized: 1/1, 10/20  FOTO: Done 10/4/23  PTA Visit #: 0/5      Precautions: Standard, deafness in right ear, right Talocrual and calcaneal bony fusion    Time In: 10:15 AM   Time Out: 11:00 AM  Total Billable Time: 45 minutes (3 TE) - Medicaid    SUBJECTIVE     Pt reports: Feels good and thinks he is ready to go back to work.     He was compliant with home exercise program.  Response to previous treatment: knee soreness  Functional change: walking more    Pain: 2/10 PRE Tx  Location: right ankle    OBJECTIVE     Objective Measures updated at progress report unless specified.     Gait: decreased RLE WB while using quad cane in left hand  Posture: left trunk shift on LLE  Sensation: WNL  Palpation: +tender to palpation at anterior aspect of TCJ and dorsum of foot at 1-3 cuneiforms      A/PROM and MMT  * = right ankle pain with testing  NT = Not tested  Hip   Right     Left   Pain/Dysfunction with Movement     AROM PROM MMT AROM PROM MMT     Flexion 100 105 4/5 110 115 4/5     Extension NT NT 4/5 NT NT 4/5     Abduction WFL WFL 3+/5 WFL WFL 4-/5     Adduction WFL WFL NT WFL WFL NT     Internal rotation WFL WFL NT WFL WFL NT     External rotation WFL WFL NT WFL WFL NT        Knee   Right     Left    Pain/Dysfunction with Movement     AROM PROM MMT AROM PROM MMT     Flexion (140 deg) 121 122 4-/5 116 118 4+/5     Extension (0-5 deg) 0 0 4-/5 0 0 4/5 Right knee hyperextension greater than left      Ankle   Right     Left   Pain/Dysfunction with Movement     AROM PROM MMT AROM PROM MMT     Great toe (45/70 deg) -- -- 5/5 -- -- 5/5     Plantarflexion (50 deg 14 14 NT WNL WNL 5/5     Dorsiflexion (20 deg) -0 0 NT WNL WNL 5/5     Inversion (20-30 deg) 5 5 NT WNL WNL 5/5     Eversion (5-10 deg) 5 5 NT WNL WNL 5/5        DL heel raise assessment = NT  SL heel raise assessment = NT     Special tests:  Anterior Drawer Test = TCJ bony fusion present  Calcaneofibular ligament stress test = NT  Interdigital Neuroma Test = negative B  Talar Tilit Test = not tested  Navicular drop test = not tested              (Difference of >10 mm is considered significant excessive foot pronation.)  Tinel's Sign Test (tarsal tunnel syndrome) = not tested  Blair test = no tested     Treatment     Herman received the treatments listed below:        therapeutic exercises to develop strength, endurance, ROM, flexibility, posture, and core stabilization for 30 minutes including: FOTO and reassessment     -Sidelying hip abduction: 3x10 Bilateral, 1# on left and 0# on right  -Sidelying clams: 3x10x5'' holds, Bilateral   -Bridges: 3x10 double leg   -Straight leg raise: 3x10 each, 1# on left and 0# on right  -Right hip adductor stretch: 4x30'' holds right, done by PT  -Long arc quad/HSC: 30x, 4# Bilaterally  -theraband hamstring curls: red theraband 30x each    Not today  -Seated heel slides on ground: 2x15, 3# right    therapeutic activities to improve functional performance for 00 minutes, including:  Lateral step ups: 2x10 right, 4 inch step-NT  Sidestepping in // bars: 2 laps with min upper extremity use-NT    Gait training for 15 minutes includin'x2 with quad cane at Mod I-NT  Left to right weight shift + holds: 20x  Pre gait forward  "weight shift (right in front) + holds: 20x  Toe taps to step: 2x15 6" box, R LE in stance    Patient Education and Home Exercises     Home Exercises Provided and Patient Education Provided   Education provided:   - proper foot wear  - course of therapy, prognosis  - importance of HEP    Written Home Exercises Provided: yes. Exercises were reviewed and Herman was able to demonstrate them prior to the end of the session.  Herman demonstrated good  understanding of the education provided. See EMR under Patient Instructions for exercises provided during therapy sessions    ASSESSMENT     Herman is a 52 y.o. male referred to outpatient Physical Therapy with a medical diagnosis of Acute pain of right foot, Status post hardware removal, and Chronic pain of right ankle presenting to PT at [facility].  Improved measurements compared to evaluation, no longer using SPC, and he feels that he can return back to work. 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 Pt discharged from PT.    Herman Is progressing well towards his goals.   Pt prognosis is Good.     Pt will continue to benefit from skilled outpatient physical therapy to address the deficits listed in the problem list box on initial evaluation, provide pt/family education and to maximize pt's level of independence in the home and community environment.     Pt's spiritual, cultural and educational needs considered and pt agreeable to plan of care and goals.     Anticipated barriers to physical therapy: right talocrural joint fusion, right foot osteoarthritis    GOALS: Short Term Goals:  4 weeks  1. Report decreased right ankle pain </= 1/10 to increase tolerance for ADLs. - MET  2. Increase right ankle AROM by 2 degrees in each plane in order to walk with min to no compensation. - MET  3. Pt will demo good sitting and standing posture for improved spine and joint " alignment for improved biomechanics. - MET  4. Pt to tolerate HEP to improve ROM and independence with ADL's. - MET     Long Term Goals: 8 weeks  1. Report decreased right ankle pain </= 1/10 with stair stepping and squatting to increase tolerance for increased QoL and improved ADLs. - MET  2. Patient goal: to get back to walking without quad cane and to work - MET  3. Increase strength to >/= 4/5 for BLE to increase tolerance for ADL and work activities. - MET  4. Pt will report at </= 38% impaired on ANKLE FOTO score to demo increased functional mobility.  - MET  5. Pt will be able to ambulate community distances and negotiate stairs with minimal ankle pain for increased functional mobility and QoL. - MET  6. Pt will be able to ambulate all surfaces without AD use for increased functional independence. - MET     PLAN     Pt discharged from PT.     Nate Ruiz, PT

## 2023-11-15 ENCOUNTER — OFFICE VISIT (OUTPATIENT)
Dept: FAMILY MEDICINE | Facility: HOSPITAL | Age: 53
End: 2023-11-15
Payer: MEDICAID

## 2023-11-15 DIAGNOSIS — G47.33 OSA (OBSTRUCTIVE SLEEP APNEA): Primary | ICD-10-CM

## 2023-11-15 NOTE — PROGRESS NOTES
"*This is a telephone encounter that is replacing a normal clinic visit.     Herman Floyd is a 53 y.o. male presenting for sleep medicine referral for DIANA evaluation. Went to health care at work and was determined to be "high risk" per their assessment. Positive for male, greater than 50, BMI > 35, neck circumference, witnessed snoring, apnea.     Review of Systems   HENT:  Positive for hearing loss.    Eyes:  Negative for discharge.   Respiratory:  Negative for wheezing.    Cardiovascular:  Negative for chest pain and palpitations.   Gastrointestinal:  Negative for blood in stool, constipation, diarrhea and vomiting.   Genitourinary:  Negative for hematuria and urgency.   Musculoskeletal:  Negative for neck pain.   Neurological:  Negative for weakness and headaches.   Endo/Heme/Allergies:  Negative for polydipsia.     Answers submitted by the patient for this visit:  Review of Systems Questionnaire (Submitted on 11/15/2023)  activity change: No  unexpected weight change: No  rhinorrhea: No  trouble swallowing: No  visual disturbance: No  chest tightness: No  polyuria: No  difficulty urinating: No  joint swelling: No  arthralgias: No  confusion: No  dysphoric mood: No    Assessment/Plan:    Diagnoses and all orders for this visit:    DIANA (obstructive sleep apnea)  - high risk sleep apnea, referral to be faxed to [facility] per patient request  -     Ambulatory referral/consult to Sleep Disorders; Future        Patient instructed to contact the clinic at [phone] with any additional questions or concerns.    Case discussed with Dr. Brian Bailon DO   [facility]   11/15/2023 4:49 PM    This note was partially created using [employer] Digital Voice Recognition software. There may be occasional misinterpreted words, typos, or grammatical errors that were not caught upon review.       "

## 2023-12-14 NOTE — PROGRESS NOTES
I have reviewed the notes, assessments, and/or procedures performed by Dr. Bailon, I concur with her/his documentation of Herman Floyd.  Date of Service: 11/15/2023. Morbid obesity and concern for DIANA. Sleep study ordered

## 2024-05-24 ENCOUNTER — OFFICE VISIT (OUTPATIENT)
Dept: OTOLARYNGOLOGY | Facility: CLINIC | Age: 54
End: 2024-05-24
Payer: MEDICAID

## 2024-05-24 VITALS — WEIGHT: 283.31 LBS | BODY MASS INDEX: 38.37 KG/M2 | HEIGHT: 72 IN

## 2024-05-24 DIAGNOSIS — H60.8X3 CHRONIC CONTACT OTITIS EXTERNA OF BOTH EARS: ICD-10-CM

## 2024-05-24 DIAGNOSIS — H60.392 ACUTE INFECTIVE OTITIS EXTERNA, LEFT: Primary | ICD-10-CM

## 2024-05-24 DIAGNOSIS — Z97.4 WEARS HEARING AID: ICD-10-CM

## 2024-05-24 PROCEDURE — 1159F MED LIST DOCD IN RCRD: CPT | Mod: CPTII,,, | Performed by: OTOLARYNGOLOGY

## 2024-05-24 PROCEDURE — 99213 OFFICE O/P EST LOW 20 MIN: CPT | Mod: PBBFAC,PN | Performed by: OTOLARYNGOLOGY

## 2024-05-24 PROCEDURE — 3008F BODY MASS INDEX DOCD: CPT | Mod: CPTII,,, | Performed by: OTOLARYNGOLOGY

## 2024-05-24 PROCEDURE — 99204 OFFICE O/P NEW MOD 45 MIN: CPT | Mod: S$PBB,,, | Performed by: OTOLARYNGOLOGY

## 2024-05-24 PROCEDURE — 1160F RVW MEDS BY RX/DR IN RCRD: CPT | Mod: CPTII,,, | Performed by: OTOLARYNGOLOGY

## 2024-05-24 PROCEDURE — 99999 PR PBB SHADOW E&M-EST. PATIENT-LVL III: CPT | Mod: PBBFAC,,, | Performed by: OTOLARYNGOLOGY

## 2024-05-24 RX ORDER — CIPROFLOXACIN AND DEXAMETHASONE 3; 1 MG/ML; MG/ML
SUSPENSION/ DROPS AURICULAR (OTIC)
Qty: 7.5 ML | Refills: 1 | Status: SHIPPED | OUTPATIENT
Start: 2024-05-24

## 2024-05-24 NOTE — PATIENT INSTRUCTIONS
Use the Ciprodex drops as prescribed to the left ear twice daily for a week.  Make sure to use them for least 3 days after all pain/tenderness has resolved.  Start them on the right side if the right side is getting worse in spite of routine ear care instructions as outlined below.      Consider using liquid antiperspirant to the outer part of the opening of the ear on a regular basis to try and prevent sweat associated with wearing hearing aids from building up in the deeper part of the ear.    Contact the office if the eardrops are too pricey for alternative eye drops which can be used into the ear possibly at a lower copayment.    ROUTINE EAR CARE    Keep the ears dry in general.  Water and soap dry the ears increases scaling by stripping away the natural oils of the ears.    A twisted single ply of facial tissue can be used to wick out moisture on the rare occasion when water gets stuck in the ear; or the water can be displaced with concentrated alcohol like OTC SwimEar or a few drops of 72-95% isopropyl alcohol to fill the ear canal.  Regular use will cause excess drying of the ears.    The ear should be kept moist in general with mineral oil. Three to four drops into the ear canal 2 to 3 times a week or even every night.    If the ears need to be irrigated use either a 50 50 mixture of white vinegar and distilled water or 50 50 mixture of alcohol and white vinegar.    Painful draining ears that do not resolve with conservative care could represent infection and may need microscopic office debridement/clearing of the wax, and topical antibiotic drops with or without steroids may need to be prescribed.      Return with any worsening of symptoms, failure to improve, or any other concerns for further evaluation and treatment.      Voice recognition software was used in the creation of this note/communication and any sound-alike errors which may have occurred from its use should be taken in context when  interpreting.  If such errors prevent a clear understanding of the note/communication, please contact the office for clarification.

## 2024-05-24 NOTE — PROGRESS NOTES
[facility]    Subjective:      Patient: Herman Floyd Patient PCP: Brenton Zhao MD         :  1970     Sex:  male      MRN:  [id number]          Date of Visit: 2024      Chief Complaint: Otitis Media (Pt wears hearing aids, pt has ear pain, started last week, pt states the opening on left ear hurts when he tries to put the hearing aid in after he's taken it out. No drainage, no dizziness. )      Patient ID: Herman Floyd is a 53 y.o. male     Patient is a  lifelong NON-smoker with a past medical history of pulmonary embolism, class 2 obesity, orthopedic injuries and repairs but no history of chronic ear disease eczema psoriasis diabetes or immunosuppression self-referred for hearing loss (wearing hearing aids) ear pain which began last week.  Tenderness at the meatus on the left side without active drainage.  No fevers or chills.    Labs:  WBC   Date Value Ref Range Status   2023 10.19 3.90 - 12.70 K/uL Final     Hemoglobin   Date Value Ref Range Status   2023 11.2 (L) 14.0 - 18.0 g/dL Final     Platelets   Date Value Ref Range Status   2023 432 150 - 450 K/uL Final     Creatinine   Date Value Ref Range Status   2023 1.1 0.5 - 1.4 mg/dL Final     TSH   Date Value Ref Range Status   2022 2.103 0.400 - 4.000 uIU/mL Final     Hemoglobin A1C   Date Value Ref Range Status   2023 4.7 4.0 - 5.6 % Final     Comment:     ADA Screening Guidelines:  5.7-6.4%  Consistent with prediabetes  >or=6.5%  Consistent with diabetes    High levels of fetal hemoglobin interfere with the HbA1C  assay. Heterozygous hemoglobin variants (HbS, HgC, etc)do  not significantly interfere with this assay.   However, presence of multiple variants may affect accuracy.         Past Medical History  He has a past medical history of Closed fracture of right ankle and Deafness in right ear.    Family / Surgical / Social History  His family history is not on file.    Past Surgical History:    Procedure Laterality Date    ANKLE FRACTURE SURGERY Right 08/2018    COLONOSCOPY N/A 8/17/2022    Procedure: COLONOSCOPY Suprep;  Surgeon: Jose Angel Leonardo MD;  Location: [facility];  Service: Endoscopy;  Laterality: N/A;    FINGER FRACTURE SURGERY         Social History     Tobacco Use    Smoking status: Never    Smokeless tobacco: Never   Substance and Sexual Activity    Alcohol use: No    Drug use: No    Sexual activity: Yes       Medications  He has a current medication list which includes the following prescription(s): aspirin, dalvance, diclofenac sodium, furosemide, hydrocodone-acetaminophen, ketoconazole, linezolid, oxycodone-acetaminophen, polyethylene glycol, and gabapentin, and the following Facility-Administered Medications: sodium chloride 0.9% and lidocaine (pf) 10 mg/ml (1%).      Allergies  Review of patient's allergies indicates:  No Known Allergies    All medications, allergies, and past history have been reviewed.    Objective:      Vitals:      8/17/2023    11:54 AM 9/20/2023    10:59 AM 5/24/2024    10:39 AM   Vitals - 1 value per visit   SYSTOLIC 136 121    DIASTOLIC 62 69    Pulse 84 77    Temp 98.4 °F (36.9 °C) 98.4 °F (36.9 °C)    Resp 18     SPO2 99 %     Weight (lb) 275 283.29 283.29   Weight (kg) 124.739 128.5 128.5   Height   6' (1.829 m)   BMI (Calculated) 37.3  38.4   Pain Score  One Zero       Body surface area is 2.55 meters squared.    Physical Exam:    GENERAL  APPEARANCE -  alert, appears stated age, cooperative, and moderately obese  BARRIER(S) TO COMMUNICATION -  poor hearing VOICE - appropriate for age and gender    INTEGUMENTARY  no suspicious head and neck lesions    HEENT  HEAD: Normocephalic, without obvious abnormality, atraumatic  FACE: INSPECTION - Symmetric, no signs of trauma, no suspicious lesion(s)      STRENGTH - facial symmetry intact     PALPATION -  No masses     SALIVARY GLANDS - non-tender with no appreciable mass    NECK/THYROID: normal atraumatic, no neck  masses, normal thyroid, no jvd    EYES  Normal occular alignment and mobility with no visible nystagmus at rest    EARS/NOSE/MOUTH/THROAT  EARS  PINNAE AND EXTERNAL EARS - no suspicious lesion OTOSCOPIC EXAM (surgical microscopy was used for visualization/instrumentation): EAR EXAM - some dryness on the right side no scaling noted no fissure.  Not tender to manipulation.  No edema or drainage.  Left ear on the other hand has some slight edema of the meatus and some purulence with minimal squamous material in the mid canal nothing obstructive.  No granulation tissue ulceration or bone exposure.  Mildly tender to manipulation not very notable.  No extension below the ear or posteriorly with any findings of any cellulitis.  HEARING - grossly intact to voice/finger rub    NOSE AND SINUSES  EXTERNAL NOSE - Grossly normal for age/sex  SEPTUM - normal/no obstruction on anterior exam without decongestion TURBINATES - within normal limits MUCOSA - within normal limits     MOUTH AND THROAT   ORAL CAVITY, LIPS, TEETH, GUMS & TONGUE - moist, no suspicious lesions  OROPHARYNX /TONSILS/PHARYNGEAL WALLS/HYPOPHARYNX - no erythema or exudates  NASOPHARYNX - limited mirror exam - unable to visualize due to anatomy/gag  LARYNX -  - limited mirror exam - unable to visualize due to anatomy/gag      CHEST AND LUNG   INSPECTION & AUSCULTATION - normal effort, no stridor    CARDIOVASCULAR  AUSCULTATION & PERIPHERAL VASCULAR - regular rate and rhythm.    NEUROLOGIC  MENTAL STATUS - alert, interactive CRANIAL NERVES - normal    LYMPHATIC  HEAD AND NECK - non-palpable; SUPRACLAVICULAR - deferred      Procedure(s):  None          Assessment:      Problem List Items Addressed This Visit    None  Visit Diagnoses       Acute infective otitis externa, left    -  Primary    Chronic contact otitis externa of both ears        Wears hearing aid                     Plan:      Ciprodex drops in the left ear.  Right ear if worsening.  Routine ear care  including antiperspirant to the ears given the trapping of moisture with hearing aids.  No indication of more significant infection needing further therapy.  Routine ear care critical.    Follow up as needed          Voice recognition software was used in the creation of this note/communication and any sound-alike errors which may have occurred from its use should be taken in context when interpreting.  If such errors prevent a clear understanding of the note/communication, please contact the office for clarification.

## 2024-08-29 DIAGNOSIS — Z01.89 PATIENT REQUEST FOR DIAGNOSTIC TESTING: Primary | ICD-10-CM

## 2024-09-05 ENCOUNTER — TELEPHONE (OUTPATIENT)
Dept: AUDIOLOGY | Facility: CLINIC | Age: 54
End: 2024-09-05
Payer: MEDICAID

## 2024-09-05 NOTE — TELEPHONE ENCOUNTER
----- Message from Joanie Bean sent at 9/5/2024  3:41 PM CDT -----  Regarding: Hearing aid check  Contact: [phone]  Type:  Needs Medical Advice    Who Called: Grace  Would the patient rather a call back or a response via [facility]? Call  Best Call Back Number: [phone]  Additional Information: Patient would like to have hearing aid checked..

## 2025-05-08 ENCOUNTER — OFFICE VISIT (OUTPATIENT)
Dept: FAMILY MEDICINE | Facility: HOSPITAL | Age: 55
End: 2025-05-08

## 2025-05-08 VITALS
HEIGHT: 72 IN | DIASTOLIC BLOOD PRESSURE: 83 MMHG | SYSTOLIC BLOOD PRESSURE: 138 MMHG | OXYGEN SATURATION: 93 % | BODY MASS INDEX: 42.4 KG/M2 | WEIGHT: 313.06 LBS | HEART RATE: 75 BPM

## 2025-05-08 DIAGNOSIS — Z13.1 SCREENING FOR DIABETES MELLITUS: ICD-10-CM

## 2025-05-08 DIAGNOSIS — Z23 ENCOUNTER FOR VACCINATION: ICD-10-CM

## 2025-05-08 DIAGNOSIS — Z76.89 ENCOUNTER FOR WEIGHT MANAGEMENT: ICD-10-CM

## 2025-05-08 PROCEDURE — 90677 PCV20 VACCINE IM: CPT

## 2025-05-08 PROCEDURE — 99213 OFFICE O/P EST LOW 20 MIN: CPT

## 2025-05-08 RX ADMIN — PNEUMOCOCCAL 20-VALENT CONJUGATE VACCINE 0.5 ML
2.2; 2.2; 2.2; 2.2; 2.2; 2.2; 2.2; 2.2; 2.2; 2.2; 2.2; 2.2; 2.2; 2.2; 2.2; 2.2; 4.4; 2.2; 2.2; 2.2 INJECTION, SUSPENSION INTRAMUSCULAR at 02:05

## 2025-05-08 NOTE — PROGRESS NOTES
Prevnar-20 vaccine 0.5ml given IM in left deltoid . Tolerated well . Instructed to remain in clinic x 15 min to observe for adverse reactions.-DP

## 2025-05-08 NOTE — PROGRESS NOTES
[facility]    Subjective:     Herman Floyd is a 54 y.o. year old male who presents to clinic for     General check up:  - Patient doing well overall, no acute complaints    Weight:  - Patient endorsing previous weight gain   - States most likely 2/2 sedentary job of   - States he is trying to increase exercise at home now    Patient Active Problem List    Diagnosis Date Noted    Acute pain of right foot 09/13/2023    Chronic pain of right ankle 09/13/2023    Status post hardware removal 09/13/2023    Soft tissue abscess 07/29/2023    Infection at site of external fixator pin 07/29/2023    Impaired functional mobility and activity tolerance 07/28/2023    Physical deconditioning 07/26/2023    Cellulitis of right lower extremity 07/26/2023    Constipation 07/26/2023    Sciatica of right side 08/25/2022    Decreased mobility and endurance 03/11/2021    Ankle stiffness, right 02/19/2020    Edema 09/26/2019    S/P ankle fusion 12/10/2018    Class 3 severe obesity due to excess calories with body mass index (BMI) of 40.0 to 44.9 in adult 12/03/2018    Tibia/fibula fracture, left, closed, with nonunion, subsequent encounter 11/28/2018    Leg pain 11/06/2018    Chest pain     Shortness of breath     Other pulmonary embolism without acute cor pulmonale 09/18/2018        Review of patient's allergies indicates:  No Known Allergies     Past Medical History:   Diagnosis Date    Closed fracture of right ankle 8/3/2018    Deafness in right ear       Past Surgical History:   Procedure Laterality Date    ANKLE FRACTURE SURGERY Right 08/2018    COLONOSCOPY N/A 8/17/2022    Procedure: COLONOSCOPY Suprep;  Surgeon: Jose Angel Leonardo MD;  Location: [facility];  Service: Endoscopy;  Laterality: N/A;    FINGER FRACTURE SURGERY        No family history on file.   Social History     Tobacco Use    Smoking status: Never    Smokeless tobacco: Never   Substance Use Topics    Alcohol use: No      Review of Systems    Constitutional:  Negative for chills and fever.   Respiratory:  Negative for shortness of breath.    Cardiovascular:  Negative for chest pain.   Gastrointestinal:  Negative for nausea and vomiting.   Neurological:  Negative for dizziness.     Objective:     Vitals:    05/08/25 1320   BP: 138/83   Pulse: 75   SpO2: (!) 93%   Weight: (!) 142 kg (313 lb 0.9 oz)   Height: 6' (1.829 m)     Physical Exam  Constitutional:       General: He is not in acute distress.     Appearance: He is not toxic-appearing or diaphoretic.   HENT:      Head: Normocephalic and atraumatic.      Right Ear: External ear normal.      Left Ear: External ear normal.      Nose: Nose normal.   Eyes:      Extraocular Movements: Extraocular movements intact.   Cardiovascular:      Pulses: Normal pulses.      Heart sounds: Normal heart sounds. No murmur heard.     No gallop.   Pulmonary:      Effort: Pulmonary effort is normal. No respiratory distress.      Breath sounds: Normal breath sounds. No wheezing.   Musculoskeletal:         General: Normal range of motion.   Skin:     General: Skin is warm and dry.   Neurological:      Mental Status: He is alert.   Psychiatric:         Mood and Affect: Mood normal.       Assessment/Plan:     Herman Floyd is a 54 y.o. year old male who presents to clinic for     1. Encounter for vaccination (Primary)  - pneumoc 20-chitra conj-dip cr(PF) (PREVNAR-20 (PF)) injection Syrg 0.5 mL    2. Screening for diabetes mellitus  - Hemoglobin A1C; Future  - Comprehensive Metabolic Panel; Future    3. Encounter for weight management  - Chronic  - Discussed with patient possible interventions including dieting, exercise, medications, if not contraindicated, and possibly bariatric surgery  - Patient declining these interventions currently, would like to pursue lifestyle modifications first    ________________________  Herman Garcia Jr, MD  [facility] PGY-2

## 2025-05-14 ENCOUNTER — PATIENT MESSAGE (OUTPATIENT)
Dept: FAMILY MEDICINE | Facility: HOSPITAL | Age: 55
End: 2025-05-14
Payer: OTHER MISCELLANEOUS

## 2025-05-14 DIAGNOSIS — R17 HIGH BILIRUBIN: Primary | ICD-10-CM

## 2025-06-01 NOTE — TELEPHONE ENCOUNTER
----- Message from Becca Borges sent at 9/27/2018 10:44 AM CDT -----  Contact: [phone]/ Elle with [facility]  Pharmacy would like to speak with you about rx of famotidine (PEPCID) 40 MG tablet not being received. Pt informed pharmacy it should have been received on 9/24/18. Please call and advise.   
Pharmacy notified that  Printed Rx instead of sending to pharmacy.  So Rx was given verbally.  
5

## 2025-06-23 ENCOUNTER — HOSPITAL ENCOUNTER (EMERGENCY)
Facility: HOSPITAL | Age: 55
Discharge: HOME | End: 2025-06-23
Attending: EMERGENCY MEDICINE
Payer: MEDICAID

## 2025-06-23 VITALS
HEIGHT: 72 IN | TEMPERATURE: 99 F | BODY MASS INDEX: 41.99 KG/M2 | OXYGEN SATURATION: 98 % | SYSTOLIC BLOOD PRESSURE: 130 MMHG | DIASTOLIC BLOOD PRESSURE: 74 MMHG | WEIGHT: 310 LBS | RESPIRATION RATE: 20 BRPM | HEART RATE: 73 BPM

## 2025-06-23 DIAGNOSIS — R60.9 DEPENDENT EDEMA: ICD-10-CM

## 2025-06-23 DIAGNOSIS — S99.911A ANKLE INJURY, RIGHT, INITIAL ENCOUNTER: ICD-10-CM

## 2025-06-23 DIAGNOSIS — M79.89 RIGHT LEG SWELLING: ICD-10-CM

## 2025-06-23 DIAGNOSIS — S99.929A FOOT INJURY: ICD-10-CM

## 2025-06-23 DIAGNOSIS — S89.92XA KNEE INJURY, LEFT, INITIAL ENCOUNTER: Primary | ICD-10-CM

## 2025-06-23 DIAGNOSIS — R52 PAIN: ICD-10-CM

## 2025-06-23 LAB
ABSOLUTE EOSINOPHIL (OHS): 0.1 K/UL
ABSOLUTE MONOCYTE (OHS): 0.46 K/UL (ref 0.3–1)
ABSOLUTE NEUTROPHIL COUNT (OHS): 2.16 K/UL (ref 1.8–7.7)
ALBUMIN SERPL BCP-MCNC: 4.2 G/DL (ref 3.5–5.2)
ALP SERPL-CCNC: 73 UNIT/L (ref 40–150)
ALT SERPL W/O P-5'-P-CCNC: 29 UNIT/L (ref 10–44)
ANION GAP (OHS): 7 MMOL/L (ref 8–16)
AST SERPL-CCNC: 33 UNIT/L (ref 11–45)
BASOPHILS # BLD AUTO: 0.04 K/UL
BASOPHILS NFR BLD AUTO: 0.8 %
BILIRUB SERPL-MCNC: 1.1 MG/DL (ref 0.1–1)
BNP SERPL-MCNC: <10 PG/ML (ref 0–99)
BUN SERPL-MCNC: 11 MG/DL (ref 6–20)
CALCIUM SERPL-MCNC: 9.5 MG/DL (ref 8.7–10.5)
CHLORIDE SERPL-SCNC: 105 MMOL/L (ref 95–110)
CO2 SERPL-SCNC: 25 MMOL/L (ref 23–29)
CREAT SERPL-MCNC: 1.1 MG/DL (ref 0.5–1.4)
ERYTHROCYTE [DISTWIDTH] IN BLOOD BY AUTOMATED COUNT: 12.1 % (ref 11.5–14.5)
GFR SERPLBLD CREATININE-BSD FMLA CKD-EPI: >60 ML/MIN/1.73/M2
GLUCOSE SERPL-MCNC: 82 MG/DL (ref 70–110)
HCT VFR BLD AUTO: 40.2 % (ref 40–54)
HGB BLD-MCNC: 13.6 GM/DL (ref 14–18)
IMM GRANULOCYTES # BLD AUTO: 0.01 K/UL (ref 0–0.04)
IMM GRANULOCYTES NFR BLD AUTO: 0.2 % (ref 0–0.5)
LYMPHOCYTES # BLD AUTO: 2.1 K/UL (ref 1–4.8)
MCH RBC QN AUTO: 30.4 PG (ref 27–31)
MCHC RBC AUTO-ENTMCNC: 33.8 G/DL (ref 32–36)
MCV RBC AUTO: 90 FL (ref 82–98)
NUCLEATED RBC (/100WBC) (OHS): 0 /100 WBC
PLATELET # BLD AUTO: 186 K/UL (ref 150–450)
PMV BLD AUTO: 9 FL (ref 9.2–12.9)
POTASSIUM SERPL-SCNC: 3.9 MMOL/L (ref 3.5–5.1)
PROT SERPL-MCNC: 8.4 GM/DL (ref 6–8.4)
RBC # BLD AUTO: 4.48 M/UL (ref 4.6–6.2)
RELATIVE EOSINOPHIL (OHS): 2.1 %
RELATIVE LYMPHOCYTE (OHS): 43.1 % (ref 18–48)
RELATIVE MONOCYTE (OHS): 9.4 % (ref 4–15)
RELATIVE NEUTROPHIL (OHS): 44.4 % (ref 38–73)
SODIUM SERPL-SCNC: 137 MMOL/L (ref 136–145)
WBC # BLD AUTO: 4.87 K/UL (ref 3.9–12.7)

## 2025-06-23 PROCEDURE — 82247 BILIRUBIN TOTAL: CPT

## 2025-06-23 PROCEDURE — 85025 COMPLETE CBC W/AUTO DIFF WBC: CPT

## 2025-06-23 PROCEDURE — 83880 ASSAY OF NATRIURETIC PEPTIDE: CPT

## 2025-06-23 PROCEDURE — 99285 EMERGENCY DEPT VISIT HI MDM: CPT | Mod: 25

## 2025-06-23 RX ORDER — DICLOFENAC SODIUM 10 MG/G
2 GEL TOPICAL 4 TIMES DAILY
Qty: 200 G | Refills: 0 | Status: SHIPPED | OUTPATIENT
Start: 2025-06-23

## 2025-06-23 RX ORDER — LIDOCAINE 50 MG/G
1 PATCH TOPICAL DAILY
Qty: 5 PATCH | Refills: 0 | Status: SHIPPED | OUTPATIENT
Start: 2025-06-23 | End: 2025-06-28

## 2025-06-23 RX ORDER — MELOXICAM 15 MG/1
15 TABLET ORAL DAILY
Qty: 14 TABLET | Refills: 0 | Status: SHIPPED | OUTPATIENT
Start: 2025-06-23 | End: 2025-07-07

## 2025-06-23 NOTE — ED TRIAGE NOTES
Presents awake, alert with c/o pain to left knee and right ankle x 2 months. No h/o injury. States he felt a pop in left left knee today when pushing down on clutch. Denies use of OTC meds. No distress noted.

## 2025-06-24 NOTE — ED PROVIDER NOTES
"Encounter Date: 6/23/2025       History     Chief Complaint   Patient presents with    Knee Pain     C/o worsening L knee pain x2 months. Denies trauma. Denies taking anything for sx. Pt reports feeling a "pop" sensation in knee.      Patient is a 54 y.o. male who presents to the ED for evaluation of multiple complaints.    Firstly, patient reports that he has been having intermittent left knee pain for 2 months.  Patient says that he is a  and pressed on his clutching earlier today and he felt a popping sensation in the left knee.  Patient reports that his knee pain has been exacerbated since this time.  Patient is able to bear weight and bend the knee.      Secondly, patient reports right ankle pain.  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  Patient patient has had several ankle surgeries and has previously had hardware infections/abscesses in the ankle that required surgical intervention.  Patient says that since he rolled his ankle last week, that his foot and ankle has been swollen.  Patient is able to ambulate and bear weight.      Patient has taken no medication for pain relief.  He has been wearing compression socks to help with the swelling.  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  Denies any other DVT risk factors.    Patient denies fever, chills, chest pain, shortness of breath, or any other complaints at this time.    The history is provided by the patient, medical records and the spouse.     Review of patient's allergies indicates:  No Known Allergies  Past Medical History:   Diagnosis Date    Closed fracture of right ankle 8/3/2018    Deafness in right ear      Past Surgical History:   Procedure Laterality Date    ANKLE FRACTURE SURGERY Right 08/2018    COLONOSCOPY N/A 8/17/2022    Procedure: COLONOSCOPY Suprep;  Surgeon: Jose Angel Leonardo MD;  Location: [facility]; "  Service: Endoscopy;  Laterality: N/A;    FINGER FRACTURE SURGERY       No family history on file.  Social History[1]  Review of Systems   Constitutional:  Negative for chills and fever.   Respiratory:  Negative for shortness of breath and wheezing.    Cardiovascular:  Positive for leg swelling. Negative for chest pain.   Gastrointestinal:  Negative for abdominal distention, abdominal pain, diarrhea, nausea and vomiting.   Genitourinary:  Negative for dysuria, flank pain, frequency and hematuria.   Musculoskeletal:  Positive for arthralgias and joint swelling. Negative for back pain, neck pain and neck stiffness.   Skin:  Negative for color change and rash.   Neurological:  Negative for weakness.   Hematological:  Does not bruise/bleed easily.   All other systems reviewed and are negative.      Physical Exam     Initial Vitals [06/23/25 1622]   BP Pulse Resp Temp SpO2   130/74 73 20 98.6 °F (37 °C) 98 %      MAP       --         Physical Exam    Constitutional: He appears well-developed and well-nourished. He is Obese .   HENT:   Head: Normocephalic and atraumatic.   Cardiovascular:  Normal rate.           Musculoskeletal:      Comments: Right leg: Diffuse swelling to right foot and right ankle that extends to the mid-calf.  No warmth, erythema, color change.  Full ROM of toes and knee.  Slight decreased ROM of ankle.  Achilles appears to be intact.  Mild generalized tenderness to the lateral ankle.  No bony tenderness.  2+ pedal pulse.  Homans sign negative.    Left leg:  Mild generalized tenderness to the anterior left knee.  Full ROM of knee, ankle, toes.  No swelling, warmth, erythema.  Homans sign negative.  2+ pedal pulse.     Neurological: He is alert.         ED Course   Procedures  Labs Reviewed   COMPREHENSIVE METABOLIC PANEL - Abnormal       Result Value    Sodium 137      Potassium 3.9      Chloride 105      CO2 25      Glucose 82      BUN 11      Creatinine 1.1      Calcium 9.5      Protein Total 8.4       Albumin 4.2      Bilirubin Total 1.1 (*)     ALP 73      AST 33      ALT 29      Anion Gap 7 (*)     eGFR >60     CBC WITH DIFFERENTIAL - Abnormal    WBC 4.87      RBC 4.48 (*)     HGB 13.6 (*)     HCT 40.2      MCV 90      MCH 30.4      MCHC 33.8      RDW 12.1      Platelet Count 186      MPV 9.0 (*)     Nucleated RBC 0      Neut % 44.4      Lymph % 43.1      Mono % 9.4      Eos % 2.1      Basophil % 0.8      Imm Grans % 0.2      Neut # 2.16      Lymph # 2.10      Mono # 0.46      Eos # 0.10      Baso # 0.04      Imm Grans # 0.01     B-TYPE NATRIURETIC PEPTIDE - Normal    BNP <10     CBC W/ AUTO DIFFERENTIAL    Narrative:     The following orders were created for panel order CBC auto differential.  Procedure                               Abnormality         Status                     ---------                               -----------         ------                     CBC with Differential[[id number]]       Abnormal            Final result                 Please view results for these tests on the individual orders.          Imaging Results              US Lower Extremity Veins Right (Final result)  Result time 06/23/25 19:29:14      Final result by Juwan Mariee MD (06/23/25 19:29:14)                   Impression:      No evidence of deep venous thrombosis in the right lower extremity.      Electronically signed by: Juwan Mariee MD  Date:    06/23/2025  Time:    19:29               Narrative:    EXAMINATION:  US LOWER EXTREMITY VEINS RIGHT    CLINICAL HISTORY:  Other specified soft tissue disorders    TECHNIQUE:  Duplex and color flow Doppler evaluation and graded compression of the right lower extremity veins was performed.    COMPARISON:  None    FINDINGS:  Duplex and color flow Doppler evaluation does not reveal any evidence of acute venous thrombosis in the common femoral, superficial femoral, greater saphenous, popliteal, peroneal, anterior tibial and posterior tibial veins of the right lower  extremity.  There is no reflux to suggest valvular incompetence.                                       X-Ray Ankle Complete Right (Final result)  Result time 06/23/25 18:56:52      Final result by Juwan Mariee MD (06/23/25 18:56:52)                   Impression:      1. Posttraumatic/degenerative changes involving the distal tibia, fibula, and talus.  No convincing acute displaced fracture or dislocation noting diffuse edema.  Correlation is advised.      Electronically signed by: Juwan Mariee MD  Date:    06/23/2025  Time:    18:56               Narrative:    EXAMINATION:  XR ANKLE COMPLETE 3 VIEW RIGHT    CLINICAL HISTORY:  Unspecified injury of right ankle, initial encounter    TECHNIQUE:  AP, lateral, and oblique images of the right ankle were performed.    COMPARISON:  08/17/2023    FINDINGS:  Three views right ankle.    There is diffuse edema about the lower extremity and ankle.  There is postsurgical/degenerative change involving the distal fibula and tibia noting fusion of the tibiotalar articulation as well as of the distal aspect of the tibia with the medial fibula.  No convincing acute displaced fracture or dislocation.  No radiopaque foreign body.                                       X-Ray Knee 3 View Left (Final result)  Result time 06/23/25 18:57:20      Final result by Juwan Mariee MD (06/23/25 18:57:20)                   Impression:      1. No acute displaced fracture or dislocation of the left knee.      Electronically signed by: Juwan Mariee MD  Date:    06/23/2025  Time:    18:57               Narrative:    EXAMINATION:  XR KNEE 3 VIEW LEFT    CLINICAL HISTORY:  Pain, unspecified    TECHNIQUE:  AP, lateral, and Merchant views of the left knee were performed.    COMPARISON:  None    FINDINGS:  Three views left knee.    There are degenerative changes of the knee.  No acute displaced fracture or dislocation of the knee.  No radiopaque foreign body.  There is edema about the lower  leg.                                       X-Ray Foot Complete Right (Final result)  Result time 06/23/25 18:58:22      Final result by Juwan Mariee MD (06/23/25 18:58:22)                   Impression:      1. No convincing acute displaced fracture or dislocation of the foot, please see separately dictated report for details of the lower leg and ankle.      Electronically signed by: Juwan Mariee MD  Date:    06/23/2025  Time:    18:58               Narrative:    EXAMINATION:  XR FOOT COMPLETE 3 VIEW RIGHT    CLINICAL HISTORY:  . Unspecified injury of unspecified foot, initial encounter    TECHNIQUE:  AP, lateral, and oblique views of the right foot were performed.    COMPARISON:  Tib fib radiograph 08/17/2023    FINDINGS:  Three views right foot.    There is osteopenia.  There are degenerative changes of the foot.  Please see separately dictated report for details of the ankle and hindfoot.  There is diffuse edema about the lower extremity and ankle.  There are degenerative changes of the midfoot.                                       Medications - No data to display  Medical Decision Making  Patient is a afebrile, well appearing 54 y.o.  male who presents for evaluation of left knee and right ankle pain.  Patient is able to ambulate. Denies cyanosis, pallor, decreased strength or sensation. Pulses normal. Neurovascularly intact. The patient remained comfortable and stable during their visit in the ED. Details of ED course documented in ED workup.     Differential Diagnosis includes, but is not limited to: Fracture, dislocation, cellulitis, bursitis, muscle strain, ligament tear/sprain, soft tissue contusion, osteoarthritis, hardware infection, abscess, cellulitis, DVT, dependent edema     All historical, clinical, and radiographic findings reviewed and discussed with patient.  Xray without any acute bony abnormalities.  No evidence of acute infection/septic joint in ankle or knee given x-ray results and  physical exam. Ultrasound not suggestive of DVT.  BNP WNL, low concern for CHF exacerbation.  It is possible that patient has soft tissue/ligamentous injuries to the knee/ankle.  I believe that swelling is likely exacerbated by dependent edema. There are no concerning features on physical exam to suggest an emergent or life threatening condition. No further intervention is indicated at this time, the patient is at low risk for an emergent/life threatening medical condition at this time and I am of the belief that that it is safe to discharge the patient from the emergency department.     Patient has been counseled regarding the need for follow-up as well as the indications to return to the emergency room should new or worrisome developments (including but not limited to worsening pain, cyanosis, or loss of strength or sensation) occur. Referral placed to orthopedics, podiatry, PM&R. Additionally, patient instructed to follow up with PCP in 2-3 days for recheck of today's complaints.    Discharge and follow-up instructions discussed with the patient who expressed understanding and willingness to comply with recommendations. Patient discharged from the emergency department in stable condition, in no acute distress.     Amount and/or Complexity of Data Reviewed  Labs: ordered. Decision-making details documented in ED Course.  Radiology: ordered.    Risk  Prescription drug management.               ED Course as of 06/24/25 1612   Mon Jun 23, 2025   1822 WBC: 4.87  No leukocytosis [OB]   1846 BNP: <10  BNP WNL [OB]   1927 X-ray foot independently reviewed: No convincing acute displaced fracture or dislocation of the foot, [OB]   1928 Xray ankle reviewed: Posttraumatic/degenerative changes involving the distal tibia, fibula, and talus.  No convincing acute displaced fracture or dislocation noting diffuse edema. [OB]   1941 Ultrasound lower extremity veins reviewed: No DVT noted [OB]      ED Course User Index  [OB] Ann  MARIANELA Mendes                           Clinical Impression:  Final diagnoses:  [R52] Pain  [S99.911A] Ankle injury, right, initial encounter  [S99.929A] Foot injury  [M79.89] Right leg swelling  [S89.92XA] Knee injury, left, initial encounter (Primary)  [R60.9] Dependent edema          ED Disposition Condition    Discharge Stable          ED Prescriptions       Medication Sig Dispense Start Date End Date Auth. Provider    meloxicam (MOBIC) 15 MG tablet Take 1 tablet (15 mg total) by mouth once daily. for 14 days 14 tablet 6/23/2025 7/7/2025 Cinthya Juarez PA-C    LIDOcaine (LIDODERM) 5 % Place 1 patch onto the skin once daily. Remove & Discard patch within 12 hours or as directed by MD for 5 days 5 patch 6/23/2025 6/28/2025 Cinthya Juarez PA-C    diclofenac sodium (VOLTAREN ARTHRITIS PAIN) 1 % Gel Apply 2 g topically 4 (four) times daily. 200 g 6/23/2025 -- Cinthya Juarez PA-C          Follow-up Information    None                  [1]   Social History  Tobacco Use    Smoking status: Never    Smokeless tobacco: Never   Substance Use Topics    Alcohol use: No    Drug use: No        Cinthya Juarez PA-C  06/24/25 1612

## 2025-06-24 NOTE — DISCHARGE INSTRUCTIONS
Thank you for letting me care for you today - it was nice to meet you and I hope you feel better soon. I have placed a referral to Podiatry, Orthopedics, and to Physical Medicine and Rehabilitation for follow up care. You can call [phone] ([phone]) to schedule. You can also schedule on the [facility] jr.     Over the counter: .You can also take 500-1000mg of Tylenol every 6-8 hours. No more than 3000mg of Tylenol per day.      I sent in the following medication to your pharmacy:   Mobic: once a day. Do not take ibuprofen or Aleve on days when you take this medication  Lidoderm patches: You can apply this to the area that is causing pain and leave it on for up to 12 hours. If you find this is helpful, you can also buy these over the counter.    Voltaren Gel: You can rub this on the area that is causing you pain 4 times per day. You can rub this on the area that is causing you pain 4 times per day.  If you find this is helpful, you can also buy them over the counter.        Our goal at [facility] is to always give you outstanding care and exceptional service. You may receive a survey by mail or email in the next week about your experience in our ED. We would greatly appreciate you completing and returning the survey. Your feedback provides us with a way to recognize our staff who give very good care and it helps us learn how to improve when your experience was below our aspiration of excellence.     All the best,     Cinthya Juarez, MPH, PA-C  Emergency Department Physician Assistant  [facility], [facility], [facility]

## 2025-07-07 ENCOUNTER — OFFICE VISIT (OUTPATIENT)
Dept: FAMILY MEDICINE | Facility: HOSPITAL | Age: 55
End: 2025-07-07
Payer: MEDICAID

## 2025-07-07 VITALS
BODY MASS INDEX: 41.86 KG/M2 | OXYGEN SATURATION: 96 % | RESPIRATION RATE: 18 BRPM | HEIGHT: 72 IN | HEART RATE: 70 BPM | SYSTOLIC BLOOD PRESSURE: 122 MMHG | WEIGHT: 309.06 LBS | DIASTOLIC BLOOD PRESSURE: 69 MMHG

## 2025-07-07 DIAGNOSIS — E78.5 DYSLIPIDEMIA (HIGH LDL; LOW HDL): Primary | ICD-10-CM

## 2025-07-07 DIAGNOSIS — R74.8 ELEVATED LIVER ENZYMES: ICD-10-CM

## 2025-07-07 DIAGNOSIS — R60.0 BILATERAL LEG EDEMA: ICD-10-CM

## 2025-07-07 DIAGNOSIS — R53.83 FATIGUE, UNSPECIFIED TYPE: ICD-10-CM

## 2025-07-07 PROCEDURE — 99214 OFFICE O/P EST MOD 30 MIN: CPT

## 2025-07-07 NOTE — PROGRESS NOTES
[facility]    Subjective:     Herman Floyd is a 54 y.o. year old male who presents to clinic for follow up    #fatigue  Patient reports fatigue for years and that he falls asleep easily. He reports that he especially falls asleep after eating in about 10 minutes. Denies daytime fatigue, denies history of snoring or apneic events.       #bilateral edema  History of bilateral edema. He reports that it has been worsening for the past couple years. Denies pain behind knees, denies SOB or chest pain.     Patient Active Problem List    Diagnosis Date Noted    Acute pain of right foot 09/13/2023    Chronic pain of right ankle 09/13/2023    Status post hardware removal 09/13/2023    Soft tissue abscess 07/29/2023    Infection at site of external fixator pin 07/29/2023    Impaired functional mobility and activity tolerance 07/28/2023    Physical deconditioning 07/26/2023    Cellulitis of right lower extremity 07/26/2023    Constipation 07/26/2023    Sciatica of right side 08/25/2022    Decreased mobility and endurance 03/11/2021    Ankle stiffness, right 02/19/2020    Edema 09/26/2019    S/P ankle fusion 12/10/2018    Class 3 severe obesity due to excess calories with body mass index (BMI) of 40.0 to 44.9 in adult 12/03/2018    Tibia/fibula fracture, left, closed, with nonunion, subsequent encounter 11/28/2018    Leg pain 11/06/2018    Chest pain     Shortness of breath     Other pulmonary embolism without acute cor pulmonale 09/18/2018        Review of patient's allergies indicates:  No Known Allergies     Past Medical History:   Diagnosis Date    Closed fracture of right ankle 8/3/2018    Deafness in right ear       Past Surgical History:   Procedure Laterality Date    ANKLE FRACTURE SURGERY Right 08/2018    COLONOSCOPY N/A 8/17/2022    Procedure: COLONOSCOPY Suprep;  Surgeon: Jose Angel Leonardo MD;  Location: [facility];  Service: Endoscopy;  Laterality: N/A;    FINGER FRACTURE SURGERY        No family history on  file.   Social History     Tobacco Use    Smoking status: Never    Smokeless tobacco: Never   Substance Use Topics    Alcohol use: No        Objective:     Vitals:    07/07/25 1626   BP: 122/69   BP Location: Left arm   Patient Position: Sitting   Pulse: 70   Resp: 18   SpO2: 96%   Weight: (!) 140.2 kg (309 lb 1.4 oz)   Height: 6' (1.829 m)     BMI: Body mass index is 41.92 kg/m².      Physical Exam  Vitals reviewed.   Constitutional:       General: He is not in acute distress.     Appearance: Normal appearance. He is not ill-appearing, toxic-appearing or diaphoretic.   HENT:      Head: Normocephalic and atraumatic.   Eyes:      General: No scleral icterus.     Extraocular Movements: Extraocular movements intact.      Pupils: Pupils are equal, round, and reactive to light.   Cardiovascular:      Rate and Rhythm: Normal rate.      Pulses: Normal pulses.      Heart sounds: Normal heart sounds. No murmur heard.     No friction rub. No gallop.   Pulmonary:      Effort: Pulmonary effort is normal. No respiratory distress.      Breath sounds: No wheezing or rales.   Abdominal:      General: Bowel sounds are normal. There is no distension.      Tenderness: There is no abdominal tenderness. There is no guarding.   Musculoskeletal:         General: Normal range of motion.      Right lower leg: Edema present.      Left lower leg: Edema present.      Comments: Pulses appreciable bilaterally on pedal pulses  No pain in back of knees bilaterally   Skin:     General: Skin is warm.      Coloration: Skin is not jaundiced.      Findings: No bruising or rash.   Neurological:      General: No focal deficit present.      Mental Status: He is alert and oriented to person, place, and time. Mental status is at baseline.      Motor: No weakness.          Assessment/Plan:     Herman Floyd is a 54 y.o. year old male who presents to clinic for follow up    1. Dyslipidemia (high LDL; low HDL) (Primary)  Patient with history of HLD in  2022. No new lipid panels and currently not on medications. Will recheck at this time.  - Lipid Panel; Future      2. Elevated liver enzymes  Patient with history of elevated liver enzymes on several previous CMPs. Will recheck.  - Comprehensive Metabolic Panel; Future    3. Fatigue, unspecified type  Patient with years history of fatigue. Does have BMI of 41 but denies symptoms of DIANA. Will check TSH at this time, as the last time it was checked was 2022.  - TSH; Future  - T4, Free; Future    4. Bilateral leg edema  Patient with bilateral leg edema. No previous history of HF, denies chest pain or SOB and normal lung exam. Likely venous stasis, recommended compression socks.       Follow-up:6 months or PRN    A total of 25 minutes was spent on patient care during this encounter which included chart review, examining the patient, formulating a treatment plan and documentation.       Manuel Huertas MD  [facility], PGY-3  07/07/2025

## (undated) DEVICE — SEE MEDLINE ITEM 157116

## (undated) DEVICE — GLOVE 8 PROTEXIS PI BLUE

## (undated) DEVICE — SEE MEDLINE ITEM 146271

## (undated) DEVICE — DRAPE C-ARMOR EQUIPMENT COVER

## (undated) DEVICE — SEE MEDLINE ITEM 154981

## (undated) DEVICE — SEE L#120831

## (undated) DEVICE — DRAPE STERI U-SHAPED 47X51IN

## (undated) DEVICE — Device

## (undated) DEVICE — GOWN SURGICAL XX LARGE X LONG

## (undated) DEVICE — GAUZE SPONGE 4X4 12PLY

## (undated) DEVICE — DRAPE C-ARM/MOBILE XRAY 44X80

## (undated) DEVICE — SPONGE DERMACEA GAUZE 4X4

## (undated) DEVICE — SCRUB SURGICAL BETASEPT 4%4 OZ

## (undated) DEVICE — SEE MEDLINE ITEM 146231

## (undated) DEVICE — SEE MEDLINE ITEM 152529

## (undated) DEVICE — SPONGE LAP 18X18 PREWASHED

## (undated) DEVICE — ALCOHOL 70% ISOP W/GREEN 16OZ

## (undated) DEVICE — GLOVE BIOGEL ECLIPSE SZ 7.5

## (undated) DEVICE — DRESSING XEROFORM FOIL PK 1X8

## (undated) DEVICE — MANIFOLD 4 PORT

## (undated) DEVICE — COVER OVERHEAD SURG LT BLUE

## (undated) DEVICE — ELECTRODE REM PLYHSV RETURN 9

## (undated) DEVICE — DRAPE SURGICAL STERI INCISE

## (undated) DEVICE — SCRUB 10% POVIDONE IODINE 4OZ

## (undated) DEVICE — GLOVE 8 PROTEXIS PI ORTHO

## (undated) DEVICE — TOURNIQUET SB QC DP 34X4IN

## (undated) DEVICE — SEE MEDLINE ITEM 156953

## (undated) DEVICE — DRAPE STERI INSTRUMENT 1018

## (undated) DEVICE — DRAPE PLASTIC U 60X72

## (undated) DEVICE — PAD PREP 50/CA